# Patient Record
Sex: MALE | Race: WHITE | Employment: UNEMPLOYED | URBAN - METROPOLITAN AREA
[De-identification: names, ages, dates, MRNs, and addresses within clinical notes are randomized per-mention and may not be internally consistent; named-entity substitution may affect disease eponyms.]

---

## 2019-11-29 ENCOUNTER — TRANSFERRED RECORDS (OUTPATIENT)
Dept: HEALTH INFORMATION MANAGEMENT | Facility: CLINIC | Age: 64
End: 2019-11-29

## 2019-11-30 ENCOUNTER — TRANSFERRED RECORDS (OUTPATIENT)
Dept: HEALTH INFORMATION MANAGEMENT | Facility: CLINIC | Age: 64
End: 2019-11-30

## 2019-12-01 ENCOUNTER — APPOINTMENT (OUTPATIENT)
Dept: MRI IMAGING | Facility: CLINIC | Age: 64
DRG: 003 | End: 2019-12-01
Attending: NEUROLOGICAL SURGERY
Payer: COMMERCIAL

## 2019-12-01 ENCOUNTER — ANESTHESIA (OUTPATIENT)
Dept: SURGERY | Facility: CLINIC | Age: 64
DRG: 003 | End: 2019-12-01
Payer: COMMERCIAL

## 2019-12-01 ENCOUNTER — APPOINTMENT (OUTPATIENT)
Dept: GENERAL RADIOLOGY | Facility: CLINIC | Age: 64
DRG: 003 | End: 2019-12-01
Attending: PSYCHIATRY & NEUROLOGY
Payer: COMMERCIAL

## 2019-12-01 ENCOUNTER — APPOINTMENT (OUTPATIENT)
Dept: GENERAL RADIOLOGY | Facility: CLINIC | Age: 64
DRG: 003 | End: 2019-12-01
Attending: NEUROLOGICAL SURGERY
Payer: COMMERCIAL

## 2019-12-01 ENCOUNTER — APPOINTMENT (OUTPATIENT)
Dept: GENERAL RADIOLOGY | Facility: CLINIC | Age: 64
DRG: 003 | End: 2019-12-01
Attending: ANESTHESIOLOGY
Payer: COMMERCIAL

## 2019-12-01 ENCOUNTER — APPOINTMENT (OUTPATIENT)
Dept: GENERAL RADIOLOGY | Facility: CLINIC | Age: 64
DRG: 003 | End: 2019-12-01
Attending: STUDENT IN AN ORGANIZED HEALTH CARE EDUCATION/TRAINING PROGRAM
Payer: COMMERCIAL

## 2019-12-01 ENCOUNTER — HOSPITAL ENCOUNTER (INPATIENT)
Facility: CLINIC | Age: 64
LOS: 30 days | Discharge: ANOTHER HOSPITAL | DRG: 003 | End: 2019-12-31
Attending: NEUROLOGICAL SURGERY | Admitting: NEUROLOGICAL SURGERY
Payer: COMMERCIAL

## 2019-12-01 ENCOUNTER — ANESTHESIA EVENT (OUTPATIENT)
Dept: SURGERY | Facility: CLINIC | Age: 64
DRG: 003 | End: 2019-12-01
Payer: COMMERCIAL

## 2019-12-01 DIAGNOSIS — F10.10 ALCOHOL ABUSE: ICD-10-CM

## 2019-12-01 DIAGNOSIS — I10 BENIGN ESSENTIAL HYPERTENSION: ICD-10-CM

## 2019-12-01 DIAGNOSIS — K29.70 GASTRITIS WITHOUT BLEEDING, UNSPECIFIED CHRONICITY, UNSPECIFIED GASTRITIS TYPE: ICD-10-CM

## 2019-12-01 DIAGNOSIS — R52 PAIN: ICD-10-CM

## 2019-12-01 DIAGNOSIS — J96.01 ACUTE RESPIRATORY FAILURE WITH HYPOXIA (H): ICD-10-CM

## 2019-12-01 DIAGNOSIS — G82.50 QUADRIPLEGIA (H): Primary | ICD-10-CM

## 2019-12-01 DIAGNOSIS — S14.109A: ICD-10-CM

## 2019-12-01 DIAGNOSIS — K59.00 CONSTIPATION, UNSPECIFIED CONSTIPATION TYPE: ICD-10-CM

## 2019-12-01 DIAGNOSIS — F32.A DEPRESSION, UNSPECIFIED DEPRESSION TYPE: ICD-10-CM

## 2019-12-01 DIAGNOSIS — Z43.0 TRACHEOSTOMY CARE (H): ICD-10-CM

## 2019-12-01 DIAGNOSIS — H04.123 DRY EYES: ICD-10-CM

## 2019-12-01 DIAGNOSIS — G82.20 PARAPLEGIA (H): ICD-10-CM

## 2019-12-01 LAB
ABO + RH BLD: NORMAL
ABO + RH BLD: NORMAL
ALBUMIN SERPL-MCNC: 2.1 G/DL (ref 3.4–5)
ALBUMIN UR-MCNC: 100 MG/DL
ALP SERPL-CCNC: 103 U/L (ref 40–150)
ALT SERPL W P-5'-P-CCNC: 32 U/L (ref 0–70)
ANION GAP SERPL CALCULATED.3IONS-SCNC: 7 MMOL/L (ref 3–14)
APPEARANCE UR: CLEAR
APTT PPP: 35 SEC (ref 22–37)
AST SERPL W P-5'-P-CCNC: 17 U/L (ref 0–45)
BASE EXCESS BLDA CALC-SCNC: 3 MMOL/L
BASE EXCESS BLDA CALC-SCNC: 4.8 MMOL/L
BASE EXCESS BLDA CALC-SCNC: 5.9 MMOL/L
BILIRUB SERPL-MCNC: 0.6 MG/DL (ref 0.2–1.3)
BILIRUB UR QL STRIP: NEGATIVE
BLD GP AB SCN SERPL QL: NORMAL
BLD PROD TYP BPU: NORMAL
BLOOD BANK CMNT PATIENT-IMP: NORMAL
BUN SERPL-MCNC: 27 MG/DL (ref 7–30)
CA-I BLD-MCNC: 4.4 MG/DL (ref 4.4–5.2)
CA-I BLD-MCNC: 4.4 MG/DL (ref 4.4–5.2)
CALCIUM SERPL-MCNC: 8.7 MG/DL (ref 8.5–10.1)
CHLORIDE SERPL-SCNC: 94 MMOL/L (ref 94–109)
CO2 SERPL-SCNC: 31 MMOL/L (ref 20–32)
COLOR UR AUTO: YELLOW
CREAT SERPL-MCNC: 0.75 MG/DL (ref 0.66–1.25)
CRP SERPL-MCNC: 170 MG/L (ref 0–8)
ERYTHROCYTE [DISTWIDTH] IN BLOOD BY AUTOMATED COUNT: 12.1 % (ref 10–15)
GFR SERPL CREATININE-BSD FRML MDRD: >90 ML/MIN/{1.73_M2}
GLUCOSE BLD-MCNC: 188 MG/DL (ref 70–99)
GLUCOSE BLD-MCNC: 195 MG/DL (ref 70–99)
GLUCOSE BLDC GLUCOMTR-MCNC: 140 MG/DL (ref 70–99)
GLUCOSE BLDC GLUCOMTR-MCNC: 168 MG/DL (ref 70–99)
GLUCOSE BLDC GLUCOMTR-MCNC: 172 MG/DL (ref 70–99)
GLUCOSE BLDC GLUCOMTR-MCNC: 175 MG/DL (ref 70–99)
GLUCOSE BLDC GLUCOMTR-MCNC: 209 MG/DL (ref 70–99)
GLUCOSE BLDC GLUCOMTR-MCNC: 218 MG/DL (ref 70–99)
GLUCOSE SERPL-MCNC: 230 MG/DL (ref 70–99)
GLUCOSE UR STRIP-MCNC: NEGATIVE MG/DL
GRAM STN SPEC: ABNORMAL
HBA1C MFR BLD: 6.5 % (ref 0–5.6)
HCO3 BLD-SCNC: 27 MMOL/L (ref 21–28)
HCO3 BLD-SCNC: 30 MMOL/L (ref 21–28)
HCO3 BLD-SCNC: 31 MMOL/L (ref 21–28)
HCT VFR BLD AUTO: 36.5 % (ref 40–53)
HGB BLD-MCNC: 11.5 G/DL (ref 13.3–17.7)
HGB BLD-MCNC: 11.7 G/DL (ref 13.3–17.7)
HGB BLD-MCNC: 11.9 G/DL (ref 13.3–17.7)
HGB UR QL STRIP: NEGATIVE
INR PPP: 1.17 (ref 0.86–1.14)
KETONES UR STRIP-MCNC: NEGATIVE MG/DL
LACTATE BLD-SCNC: 1.2 MMOL/L (ref 0.7–2)
LEUKOCYTE ESTERASE UR QL STRIP: NEGATIVE
MAGNESIUM SERPL-MCNC: 2.2 MG/DL (ref 1.6–2.3)
MCH RBC QN AUTO: 29 PG (ref 26.5–33)
MCHC RBC AUTO-ENTMCNC: 32.1 G/DL (ref 31.5–36.5)
MCV RBC AUTO: 90 FL (ref 78–100)
MRSA DNA SPEC QL NAA+PROBE: NEGATIVE
NITRATE UR QL: NEGATIVE
NUM BPU REQUESTED: 2
O2/TOTAL GAS SETTING VFR VENT: 100 %
O2/TOTAL GAS SETTING VFR VENT: 100 %
O2/TOTAL GAS SETTING VFR VENT: ABNORMAL %
PCO2 BLD: 40 MM HG (ref 35–45)
PCO2 BLD: 43 MM HG (ref 35–45)
PCO2 BLD: 44 MM HG (ref 35–45)
PH BLD: 7.44 PH (ref 7.35–7.45)
PH BLD: 7.44 PH (ref 7.35–7.45)
PH BLD: 7.45 PH (ref 7.35–7.45)
PH UR STRIP: 6 PH (ref 5–7)
PHOSPHATE SERPL-MCNC: 4.1 MG/DL (ref 2.5–4.5)
PLATELET # BLD AUTO: 565 10E9/L (ref 150–450)
PO2 BLD: 114 MM HG (ref 80–105)
PO2 BLD: 192 MM HG (ref 80–105)
PO2 BLD: 61 MM HG (ref 80–105)
POTASSIUM BLD-SCNC: 4.6 MMOL/L (ref 3.4–5.3)
POTASSIUM BLD-SCNC: 4.7 MMOL/L (ref 3.4–5.3)
POTASSIUM SERPL-SCNC: 4.4 MMOL/L (ref 3.4–5.3)
PROT SERPL-MCNC: 7.4 G/DL (ref 6.8–8.8)
RADIOLOGIST FLAGS: ABNORMAL
RBC # BLD AUTO: 4.04 10E12/L (ref 4.4–5.9)
RBC #/AREA URNS AUTO: 2 /HPF (ref 0–2)
SODIUM BLD-SCNC: 135 MMOL/L (ref 133–144)
SODIUM BLD-SCNC: 135 MMOL/L (ref 133–144)
SODIUM SERPL-SCNC: 133 MMOL/L (ref 133–144)
SOURCE: ABNORMAL
SP GR UR STRIP: 1.03 (ref 1–1.03)
SPECIMEN EXP DATE BLD: NORMAL
SPECIMEN SOURCE: ABNORMAL
SPECIMEN SOURCE: NORMAL
TRANS CELLS #/AREA URNS HPF: <1 /HPF (ref 0–1)
UROBILINOGEN UR STRIP-MCNC: NORMAL MG/DL (ref 0–2)
WBC # BLD AUTO: 13.1 10E9/L (ref 4–11)
WBC #/AREA URNS AUTO: 2 /HPF (ref 0–5)

## 2019-12-01 PROCEDURE — 36000064 ZZH SURGERY LEVEL 4 EA 15 ADDTL MIN - UMMC: Performed by: NEUROLOGICAL SURGERY

## 2019-12-01 PROCEDURE — 87205 SMEAR GRAM STAIN: CPT | Performed by: NEUROLOGICAL SURGERY

## 2019-12-01 PROCEDURE — 87040 BLOOD CULTURE FOR BACTERIA: CPT | Performed by: STUDENT IN AN ORGANIZED HEALTH CARE EDUCATION/TRAINING PROGRAM

## 2019-12-01 PROCEDURE — 0RP30JZ REMOVAL OF SYNTHETIC SUBSTITUTE FROM CERVICAL VERTEBRAL DISC, OPEN APPROACH: ICD-10-PCS | Performed by: NEUROLOGICAL SURGERY

## 2019-12-01 PROCEDURE — 85610 PROTHROMBIN TIME: CPT | Performed by: STUDENT IN AN ORGANIZED HEALTH CARE EDUCATION/TRAINING PROGRAM

## 2019-12-01 PROCEDURE — 86901 BLOOD TYPING SEROLOGIC RH(D): CPT | Performed by: STUDENT IN AN ORGANIZED HEALTH CARE EDUCATION/TRAINING PROGRAM

## 2019-12-01 PROCEDURE — 37000008 ZZH ANESTHESIA TECHNICAL FEE, 1ST 30 MIN: Performed by: NEUROLOGICAL SURGERY

## 2019-12-01 PROCEDURE — 25800030 ZZH RX IP 258 OP 636: Performed by: NEUROLOGICAL SURGERY

## 2019-12-01 PROCEDURE — 25000131 ZZH RX MED GY IP 250 OP 636 PS 637: Performed by: STUDENT IN AN ORGANIZED HEALTH CARE EDUCATION/TRAINING PROGRAM

## 2019-12-01 PROCEDURE — 40000275 ZZH STATISTIC RCP TIME EA 10 MIN

## 2019-12-01 PROCEDURE — 25800030 ZZH RX IP 258 OP 636: Performed by: STUDENT IN AN ORGANIZED HEALTH CARE EDUCATION/TRAINING PROGRAM

## 2019-12-01 PROCEDURE — 27210794 ZZH OR GENERAL SUPPLY STERILE: Performed by: NEUROLOGICAL SURGERY

## 2019-12-01 PROCEDURE — 25000125 ZZHC RX 250: Performed by: NEUROLOGICAL SURGERY

## 2019-12-01 PROCEDURE — 25500064 ZZH RX 255 OP 636: Performed by: NEUROLOGICAL SURGERY

## 2019-12-01 PROCEDURE — 40000277 XR SURGERY CARM FLUORO LESS THAN 5 MIN W STILLS: Mod: TC

## 2019-12-01 PROCEDURE — 40000986 XR ABDOMEN PORT 1 VW

## 2019-12-01 PROCEDURE — 25000565 ZZH ISOFLURANE, EA 15 MIN: Performed by: NEUROLOGICAL SURGERY

## 2019-12-01 PROCEDURE — 37000009 ZZH ANESTHESIA TECHNICAL FEE, EACH ADDTL 15 MIN: Performed by: NEUROLOGICAL SURGERY

## 2019-12-01 PROCEDURE — 83735 ASSAY OF MAGNESIUM: CPT | Performed by: STUDENT IN AN ORGANIZED HEALTH CARE EDUCATION/TRAINING PROGRAM

## 2019-12-01 PROCEDURE — 87102 FUNGUS ISOLATION CULTURE: CPT | Performed by: NEUROLOGICAL SURGERY

## 2019-12-01 PROCEDURE — 25800030 ZZH RX IP 258 OP 636

## 2019-12-01 PROCEDURE — 25000125 ZZHC RX 250: Performed by: STUDENT IN AN ORGANIZED HEALTH CARE EDUCATION/TRAINING PROGRAM

## 2019-12-01 PROCEDURE — 87075 CULTR BACTERIA EXCEPT BLOOD: CPT | Performed by: NEUROLOGICAL SURGERY

## 2019-12-01 PROCEDURE — 36000066 ZZH SURGERY LEVEL 4 W FLUORO 1ST 30 MIN - UMMC: Performed by: NEUROLOGICAL SURGERY

## 2019-12-01 PROCEDURE — L1499 SPINAL ORTHOSIS NOS: HCPCS

## 2019-12-01 PROCEDURE — 87185 SC STD ENZYME DETCJ PER NZM: CPT | Performed by: NEUROLOGICAL SURGERY

## 2019-12-01 PROCEDURE — 25000132 ZZH RX MED GY IP 250 OP 250 PS 637: Performed by: STUDENT IN AN ORGANIZED HEALTH CARE EDUCATION/TRAINING PROGRAM

## 2019-12-01 PROCEDURE — 25800030 ZZH RX IP 258 OP 636: Performed by: NURSE ANESTHETIST, CERTIFIED REGISTERED

## 2019-12-01 PROCEDURE — 87070 CULTURE OTHR SPECIMN AEROBIC: CPT | Performed by: NEUROLOGICAL SURGERY

## 2019-12-01 PROCEDURE — 25000128 H RX IP 250 OP 636: Performed by: NEUROLOGICAL SURGERY

## 2019-12-01 PROCEDURE — L0172 CERV COL SR FOAM 2PC PRE OTS: HCPCS

## 2019-12-01 PROCEDURE — 0CQM0ZZ REPAIR PHARYNX, OPEN APPROACH: ICD-10-PCS | Performed by: OTOLARYNGOLOGY

## 2019-12-01 PROCEDURE — 27210437 ZZH NUTRITION PRODUCT SEMIELEM INTERMED LITER

## 2019-12-01 PROCEDURE — 71045 X-RAY EXAM CHEST 1 VIEW: CPT | Mod: 77

## 2019-12-01 PROCEDURE — 80053 COMPREHEN METABOLIC PANEL: CPT | Performed by: STUDENT IN AN ORGANIZED HEALTH CARE EDUCATION/TRAINING PROGRAM

## 2019-12-01 PROCEDURE — 93010 ELECTROCARDIOGRAM REPORT: CPT | Mod: 76 | Performed by: INTERNAL MEDICINE

## 2019-12-01 PROCEDURE — 87181 SC STD AGAR DILUTION PER AGT: CPT | Performed by: NEUROLOGICAL SURGERY

## 2019-12-01 PROCEDURE — 81001 URINALYSIS AUTO W/SCOPE: CPT | Performed by: PSYCHIATRY & NEUROLOGY

## 2019-12-01 PROCEDURE — 84132 ASSAY OF SERUM POTASSIUM: CPT | Performed by: NEUROLOGICAL SURGERY

## 2019-12-01 PROCEDURE — 36415 COLL VENOUS BLD VENIPUNCTURE: CPT | Performed by: STUDENT IN AN ORGANIZED HEALTH CARE EDUCATION/TRAINING PROGRAM

## 2019-12-01 PROCEDURE — 84295 ASSAY OF SERUM SODIUM: CPT | Performed by: NEUROLOGICAL SURGERY

## 2019-12-01 PROCEDURE — 86140 C-REACTIVE PROTEIN: CPT | Performed by: STUDENT IN AN ORGANIZED HEALTH CARE EDUCATION/TRAINING PROGRAM

## 2019-12-01 PROCEDURE — 25000125 ZZHC RX 250: Performed by: NURSE ANESTHETIST, CERTIFIED REGISTERED

## 2019-12-01 PROCEDURE — 25000128 H RX IP 250 OP 636: Performed by: NURSE ANESTHETIST, CERTIFIED REGISTERED

## 2019-12-01 PROCEDURE — 93005 ELECTROCARDIOGRAM TRACING: CPT

## 2019-12-01 PROCEDURE — 83036 HEMOGLOBIN GLYCOSYLATED A1C: CPT | Performed by: STUDENT IN AN ORGANIZED HEALTH CARE EDUCATION/TRAINING PROGRAM

## 2019-12-01 PROCEDURE — 27210995 ZZH RX 272: Performed by: NEUROLOGICAL SURGERY

## 2019-12-01 PROCEDURE — 82803 BLOOD GASES ANY COMBINATION: CPT | Performed by: NEUROLOGICAL SURGERY

## 2019-12-01 PROCEDURE — 72040 X-RAY EXAM NECK SPINE 2-3 VW: CPT

## 2019-12-01 PROCEDURE — 40000065 ZZH STATISTIC EKG NON-CHARGEABLE

## 2019-12-01 PROCEDURE — 86850 RBC ANTIBODY SCREEN: CPT | Performed by: STUDENT IN AN ORGANIZED HEALTH CARE EDUCATION/TRAINING PROGRAM

## 2019-12-01 PROCEDURE — 82803 BLOOD GASES ANY COMBINATION: CPT | Performed by: PSYCHIATRY & NEUROLOGY

## 2019-12-01 PROCEDURE — 82947 ASSAY GLUCOSE BLOOD QUANT: CPT | Performed by: NEUROLOGICAL SURGERY

## 2019-12-01 PROCEDURE — A9585 GADOBUTROL INJECTION: HCPCS | Performed by: NEUROLOGICAL SURGERY

## 2019-12-01 PROCEDURE — 87640 STAPH A DNA AMP PROBE: CPT | Performed by: STUDENT IN AN ORGANIZED HEALTH CARE EDUCATION/TRAINING PROGRAM

## 2019-12-01 PROCEDURE — 25000132 ZZH RX MED GY IP 250 OP 250 PS 637: Performed by: NEUROLOGICAL SURGERY

## 2019-12-01 PROCEDURE — 87077 CULTURE AEROBIC IDENTIFY: CPT | Performed by: NEUROLOGICAL SURGERY

## 2019-12-01 PROCEDURE — 40000556 ZZH STATISTIC PERIPHERAL IV START W US GUIDANCE

## 2019-12-01 PROCEDURE — 87076 CULTURE ANAEROBE IDENT EACH: CPT | Performed by: NEUROLOGICAL SURGERY

## 2019-12-01 PROCEDURE — 85730 THROMBOPLASTIN TIME PARTIAL: CPT | Performed by: STUDENT IN AN ORGANIZED HEALTH CARE EDUCATION/TRAINING PROGRAM

## 2019-12-01 PROCEDURE — 87641 MR-STAPH DNA AMP PROBE: CPT | Performed by: STUDENT IN AN ORGANIZED HEALTH CARE EDUCATION/TRAINING PROGRAM

## 2019-12-01 PROCEDURE — 00000146 ZZHCL STATISTIC GLUCOSE BY METER IP

## 2019-12-01 PROCEDURE — 72156 MRI NECK SPINE W/O & W/DYE: CPT

## 2019-12-01 PROCEDURE — 0CJS8ZZ INSPECTION OF LARYNX, VIA NATURAL OR ARTIFICIAL OPENING ENDOSCOPIC: ICD-10-PCS | Performed by: OTOLARYNGOLOGY

## 2019-12-01 PROCEDURE — 71045 X-RAY EXAM CHEST 1 VIEW: CPT

## 2019-12-01 PROCEDURE — 00NW0ZZ RELEASE CERVICAL SPINAL CORD, OPEN APPROACH: ICD-10-PCS | Performed by: NEUROLOGICAL SURGERY

## 2019-12-01 PROCEDURE — 86900 BLOOD TYPING SEROLOGIC ABO: CPT | Performed by: STUDENT IN AN ORGANIZED HEALTH CARE EDUCATION/TRAINING PROGRAM

## 2019-12-01 PROCEDURE — 87176 TISSUE HOMOGENIZATION CULTR: CPT | Performed by: NEUROLOGICAL SURGERY

## 2019-12-01 PROCEDURE — 86923 COMPATIBILITY TEST ELECTRIC: CPT | Performed by: STUDENT IN AN ORGANIZED HEALTH CARE EDUCATION/TRAINING PROGRAM

## 2019-12-01 PROCEDURE — 25000128 H RX IP 250 OP 636: Performed by: STUDENT IN AN ORGANIZED HEALTH CARE EDUCATION/TRAINING PROGRAM

## 2019-12-01 PROCEDURE — 84100 ASSAY OF PHOSPHORUS: CPT | Performed by: STUDENT IN AN ORGANIZED HEALTH CARE EDUCATION/TRAINING PROGRAM

## 2019-12-01 PROCEDURE — 20000004 ZZH R&B ICU UMMC

## 2019-12-01 PROCEDURE — 40000014 ZZH STATISTIC ARTERIAL MONITORING DAILY

## 2019-12-01 PROCEDURE — 40000170 ZZH STATISTIC PRE-PROCEDURE ASSESSMENT II: Performed by: NEUROLOGICAL SURGERY

## 2019-12-01 PROCEDURE — 94002 VENT MGMT INPAT INIT DAY: CPT

## 2019-12-01 PROCEDURE — 83605 ASSAY OF LACTIC ACID: CPT | Performed by: NEUROLOGICAL SURGERY

## 2019-12-01 PROCEDURE — 40000196 ZZH STATISTIC RAPCV CVP MONITORING

## 2019-12-01 PROCEDURE — 82330 ASSAY OF CALCIUM: CPT | Performed by: NEUROLOGICAL SURGERY

## 2019-12-01 PROCEDURE — 85027 COMPLETE CBC AUTOMATED: CPT | Performed by: STUDENT IN AN ORGANIZED HEALTH CARE EDUCATION/TRAINING PROGRAM

## 2019-12-01 PROCEDURE — 87186 SC STD MICRODIL/AGAR DIL: CPT | Performed by: NEUROLOGICAL SURGERY

## 2019-12-01 PROCEDURE — 40000281 ZZH STATISTIC TRANSPORT TIME EA 15 MIN

## 2019-12-01 PROCEDURE — 25000128 H RX IP 250 OP 636

## 2019-12-01 RX ORDER — AMOXICILLIN 250 MG
1 CAPSULE ORAL 2 TIMES DAILY PRN
Status: DISCONTINUED | OUTPATIENT
Start: 2019-12-01 | End: 2019-12-01

## 2019-12-01 RX ORDER — FOLIC ACID 1 MG/1
1 TABLET ORAL DAILY
Status: DISCONTINUED | OUTPATIENT
Start: 2019-12-04 | End: 2019-12-01

## 2019-12-01 RX ORDER — FOLIC ACID 5 MG/ML
1 INJECTION, SOLUTION INTRAMUSCULAR; INTRAVENOUS; SUBCUTANEOUS DAILY
Status: DISCONTINUED | OUTPATIENT
Start: 2019-12-02 | End: 2019-12-03

## 2019-12-01 RX ORDER — OXYCODONE HCL 5 MG/5 ML
5-10 SOLUTION, ORAL ORAL EVERY 4 HOURS PRN
Status: DISCONTINUED | OUTPATIENT
Start: 2019-12-01 | End: 2019-12-31 | Stop reason: HOSPADM

## 2019-12-01 RX ORDER — NICOTINE POLACRILEX 4 MG
15-30 LOZENGE BUCCAL
Status: DISCONTINUED | OUTPATIENT
Start: 2019-12-01 | End: 2019-12-31 | Stop reason: HOSPADM

## 2019-12-01 RX ORDER — FENTANYL CITRATE 50 UG/ML
INJECTION, SOLUTION INTRAMUSCULAR; INTRAVENOUS PRN
Status: DISCONTINUED | OUTPATIENT
Start: 2019-12-01 | End: 2019-12-01

## 2019-12-01 RX ORDER — FOLIC ACID 1 MG/1
1 TABLET ORAL DAILY
Status: DISCONTINUED | OUTPATIENT
Start: 2019-12-04 | End: 2019-12-31 | Stop reason: HOSPADM

## 2019-12-01 RX ORDER — DEXMEDETOMIDINE HYDROCHLORIDE 4 UG/ML
0.2-0.7 INJECTION, SOLUTION INTRAVENOUS CONTINUOUS
Status: DISCONTINUED | OUTPATIENT
Start: 2019-12-01 | End: 2019-12-06

## 2019-12-01 RX ORDER — CEFAZOLIN SODIUM 2 G/100ML
2 INJECTION, SOLUTION INTRAVENOUS
Status: DISCONTINUED | OUTPATIENT
Start: 2019-12-01 | End: 2019-12-01 | Stop reason: HOSPADM

## 2019-12-01 RX ORDER — DEXMEDETOMIDINE HYDROCHLORIDE 4 UG/ML
0.2-0.7 INJECTION, SOLUTION INTRAVENOUS CONTINUOUS
Status: DISCONTINUED | OUTPATIENT
Start: 2019-12-01 | End: 2019-12-01

## 2019-12-01 RX ORDER — SODIUM CHLORIDE 9 MG/ML
INJECTION, SOLUTION INTRAVENOUS CONTINUOUS
Status: DISCONTINUED | OUTPATIENT
Start: 2019-12-01 | End: 2019-12-02

## 2019-12-01 RX ORDER — LANOLIN ALCOHOL/MO/W.PET/CERES
100 CREAM (GRAM) TOPICAL 3 TIMES DAILY
Status: DISCONTINUED | OUTPATIENT
Start: 2019-12-03 | End: 2019-12-03

## 2019-12-01 RX ORDER — CEFAZOLIN SODIUM 2 G/100ML
INJECTION, SOLUTION INTRAVENOUS PRN
Status: DISCONTINUED | OUTPATIENT
Start: 2019-12-01 | End: 2019-12-01

## 2019-12-01 RX ORDER — DEXTROSE MONOHYDRATE 25 G/50ML
25-50 INJECTION, SOLUTION INTRAVENOUS
Status: DISCONTINUED | OUTPATIENT
Start: 2019-12-01 | End: 2019-12-31 | Stop reason: HOSPADM

## 2019-12-01 RX ORDER — PROPOFOL 10 MG/ML
5-75 INJECTION, EMULSION INTRAVENOUS CONTINUOUS
Status: DISCONTINUED | OUTPATIENT
Start: 2019-12-01 | End: 2019-12-01

## 2019-12-01 RX ORDER — POTASSIUM CHLORIDE 7.45 MG/ML
10 INJECTION INTRAVENOUS
Status: DISCONTINUED | OUTPATIENT
Start: 2019-12-01 | End: 2019-12-31 | Stop reason: HOSPADM

## 2019-12-01 RX ORDER — NALOXONE HYDROCHLORIDE 0.4 MG/ML
.1-.4 INJECTION, SOLUTION INTRAMUSCULAR; INTRAVENOUS; SUBCUTANEOUS
Status: DISCONTINUED | OUTPATIENT
Start: 2019-12-01 | End: 2019-12-31 | Stop reason: HOSPADM

## 2019-12-01 RX ORDER — AMOXICILLIN 250 MG
2 CAPSULE ORAL 2 TIMES DAILY PRN
Status: DISCONTINUED | OUTPATIENT
Start: 2019-12-01 | End: 2019-12-01

## 2019-12-01 RX ORDER — LANOLIN ALCOHOL/MO/W.PET/CERES
100 CREAM (GRAM) TOPICAL DAILY
Status: DISCONTINUED | OUTPATIENT
Start: 2019-12-08 | End: 2019-12-04

## 2019-12-01 RX ORDER — NOREPINEPHRINE BITARTRATE 0.06 MG/ML
0.03-0.4 INJECTION, SOLUTION INTRAVENOUS CONTINUOUS
Status: DISCONTINUED | OUTPATIENT
Start: 2019-12-01 | End: 2019-12-01

## 2019-12-01 RX ORDER — ONDANSETRON 4 MG/1
4-8 TABLET, ORALLY DISINTEGRATING ORAL EVERY 6 HOURS PRN
Status: DISCONTINUED | OUTPATIENT
Start: 2019-12-01 | End: 2019-12-31 | Stop reason: HOSPADM

## 2019-12-01 RX ORDER — PROCHLORPERAZINE MALEATE 5 MG
10 TABLET ORAL EVERY 6 HOURS PRN
Status: DISCONTINUED | OUTPATIENT
Start: 2019-12-01 | End: 2019-12-31 | Stop reason: HOSPADM

## 2019-12-01 RX ORDER — LANOLIN ALCOHOL/MO/W.PET/CERES
100 CREAM (GRAM) TOPICAL 3 TIMES DAILY
Status: DISCONTINUED | OUTPATIENT
Start: 2019-12-03 | End: 2019-12-01

## 2019-12-01 RX ORDER — PROPOFOL 10 MG/ML
5-75 INJECTION, EMULSION INTRAVENOUS CONTINUOUS
Status: DISCONTINUED | OUTPATIENT
Start: 2019-12-01 | End: 2019-12-03

## 2019-12-01 RX ORDER — AMOXICILLIN 250 MG
2 CAPSULE ORAL 2 TIMES DAILY PRN
Status: DISCONTINUED | OUTPATIENT
Start: 2019-12-01 | End: 2019-12-02

## 2019-12-01 RX ORDER — PROCHLORPERAZINE 25 MG
25 SUPPOSITORY, RECTAL RECTAL EVERY 12 HOURS PRN
Status: DISCONTINUED | OUTPATIENT
Start: 2019-12-01 | End: 2019-12-31 | Stop reason: HOSPADM

## 2019-12-01 RX ORDER — ONDANSETRON 2 MG/ML
4-8 INJECTION INTRAMUSCULAR; INTRAVENOUS EVERY 6 HOURS PRN
Status: DISCONTINUED | OUTPATIENT
Start: 2019-12-01 | End: 2019-12-31 | Stop reason: HOSPADM

## 2019-12-01 RX ORDER — LANOLIN ALCOHOL/MO/W.PET/CERES
100 CREAM (GRAM) TOPICAL DAILY
Status: DISCONTINUED | OUTPATIENT
Start: 2019-12-08 | End: 2019-12-01

## 2019-12-01 RX ORDER — FOLIC ACID 5 MG/ML
1 INJECTION, SOLUTION INTRAMUSCULAR; INTRAVENOUS; SUBCUTANEOUS ONCE
Status: COMPLETED | OUTPATIENT
Start: 2019-12-01 | End: 2019-12-01

## 2019-12-01 RX ORDER — SODIUM CHLORIDE, SODIUM LACTATE, POTASSIUM CHLORIDE, CALCIUM CHLORIDE 600; 310; 30; 20 MG/100ML; MG/100ML; MG/100ML; MG/100ML
INJECTION, SOLUTION INTRAVENOUS CONTINUOUS
Status: DISCONTINUED | OUTPATIENT
Start: 2019-12-01 | End: 2019-12-01 | Stop reason: HOSPADM

## 2019-12-01 RX ORDER — AMOXICILLIN 250 MG
1 CAPSULE ORAL 2 TIMES DAILY PRN
Status: DISCONTINUED | OUTPATIENT
Start: 2019-12-01 | End: 2019-12-02

## 2019-12-01 RX ORDER — POTASSIUM CL/LIDO/0.9 % NACL 10MEQ/0.1L
10 INTRAVENOUS SOLUTION, PIGGYBACK (ML) INTRAVENOUS
Status: DISCONTINUED | OUTPATIENT
Start: 2019-12-01 | End: 2019-12-31 | Stop reason: HOSPADM

## 2019-12-01 RX ORDER — OXYCODONE HCL 5 MG/5 ML
5-10 SOLUTION, ORAL ORAL EVERY 4 HOURS PRN
Status: DISCONTINUED | OUTPATIENT
Start: 2019-12-01 | End: 2019-12-01

## 2019-12-01 RX ORDER — NOREPINEPHRINE BITARTRATE 0.06 MG/ML
0.03-0.4 INJECTION, SOLUTION INTRAVENOUS CONTINUOUS
Status: DISCONTINUED | OUTPATIENT
Start: 2019-12-01 | End: 2019-12-03

## 2019-12-01 RX ORDER — PROPOFOL 10 MG/ML
INJECTION, EMULSION INTRAVENOUS PRN
Status: DISCONTINUED | OUTPATIENT
Start: 2019-12-01 | End: 2019-12-01

## 2019-12-01 RX ORDER — SODIUM CHLORIDE, SODIUM LACTATE, POTASSIUM CHLORIDE, CALCIUM CHLORIDE 600; 310; 30; 20 MG/100ML; MG/100ML; MG/100ML; MG/100ML
INJECTION, SOLUTION INTRAVENOUS CONTINUOUS PRN
Status: DISCONTINUED | OUTPATIENT
Start: 2019-12-01 | End: 2019-12-01

## 2019-12-01 RX ORDER — MULTIPLE VITAMINS W/ MINERALS TAB 9MG-400MCG
1 TAB ORAL DAILY
Status: DISCONTINUED | OUTPATIENT
Start: 2019-12-01 | End: 2019-12-01

## 2019-12-01 RX ORDER — POTASSIUM CHLORIDE 29.8 MG/ML
20 INJECTION INTRAVENOUS
Status: DISCONTINUED | OUTPATIENT
Start: 2019-12-01 | End: 2019-12-31 | Stop reason: HOSPADM

## 2019-12-01 RX ORDER — BISACODYL 10 MG
10 SUPPOSITORY, RECTAL RECTAL DAILY PRN
Status: DISCONTINUED | OUTPATIENT
Start: 2019-12-01 | End: 2019-12-14

## 2019-12-01 RX ORDER — CEFAZOLIN SODIUM 1 G/3ML
1 INJECTION, POWDER, FOR SOLUTION INTRAMUSCULAR; INTRAVENOUS SEE ADMIN INSTRUCTIONS
Status: DISCONTINUED | OUTPATIENT
Start: 2019-12-01 | End: 2019-12-01 | Stop reason: HOSPADM

## 2019-12-01 RX ORDER — POTASSIUM CHLORIDE 1.5 G/1.58G
20-40 POWDER, FOR SOLUTION ORAL
Status: DISCONTINUED | OUTPATIENT
Start: 2019-12-01 | End: 2019-12-31 | Stop reason: HOSPADM

## 2019-12-01 RX ORDER — PROPOFOL 10 MG/ML
INJECTION, EMULSION INTRAVENOUS CONTINUOUS PRN
Status: DISCONTINUED | OUTPATIENT
Start: 2019-12-01 | End: 2019-12-01

## 2019-12-01 RX ORDER — ALBUTEROL SULFATE 0.83 MG/ML
2.5 SOLUTION RESPIRATORY (INHALATION) EVERY 6 HOURS PRN
Status: DISCONTINUED | OUTPATIENT
Start: 2019-12-01 | End: 2019-12-09

## 2019-12-01 RX ORDER — POTASSIUM CHLORIDE 750 MG/1
20-40 TABLET, EXTENDED RELEASE ORAL
Status: DISCONTINUED | OUTPATIENT
Start: 2019-12-01 | End: 2019-12-31 | Stop reason: HOSPADM

## 2019-12-01 RX ORDER — MAGNESIUM SULFATE HEPTAHYDRATE 40 MG/ML
4 INJECTION, SOLUTION INTRAVENOUS EVERY 4 HOURS PRN
Status: DISCONTINUED | OUTPATIENT
Start: 2019-12-01 | End: 2019-12-31 | Stop reason: HOSPADM

## 2019-12-01 RX ORDER — GADOBUTROL 604.72 MG/ML
10 INJECTION INTRAVENOUS ONCE
Status: COMPLETED | OUTPATIENT
Start: 2019-12-01 | End: 2019-12-01

## 2019-12-01 RX ADMIN — FENTANYL CITRATE 50 MCG: 50 INJECTION, SOLUTION INTRAMUSCULAR; INTRAVENOUS at 08:10

## 2019-12-01 RX ADMIN — GADOBUTROL 10 ML: 604.72 INJECTION INTRAVENOUS at 03:25

## 2019-12-01 RX ADMIN — PHENYLEPHRINE HYDROCHLORIDE 100 MCG: 10 INJECTION INTRAVENOUS at 10:14

## 2019-12-01 RX ADMIN — SUGAMMADEX 150 MG: 100 INJECTION, SOLUTION INTRAVENOUS at 13:49

## 2019-12-01 RX ADMIN — THIAMINE HYDROCHLORIDE 200 MG: 100 INJECTION, SOLUTION INTRAMUSCULAR; INTRAVENOUS at 20:01

## 2019-12-01 RX ADMIN — PROPOFOL 50 MG: 10 INJECTION, EMULSION INTRAVENOUS at 09:50

## 2019-12-01 RX ADMIN — DEXMEDETOMIDINE 0.7 MCG/KG/HR: 100 INJECTION, SOLUTION, CONCENTRATE INTRAVENOUS at 22:30

## 2019-12-01 RX ADMIN — METRONIDAZOLE 500 MG: 500 INJECTION, SOLUTION INTRAVENOUS at 15:22

## 2019-12-01 RX ADMIN — DEXMEDETOMIDINE 0.4 MCG/KG/HR: 100 INJECTION, SOLUTION, CONCENTRATE INTRAVENOUS at 15:26

## 2019-12-01 RX ADMIN — FOLIC ACID 1 MG: 5 INJECTION, SOLUTION INTRAMUSCULAR; INTRAVENOUS; SUBCUTANEOUS at 20:10

## 2019-12-01 RX ADMIN — VANCOMYCIN HYDROCHLORIDE 2250 MG: 10 INJECTION, POWDER, LYOPHILIZED, FOR SOLUTION INTRAVENOUS at 18:27

## 2019-12-01 RX ADMIN — PHENYLEPHRINE HYDROCHLORIDE 100 MCG: 10 INJECTION INTRAVENOUS at 08:18

## 2019-12-01 RX ADMIN — MIDAZOLAM 1 MG: 1 INJECTION INTRAMUSCULAR; INTRAVENOUS at 08:21

## 2019-12-01 RX ADMIN — CEFAZOLIN SODIUM 2 G: 2 INJECTION, SOLUTION INTRAVENOUS at 09:22

## 2019-12-01 RX ADMIN — SODIUM CHLORIDE: 9 INJECTION, SOLUTION INTRAVENOUS at 01:06

## 2019-12-01 RX ADMIN — ROCURONIUM BROMIDE 20 MG: 10 INJECTION INTRAVENOUS at 13:24

## 2019-12-01 RX ADMIN — ROCURONIUM BROMIDE 80 MG: 10 INJECTION INTRAVENOUS at 08:10

## 2019-12-01 RX ADMIN — PHENYLEPHRINE HYDROCHLORIDE 0.04 MCG/KG/MIN: 10 INJECTION INTRAVENOUS at 14:56

## 2019-12-01 RX ADMIN — INSULIN ASPART 2 UNITS: 100 INJECTION, SOLUTION INTRAVENOUS; SUBCUTANEOUS at 23:32

## 2019-12-01 RX ADMIN — MIDAZOLAM 1 MG: 1 INJECTION INTRAMUSCULAR; INTRAVENOUS at 08:10

## 2019-12-01 RX ADMIN — INSULIN ASPART 1 UNITS: 100 INJECTION, SOLUTION INTRAVENOUS; SUBCUTANEOUS at 19:59

## 2019-12-01 RX ADMIN — PROPOFOL 60 MCG/KG/MIN: 10 INJECTION, EMULSION INTRAVENOUS at 14:57

## 2019-12-01 RX ADMIN — PHENYLEPHRINE HYDROCHLORIDE 100 MCG: 10 INJECTION INTRAVENOUS at 08:23

## 2019-12-01 RX ADMIN — ACETAMINOPHEN 650 MG: 325 SOLUTION ORAL at 23:49

## 2019-12-01 RX ADMIN — DEXMEDETOMIDINE 0.6 MCG/KG/HR: 100 INJECTION, SOLUTION, CONCENTRATE INTRAVENOUS at 15:46

## 2019-12-01 RX ADMIN — FENTANYL CITRATE 100 MCG: 50 INJECTION, SOLUTION INTRAMUSCULAR; INTRAVENOUS at 12:05

## 2019-12-01 RX ADMIN — SODIUM CHLORIDE, POTASSIUM CHLORIDE, SODIUM LACTATE AND CALCIUM CHLORIDE: 600; 310; 30; 20 INJECTION, SOLUTION INTRAVENOUS at 07:59

## 2019-12-01 RX ADMIN — FENTANYL CITRATE 50 MCG: 50 INJECTION, SOLUTION INTRAMUSCULAR; INTRAVENOUS at 10:31

## 2019-12-01 RX ADMIN — PHENYLEPHRINE HYDROCHLORIDE 0.5 MCG/KG/MIN: 10 INJECTION INTRAVENOUS at 08:41

## 2019-12-01 RX ADMIN — PROPOFOL 25 MCG/KG/MIN: 10 INJECTION, EMULSION INTRAVENOUS at 16:29

## 2019-12-01 RX ADMIN — CEFEPIME HYDROCHLORIDE 2 G: 2 INJECTION, POWDER, FOR SOLUTION INTRAVENOUS at 16:31

## 2019-12-01 RX ADMIN — PROPOFOL 50 MCG/KG/MIN: 10 INJECTION, EMULSION INTRAVENOUS at 08:26

## 2019-12-01 RX ADMIN — CEFAZOLIN SODIUM 1 G: 2 INJECTION, SOLUTION INTRAVENOUS at 13:33

## 2019-12-01 RX ADMIN — ROCURONIUM BROMIDE 20 MG: 10 INJECTION INTRAVENOUS at 09:48

## 2019-12-01 RX ADMIN — ALBUTEROL SULFATE 2.5 MG: 2.5 SOLUTION RESPIRATORY (INHALATION) at 05:43

## 2019-12-01 RX ADMIN — CEFAZOLIN SODIUM 1 G: 2 INJECTION, SOLUTION INTRAVENOUS at 11:28

## 2019-12-01 RX ADMIN — PHENYLEPHRINE HYDROCHLORIDE 100 MCG: 10 INJECTION INTRAVENOUS at 08:20

## 2019-12-01 RX ADMIN — METRONIDAZOLE 500 MG: 500 INJECTION, SOLUTION INTRAVENOUS at 21:05

## 2019-12-01 RX ADMIN — Medication 25 MCG/HR: at 15:00

## 2019-12-01 RX ADMIN — MULTIVITAMIN 15 ML: LIQUID ORAL at 20:00

## 2019-12-01 RX ADMIN — SODIUM CHLORIDE, POTASSIUM CHLORIDE, SODIUM LACTATE AND CALCIUM CHLORIDE: 600; 310; 30; 20 INJECTION, SOLUTION INTRAVENOUS at 08:23

## 2019-12-01 RX ADMIN — Medication 0.03 MCG/KG/MIN: at 17:04

## 2019-12-01 RX ADMIN — INSULIN ASPART 1 UNITS: 100 INJECTION, SOLUTION INTRAVENOUS; SUBCUTANEOUS at 04:23

## 2019-12-01 RX ADMIN — CEFEPIME HYDROCHLORIDE 2 G: 2 INJECTION, POWDER, FOR SOLUTION INTRAVENOUS at 23:37

## 2019-12-01 ASSESSMENT — ACTIVITIES OF DAILY LIVING (ADL)
WHICH_OF_THE_ABOVE_FUNCTIONAL_RISKS_HAD_A_RECENT_ONSET_OR_CHANGE?: AMBULATION;TRANSFERRING;TOILETING;BATHING;DRESSING;EATING;SWALLOWING;COGNITION;COMMUNICATION/SPEECH;FALL HISTORY
RETIRED_COMMUNICATION: 0-->UNDERSTANDS/COMMUNICATES WITHOUT DIFFICULTY
TOILETING: 3-->ASSISTIVE EQUIPMENT AND PERSON
TRANSFERRING: 4-->COMPLETELY DEPENDENT
AMBULATION: 4-->COMPLETELY DEPENDENT
SWALLOWING: 2-->DIFFICULTY SWALLOWING FOODS
COGNITION: 0 - NO COGNITION ISSUES REPORTED
ADLS_ACUITY_SCORE: 41
FALL_HISTORY_WITHIN_LAST_SIX_MONTHS: YES
BATHING: 4-->COMPLETELY DEPENDENT
RETIRED_EATING: 4-->COMPLETELY DEPENDENT
NUMBER_OF_TIMES_PATIENT_HAS_FALLEN_WITHIN_LAST_SIX_MONTHS: 20
DRESS: 4-->COMPLETELY DEPENDENT
PRIOR_FUNCTIONAL_LEVEL_COMMENT: INDEPENDENT
ADLS_ACUITY_SCORE: 41
ADLS_ACUITY_SCORE: 41

## 2019-12-01 ASSESSMENT — MIFFLIN-ST. JEOR: SCORE: 1636.78

## 2019-12-01 NOTE — BRIEF OP NOTE
[facility], [place]    Brief Operative Note    Pre-operative diagnosis: Spinal cord injury at C1-C4 level, initial encounter (H) [S14.101A]  Post-operative diagnosis Spinal cord injury at C1-C4 level, initial encounter (H) [S14.101A]    Procedure: Procedure(s):  IRRIGATION AND DEBRIDEMENT OF NECK; PHARYNGOTOMY REPAIR.  REMOVAL, HARDWARE, SPINE, CERVICAL, ANTERIOR  FUSION, SPINE, CERVICAL, 3 OR MORE LEVELS, ANTERIOR APPROACH  Surgeon: Surgeon(s) and Role:     * Fernando Ashton MD - Primary     * Kathy Mercado MD - Assisting     * Merlin Quintanilla MD - Resident - Assisting     * Reagan Faustin MD - Resident - Assisting     * Kemar Akins MD - Resident - Assisting     * Nate Urbina MD - Resident - Assisting     * Ronak Cummins MD - Resident - Observing  Anesthesia: General   Estimated blood loss: 30cc  Drains: [employer]  Specimens:   ID Type Source Tests Collected by Time Destination   1 : VERTEBRAL PHLEGMON Tissue Spine, Cervical ANAEROBIC BACTERIAL CULTURE, FUNGUS CULTURE, GRAM STAIN, TISSUE CULTURE AEROBIC BACTERIAL Fernando Ashton MD 12/1/2019 12:25 PM      Findings:  Pharyngotomy closed with assistance from ENT. Arthroplasty implant removed. No replacement implant placed.  Complications: None.  Implants: * No implants in log *

## 2019-12-01 NOTE — PROGRESS NOTES
"CLINICAL NUTRITION SERVICES - ASSESSMENT NOTE     Nutrition Prescription    RECOMMENDATIONS FOR MDs/PROVIDERS TO ORDER:   Adjust free water flushes via feeding tube as needed, pending fluid status. Currently, free water flushes are minimal, or for patency. Adjust IVFs as needed.      Malnutrition Status:    Unable to determine due to lack of weight data points in EMR and pt is intubated    Recommendations already ordered by Registered Dietitian (RD):  TFs, free water, and multivitamin with minerals    Future/Additional Recommendations:  1. When appropriate, consult SLP and adv diet as per SLP. Order oral supplements between meals, when appropriate.   2. Monitor BG control. H/o DM. Hgb A1c was 6.5 on 12/1/19.   3. Monitor lytes (Phos, Mg++, and K+) for refeeding syndrome. If lytes trend low, aggressively replace.         REASON FOR ASSESSMENT  Sherwin Angel is a/an 64 year old male assessed by the dietitian for Admission Nutrition Risk Screen for tube feeding or parenteral nutrition and Provider Order - Registered Dietitian to Assess and Order TF per Medical Nutrition Therapy Protocol    NUTRITION HISTORY  Pt not known to this service PTA.   Pt transferred from the [facility]. Per H & P, \"Status post anterior C3-C4 arthroplasty about 2 weeks ago complicated by dysphasia was transferred from an outside hospital after a fall today in rehab, and now presents paraplegic and near loss of motor function upper extremities bilateral. No N/V or change in taste/smell.\" Chart indicates pt has a h/o DM and HTN.   Unable to obtain nutrition history. Pt is intubated and no family is in his room.     CURRENT NUTRITION ORDERS  Diet: No order. Admitted just today (12/1).  Intake/Tolerance: N/A    Nutrition Support: Per chart, LIOR KING from OSH, clamped, no drainage.      LABS  Labs reviewed  Note,  on 12/1/19.    MEDICATIONS  Medications reviewed  Gtt: Noting, precedex, fent, phenylephrine, and propofol " "(providing 549 kcals/day at current rate). Note, on NS at 150 mL/hr.     ANTHROPOMETRICS  Height: 172.7 cm (5' 8\")  Most Recent Weight: 86.7 kg (191 lb 3.2 oz)    IBW: 64.8 kg - Adjusted for new paraplegia     BMI: Overweight BMI 25-29.9. However, with new paraplegia, pt's BMI would fit into the obese wt status criteria when accounting for SCI.  Weight History: Not available in EMR.   Dosing Weight: 70 kg (adjusted, based on lowest wt so far this admission of 86.7 kg on 12/1)    ASSESSED NUTRITION NEEDS  Estimated Energy Needs: 5639-9962 kcals/day (25 - 30 kcals/kg)  Justification: Decreased needs with paraplegia but potentially increased needs with stress factors  Estimated Protein Needs: 105-140 grams protein/day (1.5 - 2 grams of pro/kg)  Justification: Increased needs with new paraplegia  Estimated Fluid Needs: 7167-1107 mL/day (30 - 35 mL/kg)   Justification: Maintenance needs or per team, pending fluid status    PHYSICAL FINDINGS/OTHER FINDINGS  See malnutrition section below.  Resp: Intubated.  GI: Last stool yesterday, 11/30?  Skin (per RN): Bruise on L forearm, healing incision site on R side of jawline, healing wound on bilateral knees; other skin intact.    MALNUTRITION   % Intake: Unable to assess as pt is intubated.  % Weight Loss: Unable to assess. Data not available.  Subcutaneous Fat Loss: None observed  Muscle Loss: None observed  Fluid Accumulation/Edema: None noted  Malnutrition Diagnosis: Unable to determine due to lack of weight data points in EMR and pt is intubated         NUTRITION DIAGNOSIS  Predicted inadequate nutrient intake (protein-energy) related to no diet order and intubated.    INTERVENTIONS  Implementation  Nutrition Education: Per provider order if indicated   Collaboration with other providers: Discussed pt with RN. Per RN, page ENT to see if ok to start TFs. Paged ENT, await response.   Enteral Nutrition: Placed TF orders to be used if able to start TFs per ENT and pending " feeding tube verification. Impact Peptide 1.5 (immune modulating TF, high in protein), initiated at 10 mL/hr (pt lying flat). Once able to advance TFs, adv by 10 mL Q 8 hrs to goal 55 ml/hr (1320 ml/day) to provide 1980 kcals (28 kcal/kg/day), 124 g PRO (1.8 g/kg/day), 1016 ml free H2O, 84 g Fat (50% from MCTs), 185 g CHO and no Fiber daily.   Feeding tube flush: Ordered free water flushes of 30 mL Q 4 hrs.    Multivitamin/mineral supplement therapy: Ordered certavite for micronutrient supplementation.     Goals  Total avg nutritional intake to meet a minimum of 25 kcal/kg and 1.5 g PRO/kg daily (per dosing wt 70 kg).     Monitoring/Evaluation  Progress toward goals will be monitored and evaluated per protocol.    Nutrition will continue to follow.      Rhianna Lora, MS, RD, LD, CNSC   Saturday/Sunday Pgr: [phone]      4A (neuro ICU) RD pager: [phone]

## 2019-12-01 NOTE — PROGRESS NOTES
Fit patient with MiamJ collar with extra pad set.  Difficult fit with Steve drain and short neck.  [place] collar was left in the room and I advised nursing to keep it with patient despite having [place] J collar.  Please call orthotics if questions.  [phone].  Sky DELGADO.

## 2019-12-01 NOTE — ANESTHESIA CARE TRANSFER NOTE
Patient: Sherwin Covarrubiasolya    Procedure(s):  IRRIGATION AND DEBRIDEMENT OF NECK; PHARYNGOTOMY REPAIR.  REMOVAL, HARDWARE, SPINE, CERVICAL, ANTERIOR    Diagnosis: Spinal cord injury at C1-C4 level, initial encounter (H) [S14.101A]  Diagnosis Additional Information: No value filed.    Anesthesia Type:   No value filed.     Note:  Anesthesia Care Transfer Notewriter    Vitals: (Last set prior to Anesthesia Care Transfer)    CRNA VITALS  12/1/2019 0912 - 12/1/2019 1012      12/1/2019             Pulse:  82    ART BP:  111/67    ART Mean:  85    Ht Rate:  82    Temp:  36.9  C (98.4  F)    SpO2:  93 %    Resp Rate (observed):  12                Electronically Signed By: JORGE L Amin [facility]  December 1, 2019  2:18 PM

## 2019-12-01 NOTE — CONSULTS
Otolaryngology Consult Note  December 1, 2019      CC: intraoperative consult after spinal cord injury    HPI: Sherwin Angel is a 64 year old male with a past medical history of hypertension and diabetes who had a C3-C4 arthroplasty about 2 weeks ago that was complicated by dysphasia at the [facility].  The patient fell in rehab and presented with paraplegia to the [facility].  Before the fall the patient was ambulating with a cane.  Neurosurgery obtained an MRI scan that showed prevertebral enhancement concerning for an infectious process and elected to bring him to the operating room and during the exposure they encountered scar tissue and inflammatory tissue that made exposure of the operative site difficult and consulted ENT intraoperatively.    PMH, PSH, Family History, Social History obtained from chart review as we saw the patient intraoperatively only.     PMH: DM, HTN    PSH: Arthroplasty     No current outpatient medications on file.        No Known Allergies    Social History     Socioeconomic History     Marital status: Not on file     Spouse name: Not on file     Number of children: Not on file     Years of education: Not on file     Highest education level: Not on file   Occupational History     Not on file   Social Needs     Financial resource strain: Not on file     Food insecurity:     Worry: Not on file     Inability: Not on file     Transportation needs:     Medical: Not on file     Non-medical: Not on file   Tobacco Use     Smoking status: Not on file   Substance and Sexual Activity     Alcohol use: Not on file     Drug use: Not on file     Sexual activity: Not on file   Lifestyle     Physical activity:     Days per week: Not on file     Minutes per session: Not on file     Stress: Not on file   Relationships     Social connections:     Talks on phone: Not on file     Gets together: Not on file     Attends Gnosticism service: Not on file     Active member of club or  organization: Not on file     Attends meetings of clubs or organizations: Not on file     Relationship status: Not on file     Intimate partner violence:     Fear of current or ex partner: Not on file     Emotionally abused: Not on file     Physically abused: Not on file     Forced sexual activity: Not on file   Other Topics Concern     Not on file   Social History Narrative     Not on file       History reviewed. No pertinent family history.    ROS: not able to be performed due to intraoperative status    PHYSICAL EXAM: Not able to be performed as the patient was in the operating room.  C operating room vitals.    ROUTINE IP LABS (Last four results)  BMP  Recent Labs   Lab 12/01/19  1342 12/01/19  1057 12/01/19  0112    135 133   POTASSIUM 4.7 4.6 4.4   CHLORIDE  --   --  94   FLORINDA  --   --  8.7   CO2  --   --  31   BUN  --   --  27   CR  --   --  0.75   * 195* 230*     CBC  Recent Labs   Lab 12/01/19  1342 12/01/19  1057 12/01/19  0112   WBC  --   --  13.1*   RBC  --   --  4.04*   HGB 11.9* 11.5* 11.7*   HCT  --   --  36.5*   MCV  --   --  90   MCH  --   --  29.0   MCHC  --   --  32.1   RDW  --   --  12.1   PLT  --   --  565*     INR  Recent Labs   Lab 12/01/19  0112   INR 1.17*       Imaging:  Results for orders placed or performed during the hospital encounter of 12/01/19   MR Cervical Spine w/o & w Contrast    Addendum: 12/1/2019    This is an addendum to the prior report to correct the technique,  report should read:    MR CERVICAL SPINE W/O & W CONTRAST 12/1/2019 3:26 AM    Provided History: s/p C3-4 artificial disc placement 2 wks ago; fall  yesterday at rehab, new paraplegia now    Comparison: None available    Technique: Sagittal T1-weighted, sagittal T2-weighted, sagittal  diffusion weighted, axial T2-weighted, and axial T2* gradient echo  images of the cervical spine were obtained without intravenous  contrast. Following intravenous administration of gadolinium, axial  and sagittal  T1-weighted images with fat saturation were also  obtained.    Contrast: 10 mL Gadavist    Findings: Images are mildly degraded secondary to metallic artifact  from disc prosthesis.  Postsurgical changes of C3-C4 artificial disc placement. There is 4 mm  of retrolisthesis of C3 on C4. Probable acute compression fractures of  the inferior endplate of C3 and of the opposing endplates of C4-5.  There is fluid signal within the ventral epidural space. These  findings result in severe spinal canal stenosis from C3 through C4-5.  There is associated abnormal T2 signal within the cord at these  levels. Additionally, there is posterior disc bulge at C4-5  centrally  and on the left.    There is extensive adjacent inflammatory change, with enhancing   prevertebral soft tissue thickening up to 2 cm. This is hyperintense  on STIR imaging.    There is mild anterolisthesis of C7 on T1. Small central disc  protrusion at C6-7 without spinal canal stenosis. Left central disc  protrusion at C7-T1 indents the left ventral spinal cord. Mild to  moderate neural foraminal stenosis at C6-7 and C7-T1.    Impression: Images are mildly degraded secondary to metallic artifact  from disc prosthesis. Follow-up imaging with CT could be considered  for further evaluation.  1. Traumatic cord injury at C3 and C4-5 as a result of C3-5  compression fractures.   2. There is 4 mm of retrolisthesis of C3 on C4.  3. Traumatic herniation of C4-5 intervertebral disc with retropulsion  of disc fragment into the spinal canal.  4. Extensive prevertebral thickening up to 2 cm anteriorly and up to 9  mm within the epidural space, favored to represent prevertebral edema  in the setting of recent trauma.    LETTY BEAL MD      Narrative    MR CERVICAL SPINE W/O & W CONTRAST 12/1/2019 3:26 AM    Provided History: s/p C3-4 artificial disc placement 2 wks ago; fall  yesterday at rehab, new paraplegia now    Comparison: None available    Technique: Sagittal  T1-weighted, sagittal T2-weighted, sagittal STIR,  sagittal diffusion weighted, axial T2-weighted, and axial T2* gradient  echo images of the cervical spine were obtained without intravenous  contrast.    Findings: Images are mildly degraded secondary to metallic artifact  from disc prosthesis.  Postsurgical changes of C3-C4 artificial disc placement. There is 4 mm  of retrolisthesis of C3 on C4. Probable acute compression fractures of  the inferior endplate of C3 and of the opposing endplates of C4-5.  There is fluid signal within the ventral epidural space. These  findings result in severe spinal canal stenosis from C3 through C4-5.  There is associated abnormal T2 signal within the cord at these  levels. Additionally, there is posterior disc bulge at C4-5  centrally  and on the left.    There is extensive adjacent inflammatory change, with enhancing T2  prevertebral soft tissue thickening up to 2 cm. This is hyperintense  on STIR imaging.    There is mild anterolisthesis of C7 on T1. Small central disc  protrusion at C6-7 without spinal canal stenosis. Left central disc  protrusion at C7-T1 indents the left ventral spinal cord. Mild to  moderate neural foraminal stenosis at C6-7 and C7-T1.      Impression    Impression: Images are mildly degraded secondary to metallic artifact  from disc prosthesis. Follow-up imaging with CT could be considered  for further evaluation.  1. Traumatic cord injury at C3 and C4-5 as a result of C3-5 fractures.    2. There is 4 mm of retrolisthesis of C3 on C4.  3. Traumatic herniation of C4-5 intervertebral disc with retropulsion  of disc fragment into the spinal canal.  4. Extensive prevertebral thickening up to 2 cm anteriorly and up to 9  mm within the epidural space, favored to represent prevertebral edema  in the setting of recent trauma.    Findings were discussed with Dr. Sharma by Dr. Knutson via telephone  at 4:00 AM.     I have personally reviewed the examination and  initial interpretation  and I agree with the findings.    LETTY BEAL MD   XR Chest Port 1 View    Narrative    Exam: XR CHEST PORT 1 VW, 12/1/2019 7:48 AM    Indication: INCREASE WORK OF BREATHING    Comparison: None available    Findings: Single portable chest radiograph. Enteric tube coursing over  the esophagus, tip outside field of view. Trachea is midline.  Cardiomediastinal silhouette is within normal limits when accounting  for patient rotation. Bibasilar opacities, left greater than right.  Small bilateral pleural effusions. No pneumothorax. Upper abdomen is  unremarkable.      Impression    Impression: Small bilateral pleural effusions with overlying  opacities, likely atelectasis.    I have personally reviewed the examination and initial interpretation  and I agree with the findings.    RYAN PÉREZ MD   XR Surgery ALEX Fluoro L/T 5 Min w Stills    Narrative    This exam was marked as non-reportable because it will not be read by a   radiologist or a [place] non-radiologist provider.               Assessment and Plan  Sherwin Angel is a 64 year old male with a past medical history of hypertension and diabetes status post arthroplasty about 2 weeks ago who fell at rehab and now presents to the [place] with paraplegia.  He was brought to the operating room by neurosurgery by Dr. Ashton who encountered inflammatory  tissue over the spine and so ENT was consulted for assistance with the exposure.  During the exposure we noticed that there was a pharyngotomy that was seen from the cervical exposure. We performed a laryngoscopy and there was actually 2 pharyngotomy's, one superior that was able to be closed and then there was another inferior pharyngotomy that was not able to be sutured due to the location. Through the cervical exposure we were able to oversew some of the tissue and we placed a drain.  The patient will be admitted to the ICU.     - Antibiotics per neurosurgery and  ICU  -N.p.o.  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 Mercado who agrees with the above assessment and plan.  After Rogelio was present in the operating room as well.  See operative note for full details.    Kemar Akins, PGY-2  Otolaryngology-Head & Neck Surgery  Please page ENT with questions by dialing [phone] and entering job code [phone] when prompted.

## 2019-12-01 NOTE — H&P
"[facility]       H&P NOTE    HPI:  64-year-old gentleman status post anterior C3-C4 arthroplasty about 2 weeks ago complicated by dysphasia was transferred from an outside hospital after a fall today in rehab, and now presents paraplegic and near loss of motor function upper extremities bilateral.  The patient does not quite remember the fall or if he lost consciousness.  However, before the fall he was able to ambulate just using a cane. He is afraid because he has noticed that his legs move without him trying to move them. He denies sensation from his nipples down. He denies pain and urinary or stool incontinence.    No recent fevers, chills, nausea, vomiting, chest pain, shortness of breath, and denies headaches, LOC, changes in sensation, taste, smell, nor trouble speaking or other neurologic symptoms.    PAST MEDICAL HISTORY: HTN, DM    PAST SURGICAL HISTORY: History reviewed. No pertinent surgical history.    FAMILY HISTORY: History reviewed. No pertinent family history.    SOCIAL HISTORY:   Social History     Tobacco Use     Smoking status: Not on file   Substance Use Topics     Alcohol use: Not on file       MEDICATIONS:  No current outpatient medications on file.       Allergies:  No Known Allergies    ROS: 10 point ROS of systems including Constitutional, Eyes, Respiratory, Cardiovascular, Gastroenterology, Genitourinary, Integumentary, Muscularskeletal, Psychiatric were all negative except for pertinent positives noted in my HPI.    Physical exam:   Blood pressure 124/62, pulse 76, temperature 99.3  F (37.4  C), temperature source Oral, resp. rate 24, height 1.727 m (5' 8\"), weight 86.7 kg (191 lb 3.2 oz), SpO2 93 %.  General: awake and alert  HEENT: normocephalic, NG tube in place  PULM: breathing comfortably on room air  NEUROLOGIC:  -- Awake; Alert; oriented x 3  -- Follows commands  -- Speech fluent, spontaneous. No aphasia or dysarthria.  -- no gaze " preference. No apparent hemineglect.  Cranial Nerves:  -- PERRL 3-2mm bilat and brisk, extraocular movements intact  -- face symmetrical, tongue midline  -- sensory V1-V3 intact bilaterally  -- palate elevates symmetrically, uvula midline  -- hearing grossly intact bilat  -- Trapezii 5/5 strength bilat symmetric    Motor:     Delt Bi Tri Hand Flexion/  Extension Iliopsoas Quadriceps Hamstrings Tibialis Anterior Gastroc    C5 C6 C7 C8/T1 L2 L3 L4-S1 L4 S1   R 1 4- 1 3 1 1 1 1 1   L 3 4- 1 3 1 1 1 1 1   Sensory:  diminished sensation below the nipples; some sensation left nipple    Reflexes:  no clonus     Gait: deferred      IMAGING:  MRI  Impression:   1. Traumatic cord injury at C3 and C4-5 as a result of C3-5 fractures.  2. Traumatic herniation of C4-5 intervertebral disc with retropulsion  of disc fragment into the spinal canal.  2. Extensive prevertebral thickening up to 2 cm, favored to represent  prevertebral edema in the setting of recent trauma.      LABS:   Lab Results   Component Value Date    WBC 13.1 12/01/2019     Lab Results   Component Value Date    RBC 4.04 12/01/2019     Lab Results   Component Value Date    HGB 11.7 12/01/2019     Lab Results   Component Value Date    HCT 36.5 12/01/2019     Lab Results   Component Value Date    MCV 90 12/01/2019     Lab Results   Component Value Date    MCH 29.0 12/01/2019     Lab Results   Component Value Date    MCHC 32.1 12/01/2019     Lab Results   Component Value Date    RDW 12.1 12/01/2019     Lab Results   Component Value Date     12/01/2019     Last Comprehensive Metabolic Panel:  Sodium   Date Value Ref Range Status   12/01/2019 133 133 - 144 mmol/L Final     Potassium   Date Value Ref Range Status   12/01/2019 4.4 3.4 - 5.3 mmol/L Final     Chloride   Date Value Ref Range Status   12/01/2019 94 94 - 109 mmol/L Final     Carbon Dioxide   Date Value Ref Range Status   12/01/2019 31 20 - 32 mmol/L Final     Anion Gap   Date Value Ref Range Status    12/01/2019 7 3 - 14 mmol/L Final     Glucose   Date Value Ref Range Status   12/01/2019 230 (H) 70 - 99 mg/dL Final     Urea Nitrogen   Date Value Ref Range Status   12/01/2019 27 7 - 30 mg/dL Final     Creatinine   Date Value Ref Range Status   12/01/2019 0.75 0.66 - 1.25 mg/dL Final     GFR Estimate   Date Value Ref Range Status   12/01/2019 >90 >60 mL/min/[1.73_m2] Final     Comment:     Non  GFR Calc  Starting 12/18/2018, serum creatinine based estimated GFR (eGFR) will be   calculated using the Chronic Kidney Disease Epidemiology Collaboration   (CKD-EPI) equation.       Calcium   Date Value Ref Range Status   12/01/2019 8.7 8.5 - 10.1 mg/dL Final       ASSESSMENT:  64 years old gentleman status post C3-C4 artificial disc placement about 2 weeks ago presents after a fall with almost complete loss of strength in his upper extremities and paraplegia.    PLAN:  OR after MRI  MAP > 85  IV fluids  Levophed prn  Activity: bedrest  Q1hr  neuro exams   Continuous cardiac monitoring while in ICU  Continuous pulse oximetry  Supplemental oxygen PRN  Incentive spirometry Q1H while awake  NPO   Bowel regimen. PRN anti-emetics.  [place]  Electrolyte replacement protocol  Continue to monitor intake/output  Glucose < 180  DVT: SCDs while in bed  Disposition: 4A    Vinay Sharma MD  Neurosurgery Resident, PGY-1    The patient was discussed with Dr. Cummins, neurosurgery chief resident, and Dr. Ashton, neurosurgery staff.

## 2019-12-01 NOTE — PLAN OF CARE
D/I:  Neuro: Alert and oriented x4, PERRL. Slight slurred speech but understandable. Able to make needs known. Strengths: BUE +2/3; BLE 0/1, withdrew to pain, observed spontaneous movements in BLE; does not move BLE on command. Numbness in all extremities and abdominal/epigastric area. Denied hallucinations, headache, and pain.   Cardiac: Sinus rhythm and meeting MAP goal of >85. Levophed not yet started but available to titrate. NS fluids decreased from 125 mL/hr to 100 mL/hr this am. Tmax 99.4.   Resp: On 4L to keep O2 >92%, RR 9-20s. Weak cough, nonproductive; sputum via oral suction was white, thick. LS coarse. Nebs ordered Q6H PRN. White, patch noted on tongue.  GI/: NPO, NJ from OSH, clamped, no drainage. BG Q4H, on sliding scale insulin. Last BM 11/29 and díaz [facility] for [facility].  Integumentary: Incision site from surgical procedure on anterior R jawline; no erythema or edema noted. Skin blanchable. Healing scars on bilateral kneeds, large bruise on L forearm. Pre-op bath completed for pending surgery.  Social: Sister Carlota Leon was updated per patient's request on his POC.     Plan: Surgery planned for this morning (see neurosurg note). Continue to monitor and follow POC.

## 2019-12-01 NOTE — ANESTHESIA PROCEDURE NOTES
Central Line Procedure Note  Staff:     Anesthesiologist:  Wachter, Sarah, MD    Resident/CRNA:  Hubert Andino MD    Central line placed by Resident/CRNA in the presence of a teaching physician    Location: In OR after induction  Procedure Start/Stop Times:     patient identified, IV checked, site marked, risks and benefits discussed, informed consent, monitors and equipment checked, pre-op evaluation and at physician/surgeon's request      Correct Patient: Yes      Correct Position: Yes      Correct Site: Yes      Correct Procedure: Yes      Correct Laterality:  Yes    Site Marked:  Yes  Line Placement:     Procedure:  Central Line    Insertion laterality:  Left    Insertion site:  Subclavian    Position:  Supine      Maximal Sterile Barriers: All elements of maximal sterile barrier technique followed      (Maximal sterile barriers include:   Sterile gown, Sterile Gloves, Mask, Cap, Whole body draped, hand hygiene and acceptable skin prep).Skin Prep: Chloraprep         Injection Technique:  Ultrasound guided    Sterile Ultrasound Technique:  Sterile probe cover and Sterile gel    Vein evaluated via U/S for patency/adequacy of catheter insertion and is adequate.  Using realtime U/S imaging the vein was punctured, and needle was observed entering vein on U/S      A permanent image is NOT entered into the patient's record.      Local skin infiltration:  None    Catheter size:  12 Fr, 3 lumen, 20 cm    Catheter length at skin (cm):  20    Cath secured with: suture      Dressing:  Tegaderm and Biopatch    Complications:  None obvious    Blood aspirated all lumens: Yes      Tip termination: other      Verification method:  Placement to be verified post-op

## 2019-12-01 NOTE — ANESTHESIA PREPROCEDURE EVALUATION
Anesthesia Pre-Procedure Evaluation    Patient: Sherwin Angel   MRN:     [id number] Gender:   male   Age:    64 year old :      1955        Preoperative Diagnosis: Spinal cord injury at C1-C4 level, initial encounter (H) [S14.101A]   Procedure(s):  IRRIGATION AND DEBRIDEMENT, NECK  REMOVAL, HARDWARE, SPINE, CERVICAL, ANTERIOR  FUSION, SPINE, CERVICAL, 3 OR MORE LEVELS, ANTERIOR APPROACH  DECOMPRESSION CERVICAL POSTERIOR THREE+ LEVELS, Cervical 3-5     History reviewed. No pertinent past medical history.   History reviewed. No pertinent surgical history.     Pt is a poor historian but answers all questions appropriately. He was transferred from [facility] after a suspected fall resulting in nicolle-instrumental C-spine injury. Deficits include bilateral lower extremity paraplegia and incomplete upper extremity plegia. He has very weak cough, coarse breath sounds and low O2 sats on NC. CXR pending.      Anesthesia Evaluation     . Pt has had prior anesthetic. Type: General and MAC    No history of anesthetic complications          ROS/MED HX    ENT/Pulmonary:  - neg pulmonary ROS     Neurologic: Comment: Pt had recent fall resulting in BLE paraplegia and partial upper extremity plegia      (+)Spinal cord injury year sustained: 2019 level of injury: C5     Cardiovascular:     (+) hypertension----. : . . . :. .       METS/Exercise Tolerance:  3 - Able to walk 1-2 blocks without stopping   Hematologic:  - neg hematologic  ROS       Musculoskeletal:  - neg musculoskeletal ROS       GI/Hepatic:  - neg GI/hepatic ROS       Renal/Genitourinary:  - ROS Renal section negative       Endo:  - neg endo ROS       Psychiatric:  - neg psychiatric ROS       Infectious Disease:         Malignancy:      - no malignancy   Other:    (+) C-spine cleared: No, no H/O Chronic Pain,                       PHYSICAL EXAM:   Mental Status/Neuro: A/A/O; Age Appropriate   Airway: Facies: Feasible  Mallampati: II  Mouth/Opening: Full  TM  "distance: > 6 cm  Neck ROM: Full   Respiratory: Auscultation: Decreased BS     Resp. Rate: Normal     Respiratory Effort: Decreased respirator effort.      CV: Rhythm: Regular  Heart: Normal Sounds   Comments:      Dental: Details                  LABS:  CBC:   Lab Results   Component Value Date    WBC 13.1 (H) 12/01/2019    HGB 11.7 (L) 12/01/2019    HCT 36.5 (L) 12/01/2019     (H) 12/01/2019     BMP:   Lab Results   Component Value Date     12/01/2019    POTASSIUM 4.4 12/01/2019    CHLORIDE 94 12/01/2019    CO2 31 12/01/2019    BUN 27 12/01/2019    CR 0.75 12/01/2019     (H) 12/01/2019     COAGS:   Lab Results   Component Value Date    PTT 35 12/01/2019    INR 1.17 (H) 12/01/2019     POC:   Lab Results   Component Value Date     (H) 12/01/2019     OTHER:   Lab Results   Component Value Date    A1C 6.5 (H) 12/01/2019    FLORINDA 8.7 12/01/2019    ALBUMIN 2.1 (L) 12/01/2019    PROTTOTAL 7.4 12/01/2019    ALT 32 12/01/2019    AST 17 12/01/2019    ALKPHOS 103 12/01/2019    BILITOTAL 0.6 12/01/2019    .0 (H) 12/01/2019        Preop Vitals    BP Readings from Last 3 Encounters:   12/01/19 124/62    Pulse Readings from Last 3 Encounters:   12/01/19 76      Resp Readings from Last 3 Encounters:   12/01/19 24    SpO2 Readings from Last 3 Encounters:   12/01/19 94%      Temp Readings from Last 1 Encounters:   12/01/19 36.9  C (98.4  F) (Axillary)    Ht Readings from Last 1 Encounters:   12/01/19 1.727 m (5' 8\")      Wt Readings from Last 1 Encounters:   12/01/19 86.7 kg (191 lb 3.2 oz)    Estimated body mass index is 29.07 kg/m  as calculated from the following:    Height as of this encounter: 1.727 m (5' 8\").    Weight as of this encounter: 86.7 kg (191 lb 3.2 oz).     LDA:  Peripheral IV 12/01/19 Right Upper arm (Active)   Site Assessment WDL 12/1/2019  4:00 AM   Line Status Saline locked 12/1/2019  4:00 AM   Phlebitis Scale 0-->no symptoms 12/1/2019  4:00 AM   Infiltration Scale 0 12/1/2019  " 4:00 AM   Extravasation? No 12/1/2019  4:00 AM   Number of days: 0       Peripheral IV 12/01/19 Left Lower forearm (Active)   Site Assessment WDL 12/1/2019  4:00 AM   Line Status Infusing 12/1/2019  4:00 AM   Phlebitis Scale 0-->no symptoms 12/1/2019  4:00 AM   Infiltration Scale 0 12/1/2019  4:00 AM   Infiltration Site Treatment Method  None 12/1/2019  1:09 AM   Extravasation? No 12/1/2019  4:00 AM   Dressing Intervention New dressing  12/1/2019  1:09 AM   Number of days: 0       Arterial Line 12/01/19 Wrist (Active)   Site Assessment WDL 12/1/2019  6:00 AM   Line Status Pulsatile blood flow;Cap changed 12/1/2019  6:00 AM   Arterine Line Cap Change Due 12/03/19 12/1/2019  6:00 AM   Art Line Waveform Appropriate;Square wave test performed 12/1/2019  6:00 AM   Art Line Interventions Zeroed and calibrated;Leveled;Connections checked and tightened;Flushed per protocol 12/1/2019  6:00 AM   Color/Movement/Sensation Capillary refill less than 3 sec 12/1/2019  6:00 AM   Line Necessity Yes, meets criteria 12/1/2019  6:00 AM   Dressing Type Transparent 12/1/2019  6:00 AM   Dressing Status Clean, dry, intact 12/1/2019  6:00 AM   Dressing Change Due 12/08/19 12/1/2019  6:00 AM   Number of days: 0       Urethral Catheter (Active)   Tube Description Positional 12/1/2019  4:00 AM   Catheter Care Done 12/1/2019  4:00 AM   Collection Container Standard 12/1/2019  4:00 AM   Securement Method Securing device (Describe) 12/1/2019  4:00 AM   Rationale for Continued Use Retention 12/1/2019  4:00 AM   Urine Output 200 mL 12/1/2019  6:00 AM   Number of days: 0        Assessment:   ASA SCORE: 4    H&P: History and physical reviewed and following examination; no interval change.    NPO Status: NPO Appropriate     Plan:   Anes. Type:  General   Pre-Medication: None   Induction:  IV (Standard)   Airway: ETT; Oral   Access/Monitoring: PIV; CVL   Maintenance: Balanced     Postop Plan:   Postop Pain: Opioids  Postop Sedation/Airway: Not  planned  Disposition: Inpatient/Admit     PONV Management:   Adult Risk Factors:, Postop Opioids   Prevention: Ondansetron     CONSENT: Direct conversation   Plan and risks discussed with: Patient   Blood Products: Consented (ALL Blood Products)                   Sarah Wachter, MD

## 2019-12-01 NOTE — ANESTHESIA POSTPROCEDURE EVALUATION
Anesthesia POST Procedure Evaluation    Patient: Sherwin Angel   MRN:     [id number] Gender:   male   Age:    64 year old :      1955        Preoperative Diagnosis: Spinal cord injury at C1-C4 level, initial encounter (H) [S14.101A]   Procedure(s):  IRRIGATION AND DEBRIDEMENT OF NECK; PHARYNGOTOMY REPAIR.  REMOVAL, HARDWARE, SPINE, CERVICAL, ANTERIOR   Postop Comments: No value filed.       Anesthesia Type:  Not documented  No value filed.    Reportable Event: NO     PAIN: Uncomplicated   Sign Out status: Comfortable, Well controlled pain     PONV: No PONV   Sign Out status:  No Nausea or Vomiting     Neuro/Psych: Uneventful perioperative course   Sign Out Status: Planned Postop Sedation     Airway/Resp.: Uneventful perioperative course   Sign Out Status: Airway Device present     Airway Device: ETT     CV: Uneventful perioperative course   Sign Out status: Appropriate BP and perfusion indices; Appropriate HR/Rhythm     Disposition:   Sign Out in:  ICU  Disposition:  ICU  Recovery Course: Recovery in ICU  Follow-Up: Not required     Comments/Narrative:  Pt remained intubated due to multiple factors:   1. Defect in posterior pharynx found by ENT - likely pre-existing  2. High c-spine injury - pre-existing  3. Decreased respiratory effort pre-op  4. Low intraoperative saturations despite recruitment and increasing O2 requirements.               Last Anesthesia Record Vitals:  CRNA VITALS  2019 0912 - 2019 1012      2019             Pulse:  82    ART BP:  111/67    ART Mean:  85    Ht Rate:  82    Temp:  36.9  C (98.4  F)    SpO2:  93 %    Resp Rate (observed):  12          Last PACU Vitals:  Vitals Value Taken Time   BP     Temp     Pulse     Resp     SpO2 97 % 2019  2:35 PM   Temp src     NIBP     Pulse     SpO2     Resp     Temp     Ht Rate     Temp 2     Vitals shown include unvalidated device data.      Electronically Signed By: Sarah Wachter, MD, 2019, 2:35 PM

## 2019-12-01 NOTE — PHARMACY-VANCOMYCIN DOSING SERVICE
Pharmacy Vancomycin Initial Note  Date of Service 2019  Patient's  1955  64 year old, male    Indication: Meningitis    Current estimated CrCl = Estimated Creatinine Clearance: 107.9 mL/min (based on SCr of 0.75 mg/dL).    Creatinine for last 3 days  2019:  1:12 AM Creatinine 0.75 mg/dL    Recent Vancomycin Level(s) for last 3 days  No results found for requested labs within last 72 hours.      Vancomycin IV Administrations (past 72 hours)      No vancomycin orders with administrations in past 72 hours.                Nephrotoxins and other renal medications (From now, onward)    Start     Dose/Rate Route Frequency Ordered Stop    19 0300  vancomycin (VANCOCIN) 1,750 mg in sodium chloride 0.9 % 500 mL intermittent infusion      1,750 mg  over 2 Hours Intravenous EVERY 12 HOURS 19 1507      19 1515  vancomycin (VANCOCIN) 2,250 mg in sodium chloride 0.9 % 500 mL intermittent infusion      2,250 mg  over 2 Hours Intravenous ONCE 19 1507      19 1445  phenylephrine (ANDERSON-SYNEPHRINE) 50 mg in sodium chloride 0.9 % 250 mL infusion      0.5-6 mcg/kg/min × 86.7 kg  .1 mL/hr  Intravenous CONTINUOUS 19 1431      19 0100  norepinephrine (LEVOPHED) 16 mg in  mL infusion      0.03-0.4 mcg/kg/min × 86.7 kg  2.4-32.5 mL/hr  Intravenous CONTINUOUS 19 0046            Contrast Orders - past 72 hours (72h ago, onward)    Start     Dose/Rate Route Frequency Ordered Stop    19 0330  gadobutrol (GADAVIST) injection 10 mL      10 mL Intravenous ONCE 19 0325 19 0325                Plan:  1.  Start vancomycin  2250 mg IV (25 mg/kg) once followed by vancomycin 1750 mg IV (20 mg/kg) q12h.   2.  Goal Trough Level: 15-20 mg/L (aim for higher end of range with meningitis)  3.  Pharmacy will check trough levels as appropriate in 1-3 Days.    4. Serum creatinine levels will be ordered daily for the first week of therapy and at least twice weekly  for subsequent weeks.    5. [place] method utilized to dose vancomycin therapy: Method 2    Ced Calle RPH

## 2019-12-01 NOTE — LETTER
2019      Medical release office:    Hello,    My name Sandra Robertroberto and I am the provider currently taking care of Sherwinsavage Angel ( 1955) at [facility]. Upon his admission to our facility we were only given records pertaining to his inpatient rehab visit. It is imperative that we get a current medical reconciliation of his outpatient medications so we can better treat him. We are requesting this information ASAP. If you have any questions please feel free to contact myself or our staff at [phone].      Sincerely,      Sandra COATS, CNP  NeuroCritical Care Nurse Practitioner  Pager [phone]

## 2019-12-01 NOTE — PROGRESS NOTES
Neuroscience Intensive Care Progress Note    2019    Sherwin Keny Ramosfabricioolya is a 64 year old year old male with hx HTN and DM admitted on 2019 with bilateral lower extremity plegia and near loss of upper extremity movement, after a fall at rehab. He is s/p anterior C3-4 arthroplasty approximately 2 weeks ago, complicated by dysphagia, who was at rehab when he fell. Prior to the fall, he was able to ambulate with a cane.     Problem List  1. Cervical stenosis   2. Traumatic spinal cord injury at C3-5   3. Traumatic cervical spine vertebral fractures C3-5    24 hour events:  - Transferred to [facility] from OSH overnight. Arterial line placed, MAP goal > 85 without need for pressor to be started.     24 Hour Vital Signs Summary:  Temperatures:  Current - Temp: 99.3  F (37.4  C); Max - Temp  Av  F (37.2  C)  Min: 98.4  F (36.9  C)  Max: 99.4  F (37.4  C)  Respiration range: Resp  Av.8  Min: 11  Max: 26  Pulse range: Pulse  Av.3  Min: 73  Max: 76  Blood pressure range: Systolic (24hrs), Av , Min:124 , Max:143   ; Diastolic (24hrs), Av, Min:62, Max:92    Pulse oximetry range: SpO2  Av.5 %  Min: 90 %  Max: 96 %    Ventilator Settings  Resp: 24    Intake/Output Summary (Last 24 hours) at 2019 0806  Last data filed at 2019 0600  Gross per 24 hour   Intake 617.5 ml   Output 535 ml   Net 82.5 ml       Arterial Line BP: (107-151)/(60-70) 148/62  MAP:  [89 mmHg-95 mmHg] 92 mmHg  BP - Mean:  [] 95    Current Medications:    ceFAZolin  1 g Intravenous See Admin Instructions     ceFAZolin  2 g Intravenous Pre-Op/Pre-procedure x 1 dose     [Auto Hold] insulin aspart  1-6 Units Subcutaneous Q4H       PRN Medications:  [Auto Hold] albuterol, [Auto Hold] bisacodyl, [Auto Hold] glucose **OR** [Auto Hold] dextrose **OR** [Auto Hold] glucagon, HOLD MEDICATION, [Auto Hold] naloxone, [Auto Hold] senna-docusate **OR** [Auto Hold] senna-docusate    Infusions:    lactated ringers        "norepinephrine       sodium chloride 100 mL/hr at 12/01/19 0627       No Known Allergies    Physical Examination:  /62   Pulse 76   Temp 99.3  F (37.4  C) (Oral)   Resp 24   Ht 1.727 m (5' 8\")   Wt 86.7 kg (191 lb 3.2 oz)   SpO2 93%   BMI 29.07 kg/m    Taken emergently to OR, not able to assess physical exam.     Was examined while he is was in propofol 40 after surgery  Mental status: Can open eyes to noxious stimuli and sternal rub  Cranial nerves : Pupils 3 mm and reactive to light, positive vestibular ocular reflex, intact corneal and intact cough.  motor: Not moving any of his extremities spontaneously.  Moves his left shoulder and flexes his left elbow to noxious stimuli.  No movement to noxious stimuli in his right upper extremity.  There was some reflex dorsiflexion movement of his right foot to noxious stimuli nothing on the left.  Labs/Studies:  Recent Labs   Lab Test 12/01/19 0112      POTASSIUM 4.4   CHLORIDE 94   CO2 31   ANIONGAP 7   *   BUN 27   CR 0.75   FLORINDA 8.7   WBC 13.1*   RBC 4.04*   HGB 11.7*   *     Most Recent 3 CBC's:  Recent Labs   Lab Test 12/01/19  0112   WBC 13.1*   HGB 11.7*   MCV 90   *     Recent Labs   Lab Test 12/01/19 0112   INR 1.17*   PTT 35     No lab results found in last 7 days.    Imaging:    Xr Chest Port 1 View    Result Date: 12/1/2019  Preliminary Report - The following report is a preliminary interpretation.     Impression: Small bilateral pleural effusions with overlying opacities, likely atelectasis.    Mr Cervical Spine W/o & W Contrast    Result Date: 12/1/2019  MR CERVICAL SPINE W/O & W CONTRAST 12/1/2019 3:26 AM Provided History: s/p C3-4 artificial disc placement 2 wks ago; fall yesterday at rehab, new paraplegia now Comparison: None available Technique: Sagittal T1-weighted, sagittal T2-weighted, sagittal STIR, sagittal diffusion weighted, axial T2-weighted, and axial T2* gradient echo images of the cervical spine were " obtained without intravenous contrast. Findings: Postsurgical changes of C3-C4 artificial disc placement. Acute compression fractures of C3, C4 and C5 vertebral bodies with posterior wall retropulsion. This results in severe spinal canal narrowing at C3 and C4-5, with associated abnormal T2 signal within the cord at these levels. Additionally, there is rupture of the intervertebral disc at C4-5 with retropulsion of disc fragment into the spinal canal centrally and on the left. There is extensive adjacent inflammatory change, with enhancing T2 prevertebral soft tissue thickening up to 2 cm. This is hyperintense on STIR imaging.     Impression: 1. Traumatic cord injury at C3 and C4-5 as a result of C3-5 fractures. 2. Traumatic herniation of C4-5 intervertebral disc with retropulsion of disc fragment into the spinal canal. 2. Extensive prevertebral thickening up to 2 cm, favored to represent prevertebral edema in the setting of recent trauma. Findings were discussed with Dr. Sharma by Dr. Knutson via telephone at 4:00 AM.     Assessment/Plan  Sherwin Keny Angel is a 64 year old h/o HTN and DM admitted 12/1/2019 with cervical spine cord injury after a fall resulted in lower extremity plegia and near complete upper extremity weakness as well. Recently [facility], and was at rehab when he had the fall.     Plan  Neuro:  #Traumatic cervical spinal cord injury with C3-5 vertebral fractures   - MAP goal > 85 to improve spinal cord perfusion, achieved this in ICU prior to OR without pressor but will initiated levophed if needed.   - Strict bedrest   - Collar at all times   - Q1H neuro exams   -Wean sedation as possible    Resp:  Acute respiratory failure, Possible aspiration pna.  Patient intubated and sedated on propofol 40.  During surgery, communication between oropharynx and spinal cord was found, neurosurgery cleaned as much as they can, infection is  possible, ENT consulted to patch that communication.  Neurosurgery they removed the  hard wires.  - Supplemental O2 prn to keep Spo2 > 92%   - IS Q1H   -ABG ordered  -CXR after the possible aspiration  -Broad-spectrum antibiotics    CV:  #Hx Hypertension   - SBP < 140  - Resume PTA medications     Renal:  No acute issues   - Electrolyte replacement protocol   - Daily BMP     Endo:  #Hx Diabetes type II  - Initiate sliding scale insulin     Heme:  No acute issues   - Goal Hb > 8, INR < 1.5, Plt > 100    ID:  Low-grade fever 100.3, leukocytosis 13.1.  There is concern of aspiration pneumonia.  We will send blood culture, repeat chest x-ray after surgery.  Started on wide spectrum antibiotics including Vanco, metronidazole, cefepime.  - Continue monitoring WBC and fever curve   -Blood cultures by 2  -Repeat chest x-ray  -Urine analysis    GI:  No acute issues   - NPO currently   -NG tube in place, abdominal x-ray was placed to confirm correct placement  -Nutrition consult to start tube feeding if NG tube in the right place  - Bowel regimen prn     FEN: NS @ 100 ml/hr    PPX:    DVT prophylaxis: SCDs, no chemo ppx at this time     GI: not indicated     Lines: arterial line, díaz, right PIV, left PIV    Code Status: full     Dispo: pending OR, ICU cares     Arabella Howard MD  Neurocritical care fellow

## 2019-12-01 NOTE — LETTER
2019      Medical release office: STAT REQUEST    Hello,    My name is Sandra Alvarenganegritoroberto and I am the provider currently taking care of Sherwinsavage Angel ( 1955) at the [facility] in [place]. Upon his admission to our facility we were only given records pertaining to his inpatient rehab visit. It is imperative that we get a current medical reconciliation of his outpatient medications so we can better treat him. We are requesting this information STAT. If you have any questions please feel free to contact myself or our staff at [phone]. Please Fax his medication list to [phone].          Sincerely,      Sandra COATS CNP  NeuroCritical Care Nurse Practitioner  Pager [phone]  Fax [phone]

## 2019-12-01 NOTE — PROCEDURES
[facility]  Neurosurgery Procedure Note    Name of Procedure: Left-Sided Arterial Line Placement    Date of Procedure: 12/1/2019    Indication for Procedure: invasive BP monitoring/ frequent ABGs    Anesthesia: local (1% lidocaine)    Description of Procedure  Pre-procedure consent was obtained from the patient after the inherent risks & benefits were explained. The patient was placed in the supine position and their wrist was hyperextended. The puncture site was then prepped & draped in the usual sterile fashion and 1 mL of 1% Lidocaine was infiltrated into skin and subcutaneous tissue overlying the radial artery for local anesthesia. A 20-gauge needle was then advanced through the patient's skin and subcutaneous tissue and entered the patient's radial artery. Strong pulsatile blood flow was immediately observed coming through the needle. Seldinger technique was used to introduce the catheter which was then attached to the transducer. The catheter was subsequently sutured into place. After cleaning the entry site a sterile dressing was applied. The patient tolerated the procedure well without any immediate complications.    Findings: A good waveform was observed on the monitor and the arterial line blood pressure readings matched those obtained via the blood pressure cuff.    EBL: 1ml    Complications: none    Surgeon: MD Vinay Campos MD  Neurosurgery PGY1

## 2019-12-01 NOTE — PLAN OF CARE
Admitted/transferred from: [facility]  Reason for admission/transfer: Fall, MRI cervical spine with contrast, possible surgery  Patient status upon admission/transfer: Alert and oriented x4, VSS with MAP goals >85. Impaired neuro assessment (see flowsheet).   Interventions: Per POC.   Plan: Stat MRI, possible surgery.  2 RN skin assessment: completed by Alberto  Result of skin assessment and interventions/actions: Bruise on L forearm, healing incision site on R side of jawline, healing wound on bilateral knees; other skin intact.  Height, weight, drug calc weight: Done  Patient belongings (see Flowsheet - Adult Profile for details):   MDRO education (if applicable): N/A

## 2019-12-02 ENCOUNTER — APPOINTMENT (OUTPATIENT)
Dept: GENERAL RADIOLOGY | Facility: CLINIC | Age: 64
DRG: 003 | End: 2019-12-02
Attending: STUDENT IN AN ORGANIZED HEALTH CARE EDUCATION/TRAINING PROGRAM
Payer: COMMERCIAL

## 2019-12-02 LAB
ANION GAP SERPL CALCULATED.3IONS-SCNC: 5 MMOL/L (ref 3–14)
BASE EXCESS BLDA CALC-SCNC: 2.9 MMOL/L
BASE EXCESS BLDA CALC-SCNC: 3.3 MMOL/L
BUN SERPL-MCNC: 22 MG/DL (ref 7–30)
CALCIUM SERPL-MCNC: 7.5 MG/DL (ref 8.5–10.1)
CHLORIDE SERPL-SCNC: 108 MMOL/L (ref 94–109)
CO2 SERPL-SCNC: 25 MMOL/L (ref 20–32)
CREAT SERPL-MCNC: 0.67 MG/DL (ref 0.66–1.25)
CRP SERPL-MCNC: 220 MG/L (ref 0–8)
ERYTHROCYTE [DISTWIDTH] IN BLOOD BY AUTOMATED COUNT: 12.2 % (ref 10–15)
GFR SERPL CREATININE-BSD FRML MDRD: >90 ML/MIN/{1.73_M2}
GLUCOSE BLDC GLUCOMTR-MCNC: 146 MG/DL (ref 70–99)
GLUCOSE BLDC GLUCOMTR-MCNC: 158 MG/DL (ref 70–99)
GLUCOSE BLDC GLUCOMTR-MCNC: 159 MG/DL (ref 70–99)
GLUCOSE BLDC GLUCOMTR-MCNC: 159 MG/DL (ref 70–99)
GLUCOSE BLDC GLUCOMTR-MCNC: 175 MG/DL (ref 70–99)
GLUCOSE SERPL-MCNC: 184 MG/DL (ref 70–99)
HCO3 BLD-SCNC: 28 MMOL/L (ref 21–28)
HCO3 BLD-SCNC: 28 MMOL/L (ref 21–28)
HCT VFR BLD AUTO: 32 % (ref 40–53)
HGB BLD-MCNC: 10.1 G/DL (ref 13.3–17.7)
INTERPRETATION ECG - MUSE: NORMAL
INTERPRETATION ECG - MUSE: NORMAL
MCH RBC QN AUTO: 29.1 PG (ref 26.5–33)
MCHC RBC AUTO-ENTMCNC: 31.6 G/DL (ref 31.5–36.5)
MCV RBC AUTO: 92 FL (ref 78–100)
O2/TOTAL GAS SETTING VFR VENT: 50 %
O2/TOTAL GAS SETTING VFR VENT: 60 %
PCO2 BLD: 42 MM HG (ref 35–45)
PCO2 BLD: 43 MM HG (ref 35–45)
PH BLD: 7.42 PH (ref 7.35–7.45)
PH BLD: 7.43 PH (ref 7.35–7.45)
PLATELET # BLD AUTO: 528 10E9/L (ref 150–450)
PO2 BLD: 122 MM HG (ref 80–105)
PO2 BLD: 130 MM HG (ref 80–105)
POTASSIUM SERPL-SCNC: 4.3 MMOL/L (ref 3.4–5.3)
RADIOLOGIST FLAGS: ABNORMAL
RADIOLOGIST FLAGS: ABNORMAL
RBC # BLD AUTO: 3.47 10E12/L (ref 4.4–5.9)
SODIUM SERPL-SCNC: 139 MMOL/L (ref 133–144)
WBC # BLD AUTO: 15 10E9/L (ref 4–11)

## 2019-12-02 PROCEDURE — 25000128 H RX IP 250 OP 636: Performed by: STUDENT IN AN ORGANIZED HEALTH CARE EDUCATION/TRAINING PROGRAM

## 2019-12-02 PROCEDURE — 25000125 ZZHC RX 250: Performed by: STUDENT IN AN ORGANIZED HEALTH CARE EDUCATION/TRAINING PROGRAM

## 2019-12-02 PROCEDURE — 27211414 ZZ H ADHESIVE SKIN CLOSURE, DERMABOND

## 2019-12-02 PROCEDURE — 25000125 ZZHC RX 250: Performed by: NEUROLOGICAL SURGERY

## 2019-12-02 PROCEDURE — 71045 X-RAY EXAM CHEST 1 VIEW: CPT

## 2019-12-02 PROCEDURE — 40000275 ZZH STATISTIC RCP TIME EA 10 MIN

## 2019-12-02 PROCEDURE — 27211417 ZZ H KIT, VPS RHYTHM STYLET

## 2019-12-02 PROCEDURE — 27211383 ZZ H DEVICE 5FR SECURACATH

## 2019-12-02 PROCEDURE — 80048 BASIC METABOLIC PNL TOTAL CA: CPT | Performed by: STUDENT IN AN ORGANIZED HEALTH CARE EDUCATION/TRAINING PROGRAM

## 2019-12-02 PROCEDURE — 25000128 H RX IP 250 OP 636

## 2019-12-02 PROCEDURE — 25000132 ZZH RX MED GY IP 250 OP 250 PS 637: Performed by: STUDENT IN AN ORGANIZED HEALTH CARE EDUCATION/TRAINING PROGRAM

## 2019-12-02 PROCEDURE — 82803 BLOOD GASES ANY COMBINATION: CPT | Performed by: STUDENT IN AN ORGANIZED HEALTH CARE EDUCATION/TRAINING PROGRAM

## 2019-12-02 PROCEDURE — 25800030 ZZH RX IP 258 OP 636

## 2019-12-02 PROCEDURE — 27210437 ZZH NUTRITION PRODUCT SEMIELEM INTERMED LITER

## 2019-12-02 PROCEDURE — 25800030 ZZH RX IP 258 OP 636: Performed by: STUDENT IN AN ORGANIZED HEALTH CARE EDUCATION/TRAINING PROGRAM

## 2019-12-02 PROCEDURE — 40000986 XR CHEST PORT 1 VW

## 2019-12-02 PROCEDURE — 36569 INSJ PICC 5 YR+ W/O IMAGING: CPT

## 2019-12-02 PROCEDURE — 27211391 ZZ H KIT, 6 FR TL BIOFLO OPEN ENDED PICC

## 2019-12-02 PROCEDURE — 00000146 ZZHCL STATISTIC GLUCOSE BY METER IP

## 2019-12-02 PROCEDURE — 25000132 ZZH RX MED GY IP 250 OP 250 PS 637: Performed by: NURSE PRACTITIONER

## 2019-12-02 PROCEDURE — 94003 VENT MGMT INPAT SUBQ DAY: CPT

## 2019-12-02 PROCEDURE — 20000004 ZZH R&B ICU UMMC

## 2019-12-02 PROCEDURE — 86140 C-REACTIVE PROTEIN: CPT | Performed by: STUDENT IN AN ORGANIZED HEALTH CARE EDUCATION/TRAINING PROGRAM

## 2019-12-02 PROCEDURE — 85027 COMPLETE CBC AUTOMATED: CPT | Performed by: STUDENT IN AN ORGANIZED HEALTH CARE EDUCATION/TRAINING PROGRAM

## 2019-12-02 PROCEDURE — 25800030 ZZH RX IP 258 OP 636: Performed by: NEUROLOGICAL SURGERY

## 2019-12-02 RX ORDER — HEPARIN SODIUM,PORCINE 10 UNIT/ML
5-10 VIAL (ML) INTRAVENOUS EVERY 24 HOURS
Status: DISCONTINUED | OUTPATIENT
Start: 2019-12-02 | End: 2019-12-31 | Stop reason: HOSPADM

## 2019-12-02 RX ORDER — SODIUM CHLORIDE 9 MG/ML
INJECTION, SOLUTION INTRAVENOUS
Status: COMPLETED
Start: 2019-12-02 | End: 2019-12-02

## 2019-12-02 RX ORDER — LIDOCAINE 40 MG/G
CREAM TOPICAL
Status: DISCONTINUED | OUTPATIENT
Start: 2019-12-02 | End: 2019-12-06

## 2019-12-02 RX ORDER — LIDOCAINE 40 MG/G
CREAM TOPICAL
Status: DISCONTINUED | OUTPATIENT
Start: 2019-12-02 | End: 2019-12-31 | Stop reason: HOSPADM

## 2019-12-02 RX ORDER — AMOXICILLIN 250 MG
1 CAPSULE ORAL 2 TIMES DAILY
Status: DISCONTINUED | OUTPATIENT
Start: 2019-12-02 | End: 2019-12-14

## 2019-12-02 RX ORDER — HEPARIN SODIUM,PORCINE 10 UNIT/ML
2-5 VIAL (ML) INTRAVENOUS
Status: DISCONTINUED | OUTPATIENT
Start: 2019-12-02 | End: 2019-12-06

## 2019-12-02 RX ORDER — POLYETHYLENE GLYCOL 3350 17 G/17G
17 POWDER, FOR SOLUTION ORAL DAILY
Status: DISCONTINUED | OUTPATIENT
Start: 2019-12-02 | End: 2019-12-08

## 2019-12-02 RX ORDER — HEPARIN SODIUM,PORCINE 10 UNIT/ML
5-10 VIAL (ML) INTRAVENOUS
Status: DISCONTINUED | OUTPATIENT
Start: 2019-12-02 | End: 2019-12-31 | Stop reason: HOSPADM

## 2019-12-02 RX ADMIN — INSULIN ASPART 1 UNITS: 100 INJECTION, SOLUTION INTRAVENOUS; SUBCUTANEOUS at 11:39

## 2019-12-02 RX ADMIN — METRONIDAZOLE 500 MG: 500 INJECTION, SOLUTION INTRAVENOUS at 22:22

## 2019-12-02 RX ADMIN — SENNOSIDES AND DOCUSATE SODIUM 1 TABLET: 8.6; 5 TABLET ORAL at 09:36

## 2019-12-02 RX ADMIN — MULTIVITAMIN 15 ML: LIQUID ORAL at 20:52

## 2019-12-02 RX ADMIN — FOLIC ACID 1 MG: 5 INJECTION, SOLUTION INTRAMUSCULAR; INTRAVENOUS; SUBCUTANEOUS at 11:11

## 2019-12-02 RX ADMIN — POLYETHYLENE GLYCOL 3350 17 G: 17 POWDER, FOR SOLUTION ORAL at 09:36

## 2019-12-02 RX ADMIN — Medication 40 MG: at 11:10

## 2019-12-02 RX ADMIN — VANCOMYCIN HYDROCHLORIDE 1750 MG: 10 INJECTION, POWDER, LYOPHILIZED, FOR SOLUTION INTRAVENOUS at 16:05

## 2019-12-02 RX ADMIN — Medication 50 MCG/HR: at 18:39

## 2019-12-02 RX ADMIN — CEFEPIME HYDROCHLORIDE 2 G: 2 INJECTION, POWDER, FOR SOLUTION INTRAVENOUS at 08:25

## 2019-12-02 RX ADMIN — THIAMINE HYDROCHLORIDE 200 MG: 100 INJECTION, SOLUTION INTRAMUSCULAR; INTRAVENOUS at 08:27

## 2019-12-02 RX ADMIN — CEFEPIME HYDROCHLORIDE 2 G: 2 INJECTION, POWDER, FOR SOLUTION INTRAVENOUS at 15:09

## 2019-12-02 RX ADMIN — INSULIN ASPART 1 UNITS: 100 INJECTION, SOLUTION INTRAVENOUS; SUBCUTANEOUS at 15:59

## 2019-12-02 RX ADMIN — DEXMEDETOMIDINE 0.6 MCG/KG/HR: 100 INJECTION, SOLUTION, CONCENTRATE INTRAVENOUS at 08:20

## 2019-12-02 RX ADMIN — THIAMINE HYDROCHLORIDE 200 MG: 100 INJECTION, SOLUTION INTRAMUSCULAR; INTRAVENOUS at 22:22

## 2019-12-02 RX ADMIN — THIAMINE HYDROCHLORIDE 200 MG: 100 INJECTION, SOLUTION INTRAMUSCULAR; INTRAVENOUS at 13:48

## 2019-12-02 RX ADMIN — SODIUM CHLORIDE 500 ML: 9 INJECTION, SOLUTION INTRAVENOUS at 13:58

## 2019-12-02 RX ADMIN — METRONIDAZOLE 500 MG: 500 INJECTION, SOLUTION INTRAVENOUS at 09:34

## 2019-12-02 RX ADMIN — DEXMEDETOMIDINE 0.4 MCG/KG/HR: 100 INJECTION, SOLUTION, CONCENTRATE INTRAVENOUS at 13:34

## 2019-12-02 RX ADMIN — INSULIN ASPART 1 UNITS: 100 INJECTION, SOLUTION INTRAVENOUS; SUBCUTANEOUS at 21:03

## 2019-12-02 RX ADMIN — INSULIN ASPART 1 UNITS: 100 INJECTION, SOLUTION INTRAVENOUS; SUBCUTANEOUS at 08:31

## 2019-12-02 RX ADMIN — VANCOMYCIN HYDROCHLORIDE 1750 MG: 10 INJECTION, POWDER, LYOPHILIZED, FOR SOLUTION INTRAVENOUS at 03:31

## 2019-12-02 RX ADMIN — DEXMEDETOMIDINE 0.4 MCG/KG/HR: 100 INJECTION, SOLUTION, CONCENTRATE INTRAVENOUS at 23:51

## 2019-12-02 RX ADMIN — METRONIDAZOLE 500 MG: 500 INJECTION, SOLUTION INTRAVENOUS at 16:05

## 2019-12-02 RX ADMIN — INSULIN ASPART 1 UNITS: 100 INJECTION, SOLUTION INTRAVENOUS; SUBCUTANEOUS at 03:40

## 2019-12-02 RX ADMIN — SENNOSIDES AND DOCUSATE SODIUM 1 TABLET: 8.6; 5 TABLET ORAL at 20:52

## 2019-12-02 RX ADMIN — METRONIDAZOLE 500 MG: 500 INJECTION, SOLUTION INTRAVENOUS at 03:33

## 2019-12-02 ASSESSMENT — ACTIVITIES OF DAILY LIVING (ADL)
ADLS_ACUITY_SCORE: 41

## 2019-12-02 ASSESSMENT — MIFFLIN-ST. JEOR: SCORE: 1657.64

## 2019-12-02 NOTE — PROGRESS NOTES
"Otolaryngology Progress Note  December 2, 2019    S: No acute events overnight. Neuro status improving.     O: /62   Pulse 76   Temp 98.3  F (36.8  C) (Axillary)   Resp 16   Ht 1.727 m (5' 8\")   Wt 88.8 kg (195 lb 12.8 oz)   SpO2 99%   BMI 29.77 kg/m     General: Responds to verbal cues, no acute distress    HEENT: Neck collar in place, limited range of motion of neck. Difficult to asseses if any swelling of neck due to collar. EOMI.  Drain with serosang output   Pulmonary:    Neuro/MSK: able to weakly squeeze hand bilaterally. Unable to wiggle toes.    ERVIN drain output(s): (last 24 hours)/(last shift)  Right Neck 7, 29, 19    LABS:  ROUTINE IP LABS (Last four results)  BMP  Recent Labs   Lab 12/02/19 0342 12/01/19  1342 12/01/19  1057 12/01/19  0112    135 135 133   POTASSIUM 4.3 4.7 4.6 4.4   CHLORIDE 108  --   --  94   FLORINDA 7.5*  --   --  8.7   CO2 25  --   --  31   BUN 22  --   --  27   CR 0.67  --   --  0.75   * 188* 195* 230*     CBC  Recent Labs   Lab 12/02/19 0342 12/01/19  1342 12/01/19  1057 12/01/19  0112   WBC 15.0*  --   --  13.1*   RBC 3.47*  --   --  4.04*   HGB 10.1* 11.9* 11.5* 11.7*   HCT 32.0*  --   --  36.5*   MCV 92  --   --  90   MCH 29.1  --   --  29.0   MCHC 31.6  --   --  32.1   RDW 12.2  --   --  12.1   *  --   --  565*     INR  Recent Labs   Lab 12/01/19  0112   INR 1.17*       Assessment and Plan  Sherwin Keny Angel is a 64 year old male with a past medical history of hypertension and diabetes status post arthroplasty about 2 weeks ago who fell at rehab and now presents to the [place] with paraplegia.  He was brought to the operating room by neurosurgery by Dr. Ashton who encountered inflammatory  tissue over the spine and so ENT was consulted for assistance with the exposure.  During the exposure we noticed that there was a pharyngotomy that was seen from the cervical exposure. We performed a laryngoscopy and there was actually 2 pharyngotomy's, " one superior that was able to be closed and then there was another inferior pharyngotomy that was not able to be sutured due to the location. Through the cervical exposure we were able to oversew some of the tissue and we placed a drain.      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 Berenice Kearney MD PGY1   Otolaryngology-Head & Neck Surgery  Please contact ENT with questions by dialing [phone] and entering job code [phone] when prompted.

## 2019-12-02 NOTE — PLAN OF CARE
Major Shift events: Decreased precedex to 0.4 mcg/kg/min and fentanyl to 75 mcg/hr. Levophed weaned off at 1300. Tolerated PS trial for approximately 2 hours. Concerned re: ETT placement, ENT at bedside to assess. tube advanced per Respiratory, cxray completed and awaiting read. Suctioning small amount of secretions from ETT. No BM, TF advanced to 30 ml/hr. C collar in place, blanchable redness noted on L side, mepilix applied, please continue to monitor. PICC line placed   Neuro: Pupils equal, reactive. Able to track RN in room. Moves UEs. Can feel touch RLE, but not LLE. On Precedex/Fentanyl for pain/sedation. C-collar in place. No HOB restriction per Dr. Cummins.     CV: HR NSR, peaked T waves. BPs stable, goal MAP >85. TMax 100.3     Resp: LS clear to coarse. Suctioning small amount of secretions. Bloody tinged secretions from oral cavity.     Gu: díaz output adequate.     GI: Bowel regimen started. Advanced TF per order. No BM during shift.     Skin: See flowsheets for assessments and interventions. Monitor R & L toes, blanchable redness, continue to monitor.     Continue to monitor patient status, update MD as appropriate.

## 2019-12-02 NOTE — CONSULTS
[facility] ID SERVICE CONSULTATION     Patient:  Sherwin Angel   Date of birth 1955, Medical record number [id number]  Date of Visit:  12/02/2019  Date of Admission: 12/1/2019  Consult Requester:Fernando Ashton MD            Assessment and Recommendations:   ASSESSMENT:  1. Traumatic cervical spinal cord injury s/p C3-4 arthroplasty on 11/15, now removed  2. Cervical stenosis C3-5  3. Vertebral phlegmon vs abscess s/p drain placement on 12/1 -cultures now growing Staph aureus and strep anginosus  4. Concern for osteomyelitis  5. Leukocytosis        DISCUSSION:  64-year-old man with history of htn, DM2, and recent C3-4 arthroplasty on 11/15 now admitted with new paraplegia after a fall and found to have pharyngotomy and vertebral phlegmon now s/p OR with removal of arthroplasty and placement of drain.    Typically, the organisms we would worry about in an epidural abscess or infected vertebral phlegmon would be primarily gram positives, with Staph aureus being most common. In this patient, higher suspicion exists for gram negative organisms and oral anaerobes because of the pharyngotomy which creates a connection between the oropharynx and the site of infection. Additionally, the initial identification of Strep anginosus supports the presence of anaerobes as this organism tends to travel with anaerobes. As such, it is appropriate to expand coverage from the more typical vancomycin/ceftriaxone to include coverage for gram negatives including pseudomonas and anaerobes. This has been done appropriately with cefepime and metronidazole.    [facility] suggested that there was some evidence of osteomyelitis in C3, C4, and C5, and raised concern that the extention of the infection into the epidural space may be due to presnce of an abscess (image read in paper chart). As such, this patient will require a long course of antibiotics. This will need to cover  gram positive and anaerobes likely regardless of what ultimately grows out. The current choices will need to be narrowed, but this can be done in the coming days as more culture data becomes available.    RECOMMENDATION:  - Continue cefepime, metronidazole, and vancomycin  - Follow-up cultures; antibiotics to be narrowed based on these results  - Trend CRP every 2-3 days  - Please obtain blood cultures if spikes a fever      Thank you for this consult. ID will continue to follow.  This patient was discussed with Dr. Carrington.    Elaine Cowan MD  Internal Medicine, PGY-1  [phone]      Attestation:  I have reviewed today's vital signs, medications, labs and imaging.  Floor time: 25 minutes, Face-to-face time: 10 minutes, Total time: 35 minutes    Sherwin Angel was seen in the hospital by Joya Carrington MD on 12/02/2019, with the intern Elaine Cowan MD. [facility] Internal Medicine Resident. I reviewed the history & exam. Assessment and plan were jointly made.  I agree with and have edited the note and plan of care.      Joya Carrington MD.  ID Staff  [phone]      ________________________________________________________________    Consult Question: 64 M s/p C3-4 arthroplasty last month, presented with quadriplegia, found to have epidural abscess and plegmon at C3-4, taken to OR for hardware removal. 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 consult for Abx.  Admission Diagnosis: Spinal Cord Edema  Paraplegia (H)  Quadriplegia (H)         History of Present Illness:     Sherwin Angel is a 64-year-old man with history of hypertension and diabetes who had a recent [facility] and is now readmitted on 12/1 after a fall at his rehab facility that resulted in near paraplegia.    He was taken to the OR by neurosurgery on 12/1, and had the arthroplasty implant removed. He was found to have significant inflammation in the area and 2  pharyngotomy sites, so ENT was consulted intra-operatively and closed one pharyngotomy (the other was unable to be reached) and placed a drain for the phlegmon. He was put on broad-spectrum antibiotics with cefepime, metronidazole, and vancomycin, and ID is consulted for assistance with antibiotic management.    The patient had a temperature to 100.6 overnight after the surgery, but has been afebrile throughout the day today. He is awake and able to nod yes/no to questions, and follow basic commands.    Currently he denies pain or discomfort      Recent culture results include:  Culture Micro   Date Value Ref Range Status   12/01/2019 No growth after 6 hours  Preliminary   12/01/2019 Culture negative monitoring continues  Preliminary   12/01/2019 Culture in progress  Preliminary            Review of Systems:   Unable to complete as patient is intubated.         Past Medical History:   Diabetes  Hypertension  C3-C4 arthroplasty on 11/15/19         Past Surgical History:     Past Surgical History:   Procedure Laterality Date     IRRIGATION AND DEBRIDEMENT NECK, COMBINED N/A 12/1/2019    Procedure: IRRIGATION AND DEBRIDEMENT OF NECK; PHARYNGOTOMY REPAIR.;  Surgeon: Fernando Ashton MD;  Location: [facility] OR     REMOVE HARDWARE CERVICAL ANTERIOR SPINE N/A 12/1/2019    Procedure: REMOVAL, HARDWARE, SPINE, CERVICAL, ANTERIOR;  Surgeon: Fernando Ashton MD;  Location: [facility] OR            Family History:   History reviewed. No pertinent family history.         Social History:     Social History     Tobacco Use     Smoking status: Not on file   Substance Use Topics     Alcohol use: Not on file     History   Sexual Activity     Sexual activity: Not on file            Current Medications (antimicrobials listed in bold):       ceFEPIme (MAXIPIME) IV  2 g Intravenous Q8H     [START ON 12/4/2019] folic acid  1 mg Oral or Feeding Tube Daily     folic acid  1 mg Intravenous Daily     insulin aspart  1-6 Units Subcutaneous Q4H      metroNIDAZOLE  500 mg Intravenous Q6H     multivitamins w/minerals  15 mL Per Feeding Tube Daily     pantoprazole  40 mg Oral or Feeding Tube QAM AC     polyethylene glycol  17 g Oral or Feeding Tube Daily     senna-docusate  1 tablet Oral or Feeding Tube BID     thiamine  200 mg Intravenous TID     vancomycin (VANCOCIN) IV  1,750 mg Intravenous Q12H     [START ON 12/3/2019] thiamine  100 mg Oral or Feeding Tube TID     [START ON 12/8/2019] thiamine  100 mg Oral or Feeding Tube Daily            Allergies:   No Known Allergies         Physical Exam:     Ranges for his vital signs:   Temp:  [98.1  F (36.7  C)-100.6  F (38.1  C)] 98.7  F (37.1  C)  Heart Rate:  [53-74] 56  Resp:  [12-24] 14  BP: (156)/(57) 156/57  MAP:  [76 mmHg-97 mmHg] 86 mmHg  Arterial Line BP: (112-155)/(37-69) 145/58  FiO2 (%):  [40 %-100 %] 40 %  SpO2:  [90 %-100 %] 100 %    Physical Examination:  GENERAL: Intubated, awake and alert, follows commands.   HEENT:  Head is normocephalic, atraumatic.   EYES:  Eyes have anicteric sclerae without conjunctival injection or stigmata of endocarditis.    ENT:  ETT in place. Suction tube with bloody-appearing secretions.  NECK:  Collar in place. Drain with serosanguinous output.  LUNGS:  Clear to auscultation bilaterally.   CARDIOVASCULAR:  Regular rate and rhythm with no murmurs, gallops or rubs.  ABDOMEN:  Normal bowel sounds, soft, nontender. No appreciable hepatosplenomegaly  SKIN:  No acute rashes.  Lines are in place without any surrounding erythema or exudate. No stigmata of endocarditis.  NEUROLOGIC: Weakly able to  bilaterally; unable to wiggle toes.         Laboratory Data:     Inflammatory Markers    Recent Labs   Lab Test 12/02/19  0342 12/01/19  0112   .0* 170.0*       Hematology Studies    Recent Labs   Lab Test 12/02/19  0342 12/01/19  1342 12/01/19  1057 12/01/19  0112   WBC 15.0*  --   --  13.1*   HGB 10.1* 11.9* 11.5* 11.7*   MCV 92  --   --  90   *  --   --  565*  "    Metabolic Studies     Recent Labs   Lab Test 12/02/19  0342 12/01/19  1342 12/01/19  1057 12/01/19  0112    135 135 133   POTASSIUM 4.3 4.7 4.6 4.4   CHLORIDE 108  --   --  94   CO2 25  --   --  31   BUN 22  --   --  27   CR 0.67  --   --  0.75   GFRESTIMATED >90  --   --  >90       Hepatic Studies    Recent Labs   Lab Test 12/01/19  0112   BILITOTAL 0.6   ALKPHOS 103   ALBUMIN 2.1*   AST 17   ALT 32     Microbiology:  12/1 Blood culture NGTD  UA negative  12/1 Tissue culture: Cervical spine vertebral phlegmon: Staph aureus, Strep anginosus    Culture Micro   Date Value Ref Range Status   12/01/2019 No growth after 6 hours  Preliminary   12/01/2019 Culture negative monitoring continues  Preliminary   12/01/2019 Culture in progress  Preliminary       Imaging:  Of [facility]:  MRI C-spine w/o contrast done on 11/30/2019 --  - \"Apparent osteomyelitis involvement of the C3, C4 and C5 vertebrae.\"  - \"Suspected extension of the infectious process into the ventral aspect of the central canal suspicious for a ventral epidural phlegmon versus abscess.\"  "

## 2019-12-02 NOTE — PROGRESS NOTES
Neuroscience Intensive Care Progress Note  2019    Sherwin Keny Angel is a 64 year old year old male with hx HTN, DM and cervical stenosis s/p recent anterior C3-4 arthroplasty (2 weeks prior) who was admitted on 2019 with bilateral lower extremity paraplegia and near loss of upper extremity movement, after a fall at rehab. Prior to the fall, he was able to ambulate with a cane. Taken to the OR for arthroplasty implant removal on . Found to have significant inflammation and 2 pharyngotomy sites. ENT closed 1 (unable to reach other) and drain placed for the phlegmon. Now on broad spectrum abx for possible infection.     Problem List  1. Cervical stenosis s/p C3-C4 arthroplasty   2. Cervical spinal injury due to fall, with C3-C5 fracture   3. Acute respiratory failure s/p intubation  4. Leukocytosis  5. Concern for pneumonia  6. Hx of hypertension  7. Hx of DM  8. Alcohol dependence     24 hour events:  - Febrile with Tmax 100.6, HR 53 - 88  - Remains on Levophed 0.03 with BP range 113/63 - 155/57  - For sedation, on fentanyl gtt 50 mcg/hr and precedex gtt 0.6 mcg/hr      24 Hour Vital Signs Summary:  Temperatures:  Current - Temp: 98.3  F (36.8  C); Max - Temp  Av.2  F (37.3  C)  Min: 98.1  F (36.7  C)  Max: 100.6  F (38.1  C)  Respiration range: Resp  Av.7  Min: 12  Max: 24  Pulse range: No data recorded  Arterial Line BP: (112-155)/(37-69) 145/58  MAP:  [76 mmHg-97 mmHg] 86 mmHg    Pulse oximetry range: SpO2  Av.7 %  Min: 90 %  Max: 100 %    Ventilator Settings  Ventilation Mode: CMV/AC  (Continuous Mandatory Ventilation/ Assist Control)  FiO2 (%): 50 %  Rate Set (breaths/minute): 12 breaths/min  Tidal Volume Set (mL): 450 mL  PEEP (cm H2O): 5 cmH2O  Oxygen Concentration (%): 50 %  Peak Inspiratory Pressure (cm H2O) (Drager Eliana): 22  Resp: 14    Intake/Output   19   00:00 - 23:59 19   0000 - present   Input 4.2 L 1.8 L   Output 1.2 L 0.4 L   Net +2.9 L +1.3 L   Since  "Admit +2.9 L +4.3 L       Current Medications:    ceFEPIme (MAXIPIME) IV  2 g Intravenous Q8H     [START ON 12/4/2019] folic acid  1 mg Oral or Feeding Tube Daily     folic acid  1 mg Intravenous Daily     insulin aspart  1-6 Units Subcutaneous Q4H     metroNIDAZOLE  500 mg Intravenous Q6H     multivitamins w/minerals  15 mL Per Feeding Tube Daily     thiamine  200 mg Intravenous TID     vancomycin (VANCOCIN) IV  1,750 mg Intravenous Q12H     [START ON 12/3/2019] thiamine  100 mg Oral or Feeding Tube TID     [START ON 12/8/2019] thiamine  100 mg Oral or Feeding Tube Daily       PRN Medications:  acetaminophen, albuterol, bisacodyl, IV fluid REPLACEMENT ONLY, glucose **OR** dextrose **OR** glucagon, heparin lock flush, lidocaine 4%, lidocaine (buffered or not buffered), magnesium sulfate, magnesium sulfate, naloxone, ondansetron **OR** ondansetron, oxyCODONE, potassium chloride, potassium chloride with lidocaine, potassium chloride, potassium chloride, potassium chloride, potassium phosphate (KPHOS) in D5W IV, potassium phosphate (KPHOS) in D5W IV, potassium phosphate (KPHOS) in D5W IV, potassium phosphate (KPHOS) in D5W IV, potassium phosphate (KPHOS) in D5W IV, prochlorperazine **OR** prochlorperazine **OR** prochlorperazine, senna-docusate **OR** senna-docusate, sodium chloride (PF)    Infusions:    dexmedetomidine 0.6 mcg/kg/hr (12/02/19 0700)     IV fluid REPLACEMENT ONLY       fentaNYL 50 mcg/hr (12/02/19 0700)     norepinephrine 0.03 mcg/kg/min (12/02/19 0700)     propofol (DIPRIVAN) infusion Stopped (12/01/19 1716)     sodium chloride 100 mL/hr at 12/02/19 0700       No Known Allergies    Physical Examination:  BP (!) 156/57   Pulse 76   Temp 98.7  F (37.1  C) (Axillary)   Resp 14   Ht 1.727 m (5' 8\")   Wt 88.8 kg (195 lb 12.8 oz)   SpO2 100%   BMI 29.77 kg/m    General: NAD, lying in bed   HEENT: Normocephalic. NG tube in place.  CV: RRR  Resp: Intuated  Abd: Soft, nontender, nondistended   Skin: " Warm, dry   Extremities:  Neuro: intubated, examined on fentanyl 100 and precedex 0.4. Opens eyes when stimulated. Follows commands (sticking out his tongue). PERRL, eyes are conjugate. Moves both arms anti-gravity at elbows, more overall movement in LUE noted. R foot with triple flexion, L foot with no movement. No sensation in bilateral upper extremities below elbows, intact above.      Labs:  Recent Labs   Lab Test 12/02/19  0342 12/01/19  1342 12/01/19  1057 12/01/19  0112    135 135 133   POTASSIUM 4.3 4.7 4.6 4.4   CHLORIDE 108  --   --  94   CO2 25  --   --  31   ANIONGAP 5  --   --  7   * 188* 195* 230*   BUN 22  --   --  27   CR 0.67  --   --  0.75   FLORINDA 7.5*  --   --  8.7   WBC 15.0*  --   --  13.1*   RBC 3.47*  --   --  4.04*   HGB 10.1* 11.9* 11.5* 11.7*   *  --   --  565*       Recent Labs   Lab 12/02/19  0545 12/02/19  0200 12/01/19  1811 12/01/19  1342   PH 7.42 7.43 7.44 7.44   PCO2 43 42 40 43   PO2 130* 122* 192* 114*   HCO3 28 28 27 30*   O2PER 60.0 50.0 100 100       Cultures:    Blood culture (12/1): NGTD, pending   Fungal Cervical spine tissue cx: NGTD  Anaerobic bacteria cervical spine tissue cx: NGTD  Gram stain from cervical spine tissue: Few gram + cocci, rare gram + rods    Imaging:   CXR: 1. Left upper extremity PICC tip is deflected upwards and projects over the left internal jugular vein, similar to the prior radiograph on 12/1/2019. Recommend repositioning.  2. Bilateral perihilar and retrocardiac opacities, which may represent atelectasis versus infection.  3. Small right pleural effusion.  [Urgent Result: Malpositioned left upper extremity PICC]    Assessment/Plan:  Sherwin Covarrubiasolya is a 64 year old with hx of recent of anterior C3-4 [facility]) who was admitted 12/1/2019 after a fall at rehab, resulting in lower extremity paraplegia. Found to have cervical stenosis. Now s/p pharyngotomy closure on 12/1. Arthroplasty implant removed with  no replacement implant placed.    Neuro:  #Cervical spinal cord injury  - MAP > 85 with levophed as needed  - Strict bedrest   - Collar at all times   - Q1H neuro checks     #Sedation/pain  - Wean off Fentanyl gtt if able   - Precedex gtt  - Oxycodone PRN    #Hx of Alcohol dependence  - CIWA  - Thiamine & folate    Resp:  #Acute respiratory failure s/p intubation  #Possible aspiration pneumonia  - Pressure support trials while being weaned off sedation  - Would like to obtain a goal FIO2 of 30  - Supplemental O2 prn to keep Spo2 > 92%   - CXR with subclavian line going into internal jugular, replaced with PICC  - Treat possible pneumonia as below under ID    Cardio:  #Hypertension   - levophed gtt  - MAP > 85  - Hold PTA medications     Renal:  No active issues.  - Electrolyte replacement protocol   - Monitor I/O  - Continuous NS x 100 ml/hr, can be stopped once at goal TF    Endo:  #DM2  -Sliding scale insulin    Heme:  No active issues.    - Hg > 7.0   - Plt > 100k   - INR <1.5     GI:  No active issues.    #Constipation  - Last BM 11/29  - Miralax scheduled, PRN's available    - Diet: NPO, tube feeds being titrated up to goal 55 ml/hr    ID:  #Leukocytosis  #Low grade fever  #Possible pneumonia  - ID consult  - Continue Vancomycin, metronidazole, cefepime  - Follow up blood cultures (12/1)  - Follow up spinal tissue cultures (12/1)  - Monitor WBC/fevers  - Monitor for signs of infection; pan culture if >100.4    Antibiotics:  - Vancomycin (12/1 - present)  - Metronidazole (12/1 - present)  - Cefepime (12/1 - present)    Lines:  - ETT  - PICC  - PIV x 2  - Arterial line  - Degroot   - ERVIN drain in neck  - NG    FEN: NPO, tube feeds being titrated  PPX:    DVT prophylaxis: SCDs    GI: Pantoprazole  Code Status: Full, presumed    Dispo: Remains medically unstable    The patient was seen and discussed with the attending, Dr. Clark.    Randi Edmonds MD  Neurology PGY2  [phone]

## 2019-12-02 NOTE — OP NOTE
Procedure Date: 12/01/2019      PREOPERATIVE DIAGNOSIS:  Traumatic cervical spinal cord injury.      POSTOPERATIVE DIAGNOSIS:  Traumatic cervical spinal cord injury.      PROCEDURE PERFORMED:   1.  Incision and debridement of neck incision.   2.  Use of intraoperative C-arm.   3.  Removal of arthroplasty hardware.   4.  Otolaryngology repair of pharyngotomy.   5.  Use of intraoperative microscope.        ATTENDING SURGEONS:   1.  Fernando Ashton MD.    2.  Kathy Mercado MD.      RESIDENT SURGEONS:   1.  Merlin Quintanilla MD.    2.  Reagan Faustin MD.    3.  Kemar Akins MD.      4.  Nate Urbina MD.    5.  Ronak Cummins MD.      ANESTHESIA:  General.      ESTIMATED BLOOD LOSS:  30 mL.       INTRAOPERATIVE FINDINGS:  Chronic pharyngotomy was found and closed primarily with assistance from ENT.  Arthroplasty implant was removed in entirety.  No replacement implant was placed.  Phlegmon throughout the epidural space was noted and sent for cultures.      INDICATIONS FOR OPERATION:  Mr. Angel is a 64-year-old male who underwent anterior C3-C4 diskectomy and artificial disk placement approximately 2 weeks ago at the [facility].  The procedure went well, however, the patient was troubled with dysphagia postoperatively.  A feeding tube was placed.  The patient was recovering well without fever or white count.  He was discharged to a rehab facility.  While at his rehabilitation facility, the patient had a fall which he is amnestic to.  He was found down by staff at the rehab facility.  Clinically he was paraplegic.  He was transferred back to the [facility] where an MRI was obtained that demonstrated severe spinal canal narrowing between C2 and C5.  Given the patient's exam, he was transferred to [facility] for further cares.  An MRI with contrast was obtained on arrival to [facility], which demonstrated a contrast enhancing epidural collection.  Given his clinical exam as well as imaging  findings, it was recommended that he undergo the above-mentioned procedure.      DESCRIPTION OF PROCEDURE:  The patient was marked and consented in his hospital room.  He was brought to the operating room.  The patient was intubated, maintaining a neutral neck position.  An arterial line and a Degroot had already been placed in the ICU.  A left subclavian central venous catheter was placed by our anesthesia colleagues.  The patient was then transferred to the operative table and his head was placed on a donut.  His prior incision was immediately apparent.  A running Monocryl suture was apparent, which was cut and removed.  Using electrical clippers, the area of his prior incision was then shaved.  The midline, sternal notch, and medial border of the sternocleidomastoid were then marked.  The patient's incision was first cleaned using alcohol soaked 4 x 4s.  We then prepped and draped the patient in normal sterile fashion.  No local anesthetic was used.  A timeout was called, confirming the patient's identity, laterality of procedure, and the procedure intended.  A C-arm was brought to the sterile field and an image was obtained demonstrating that the vertebral bodies at C3-C4 were adequately aligned.      Metzenbaum scissors were used to gently open the incision and cut the subgaleal sutures.  Using a mosquito, the subgaleal sutures were removed.  With the incision open, blunt dissection with a finger was used to dissect along the prior dissection pathway.  Dense granulation tissue was encountered without any obvious indications of infection.  We continued to use blunt dissection until we had reached the anterior cervical spine.  The hardware was palpable.  An instrument was placed on the palpable hardware and an additional C-arm image was obtained demonstrating that we had indeed identified the artificial disk level.     We attempted to widen the corridor, however, there was substantial fibrous tissue buildup  surrounding the corridor.  A new corridor was attempted more laterally, however, we were not able to gain adequate exposure.  At this point, we consulted our ENT colleagues for assistance.  With their consultation, we determined that we were safe to move through the exposed tissues and widened our incision, per their direction.  Once we had adequate exposure, we then explored the wound with our ENT colleagues.  It was at this time that we detected a pharyngotomy.  This was confirmed with visualization of the posterior oropharynx up at the head of the bed.      We then removed the artificial disk.  We then brought the microscope to the field.  Using a curet, we then mobilized dense phlegmon appearing tissue under the implant.  Many samples were removed using a pituitary and sent to the lab for pathology.  Once we had reached the spinal canal, we then used a curet to try to identify dura.  A portion of the dura was identified, however, the phlegmon material was densely adhered to the dura and additional tension was not applied.  We then palpated within the spinal canal using a nerve hook.  We felt that the spinal canal was adequately decompressed.  The wound was then copiously irrigated with bacitracin infused normal saline.  Meticulous hemostasis was obtained.      At this point in time, our ENT colleagues assumed control of the case.  This was for primary closure of the pharyngotomy as well as drain placement and closure of the incision.  Please refer to their dictation for details regarding their procedure.      With the wound closed, the wound was cleaned with a wet followed by a dry sponge.  ChloraPrep was applied to the incision.  Once dried, a Primapore dressing was then used to dress the incision.  The drapes were then completely removed.  A decision was made to keep the patient intubated given some difficulty respirating him throughout the case and his copious secretions.  For this reason, an Aspen collar was  placed and the patient was then transferred back to his ICU bed.  The patient was taken to the surgical ICU for further postoperative cares intubated and sedated.      All counts were correct at the end of the procedure x2.  Dr. Batool Almaraz was present and scrubbed throughout all critical portions of the case and otherwise immediately available.         BATOOL ALMARAZ MD       As dictated by KIKO CHAPARRO MD            D: 2019   T: 2019   MT: WT      Name:     IVONE HUITRON   MRN:      [id number]        Account:        [id number]   :      1955           Procedure Date: 2019      Document: [id number]

## 2019-12-02 NOTE — PLAN OF CARE
"/62   Pulse 76   Temp 100.3  F (37.9  C) (Axillary)   Resp 22   Ht 1.727 m (5' 8\")   Wt 86.7 kg (191 lb 3.2 oz)   SpO2 92%   BMI 29.07 kg/m       Neuro: Sedated. Propofol off. Able to open eyes to voice. Withdraws lower extremities to pain intermittently. (NCC aware) Localizes UE  indePrecedex started, currently @0.07.     Cardiac: On arrival from OR, Tristen was @ 0.05. Tristen stopped. Currently on levo between 0.01-0.03 to keep MAP >85.     Respiratory: Fio2: 80% on arrival to unit, unable to keep sats >95%, team updated, chest Xray done, ABG sent, weaned to 50% SPO2>91. Minimal secretions.     GI/: Feeding tube in place. Degroot with good UOP.     Skin: Anterior neck incision w/ primapore. C-Collar in place.      LDA:  R. Neck ERVIN (small serosanguinous output)    Drips: Fentanyl @100   Precedex @0.7   NS @100   Levo @0.01-0.03     Plan: Aspen collar changed to [place] collar. Neck Xrays done.  Per NSG, resident to come and draw back on CVC line.  Pending Chest Xray read.  Blood cx pending. Urine cx sent.  C-spine precautions. Will continue to monitor and update team with changes. Sister Carlota updated about surgery.   "

## 2019-12-02 NOTE — PROGRESS NOTES
Neurosurgery Progress Note    Subjective:  OR - Pharyngotomy closed with assistance from ENT. Arthroplasty implant removed. No replacement implant placed.    Objective:  Intubated, sedated  Awake  Follows commands  At least antigravity in bilateral upper extremities, withdraws in bilateral lower extremities    Assessment:  64 years old gentleman status post C3-C4 artificial disc placement about 2 weeks ago presents after a fall with almost complete loss of strength in his upper extremities and paraplegia, found to have cervical stenosis. S/p OR 12/1 - Pharyngotomy closed with assistance from ENT. Arthroplasty implant removed. No replacement implant placed.    Plan:  MAP > 85  Levophed prn  Activity: bedrest  DVT: SCDs while in bed  CIWA protocol  ERVIN drain per ENT  [place] J collar  Vancomycin, cefepime, flagyl for vertebral phlegmon; consult infectious disease  Disposition: 4A    Lion Vargas MD  Neurosurgery Resident PGY2    Please contact neurosurgery resident on call with questions.    Dial * * [phone], enter [phone] when prompted.

## 2019-12-02 NOTE — PHARMACY-CONSULT NOTE
Pharmacy Tube Feeding Consult    Medication reviewed for administration by feeding tube and for potential food/drug interactions.    Recommendation: No changes are needed at this time.     Pharmacy will continue to follow as new medications are ordered.    Ced Calle, PharmD

## 2019-12-02 NOTE — PLAN OF CARE
D/I:  Neuro: Patient appeared to be oriented as he can answer yes or no questions, aroused to voice and opened eyes spontaneously. PERRL, able to track with eyes intermittently. BUE has purposeful movements and can lift on command, bilateral shoulder shrug present since 0000; weak to no grasp upon command. BLE 0/1 in strength, intermittently withdrew to pain. Precedex titrated between 0.4-0.7 mcg for RASS goal of 0 to -1. Currently infusing at 0.5. Fentanyl at 50- mcg. Neuros Q1H.   Cardiac: Sinus estela to NSR, HR 50s-60s. Tmax 100.6 F, PRN tylenol given, temp at 0400 was 98.3 F. Scheduled abx given; blood cultures collected per lab. Levophed titrated between 0.03-0.06 mcg for MAP goal >85, SBP 100s-160s via ART line. Levophed currently infusing at 0.03 mcg. IV fluids infusing at 100 mL/hr. Other vital signs stable. ERVIN in neck draining 9-10 mL serosanguinous drainage, UTV dressing due to cervical collar.  Chest x-ray obtained this am.  Resp: RT assisted in monitoring vent changes; RR 16-22, O2 stat >95%. CMV settings currently at FiO2 60%, RR 12, , PEEP 5 per RT. ABG collected and sent.   GI/: At 2000, TF started at 10 mL/hr, increased at 0400 to 20 mL with standard flushes. No residual noted. Patient appeared to be tolerating. Degroot intact, urine output  mL Q2H. No BMs overnight.    Plan: Per neurosurg, central line replacement may be needed. Continue to wean off sedation, keep MAPs >85 and follow POC.

## 2019-12-03 ENCOUNTER — APPOINTMENT (OUTPATIENT)
Dept: GENERAL RADIOLOGY | Facility: CLINIC | Age: 64
DRG: 003 | End: 2019-12-03
Attending: STUDENT IN AN ORGANIZED HEALTH CARE EDUCATION/TRAINING PROGRAM
Payer: COMMERCIAL

## 2019-12-03 LAB
ANION GAP SERPL CALCULATED.3IONS-SCNC: 3 MMOL/L (ref 3–14)
BASOPHILS # BLD AUTO: 0.1 10E9/L (ref 0–0.2)
BASOPHILS NFR BLD AUTO: 0.3 %
BUN SERPL-MCNC: 18 MG/DL (ref 7–30)
CALCIUM SERPL-MCNC: 8.2 MG/DL (ref 8.5–10.1)
CHLORIDE SERPL-SCNC: 106 MMOL/L (ref 94–109)
CO2 SERPL-SCNC: 29 MMOL/L (ref 20–32)
CREAT SERPL-MCNC: 0.58 MG/DL (ref 0.66–1.25)
DIFFERENTIAL METHOD BLD: ABNORMAL
EOSINOPHIL # BLD AUTO: 0.1 10E9/L (ref 0–0.7)
EOSINOPHIL NFR BLD AUTO: 0.6 %
ERYTHROCYTE [DISTWIDTH] IN BLOOD BY AUTOMATED COUNT: 12.3 % (ref 10–15)
GFR SERPL CREATININE-BSD FRML MDRD: >90 ML/MIN/{1.73_M2}
GLUCOSE BLDC GLUCOMTR-MCNC: 177 MG/DL (ref 70–99)
GLUCOSE BLDC GLUCOMTR-MCNC: 180 MG/DL (ref 70–99)
GLUCOSE BLDC GLUCOMTR-MCNC: 187 MG/DL (ref 70–99)
GLUCOSE BLDC GLUCOMTR-MCNC: 192 MG/DL (ref 70–99)
GLUCOSE BLDC GLUCOMTR-MCNC: 195 MG/DL (ref 70–99)
GLUCOSE BLDC GLUCOMTR-MCNC: 210 MG/DL (ref 70–99)
GLUCOSE SERPL-MCNC: 212 MG/DL (ref 70–99)
HCT VFR BLD AUTO: 31.5 % (ref 40–53)
HGB BLD-MCNC: 9.7 G/DL (ref 13.3–17.7)
IMM GRANULOCYTES # BLD: 0.2 10E9/L (ref 0–0.4)
IMM GRANULOCYTES NFR BLD: 0.9 %
LYMPHOCYTES # BLD AUTO: 1.4 10E9/L (ref 0.8–5.3)
LYMPHOCYTES NFR BLD AUTO: 7.3 %
MAGNESIUM SERPL-MCNC: 2.3 MG/DL (ref 1.6–2.3)
MCH RBC QN AUTO: 29 PG (ref 26.5–33)
MCHC RBC AUTO-ENTMCNC: 30.8 G/DL (ref 31.5–36.5)
MCV RBC AUTO: 94 FL (ref 78–100)
MONOCYTES # BLD AUTO: 0.8 10E9/L (ref 0–1.3)
MONOCYTES NFR BLD AUTO: 4.2 %
NEUTROPHILS # BLD AUTO: 16.6 10E9/L (ref 1.6–8.3)
NEUTROPHILS NFR BLD AUTO: 86.7 %
NRBC # BLD AUTO: 0 10*3/UL
NRBC BLD AUTO-RTO: 0 /100
PHOSPHATE SERPL-MCNC: 2.7 MG/DL (ref 2.5–4.5)
PLATELET # BLD AUTO: 481 10E9/L (ref 150–450)
POTASSIUM SERPL-SCNC: 3.9 MMOL/L (ref 3.4–5.3)
RBC # BLD AUTO: 3.35 10E12/L (ref 4.4–5.9)
SODIUM SERPL-SCNC: 138 MMOL/L (ref 133–144)
VANCOMYCIN SERPL-MCNC: 14.3 MG/L
WBC # BLD AUTO: 19.1 10E9/L (ref 4–11)

## 2019-12-03 PROCEDURE — 25000132 ZZH RX MED GY IP 250 OP 250 PS 637: Performed by: STUDENT IN AN ORGANIZED HEALTH CARE EDUCATION/TRAINING PROGRAM

## 2019-12-03 PROCEDURE — 25000132 ZZH RX MED GY IP 250 OP 250 PS 637: Performed by: NEUROLOGICAL SURGERY

## 2019-12-03 PROCEDURE — 25800030 ZZH RX IP 258 OP 636: Performed by: STUDENT IN AN ORGANIZED HEALTH CARE EDUCATION/TRAINING PROGRAM

## 2019-12-03 PROCEDURE — 25800030 ZZH RX IP 258 OP 636

## 2019-12-03 PROCEDURE — 40000275 ZZH STATISTIC RCP TIME EA 10 MIN

## 2019-12-03 PROCEDURE — 84100 ASSAY OF PHOSPHORUS: CPT | Performed by: STUDENT IN AN ORGANIZED HEALTH CARE EDUCATION/TRAINING PROGRAM

## 2019-12-03 PROCEDURE — 94003 VENT MGMT INPAT SUBQ DAY: CPT

## 2019-12-03 PROCEDURE — 20000004 ZZH R&B ICU UMMC

## 2019-12-03 PROCEDURE — 83735 ASSAY OF MAGNESIUM: CPT | Performed by: STUDENT IN AN ORGANIZED HEALTH CARE EDUCATION/TRAINING PROGRAM

## 2019-12-03 PROCEDURE — 25000125 ZZHC RX 250: Performed by: STUDENT IN AN ORGANIZED HEALTH CARE EDUCATION/TRAINING PROGRAM

## 2019-12-03 PROCEDURE — 00000146 ZZHCL STATISTIC GLUCOSE BY METER IP

## 2019-12-03 PROCEDURE — 85025 COMPLETE CBC W/AUTO DIFF WBC: CPT | Performed by: STUDENT IN AN ORGANIZED HEALTH CARE EDUCATION/TRAINING PROGRAM

## 2019-12-03 PROCEDURE — 25000132 ZZH RX MED GY IP 250 OP 250 PS 637: Performed by: NURSE PRACTITIONER

## 2019-12-03 PROCEDURE — 40000986 XR CHEST PORT 1 VW

## 2019-12-03 PROCEDURE — 80202 ASSAY OF VANCOMYCIN: CPT | Performed by: NEUROLOGICAL SURGERY

## 2019-12-03 PROCEDURE — 25800030 ZZH RX IP 258 OP 636: Performed by: NEUROLOGICAL SURGERY

## 2019-12-03 PROCEDURE — 25000128 H RX IP 250 OP 636

## 2019-12-03 PROCEDURE — 87040 BLOOD CULTURE FOR BACTERIA: CPT | Performed by: NURSE PRACTITIONER

## 2019-12-03 PROCEDURE — 5A1945Z RESPIRATORY VENTILATION, 24-96 CONSECUTIVE HOURS: ICD-10-PCS | Performed by: STUDENT IN AN ORGANIZED HEALTH CARE EDUCATION/TRAINING PROGRAM

## 2019-12-03 PROCEDURE — 36415 COLL VENOUS BLD VENIPUNCTURE: CPT | Performed by: NURSE PRACTITIONER

## 2019-12-03 PROCEDURE — 27210437 ZZH NUTRITION PRODUCT SEMIELEM INTERMED LITER

## 2019-12-03 PROCEDURE — 80048 BASIC METABOLIC PNL TOTAL CA: CPT | Performed by: STUDENT IN AN ORGANIZED HEALTH CARE EDUCATION/TRAINING PROGRAM

## 2019-12-03 PROCEDURE — 25000125 ZZHC RX 250: Performed by: NEUROLOGICAL SURGERY

## 2019-12-03 PROCEDURE — 25000128 H RX IP 250 OP 636: Performed by: STUDENT IN AN ORGANIZED HEALTH CARE EDUCATION/TRAINING PROGRAM

## 2019-12-03 RX ADMIN — CEFEPIME HYDROCHLORIDE 2 G: 2 INJECTION, POWDER, FOR SOLUTION INTRAVENOUS at 08:02

## 2019-12-03 RX ADMIN — THIAMINE HYDROCHLORIDE 200 MG: 100 INJECTION, SOLUTION INTRAMUSCULAR; INTRAVENOUS at 08:41

## 2019-12-03 RX ADMIN — INSULIN ASPART 2 UNITS: 100 INJECTION, SOLUTION INTRAVENOUS; SUBCUTANEOUS at 08:06

## 2019-12-03 RX ADMIN — SENNOSIDES AND DOCUSATE SODIUM 1 TABLET: 8.6; 5 TABLET ORAL at 08:10

## 2019-12-03 RX ADMIN — SENNOSIDES AND DOCUSATE SODIUM 1 TABLET: 8.6; 5 TABLET ORAL at 20:38

## 2019-12-03 RX ADMIN — VANCOMYCIN HYDROCHLORIDE 1750 MG: 10 INJECTION, POWDER, LYOPHILIZED, FOR SOLUTION INTRAVENOUS at 17:34

## 2019-12-03 RX ADMIN — METRONIDAZOLE 500 MG: 500 INJECTION, SOLUTION INTRAVENOUS at 22:16

## 2019-12-03 RX ADMIN — ACETAMINOPHEN 650 MG: 325 SOLUTION ORAL at 18:11

## 2019-12-03 RX ADMIN — POTASSIUM PHOSPHATE, MONOBASIC AND POTASSIUM PHOSPHATE, DIBASIC 10 MMOL: 224; 236 INJECTION, SOLUTION INTRAVENOUS at 06:31

## 2019-12-03 RX ADMIN — POLYETHYLENE GLYCOL 3350 17 G: 17 POWDER, FOR SOLUTION ORAL at 08:10

## 2019-12-03 RX ADMIN — POTASSIUM CHLORIDE 20 MEQ: 1.5 POWDER, FOR SOLUTION ORAL at 04:40

## 2019-12-03 RX ADMIN — VANCOMYCIN HYDROCHLORIDE 1750 MG: 10 INJECTION, POWDER, LYOPHILIZED, FOR SOLUTION INTRAVENOUS at 04:40

## 2019-12-03 RX ADMIN — ACETAMINOPHEN 650 MG: 325 SOLUTION ORAL at 23:06

## 2019-12-03 RX ADMIN — DEXMEDETOMIDINE 0.4 MCG/KG/HR: 100 INJECTION, SOLUTION, CONCENTRATE INTRAVENOUS at 13:10

## 2019-12-03 RX ADMIN — METRONIDAZOLE 500 MG: 500 INJECTION, SOLUTION INTRAVENOUS at 16:16

## 2019-12-03 RX ADMIN — CEFEPIME HYDROCHLORIDE 2 G: 2 INJECTION, POWDER, FOR SOLUTION INTRAVENOUS at 23:15

## 2019-12-03 RX ADMIN — CEFEPIME HYDROCHLORIDE 2 G: 2 INJECTION, POWDER, FOR SOLUTION INTRAVENOUS at 00:28

## 2019-12-03 RX ADMIN — BISACODYL 10 MG: 10 SUPPOSITORY RECTAL at 12:20

## 2019-12-03 RX ADMIN — Medication 40 MG: at 08:09

## 2019-12-03 RX ADMIN — INSULIN ASPART 1 UNITS: 100 INJECTION, SOLUTION INTRAVENOUS; SUBCUTANEOUS at 04:40

## 2019-12-03 RX ADMIN — METRONIDAZOLE 500 MG: 500 INJECTION, SOLUTION INTRAVENOUS at 10:44

## 2019-12-03 RX ADMIN — METRONIDAZOLE 500 MG: 500 INJECTION, SOLUTION INTRAVENOUS at 04:41

## 2019-12-03 RX ADMIN — THIAMINE HYDROCHLORIDE 200 MG: 100 INJECTION, SOLUTION INTRAMUSCULAR; INTRAVENOUS at 13:54

## 2019-12-03 RX ADMIN — INSULIN ASPART 1 UNITS: 100 INJECTION, SOLUTION INTRAVENOUS; SUBCUTANEOUS at 00:43

## 2019-12-03 RX ADMIN — ACETAMINOPHEN 650 MG: 325 SOLUTION ORAL at 11:46

## 2019-12-03 RX ADMIN — CEFEPIME HYDROCHLORIDE 2 G: 2 INJECTION, POWDER, FOR SOLUTION INTRAVENOUS at 15:04

## 2019-12-03 RX ADMIN — MULTIVITAMIN 15 ML: LIQUID ORAL at 20:38

## 2019-12-03 ASSESSMENT — ACTIVITIES OF DAILY LIVING (ADL)
ADLS_ACUITY_SCORE: 41

## 2019-12-03 ASSESSMENT — MIFFLIN-ST. JEOR: SCORE: 1671.5

## 2019-12-03 NOTE — PROGRESS NOTES
Neuroscience Intensive Care Progress Note  2019    Sherwin Keny Angel is a 64 year old year old male with hx HTN, DM and cervical stenosis s/p recent anterior C3-4 arthroplasty (2 weeks prior) who was admitted on 2019 with bilateral lower extremity paraplegia and near loss of upper extremity movement, after a fall at rehab. Prior to the fall, he was able to ambulate with a cane. Taken to the OR for arthroplasty implant removal on . Found to have significant inflammation and 2 pharyngotomy sites. ENT closed 1 (unable to reach other) and drain placed for the phlegmon. Now on broad spectrum abx for possible infection.     Problem List  1. Cervical stenosis s/p C3-C4 arthroplasty   2. Cervical spinal injury due to fall, with C3-C5 fracture   3. Acute respiratory failure s/p intubation  4. Leukocytosis  5. Concern for pneumonia  6. Hx of hypertension  7. Hx of DM  8. Alcohol dependence     24 hour events:  - No major events overnight  - Noted to have Tmax 100.3.  - No longer on Levophed with BP range 118//57.  - For sedation, being weaned, now on fentanyl gtt 20 mcg/hr and precedex gtt 0.4 mcg/hr   - ET tube advanced appropriately this morning  - No BM since   - This morning, on pressure support, tolerating well  - Discussed future plans with NSG - they do not plan to go back to OR, ok to extubate from their standpoint, will touch base with ENT     24 Hour Vital Signs Summary:  Temperatures:  Current - Temp: 98.3  F (36.8  C); Max - Temp  Av.2  F (37.3  C)  Min: 98.1  F (36.7  C)  Max: 100.6  F (38.1  C)  Respiration range: Resp  Av.7  Min: 12  Max: 24  Pulse range: No data recorded  Arterial Line BP: (112-155)/(37-69) 145/58  MAP:  [76 mmHg-97 mmHg] 86 mmHg    Pulse oximetry range: SpO2  Av.7 %  Min: 90 %  Max: 100 %    Ventilator Settings  Ventilation Mode: CMV/AC  (Continuous Mandatory Ventilation/ Assist Control)  FiO2 (%): 40 %  Rate Set (breaths/minute): 12  breaths/min  Tidal Volume Set (mL): 450 mL  PEEP (cm H2O): 5 cmH2O  Pressure Support (cm H2O): 7 cmH2O  Oxygen Concentration (%): 40 %  Peak Inspiratory Pressure (cm H2O) (Drager Eliana): 20  Resp: 17    Intake/Output   12/2/19   00:00 - 23:59 12/03/19   0000 - present   Input 3.7 L 1.3 L   Output 2.1 L 0.911 L   Net +1.6 L +0.4 L   Since Admit +4.5 L +4.9 L       Current Medications:    ceFEPIme (MAXIPIME) IV  2 g Intravenous Q8H     [START ON 12/4/2019] folic acid  1 mg Oral or Feeding Tube Daily     folic acid  1 mg Intravenous Daily     heparin lock flush  5-10 mL Intracatheter Q24H     insulin aspart  1-6 Units Subcutaneous Q4H     metroNIDAZOLE  500 mg Intravenous Q6H     multivitamins w/minerals  15 mL Per Feeding Tube Daily     pantoprazole  40 mg Oral or Feeding Tube QAM AC     polyethylene glycol  17 g Oral or Feeding Tube Daily     senna-docusate  1 tablet Oral or Feeding Tube BID     thiamine  200 mg Intravenous TID     vancomycin (VANCOCIN) IV  1,750 mg Intravenous Q12H     thiamine  100 mg Oral or Feeding Tube TID     [START ON 12/8/2019] thiamine  100 mg Oral or Feeding Tube Daily       PRN Medications:  acetaminophen, albuterol, bisacodyl, IV fluid REPLACEMENT ONLY, glucose **OR** dextrose **OR** glucagon, heparin lock flush, heparin lock flush, lidocaine 4%, lidocaine 4%, lidocaine (buffered or not buffered), lidocaine (buffered or not buffered), magnesium sulfate, magnesium sulfate, naloxone, ondansetron **OR** ondansetron, oxyCODONE, potassium chloride, potassium chloride with lidocaine, potassium chloride, potassium chloride, potassium chloride, potassium phosphate (KPHOS) in D5W IV, potassium phosphate (KPHOS) in D5W IV, potassium phosphate (KPHOS) in D5W IV, potassium phosphate (KPHOS) in D5W IV, potassium phosphate (KPHOS) in D5W IV, prochlorperazine **OR** prochlorperazine **OR** prochlorperazine, sodium chloride (PF), sodium chloride (PF)    Infusions:    dexmedetomidine 0.4 mcg/kg/hr  "(12/03/19 0700)     IV fluid REPLACEMENT ONLY       fentaNYL 20 mcg/hr (12/03/19 0700)     norepinephrine Stopped (12/03/19 0525)       No Known Allergies    Physical Examination:  BP (!) 144/74   Pulse 68   Temp 98.9  F (37.2  C) (Axillary)   Resp 17   Ht 1.727 m (5' 8\")   Wt 90.2 kg (198 lb 13.7 oz)   SpO2 94%   BMI 30.24 kg/m    General: NAD, lying in bed   HEENT: Normocephalic. NG tube in place.  CV: RRR  Resp: Intuated  Abd: Soft, nontender, nondistended   Skin: Warm, dry   Extremities:  Neuro: intubated, examined on fentanyl and precedex. Eyes open today. EOMI. Follows commands. Eyes are conjugate. Moves both arms anti-gravity at elbows, more overall movement in LUE noted.  stronger in L > R. No movement in BLE with noxious.Sensation intact half way past bilateral elbows today, improved from prior.     Labs:  Most Recent 3 CBC's:  Recent Labs   Lab Test 12/03/19  0332 12/02/19  0342 12/01/19  1342  12/01/19  0112   WBC 19.1* 15.0*  --   --  13.1*   HGB 9.7* 10.1* 11.9*   < > 11.7*   MCV 94 92  --   --  90   * 528*  --   --  565*    < > = values in this interval not displayed.     Most Recent 3 BMP's:  Recent Labs   Lab Test 12/03/19  0332 12/02/19  0342 12/01/19  1342  12/01/19  0112    139 135   < > 133   POTASSIUM 3.9 4.3 4.7   < > 4.4   CHLORIDE 106 108  --   --  94   CO2 29 25  --   --  31   BUN 18 22  --   --  27   CR 0.58* 0.67  --   --  0.75   ANIONGAP 3 5  --   --  7   FLORINDA 8.2* 7.5*  --   --  8.7   * 184* 188*   < > 230*    < > = values in this interval not displayed.       Cultures:  Blood culture (12/1): NGTD, pending   Fungal Cervical spine tissue cx (12/1): NGTD  Gram stain from cervical spine tissue(12/1): Few gram + cocci, rare gram + rods    Anaerobic cervical spine tissue cx (12/1): Moderate fusobacterium nucleatum, moderate parvimonas micra  Aerobic cervical spine tissue cx (12/1): Single colony staph aureus, light growth strep anginosus, light eikinella " corrodens    Imaging:   Reviewed.    Assessment/Plan:  Sherwin Keny Angel is a 64 year old with hx of recent of anterior C3-4 arthroplasty ([facility] weeks ago at [facility]) who was admitted 12/1/2019 after a fall at rehab, resulting in lower extremity paraplegia. Found to have cervical stenosis. Now s/p pharyngotomy closure on 12/1. Arthroplasty implant removed with no replacement implant placed. On broad spectrum abx with ID following.     Neuro:  #Cervical spinal cord injury  - Normalize goal MAP > 65 today, stop levo  - Strict bedrest   - Collar at all times   - Q1H neuro checks     #Sedation/pain  - Wean off Fentanyl gtt if able   - Precedex gtt  - Oxycodone PRN    #Hx of Alcohol dependence  - CIWA  - Thiamine & folate    Resp:  #Acute respiratory failure s/p intubation  - Discussed future plans with NSG - they do not plan to go back to OR, ok to extubate from their standpoint, will touch base with ENT   - Pressure support trials while being weaned off sedation  - Would like to obtain a goal FIO2 of 30  - Supplemental O2 prn to keep Spo2 > 92%     Cardio:  #Hx of hypertension   - discontinue levophed  - Normalize goal to MAP > 65  - Hold PTA medications for now    Renal:  No active issues.  - Electrolyte replacement protocol   - Monitor I/O  - No longer on IVF    Endo:  #DM2  -Sliding scale insulin    Heme:  No active issues.    - Hg > 7.0   - Plt > 100k   - INR <1.5     GI:  No active issues.    #Constipation  - Last BM 11/29  - will give a suppository today   - Miralax scheduled  - PRN's available    - Diet: NPO, tube feeds at goal    ID:  #Leukocytosis  #Low grade fever  - today worsening leukocytosis with low grade T 100.3, however, CXR without signs of pnuemonia and already on broad abx, will continue to monitor; leukocytosis may be stress response   - if T >100.4, will obtain blood/sputum cultures and UA   - ID following  - Continue Vancomycin, metronidazole, cefepime  - Follow up blood cultures (12/1): NGTD,  pending  - Follow up spinal tissue cultures (12/1): multiple organisms growing, pending   - Daily CBC    Antibiotics:  - Vancomycin (12/1 - present)  - Metronidazole (12/1 - present)  - Cefepime (12/1 - present)    Lines:  - ETT  - PICC  - PIV x 2  - Arterial line  - Degroot   - ERVIN drain in neck  - NG    FEN: NPO, tube feeds at goal  PPX:    DVT prophylaxis: SCDs, will discuss starting heparin ppx with NSG    GI: Pantoprazole  Code Status: Full, presumed    Dispo: Remains medically unstable    The patient was seen and discussed with the attending, Dr. Clark.    Randi Edmonds MD  Neurology PGY2  [phone]

## 2019-12-03 NOTE — PROGRESS NOTES
Neurosurgery Progress Note    Subjective:  PICC placed.     Objective:  Intubated, sedated  Awake  Follows commands  At least antigravity in bilateral upper extremities, 4/5 biceps, 1/5 , triple flexion in RLE, 0/5 in LLE    Assessment:  64 years old gentleman status post C3-C4 artificial disc placement about 2 weeks ago presents after a fall with almost complete loss of strength in his upper extremities and paraplegia, found to have cervical stenosis. S/p OR 12/1 - Pharyngotomy closed with assistance from ENT. Arthroplasty implant removed. No replacement implant placed.    Plan:  MAP > 85  Levophed prn  Activity: bedrest  DVT: SCDs while in bed  ERVIN drain per ENT  [place] J collar  Per ID: continue vancomycin, cefepime, flagyl for vertebral phlegmon, CRP q72 hrs  Disposition: 4A    Lion Vargas MD  Neurosurgery Resident PGY2    Please contact neurosurgery resident on call with questions.    Dial * * [phone], enter [phone] when prompted.

## 2019-12-03 NOTE — PLAN OF CARE
N-  Neuros q1h. Pupils equal and reactive. Patient moves upper arms to command. Slight grasp at times. Sensation to upper extremeties and RLE. No sensation to LLE. Triple flex at times to RLE. No movement to LLE. Bloody nasal drainage, NSG notified at 0100. Cervical kartik in place with spinal precautions.     C- Afebrile. SR. HR 60s-70s. Norepinephrine to maintain MAP goal >85, see MAR.     R- ETT advanced. Now at 29 to lip. CMV 40%/12/450/5. Suctioning bloody oral secretions. Suctioning more pink tinged/creamy secretions from ETT after advancement. Lung sounds course.     Gi- NG. TF advanced to 55 which is goal. FWF. No bowel movement since 11/29. +bowel sounds. Bowel regimen in place.     - Degroot in place for neurogenic bladder. Good UO.     Skin- ERVIN x1. Preventative mepilex to R shoulder and coccyx.     Access- R PICC, PIV    Gtts- Precedex, Fentanyl, TKO     Electrolytes replaced via MAR    Plan to continue to monitor and notify MD for changes

## 2019-12-03 NOTE — OP NOTE
Procedure Date: 12/01/2019    SURGEONS:   1. Kathy Mercado MD  2. Fernando Ashton MD    RESIDENT SURGEONS:   1. Nate Urbina MD  2. Rashawn Akins MD  3. Walker Faustin MD  4. Ronak Cummins MD    PREOPERATIVE DIAGNOSIS:  1.  Traumatic cervical spinal cord injury.   2.  Epidural abscess.    POSTOPERATIVE DIAGNOSIS:  1.  Traumatic cervical spinal cord injury.   2.  Epidural abscess.  3.  Pharyngotomy     PROCEDURE PERFORMED:  1.  Direct laryngoscopy.   2.  Transcervical and transoral repair of pharyngotomy.     INDICATIONS FOR PROCEDURE: Sherwin Gonzalez is a 64 year old male who underwent an anterior C3-C4 diskectomy and artificial disk placement about 2 weeks ago. He was discharged to TCU following the procedure. He apparently had a fall that resulted in paraplegia. He was transferred to [facility] for further management. An MRI with contrast was notable for a contrast-enhancing epidural fluid collection. He was therefore taken to the operating room by the neurosurgery service for management. We were asked to assist intraoperatively.     FINDINGS:  1.  Significant inflammatory soft tissue in the prevertebral soft tissues.  2.  Anterior arthroplasty hardware in place.   3.  A pharyngotomy was identified in the oropharynx extending towards the hypopharynx. Given the degree of granulation tissue, this was suspected to be an old injury.     ANESTHESIA: General.     ESTIMATED BLOOD LOSS: 30 mL    SPECIMENS: none from our portion.     DRAINS: [place] x 1.     CONDITION: Stable to the surgical intensive care unit.     DESCRIPTION OF PROCEDURE: Patient was met in the operating room as an intraoperative consult. Dr. Ashton and his team had performed the initial approach through the patient's prior anterior cervical approach on the right. The prevertebral musculature was significantly inflamed and friable. The carotid sheath was appreciated laterally. This complicated identification of vital structures. In order to identify the  esophagus, a 26 Fr Lawton esophageal bougie or was passed. During this maneuver, it became apparent that their was likely a pharyngeal defect that was palpable from the neck. A Dedo laryngoscope was then introduced into the oral cavity and the pharyngeal defect was identified on the posterior wall extending from the oropharynx toward the hypopharynx. At this point, the neurosurgery service completed removal of the hardware which will be dictated under a separate cover.     We then returned to complete the repair of the pharyngotomy. The pharyngeal defect was oversewn with a 4-0 vicryl from the neck side to reapproximate the wound edges. The pharyngeal constrictors and prevertebral muscles were then approximated and oversewn to bolster the repair. A 10 flat ERVIN drain was placed in the wound, brought out through a stab incision, and secured with a 3-0 nylon. The platysma was reapproximated with 3-0 vicryl. The deep dermis was closed with 3-0 vicryl. Skin was then closed with 5-0 nylon in a simple running fashion. A McIvor mouth gag was placed into the oral cavity. The pharyngotomy defect was identified. This was closed in 2 layers with 3-0 vicryl with simple interrupted sutures. The patient's feeding tube was then secured in the nose with a 3-0 nylon. An island dressing was placed over the neck incision. A cervical collar was placed by the neurosurgery service. The patient remained intubated and was transported to the surgical intensive care unit in stable condition.     Dr. Kathy Mercado was present for all portions of the procedure.     Nate Urbina MD PGY5  Otolaryngology - Head & Neck Surgery

## 2019-12-03 NOTE — PHARMACY-VANCOMYCIN DOSING SERVICE
Pharmacy Vancomycin Note  Date of Service December 3, 2019  Patient's  1955   64 year old, male    Indication: Osteomyelitis (spinal)  Goal Trough Level: 15-20 mg/L  Day of Therapy: 3  Current Vancomycin regimen:  1750 mg IV q12h    Current estimated CrCl = Estimated Creatinine Clearance: 142.2 mL/min (A) (based on SCr of 0.58 mg/dL (L)).    Creatinine for last 3 days  2019:  1:12 AM Creatinine 0.75 mg/dL  2019:  3:42 AM Creatinine 0.67 mg/dL  12/3/2019:  3:32 AM Creatinine 0.58 mg/dL    Recent Vancomycin Levels (past 3 days)  12/3/2019:  3:48 PM Vancomycin Level 14.3 mg/L (11hr trough)    Vancomycin IV Administrations (past 72 hours)                   vancomycin (VANCOCIN) 1,750 mg in sodium chloride 0.9 % 500 mL intermittent infusion (mg) 1,750 mg New Bag 19 1734     1,750 mg New Bag  0440     1,750 mg New Bag 19 1605     1,750 mg New Bag  0331    vancomycin (VANCOCIN) 2,250 mg in sodium chloride 0.9 % 500 mL intermittent infusion (mg) 2,250 mg New Bag 19 1827                Nephrotoxins and other renal medications (From now, onward)    Start     Dose/Rate Route Frequency Ordered Stop    19 0400  vancomycin (VANCOCIN) 1,750 mg in sodium chloride 0.9 % 500 mL intermittent infusion      1,750 mg  over 2 Hours Intravenous EVERY 12 HOURS 19 1507               Contrast Orders - past 72 hours (72h ago, onward)    Start     Dose/Rate Route Frequency Ordered Stop    19 0330  gadobutrol (GADAVIST) injection 10 mL      10 mL Intravenous ONCE 19 0325 19 0325          Interpretation of levels and current regimen:  Trough level is  Subtherapeutic    Has serum creatinine changed > 50% in last 72 hours: No    Urine output:  good urine output    Renal Function: Stable    Plan:  1.  Increase Dose to 2000 mg IV (22 mg/kg) every 12 hours. Patient should be near steady state after four doses including a loading dose. Clinically patient continues to have a rising  leukocytosis and Staph aureus culture sensitivities are pending. Renal function has remained good. Therefore, will increase dose by ~15%.   2.  Pharmacy will check trough levels as appropriate in 1-3 Days.    3. Serum creatinine levels will be ordered daily for the first week of therapy and at least twice weekly for subsequent weeks.      Ced Calle [facility]        .

## 2019-12-03 NOTE — PROGRESS NOTES
"Otolaryngology Progress Note  12/3/2019     S: No acute events overnight. Neuro status improving. ETT advanced yesterday afternoon.     O: BP (!) 153/68   Pulse 73   Temp 98.9  F (37.2  C) (Axillary)   Resp 17   Ht 1.727 m (5' 8\")   Wt 88.8 kg (195 lb 12.8 oz)   SpO2 97%   BMI 29.77 kg/m                 General: Responds to verbal cues, no acute distress               HEENT: Neck collar in place, limited range of motion of neck. Neck without hematoma/seroma. EOMI.  Drain with serosang output              Pulmonary:               Neuro/MSK: able to weakly squeeze hand bilaterally. Unable to wiggle toes.     ERVIN drain output(s): (last 24 hours)/(last shift)  Right Neck 15 / 11 / 3      Assessment and Plan  Sherwin Keny Angel is a 64 year old male with a past medical history of hypertension and diabetes status post arthroplasty about 2 weeks ago who fell at rehab and now presents to the [facility] with paraplegia.  He was brought to the operating room by neurosurgery by Dr. Ashton who encountered inflammatory  tissue over the spine and so ENT was consulted for assistance with the exposure.  During the exposure we noticed that there was a pharyngotomy that was seen from the cervical exposure. This was repaired transoral and transcervical. Patient remained intubated post-op and remains in the SICU.   - Antibiotics per ID (flagyl, vanc, cefepime), S aureus, S anginosus  - NPO  - Continue to monitor drain output. Thus far, it has been serosanguinous, no evidence of saliva.   - Rest of cares per neurosurgery and ICU  - [facility] on call for questions     -- Patient and above plan discussed with Dr. Kathy Urbina MD PGY5  Otolaryngology-Head & Neck Surgery  Please contact ENT with questions by dialing [phone] and entering job code [phone] when prompted.    "

## 2019-12-03 NOTE — PROGRESS NOTES
[facility] ID SERVICE Progress Note     Patient:  Sherwin Angel   Date of birth 1955, Medical record number [id number]  Date of Visit:  12/03/2019  Date of Admission: 12/1/2019  Consult Requester:Fernando Ashton MD            Assessment and Recommendations:   ASSESSMENT:  1. Traumatic cervical spinal cord injury s/p C3-4 arthroplasty on 11/15, now removed  2. Cervical stenosis C3-5  3. Vertebral phlegmon vs abscess s/p drain placement on 12/1 -cultures now growing Staph aureus and strep anginosus  4. Concern for osteomyelitis  5. Leukocytosis        DISCUSSION:  64-year-old man with history of htn, DM2, and recent C3-4 arthroplasty on 11/15 now admitted with new paraplegia after a fall and found to have pharyngotomy and vertebral phlegmon now s/p OR with removal of arthroplasty and placement of drain.    Cultures growing staph aureus and strep anginosus. Okay to continue Vanc/Cefepime/Flagyl for now. If no pseudomonas, we can narrow to Ceftriaxone. Awaiting staph sensitivities.  Anticipate he will need a 6 week course of IV antibiotics for abscess vs. Phlegmon in epidural space.       RECOMMENDATION:  - Continue cefepime, metronidazole, and vancomycin  - Follow-up cultures; antibiotics to be narrowed based on these results  - Trend CRP every 2-3 days  - Please obtain blood cultures if spikes a fever      Thank you for this consult. ID will continue to follow.    Attestation:  I have reviewed today's vital signs, medications, labs and imaging.  Floor time: 25 minutes, Face-to-face time: 10 minutes, Total time: 35 minutes    Joya Carrington MD.  ID Staff  [id number]      ________________________________________________________________           Overnight events:     Weaned off of pressors. Currently he denies pain or discomfort             Review of Systems:   Unable to complete as patient is intubated.         Past Medical History:   Diabetes  Hypertension  C3-C4 arthroplasty on 11/15/19          Past Surgical History:     Past Surgical History:   Procedure Laterality Date     IRRIGATION AND DEBRIDEMENT NECK, COMBINED N/A 12/1/2019    Procedure: IRRIGATION AND DEBRIDEMENT OF NECK; PHARYNGOTOMY REPAIR.;  Surgeon: Fernando Ashton MD;  Location: [facility] OR     REMOVE HARDWARE CERVICAL ANTERIOR SPINE N/A 12/1/2019    Procedure: REMOVAL, HARDWARE, SPINE, CERVICAL, ANTERIOR;  Surgeon: Fernando Ashton MD;  Location: [facility] OR            Family History:   History reviewed. No pertinent family history.         Social History:     Social History     Tobacco Use     Smoking status: Not on file   Substance Use Topics     Alcohol use: Not on file     History   Sexual Activity     Sexual activity: Not on file            Current Medications (antimicrobials listed in bold):       ceFEPIme (MAXIPIME) IV  2 g Intravenous Q8H     [START ON 12/4/2019] folic acid  1 mg Oral or Feeding Tube Daily     heparin lock flush  5-10 mL Intracatheter Q24H     insulin aspart  1-12 Units Subcutaneous Q4H     metroNIDAZOLE  500 mg Intravenous Q6H     multivitamins w/minerals  15 mL Per Feeding Tube Daily     pantoprazole  40 mg Oral or Feeding Tube QAM AC     polyethylene glycol  17 g Oral or Feeding Tube Daily     senna-docusate  1 tablet Oral or Feeding Tube BID     thiamine  200 mg Intravenous TID     vancomycin (VANCOCIN) IV  1,750 mg Intravenous Q12H     [START ON 12/8/2019] thiamine  100 mg Oral or Feeding Tube Daily            Allergies:   No Known Allergies         Physical Exam:     Ranges for his vital signs:   Temp:  [98.5  F (36.9  C)-100.3  F (37.9  C)] 99.2  F (37.3  C)  Pulse:  [65-92] 82  Heart Rate:  [60-92] 80  Resp:  [10-26] 19  BP: (118-153)/(58-74) 138/73  MAP:  [73 mmHg-114 mmHg] 98 mmHg  Arterial Line BP: (100-186)/(53-94) 159/62  FiO2 (%):  [40 %] 40 %  SpO2:  [92 %-100 %] 97 %    Physical Examination:  GENERAL: Intubated, awake and alert, follows commands.   HEENT:  Head is normocephalic, atraumatic.   EYES:   Eyes have anicteric sclerae without conjunctival injection or stigmata of endocarditis.    ENT:  ETT in place. Suction tube with bloody-appearing secretions.  NECK:  Collar in place. Drain with serosanguinous output.  LUNGS:  Clear to auscultation bilaterally on anterior exam.   CARDIOVASCULAR:  Regular rate and rhythm with no murmurs, gallops or rubs.  ABDOMEN:  Normal bowel sounds, soft, nontender. No appreciable hepatosplenomegaly  SKIN:  No acute rashes.  Lines are in place without any surrounding erythema or exudate. No stigmata of endocarditis.           Laboratory Data:     Inflammatory Markers    Recent Labs   Lab Test 12/02/19  0342 12/01/19  0112   .0* 170.0*       Hematology Studies    Recent Labs   Lab Test 12/03/19  0332 12/02/19  0342 12/01/19  1342 12/01/19  1057 12/01/19  0112   WBC 19.1* 15.0*  --   --  13.1*   ANEU 16.6*  --   --   --   --    AEOS 0.1  --   --   --   --    HGB 9.7* 10.1* 11.9* 11.5* 11.7*   MCV 94 92  --   --  90   * 528*  --   --  565*     Metabolic Studies     Recent Labs   Lab Test 12/03/19  0332 12/02/19  0342 12/01/19  1342 12/01/19  1057 12/01/19  0112    139 135 135 133   POTASSIUM 3.9 4.3 4.7 4.6 4.4   CHLORIDE 106 108  --   --  94   CO2 29 25  --   --  31   BUN 18 22  --   --  27   CR 0.58* 0.67  --   --  0.75   GFRESTIMATED >90 >90  --   --  >90       Hepatic Studies    Recent Labs   Lab Test 12/01/19  0112   BILITOTAL 0.6   ALKPHOS 103   ALBUMIN 2.1*   AST 17   ALT 32     Microbiology:  12/1 Blood culture NGTD  UA negative  12/1 Tissue culture: Cervical spine vertebral phlegmon: Staph aureus, Strep anginosus    Culture Micro   Date Value Ref Range Status   12/01/2019 No growth after 2 days  Preliminary   12/01/2019 Culture negative monitoring continues  Preliminary   12/01/2019 Culture negative after 1 day  Preliminary   12/01/2019 (A)  Preliminary    Single colony  Staphylococcus aureus  Susceptibility testing in progress     12/01/2019 (A)   "Preliminary    Light growth  Streptococcus anginosus  Susceptibility testing in progress     2019 Culture in progress  Preliminary       Imagin/1 MRI  Impression: Images are mildly degraded secondary to metallic artifact  from disc prosthesis. Follow-up imaging with CT could be considered  for further evaluation.  1. Traumatic cord injury at C3 and C4-5 as a result of C3-5  compression fractures.   2. There is 4 mm of retrolisthesis of C3 on C4.  3. Traumatic herniation of C4-5 intervertebral disc with retropulsion  of disc fragment into the spinal canal.  4. Extensive prevertebral thickening up to 2 cm anteriorly and up to 9  mm within the epidural space, favored to represent prevertebral edema  in the setting of recent trauma.       [facility]  [facility] C-spine w/o contrast done on 2019 --  - \"Apparent osteomyelitis involvement of the C3, C4 and C5 vertebrae.\"  - \"Suspected extension of the infectious process into the ventral aspect of the central canal suspicious for a ventral epidural phlegmon versus abscess.\"  "

## 2019-12-03 NOTE — PROVIDER NOTIFICATION
[facility] resident notified on chest xray results after being advanced 2cm. Orders to advance and recheck xray.

## 2019-12-04 ENCOUNTER — APPOINTMENT (OUTPATIENT)
Dept: GENERAL RADIOLOGY | Facility: CLINIC | Age: 64
DRG: 003 | End: 2019-12-04
Attending: NEUROLOGICAL SURGERY
Payer: COMMERCIAL

## 2019-12-04 LAB
ABO + RH BLD: NORMAL
ABO + RH BLD: NORMAL
ANION GAP SERPL CALCULATED.3IONS-SCNC: 4 MMOL/L (ref 3–14)
BASOPHILS # BLD AUTO: 0 10E9/L (ref 0–0.2)
BASOPHILS NFR BLD AUTO: 0.2 %
BLD GP AB SCN SERPL QL: NORMAL
BLOOD BANK CMNT PATIENT-IMP: NORMAL
BUN SERPL-MCNC: 24 MG/DL (ref 7–30)
CALCIUM SERPL-MCNC: 8 MG/DL (ref 8.5–10.1)
CHLORIDE SERPL-SCNC: 109 MMOL/L (ref 94–109)
CO2 SERPL-SCNC: 29 MMOL/L (ref 20–32)
CREAT SERPL-MCNC: 0.59 MG/DL (ref 0.66–1.25)
DIFFERENTIAL METHOD BLD: ABNORMAL
EOSINOPHIL # BLD AUTO: 0.2 10E9/L (ref 0–0.7)
EOSINOPHIL NFR BLD AUTO: 1.3 %
ERYTHROCYTE [DISTWIDTH] IN BLOOD BY AUTOMATED COUNT: 12.3 % (ref 10–15)
GFR SERPL CREATININE-BSD FRML MDRD: >90 ML/MIN/{1.73_M2}
GLUCOSE BLDC GLUCOMTR-MCNC: 185 MG/DL (ref 70–99)
GLUCOSE BLDC GLUCOMTR-MCNC: 185 MG/DL (ref 70–99)
GLUCOSE BLDC GLUCOMTR-MCNC: 187 MG/DL (ref 70–99)
GLUCOSE BLDC GLUCOMTR-MCNC: 190 MG/DL (ref 70–99)
GLUCOSE BLDC GLUCOMTR-MCNC: 192 MG/DL (ref 70–99)
GLUCOSE BLDC GLUCOMTR-MCNC: 199 MG/DL (ref 70–99)
GLUCOSE SERPL-MCNC: 222 MG/DL (ref 70–99)
HCT VFR BLD AUTO: 26.3 % (ref 40–53)
HGB BLD-MCNC: 8.2 G/DL (ref 13.3–17.7)
IMM GRANULOCYTES # BLD: 0.1 10E9/L (ref 0–0.4)
IMM GRANULOCYTES NFR BLD: 0.6 %
INR PPP: 1.3 (ref 0.86–1.14)
LYMPHOCYTES # BLD AUTO: 1.2 10E9/L (ref 0.8–5.3)
LYMPHOCYTES NFR BLD AUTO: 10.2 %
MAGNESIUM SERPL-MCNC: 2.3 MG/DL (ref 1.6–2.3)
MCH RBC QN AUTO: 29.1 PG (ref 26.5–33)
MCHC RBC AUTO-ENTMCNC: 31.2 G/DL (ref 31.5–36.5)
MCV RBC AUTO: 93 FL (ref 78–100)
MONOCYTES # BLD AUTO: 0.6 10E9/L (ref 0–1.3)
MONOCYTES NFR BLD AUTO: 4.9 %
NEUTROPHILS # BLD AUTO: 9.9 10E9/L (ref 1.6–8.3)
NEUTROPHILS NFR BLD AUTO: 82.8 %
NRBC # BLD AUTO: 0 10*3/UL
NRBC BLD AUTO-RTO: 0 /100
PHOSPHATE SERPL-MCNC: 1.9 MG/DL (ref 2.5–4.5)
PHOSPHATE SERPL-MCNC: 2.5 MG/DL (ref 2.5–4.5)
PLATELET # BLD AUTO: 397 10E9/L (ref 150–450)
POTASSIUM SERPL-SCNC: 3.9 MMOL/L (ref 3.4–5.3)
RBC # BLD AUTO: 2.82 10E12/L (ref 4.4–5.9)
SODIUM SERPL-SCNC: 142 MMOL/L (ref 133–144)
SPECIMEN EXP DATE BLD: NORMAL
WBC # BLD AUTO: 12 10E9/L (ref 4–11)

## 2019-12-04 PROCEDURE — 25800030 ZZH RX IP 258 OP 636: Performed by: STUDENT IN AN ORGANIZED HEALTH CARE EDUCATION/TRAINING PROGRAM

## 2019-12-04 PROCEDURE — 86901 BLOOD TYPING SEROLOGIC RH(D): CPT | Performed by: STUDENT IN AN ORGANIZED HEALTH CARE EDUCATION/TRAINING PROGRAM

## 2019-12-04 PROCEDURE — 71045 X-RAY EXAM CHEST 1 VIEW: CPT

## 2019-12-04 PROCEDURE — 94640 AIRWAY INHALATION TREATMENT: CPT | Mod: 76

## 2019-12-04 PROCEDURE — 00000146 ZZHCL STATISTIC GLUCOSE BY METER IP

## 2019-12-04 PROCEDURE — 86900 BLOOD TYPING SEROLOGIC ABO: CPT | Performed by: STUDENT IN AN ORGANIZED HEALTH CARE EDUCATION/TRAINING PROGRAM

## 2019-12-04 PROCEDURE — 83735 ASSAY OF MAGNESIUM: CPT | Performed by: STUDENT IN AN ORGANIZED HEALTH CARE EDUCATION/TRAINING PROGRAM

## 2019-12-04 PROCEDURE — 40000275 ZZH STATISTIC RCP TIME EA 10 MIN

## 2019-12-04 PROCEDURE — 25000128 H RX IP 250 OP 636: Performed by: NEUROLOGICAL SURGERY

## 2019-12-04 PROCEDURE — 40000014 ZZH STATISTIC ARTERIAL MONITORING DAILY

## 2019-12-04 PROCEDURE — 94003 VENT MGMT INPAT SUBQ DAY: CPT

## 2019-12-04 PROCEDURE — 80048 BASIC METABOLIC PNL TOTAL CA: CPT | Performed by: STUDENT IN AN ORGANIZED HEALTH CARE EDUCATION/TRAINING PROGRAM

## 2019-12-04 PROCEDURE — 85610 PROTHROMBIN TIME: CPT | Performed by: NEUROLOGICAL SURGERY

## 2019-12-04 PROCEDURE — 27211040 ZZH CONTINUOUS NEBULIZER MICRO PUMP

## 2019-12-04 PROCEDURE — 25000132 ZZH RX MED GY IP 250 OP 250 PS 637: Performed by: STUDENT IN AN ORGANIZED HEALTH CARE EDUCATION/TRAINING PROGRAM

## 2019-12-04 PROCEDURE — 25000132 ZZH RX MED GY IP 250 OP 250 PS 637: Performed by: NEUROLOGICAL SURGERY

## 2019-12-04 PROCEDURE — 25000128 H RX IP 250 OP 636: Performed by: STUDENT IN AN ORGANIZED HEALTH CARE EDUCATION/TRAINING PROGRAM

## 2019-12-04 PROCEDURE — 25000132 ZZH RX MED GY IP 250 OP 250 PS 637: Performed by: NURSE PRACTITIONER

## 2019-12-04 PROCEDURE — 25000125 ZZHC RX 250: Performed by: STUDENT IN AN ORGANIZED HEALTH CARE EDUCATION/TRAINING PROGRAM

## 2019-12-04 PROCEDURE — 84100 ASSAY OF PHOSPHORUS: CPT | Performed by: NEUROLOGICAL SURGERY

## 2019-12-04 PROCEDURE — 25800030 ZZH RX IP 258 OP 636: Performed by: NEUROLOGICAL SURGERY

## 2019-12-04 PROCEDURE — 20000004 ZZH R&B ICU UMMC

## 2019-12-04 PROCEDURE — 86850 RBC ANTIBODY SCREEN: CPT | Performed by: STUDENT IN AN ORGANIZED HEALTH CARE EDUCATION/TRAINING PROGRAM

## 2019-12-04 PROCEDURE — 94640 AIRWAY INHALATION TREATMENT: CPT

## 2019-12-04 PROCEDURE — 84100 ASSAY OF PHOSPHORUS: CPT | Performed by: STUDENT IN AN ORGANIZED HEALTH CARE EDUCATION/TRAINING PROGRAM

## 2019-12-04 PROCEDURE — 25000125 ZZHC RX 250: Performed by: NEUROLOGICAL SURGERY

## 2019-12-04 PROCEDURE — 27210437 ZZH NUTRITION PRODUCT SEMIELEM INTERMED LITER

## 2019-12-04 PROCEDURE — 85025 COMPLETE CBC W/AUTO DIFF WBC: CPT | Performed by: STUDENT IN AN ORGANIZED HEALTH CARE EDUCATION/TRAINING PROGRAM

## 2019-12-04 RX ORDER — IPRATROPIUM BROMIDE AND ALBUTEROL SULFATE 2.5; .5 MG/3ML; MG/3ML
3 SOLUTION RESPIRATORY (INHALATION)
Status: DISCONTINUED | OUTPATIENT
Start: 2019-12-04 | End: 2019-12-09

## 2019-12-04 RX ORDER — MULTIVITAMIN,THERAPEUTIC
1 TABLET ORAL DAILY
Status: ON HOLD | COMMUNITY
End: 2019-12-23

## 2019-12-04 RX ORDER — LANOLIN ALCOHOL/MO/W.PET/CERES
100 CREAM (GRAM) TOPICAL DAILY
Status: DISCONTINUED | OUTPATIENT
Start: 2019-12-05 | End: 2019-12-31 | Stop reason: HOSPADM

## 2019-12-04 RX ORDER — ATORVASTATIN CALCIUM 80 MG/1
40 TABLET, FILM COATED ORAL EVERY EVENING
Status: ON HOLD | COMMUNITY
End: 2019-12-23

## 2019-12-04 RX ORDER — TRAZODONE HYDROCHLORIDE 50 MG/1
150 TABLET, FILM COATED ORAL AT BEDTIME
Status: ON HOLD | COMMUNITY
End: 2019-12-23

## 2019-12-04 RX ORDER — AMPICILLIN AND SULBACTAM 2; 1 G/1; G/1
3 INJECTION, POWDER, FOR SOLUTION INTRAMUSCULAR; INTRAVENOUS EVERY 6 HOURS
Status: DISCONTINUED | OUTPATIENT
Start: 2019-12-04 | End: 2019-12-09

## 2019-12-04 RX ORDER — ACETYLCYSTEINE 100 MG/ML
4 SOLUTION ORAL; RESPIRATORY (INHALATION) EVERY 4 HOURS
Status: DISCONTINUED | OUTPATIENT
Start: 2019-12-04 | End: 2019-12-07

## 2019-12-04 RX ORDER — FENTANYL CITRATE 50 UG/ML
25-50 INJECTION, SOLUTION INTRAMUSCULAR; INTRAVENOUS
Status: DISCONTINUED | OUTPATIENT
Start: 2019-12-04 | End: 2019-12-06

## 2019-12-04 RX ORDER — LIDOCAINE 50 MG/G
1 PATCH TOPICAL EVERY 24 HOURS
Status: ON HOLD | COMMUNITY
End: 2019-12-23

## 2019-12-04 RX ORDER — MIRTAZAPINE 30 MG/1
15 TABLET, FILM COATED ORAL AT BEDTIME
Status: ON HOLD | COMMUNITY
End: 2019-12-23

## 2019-12-04 RX ORDER — AMLODIPINE BESYLATE 10 MG/1
10 TABLET ORAL DAILY
Status: ON HOLD | COMMUNITY
End: 2019-12-23

## 2019-12-04 RX ORDER — BUMETANIDE 2 MG/1
1 TABLET ORAL 2 TIMES DAILY
Status: ON HOLD | COMMUNITY
End: 2019-12-23

## 2019-12-04 RX ORDER — ACETAMINOPHEN 500 MG
1000 TABLET ORAL 3 TIMES DAILY PRN
Status: ON HOLD | COMMUNITY
End: 2019-12-23

## 2019-12-04 RX ORDER — METOPROLOL TARTRATE 50 MG
75 TABLET ORAL 2 TIMES DAILY
Status: ON HOLD | COMMUNITY
End: 2019-12-23

## 2019-12-04 RX ADMIN — ACETYLCYSTEINE 4 ML: 100 INHALANT RESPIRATORY (INHALATION) at 11:57

## 2019-12-04 RX ADMIN — POTASSIUM PHOSPHATE, MONOBASIC AND POTASSIUM PHOSPHATE, DIBASIC 20 MMOL: 224; 236 INJECTION, SOLUTION INTRAVENOUS at 05:50

## 2019-12-04 RX ADMIN — FOLIC ACID 1 MG: 1 TABLET ORAL at 08:54

## 2019-12-04 RX ADMIN — SENNOSIDES AND DOCUSATE SODIUM 1 TABLET: 8.6; 5 TABLET ORAL at 20:38

## 2019-12-04 RX ADMIN — IPRATROPIUM BROMIDE AND ALBUTEROL SULFATE 3 ML: .5; 3 SOLUTION RESPIRATORY (INHALATION) at 16:31

## 2019-12-04 RX ADMIN — OXYCODONE HYDROCHLORIDE 10 MG: 5 SOLUTION ORAL at 02:19

## 2019-12-04 RX ADMIN — METRONIDAZOLE 500 MG: 500 INJECTION, SOLUTION INTRAVENOUS at 04:22

## 2019-12-04 RX ADMIN — IPRATROPIUM BROMIDE AND ALBUTEROL SULFATE 3 ML: .5; 3 SOLUTION RESPIRATORY (INHALATION) at 11:57

## 2019-12-04 RX ADMIN — MULTIVITAMIN 15 ML: LIQUID ORAL at 20:38

## 2019-12-04 RX ADMIN — METRONIDAZOLE 500 MG: 500 INJECTION, SOLUTION INTRAVENOUS at 10:20

## 2019-12-04 RX ADMIN — ACETYLCYSTEINE 4 ML: 100 INHALANT RESPIRATORY (INHALATION) at 20:30

## 2019-12-04 RX ADMIN — POTASSIUM PHOSPHATE, MONOBASIC AND POTASSIUM PHOSPHATE, DIBASIC 10 MMOL: 224; 236 INJECTION, SOLUTION INTRAVENOUS at 15:13

## 2019-12-04 RX ADMIN — Medication 40 MG: at 08:53

## 2019-12-04 RX ADMIN — CEFEPIME HYDROCHLORIDE 2 G: 2 INJECTION, POWDER, FOR SOLUTION INTRAVENOUS at 15:16

## 2019-12-04 RX ADMIN — ACETAMINOPHEN 650 MG: 325 SOLUTION ORAL at 22:45

## 2019-12-04 RX ADMIN — ACETYLCYSTEINE 4 ML: 100 INHALANT RESPIRATORY (INHALATION) at 16:31

## 2019-12-04 RX ADMIN — BISACODYL 10 MG: 10 SUPPOSITORY RECTAL at 16:16

## 2019-12-04 RX ADMIN — POTASSIUM CHLORIDE 20 MEQ: 1.5 POWDER, FOR SOLUTION ORAL at 05:06

## 2019-12-04 RX ADMIN — IPRATROPIUM BROMIDE AND ALBUTEROL SULFATE 3 ML: .5; 3 SOLUTION RESPIRATORY (INHALATION) at 20:30

## 2019-12-04 RX ADMIN — AMPICILLIN AND SULBACTAM 3 G: 1; 2 INJECTION, POWDER, FOR SOLUTION INTRAMUSCULAR; INTRAVENOUS at 20:57

## 2019-12-04 RX ADMIN — OXYCODONE HYDROCHLORIDE 10 MG: 5 SOLUTION ORAL at 20:38

## 2019-12-04 RX ADMIN — CEFEPIME HYDROCHLORIDE 2 G: 2 INJECTION, POWDER, FOR SOLUTION INTRAVENOUS at 08:52

## 2019-12-04 RX ADMIN — VANCOMYCIN HYDROCHLORIDE 2000 MG: 10 INJECTION, POWDER, LYOPHILIZED, FOR SOLUTION INTRAVENOUS at 17:25

## 2019-12-04 RX ADMIN — VANCOMYCIN HYDROCHLORIDE 2000 MG: 10 INJECTION, POWDER, LYOPHILIZED, FOR SOLUTION INTRAVENOUS at 05:50

## 2019-12-04 RX ADMIN — DEXMEDETOMIDINE 0.2 MCG/KG/HR: 100 INJECTION, SOLUTION, CONCENTRATE INTRAVENOUS at 04:21

## 2019-12-04 RX ADMIN — SENNOSIDES AND DOCUSATE SODIUM 1 TABLET: 8.6; 5 TABLET ORAL at 10:20

## 2019-12-04 RX ADMIN — OXYCODONE HYDROCHLORIDE 5 MG: 5 SOLUTION ORAL at 12:20

## 2019-12-04 RX ADMIN — POLYETHYLENE GLYCOL 3350 17 G: 17 POWDER, FOR SOLUTION ORAL at 08:54

## 2019-12-04 RX ADMIN — ACETAMINOPHEN 650 MG: 325 SOLUTION ORAL at 08:54

## 2019-12-04 RX ADMIN — METRONIDAZOLE 500 MG: 500 INJECTION, SOLUTION INTRAVENOUS at 16:11

## 2019-12-04 RX ADMIN — ACETAMINOPHEN 650 MG: 325 SOLUTION ORAL at 05:06

## 2019-12-04 ASSESSMENT — ACTIVITIES OF DAILY LIVING (ADL)
ADLS_ACUITY_SCORE: 41

## 2019-12-04 NOTE — CONSULTS
Bariatric Surgery Consult  12/04/2019    Sherwin Keny Angel  64 year old  [id number]    Consulted for:  g-tube placement    History of Present Illness  Mr. Angel is a 64 year old male with a history of HTN and DMII and recent cervical arthoplasty who who fell at rehab and presented to [facility] with paraplegia. Took to the OR on 12/1 and found to have some inflammation with difficult cervical exposure which required assistance from ENT. During the case, the patient was found to have a pharyngotomy which was repaired. ENT requests NPO for about 2 weeks and also requests tube feed access. Patient has a history of a laparoscopic Ca en Y gastric bypass in 2008.    The patient is able to answer yes/no questions. He has no abdominal pain. He confirms that he has had a RNYGB in the past. He confirms that he is interested in a feeding tube if that is what is needed.    Past Medical History  HTN  DMII  Alcoholism  Paraplegia  SDH  DPGN  HLD  Asthma    Past Surgical History  Cervical spine repair  Cervical spine debridement with repair pharnygotomy  Lap RNYGB  Removal urachal tumor  ?Open appendectomy    Social History  Unable to obtain    Family History  Unable to obtain    Allergies   No Known Allergies    Medications    acetylcysteine  4 mL Nebulization Q4H     ceFEPIme (MAXIPIME) IV  2 g Intravenous Q8H     folic acid  1 mg Oral or Feeding Tube Daily     heparin lock flush  5-10 mL Intracatheter Q24H     insulin aspart  1-12 Units Subcutaneous Q4H     insulin glargine  5 Units Subcutaneous At Bedtime     ipratropium - albuterol 0.5 mg/2.5 mg/3 mL  3 mL Nebulization 4x daily     metroNIDAZOLE  500 mg Intravenous Q6H     multivitamins w/minerals  15 mL Per Feeding Tube Daily     pantoprazole  40 mg Oral or Feeding Tube QAM AC     polyethylene glycol  17 g Oral or Feeding Tube Daily     senna-docusate  1 tablet Oral or Feeding Tube BID     vancomycin (VANCOCIN) IV  2,000 mg (central catheter) Intravenous Q12H      [START ON 12/5/2019] thiamine  100 mg Oral or Feeding Tube Daily     Review of Systems  Unable to obtain    Physical Exam  Vitals:    12/04/19 1000 12/04/19 1100 12/04/19 1157 12/04/19 1200   BP: 138/67 138/69 (!) 169/79    BP Location:       Pulse: 61 59     Resp: 18 20     Temp:    98.4  F (36.9  C)   TempSrc:    Axillary   SpO2: 98% 98% 98%    Weight:       Height:         I/O this shift:  In: 643.03 [I.V.:333.03; NG/GT:90]  Out: 801 [Urine:800; Drains:1]  General: Patient in no acute distress, awake  Respiratory: tracheostomy in place; vent  CV: Regular rate and rhythm  Abdomen: soft, non-distended, nontender to palpation; no laparotomy incision; incision at mcburneys point  Extremities: warm, well perfused, no edema    Lab  Lab Results   Component Value Date    WBC 12.0 (H) 12/04/2019    HGB 8.2 (L) 12/04/2019    HCT 26.3 (L) 12/04/2019     12/04/2019     12/04/2019    POTASSIUM 3.9 12/04/2019    CHLORIDE 109 12/04/2019    CO2 29 12/04/2019    BUN 24 12/04/2019    CR 0.59 (L) 12/04/2019     (H) 12/04/2019    AST 17 12/01/2019    ALT 32 12/01/2019    ALKPHOS 103 12/01/2019    BILITOTAL 0.6 12/01/2019    INR 1.17 (H) 12/01/2019     Imaging  No relevant imaging.    Assessment  Sherwin Angel is a 64 year old year old male with recent debridement of cervical spine with pharyngotomy. Tube feed access is requested from bariatric surgery given his history of lawson en Y gastric bypass.    Recommendations:  Tentative plan for laparoscopic assisted g-tube placement into gastric remnant at 10:15 tomorrow  Discussed with ENT who recommends g-tube placement despite possibly only needing tube feed supplementation for < 2 weeks, there is a possibility that this could be much longer  NPO at midnight including tube feeds  Pre-operative orders in Epic  Has a current type and screen  Will consent patient for procedure    Patient was discussed with Dr. Santiago.    Chava Solis, PGY7  Pager [phone]  [phone]

## 2019-12-04 NOTE — PROGRESS NOTES
Neuroscience Intensive Care Progress Note  2019    Sherwin Keny Ramosfabricioolya is a 64 year old year old male with hx HTN, DM and cervical stenosis s/p recent anterior C3-4 arthroplasty (2 weeks prior) who was admitted on 2019 with bilateral lower extremity paraplegia and near loss of upper extremity movement, after a fall at rehab. Prior to the fall, he was able to ambulate with a cane. Taken to the OR for arthroplasty implant removal on . Found to have significant inflammation and 2 pharyngotomy sites. ENT closed 1 (unable to reach other) and drain placed for the phlegmon. Now on broad spectrum abx for possible infection.     Problem List  1. Cervical stenosis s/p C3-C4 arthroplasty   2. Cervical spinal injury due to fall, with C3-C5 fracture   3. Acute respiratory failure s/p intubation  4. Leukocytosis  5. Concern for pneumonia  6. Hx of hypertension  7. Hx of DM  8. Alcohol dependence  9. Secretions  10. Normocytic anemia     24 hour events:  - No major events overnight  - Noted to have cuff leak yesterday evening  - Remained afebrile with T max 99.3  - Vitally stable overall. BP 98//64, HR .  - Drips this morning: Fentanyl 20, Precedex 0.2  - No longer on pressors  - Discussed plans with NSG and ENT, no plans for OR  - Per ENT, anticipating pt will be NPO for prolonged time given pharyngotomy, thus, pt may need peg tube  - He had 2 small BMs yesterday night    24 Hour Vital Signs Summary:  Temperatures:  Current - Temp: 98.9  F (37.2  C); Max - Temp  Av.5  F (36.9  C)  Min: 96.8  F (36  C)  Max: 99.3  F (37.4  C)  Respiration range: Resp  Av.5  Min: 14  Max: 22  Pulse range: Pulse  Av.3  Min: 52  Max: 95  Blood pressure range: Systolic (24hrs), Av , Min:98 , Max:171   ; Diastolic (24hrs), Av, Min:51, Max:81    Pulse oximetry range: SpO2  Av.6 %  Min: 93 %  Max: 100 %    Ventilator Settings  Ventilation Mode: CMV/AC  (Continuous Mandatory Ventilation/ Assist  Control)  FiO2 (%): 40 %  Rate Set (breaths/minute): 12 breaths/min  Tidal Volume Set (mL): 450 mL  PEEP (cm H2O): 5 cmH2O  Pressure Support (cm H2O): 7 cmH2O  Oxygen Concentration (%): 40 %  Peak Inspiratory Pressure (cm H2O) (Drager Eliana): 20  Resp: 19      Intake/Output   12/3/19   00:00 - 23:59 12/04/19   0000 - present   Input 4 L 1 L   Output 2.3 L 0.7 L   Net 1.7 L 0.37 L   Since Admit  +6.66 L     Arterial Line BP: ()/(48-88) 145/81  MAP:  [67 mmHg-104 mmHg] 100 mmHg  BP - Mean:  [] 97    Current Medications:    ceFEPIme (MAXIPIME) IV  2 g Intravenous Q8H     folic acid  1 mg Oral or Feeding Tube Daily     heparin lock flush  5-10 mL Intracatheter Q24H     insulin aspart  1-12 Units Subcutaneous Q4H     metroNIDAZOLE  500 mg Intravenous Q6H     multivitamins w/minerals  15 mL Per Feeding Tube Daily     pantoprazole  40 mg Oral or Feeding Tube QAM AC     polyethylene glycol  17 g Oral or Feeding Tube Daily     senna-docusate  1 tablet Oral or Feeding Tube BID     vancomycin (VANCOCIN) IV  2,000 mg (central catheter) Intravenous Q12H     [START ON 12/8/2019] thiamine  100 mg Oral or Feeding Tube Daily       PRN Medications:  acetaminophen, albuterol, bisacodyl, IV fluid REPLACEMENT ONLY, glucose **OR** dextrose **OR** glucagon, heparin lock flush, heparin lock flush, lidocaine 4%, lidocaine 4%, lidocaine (buffered or not buffered), lidocaine (buffered or not buffered), magnesium sulfate, magnesium sulfate, naloxone, ondansetron **OR** ondansetron, oxyCODONE, potassium chloride, potassium chloride with lidocaine, potassium chloride, potassium chloride, potassium chloride, potassium phosphate (KPHOS) in D5W IV, potassium phosphate (KPHOS) in D5W IV, potassium phosphate (KPHOS) in D5W IV, potassium phosphate (KPHOS) in D5W IV, potassium phosphate (KPHOS) in D5W IV, prochlorperazine **OR** prochlorperazine **OR** prochlorperazine, sodium chloride (PF), sodium chloride (PF)    Infusions:     "dexmedetomidine 0.2 mcg/kg/hr (12/04/19 0700)     IV fluid REPLACEMENT ONLY       fentaNYL 20 mcg/hr (12/04/19 0700)       No Known Allergies    Physical Examination:  Vitals: /63   Pulse 61   Temp 98.9  F (37.2  C) (Axillary)   Resp 19   Ht 1.727 m (5' 8\")   Wt 90.2 kg (198 lb 13.7 oz)   SpO2 94%   BMI 30.24 kg/m    General: NAD, lying in bed  HEENT: Normocephalic. NG tube in place.  CV: RRR  Resp: Intubated  Abd: Soft, nontender,distended  Skin: Warm, dry   Extremities:  Neuro: intubated, examined on fentanyl and precedex. Awake with eyes open. Responds to questions by nodding. Follows commands. EOMI. Eyes are conjugate. Moves both arms anti-gravity at elbows brisky - appears more strong, more overall movement in LUE noted and weaker in RUE.  stronger in L > R. No movement in BLE with noxious - though earlier today, seemed to have R ankle movement (unable to repeat later on exam).Sensation intact proximately and distally including forearm and dorsal surface of hand (improved from prior)    Labs:  Recent Labs   Lab Test 12/04/19  0336 12/03/19  0332 12/02/19  0342    138 139   POTASSIUM 3.9 3.9 4.3   CHLORIDE 109 106 108   CO2 29 29 25   ANIONGAP 4 3 5   * 212* 184*   BUN 24 18 22   CR 0.59* 0.58* 0.67   FLORINDA 8.0* 8.2* 7.5*   WBC 12.0* 19.1* 15.0*   RBC 2.82* 3.35* 3.47*   HGB 8.2* 9.7* 10.1*    481* 528*     Cultures:  Blood culture (12/1 & 12/3): NGTD, pending     Fungal Cervical spine tissue cx (12/1): NGTD    Gram stain from cervical spine tissue(12/1): Few gram + cocci, rare gram + rods     Anaerobic cervical spine tissue cx (12/1): Moderate fusobacterium nucleatum, moderate parvimonas micra    Aerobic cervical spine tissue cx (12/1): Single colony staph aureus, light growth strep anginosus, light eikinella corrodens     Imaging:   None    Assessment/Plan:  Sherwin Angel is a 64 year old with hx of recent of anterior C3-4 [facility]) who was " admitted 12/1/2019 after a fall at rehab, resulting in lower extremity paraplegia. Found to have cervical stenosis. Now s/p pharyngotomy closure on 12/1. Arthroplasty implant removed with no replacement implant placed. On broad spectrum abx with ID following.     Neuro:  #Cervical spinal cord injury  - Goal MAP > 65 today  - Strict bedrest   - Collar at all times   - Q1H neuro checks --> will change to q2h   - Discussed plans with NSG and ENT, no plans for OR  - Planned to start DVT ppx once his drain is removed, however, likely will need drain for a long time per ENT, they will discuss further and confirm when ok to start     #Sedation/pain  - Stop Fentanyl gtt, start fentanyl pushes  - Precedex gtt  - Oxycodone PRN     #Hx of Alcohol dependence  - CIWA  - Thiamine & folate     Resp:  #Acute respiratory failure s/p intubation  - Discussed plans with NSG/ENT - no plan to go back to OR, ok to extubate as able  - Continue Pressure support trials while being weaned off sedation, maybe extubate tomorrow  - obtain a goal FIO2 of 30 today  - Supplemental O2 prn to keep Spo2 > 92%   - Maintain normal phos level, can contribute to resp status    #Secretions  - difficulty clearing secretions  - Mucomyst & duoneb q4h scheduled     Cardio:  #Hx of hypertension   - goal MAP > 65  - Hold PTA medications for now     Renal:  No active issues.  - Electrolyte replacement protocol   - Monitor I/O  - No longer on IVF     Endo:  #DM2  -Sliding scale insulin, high dose  - Start lantus 5 units  - Monitor BG     Heme:  #Normocytic anemia  - Down-trending from Hgb 9.7 to 8.2   - Monitor CBC     - Hg > 7.0   - Plt > 100k   - INR <1.5      GI:  No active issues.     #Constipation  - 2 small-mod BM on 12/3 night after suppository  - will give another suppository today as appears to have some abdominal distension today  - Miralax & Senna scheduled  - PRN suppository available     #Diet  - NPO, tube feeds at goal  - Per ENT, anticipate  prolonged duration of NPO status (at least 2 weeks)  - Recommend considering PEG placement     ID:  #Leukocytosis  #Low grade fever  - ID following  - 12/3 low grade fever, blood cultures sent, CXR without pna, planned to monitor   - Remains afebrile and leukocytosis improving today  - Continue Vancomycin, metronidazole, cefepime  - per ID, likely will need 6 weeks IV abx, will narrow as able  - Follow up blood cultures (12/1 & 12/3): NGTD, pending  - Follow up spinal tissue cultures (12/1): multiple organisms growing, pending   - Daily CBC  - if T >100.4, will obtain blood/sputum cultures and UA      Antibiotics:  - Vancomycin (12/1 - present)  - Metronidazole (12/1 - present)  - Cefepime (12/1 - present)     Lines:  - ETT  - PICC  - PIV x 2  - Arterial line, NSG would like to keep, will remove when able  - Degroot   - ERVIN drain in neck  - NG     FEN: NPO, tube feeds at goal  PPX:    DVT prophylaxis: SCDs, pending ENT decision on when to start ppx    GI: Pantoprazole  Code Status: Full, presumed     Dispo: Remains medically unstable     The patient was seen and discussed with the attending, Dr. Clark.     Randi Edmonds MD  Neurology PGY2  [phone]

## 2019-12-04 NOTE — PROGRESS NOTES
[facility] ID SERVICE Progress Note     Patient:  Sherwin Angel   Date of birth 1955, Medical record number [id number]  Date of Visit:  12/4/19  Date of Admission: 12/1/2019  Consult Requester:Fernando Ashton MD            Assessment and Recommendations:   ASSESSMENT:  1. Traumatic cervical spinal cord injury s/p C3-4 arthroplasty on 11/15, now removed  2. Cervical stenosis C3-5  3. Vertebral phlegmon vs abscess s/p drain placement on 12/1 - cultures growing MSSA, Strep anginosus, Eikenella, and oral anaerobes  4. Concern for osteomyelitis  5. Leukocytosis      DISCUSSION:  64-year-old man with history of htn, DM2, and recent C3-4 arthroplasty on 11/15 now admitted with new paraplegia after a fall and found to have pharyngotomy and vertebral phlegmon now s/p OR with removal of arthroplasty and placement of drain.    Staph aureus and Strep anginosus are both pan-sensitive. Eikenella sensitivities pending. Anaeobic culture now positive with oral nic, which was anticipated. Given the staph and strep sensitivities and the fact that no pseudomonas has grown, it is appropriate to narrow antibiotics at this time. Okay to treat with unasyn now; anticipating a long course. Can discuss changes to antibiotics or ways to make the dosing easier at the time of discharge as disposition becomes more clear.      RECOMMENDATION:  - Discontinue cefepime, metronidazole, and vancomycin  - Start unasyn 3 g q6h  - Anticipate 4-6 week course of IV antibiotics, will monitor response for final recommendation  - Check CRP; repeat 1-2 times per week to assess for response to antibiotics  - We will continue to follow cultures  - Please obtain blood cultures if spikes a fever      Thank you for this consult. ID will continue to follow.  This patient was discussed with ID staff, Dr. Carrington.    Elaine Cowan MD  Internal Medicine, PGY-1      Attestation:  I have reviewed today's vital signs, medications, labs  and imaging.  Floor time: 25 minutes, Face-to-face time: 10 minutes, Total time: 35 minutes    Sherwin Angel was seen in the hospital by Joya Carrington MD on 12/05/2019, with the intern, Dr. Camryn MD. [facility] Internal Medicine Resident. I reviewed the history & exam. Assessment and plan were jointly made.  I agree with and have edited the note and plan of care.      Joya Carrington MD.  ID Staff  [phone]        ________________________________________________________________           Overnight events:   Pressure supported yesterday, and stable overnight. Fentanyl gtt turned off today, patient awake and alert.         Review of Systems:   Unable to complete as patient is intubated.         Past Medical History:   Diabetes  Hypertension  C3-C4 arthroplasty on 11/15/19         Past Surgical History:     Past Surgical History:   Procedure Laterality Date     IRRIGATION AND DEBRIDEMENT NECK, COMBINED N/A 12/1/2019    Procedure: IRRIGATION AND DEBRIDEMENT OF NECK; PHARYNGOTOMY REPAIR.;  Surgeon: Fernando Ashton MD;  Location: [facility] OR     REMOVE HARDWARE CERVICAL ANTERIOR SPINE N/A 12/1/2019    Procedure: REMOVAL, HARDWARE, SPINE, CERVICAL, ANTERIOR;  Surgeon: Fernando Ashton MD;  Location: [facility] OR            Family History:   History reviewed. No pertinent family history.         Social History:     Social History     Tobacco Use     Smoking status: Not on file   Substance Use Topics     Alcohol use: Not on file     History   Sexual Activity     Sexual activity: Not on file            Current Medications (antimicrobials listed in bold):       acetylcysteine  4 mL Nebulization Q4H     ceFEPIme (MAXIPIME) IV  2 g Intravenous Q8H     folic acid  1 mg Oral or Feeding Tube Daily     heparin lock flush  5-10 mL Intracatheter Q24H     insulin aspart  1-12 Units Subcutaneous Q4H     insulin glargine  5 Units Subcutaneous At Bedtime     ipratropium - albuterol 0.5 mg/2.5 mg/3 mL  3 mL  Nebulization 4x daily     metroNIDAZOLE  500 mg Intravenous Q6H     multivitamins w/minerals  15 mL Per Feeding Tube Daily     pantoprazole  40 mg Oral or Feeding Tube QAM AC     polyethylene glycol  17 g Oral or Feeding Tube Daily     senna-docusate  1 tablet Oral or Feeding Tube BID     vancomycin (VANCOCIN) IV  2,000 mg (central catheter) Intravenous Q12H     [START ON 12/5/2019] thiamine  100 mg Oral or Feeding Tube Daily            Allergies:   No Known Allergies         Physical Exam:     Ranges for his vital signs:   Temp:  [96.8  F (36  C)-99  F (37.2  C)] 98.4  F (36.9  C)  Pulse:  [52-63] 59  Heart Rate:  [53-67] 64  Resp:  [14-21] 20  BP: ()/(51-81) 169/79  MAP:  [67 mmHg-101 mmHg] 87 mmHg  Arterial Line BP: ()/(48-88) 118/65  FiO2 (%):  [40 %] 40 %  SpO2:  [93 %-100 %] 98 %    Physical Examination:  GENERAL: Intubated, awake and alert, follows commands.   HEENT:  Head is normocephalic, atraumatic.   EYES:  Eyes have anicteric sclerae without conjunctival injection or stigmata of endocarditis.    ENT:  ETT in place. Suction tube with bloody-appearing secretions.  NECK:  Collar in place. Drain with serosanguinous output.  LUNGS:  Clear to auscultation bilaterally on anterior exam.   CARDIOVASCULAR:  Regular rate and rhythm with no murmurs, gallops or rubs.  ABDOMEN:  Normal bowel sounds, soft, nontender. No appreciable hepatosplenomegaly  SKIN:  No acute rashes.  Lines are in place without any surrounding erythema or exudate. No stigmata of endocarditis.           Laboratory Data:     Inflammatory Markers    Recent Labs   Lab Test 12/02/19  0342 12/01/19  0112   .0* 170.0*       Hematology Studies    Recent Labs   Lab Test 12/04/19  0336 12/03/19  0332 12/02/19  0342 12/01/19  1342 12/01/19  1057 12/01/19  0112   WBC 12.0* 19.1* 15.0*  --   --  13.1*   ANEU 9.9* 16.6*  --   --   --   --    AEOS 0.2 0.1  --   --   --   --    HGB 8.2* 9.7* 10.1* 11.9* 11.5* 11.7*   MCV 93 94 92  --   --   90    481* 528*  --   --  565*     Metabolic Studies     Recent Labs   Lab Test 19  0336 19  0332 19  0342 19  1342 19  1057 19  0112    138 139 135 135 133   POTASSIUM 3.9 3.9 4.3 4.7 4.6 4.4   CHLORIDE 109 106 108  --   --  94   CO2 29 29 25  --   --  31   BUN 24 18 22  --   --  27   CR 0.59* 0.58* 0.67  --   --  0.75   GFRESTIMATED >90 >90 >90  --   --  >90       Hepatic Studies    Recent Labs   Lab Test 19  0112   BILITOTAL 0.6   ALKPHOS 103   ALBUMIN 2.1*   AST 17   ALT 32     Microbiology:   Blood culture NGTD  UA negative   Tissue culture: Cervical spine vertebral phlegmon:  -- Aerobic: Staph aureus, Strep anginosus, Eikenella corrodens  -- Anaerobic: Fusobacterium nucleatum, Parvimonas micra    Culture Micro   Date Value Ref Range Status   2019 No growth after 16 hours  Preliminary   2019 No growth after 16 hours  Preliminary   2019 No growth after 3 days  Preliminary   2019 (A)  Preliminary    Moderate growth  Fusobacterium nucleatum  Susceptibility testing not routinely done     2019 (A)  Preliminary    Moderate growth  Parvimonas micra  Susceptibility testing not routinely done     2019 Culture in progress  Preliminary   2019 Culture negative after 3 days  Preliminary   2019 Single colony  Staphylococcus aureus   (A)  Preliminary   2019 Light growth  Streptococcus anginosus   (A)  Preliminary   2019 (A)  Preliminary    Light growth  Eikenella corrodens  Susceptibility testing in progress         Imagin/1 MRI  Impression: Images are mildly degraded secondary to metallic artifact  from disc prosthesis. Follow-up imaging with CT could be considered  for further evaluation.  1. Traumatic cord injury at C3 and C4-5 as a result of C3-5  compression fractures.   2. There is 4 mm of retrolisthesis of C3 on C4.  3. Traumatic herniation of C4-5 intervertebral disc with retropulsion  of  "disc fragment into the spinal canal.  4. Extensive prevertebral thickening up to 2 cm anteriorly and up to 9  mm within the epidural space, favored to represent prevertebral edema  in the setting of recent trauma.       [facility]  [facility] C-spine w/o contrast done on 11/30/2019 --  - \"Apparent osteomyelitis involvement of the C3, C4 and C5 vertebrae.\"  - \"Suspected extension of the infectious process into the ventral aspect of the central canal suspicious for a ventral epidural phlegmon versus abscess.\"  "

## 2019-12-04 NOTE — PHARMACY-ADMISSION MEDICATION HISTORY
Admission medication history interview status for the 12/1/2019 admission is complete. See Epic admission navigator for allergy information, pharmacy, prior to admission medications and immunization status.     Medication history interview sources:  [facility]    Changes made to PTA medication list (reason)  Added: all below medications    Additional medication history information (including reliability of information, actions taken by pharmacist):  [facility] included:  -Folic acid 1mg daily  -thiamine 100mg daily  -potassium 20mEq daily  - sliding scale insulin   -tiotropium-olodaterol 2.5-2.5 MCG/ACT AERS 2 puffs daily      Prior to Admission medications    Medication Sig Last Dose Taking? Auth Provider   acetaminophen (TYLENOL) 500 MG tablet Take 1,000 mg by mouth 3 times daily as needed for mild pain  Yes Unknown, Entered By History   amLODIPine (NORVASC) 10 MG tablet Take 10 mg by mouth daily  Yes Unknown, Entered By History   atorvastatin (LIPITOR) 80 MG tablet Take 40 mg by mouth every evening  Yes Unknown, Entered By History   bumetanide (BUMEX) 2 MG tablet Take 2 mg by mouth 2 times daily Was decreased to 1mg twice daily at rehab Yes Unknown, Entered By History   calcium-vitamin D-vitamin K (VIACTIV) 500-500-40 MG-UNT-MCG CHEW Take 1 tablet by mouth 3 times daily  Yes Unknown, Entered By History   lidocaine (LIDODERM) 5 % patch Place 1 patch onto the skin every 24 hours To prevent lidocaine toxicity, patient should be patch free for 12 hrs daily.  Yes Unknown, Entered By History   metoprolol tartrate (LOPRESSOR) 50 MG tablet Take 50 mg by mouth 2 times daily Was increased to 75mg twice daily at rehab Yes Unknown, Entered By History   mirtazapine (REMERON) 30 MG tablet Take 15 mg by mouth At Bedtime  Yes Unknown, Entered By History   multivitamin, therapeutic (THERA-VIT) TABS tablet Take 1 tablet by mouth  daily  Yes Unknown, Entered By History   traZODone (DESYREL) 50 MG tablet Take 150 mg by mouth At Bedtime  Yes Unknown, Entered By History     Medication history completed by: Pari Costa, TerenceD

## 2019-12-04 NOTE — PLAN OF CARE
Major Shift Events: Pressure supported x2, fentanyl discontinued, oxy given x1, activity orders changed, okay to get up/out of bed but must leave C-Collar on at all times, Suppository given x1, Phos rechecked and replaced again this afternoon.  Plan: Possible extubation tomorrow, PEG tube placement tomorrow, consent signed with sister and in chart.  For vital signs and complete assessments, please see documentation flowsheets.

## 2019-12-04 NOTE — PROGRESS NOTES
"Otolaryngology Progress Note  December 4, 2019     S: No acute events overnight. Pressure support trial trials yesterday.      O: /59 (BP Location: Left arm)   Pulse 59   Temp 98.9  F (37.2  C) (Axillary)   Resp 17   Ht 1.727 m (5' 8\")   Wt 90.2 kg (198 lb 13.7 oz)   SpO2 95%   BMI 30.24 kg/m                 General: Responds to verbal cues, no acute distress               HEENT: Neck collar in place. Neck without hematoma/seroma. EOMI.  Drain with serosang output              Pulmonary: Mechanically ventilated, FiO2 40%, PEEP +5              Neuro/MSK: able to weakly squeeze hand bilaterally.     ERVIN drain output(s): (last 24 hours)/(last shift)  Right Neck 3 / 10 / 5      Assessment and Plan  Sherwin Keny Angel is a 64 year old male with a past medical history of hypertension and diabetes status post arthroplasty about 2 weeks ago who fell at rehab and now presents to the [facility] with paraplegia.  He was brought to the operating room by neurosurgery by Dr. Ashton who encountered inflammatory  tissue over the spine and so ENT was consulted for assistance with the exposure.  During the exposure we noticed that there was a pharyngotomy that was seen from the cervical exposure. This was repaired transoral and transcervical. Patient remained intubated post-op and remains in the SICU.   - OK to extubate from ENT perspective when meets standard extubation parameters  - Antibiotics per ID (flagyl, vanc, cefepime). Cultures growing S aureus, S anginosus  - Anticipate prolonged duration of NPO status (at least 2 weeks)  - Consider PEG placement  - Continue to monitor drain output. Thus far, it has been serosanguinous, no evidence of saliva.   - Rest of cares per neurosurgery and ICU  - [facility] on call for questions     -- Patient and above plan discussed with Dr. Kathy Urbina MD PGY5  Otolaryngology-Head & Neck Surgery  Please contact ENT with questions by dialing * [phone] and " entering job [phone] when prompted.

## 2019-12-04 NOTE — PLAN OF CARE
No acute changes this shift       N-  Neuros q1h. Pupils equal and reactive. Patient moves upper arms to command. Slight grasp. Sensation to face, upper extremities and RLE. Sensation at times to foot on LLE. Cervical collar in place with spinal precautions. PRN tylenol and oxycodone for pain     C- Afebrile. SR. HR 50s-70s. Maintaining MAP >65 without intervention.      R- CMV 40%/12/450/5. Suctioning bloody oral secretions. Suctioning creamy thick secretions from ETT. Lung sounds course.      Gi- NG with TF @ goal, 55ml/hr. Standard. FWF. Bowel movement last night.      - Degroot in place for neurogenic bladder. Good UO.      Skin- ERVIN x1. Neck incision CDI. Preventative mepilex to R & L shoulder and coccyx. Collar pads changed last evening.      Access- R PICC, PIV     Gtts- Precedex, Fentanyl, TKO      Electrolytes replaced via MAR     Plan to continue to monitor, notify MD for changes and possibly extubate today

## 2019-12-04 NOTE — PROGRESS NOTES
Neurosurgery Progress Note    Subjective:  per ENT, ok to try to extubate, normalized MAP goals    Objective:  Intubated, sedated  Awake  Follows commands  At least antigravity in bilateral upper extremities, 4/5 biceps, 1/5 , triple flexion in RLE, 0/5 in LLE    Assessment:  64 years old gentleman status post C3-C4 artificial disc placement about 2 weeks ago presents after a fall with almost complete loss of strength in his upper extremities and paraplegia, found to have cervical stenosis. S/p OR 12/1 - Pharyngotomy closed with assistance from ENT. Arthroplasty implant removed. No replacement implant placed.    Plan:  MAP > 65  Activity: bedrest  DVT: SCDs while in bed  ERVIN drain per ENT  [place] J collar  Per ID: continue vancomycin, cefepime, flagyl for vertebral phlegmon, CRP q72 hrs  Per ENT: ok to work towards extubation  Disposition: 4A    Lion Vargas MD  Neurosurgery Resident PGY2    Please contact neurosurgery resident on call with questions.    Dial * [phone], enter [phone] when prompted.

## 2019-12-04 NOTE — PLAN OF CARE
Major Shift Events: levo discontinued, BP goal normalized. Suppository given, no results. Pressure supported x2.   Plan: Possible extubation tomorrow, depending on plans to return to OR or not. Cuff leak present. Awaiting ENT and neurosurg to clarify.   For vital signs and complete assessments, please see documentation flowsheets.

## 2019-12-05 ENCOUNTER — APPOINTMENT (OUTPATIENT)
Dept: GENERAL RADIOLOGY | Facility: CLINIC | Age: 64
DRG: 003 | End: 2019-12-05
Attending: STUDENT IN AN ORGANIZED HEALTH CARE EDUCATION/TRAINING PROGRAM
Payer: COMMERCIAL

## 2019-12-05 ENCOUNTER — ANESTHESIA EVENT (OUTPATIENT)
Dept: SURGERY | Facility: CLINIC | Age: 64
DRG: 003 | End: 2019-12-05
Payer: COMMERCIAL

## 2019-12-05 ENCOUNTER — ANESTHESIA (OUTPATIENT)
Dept: SURGERY | Facility: CLINIC | Age: 64
DRG: 003 | End: 2019-12-05
Payer: COMMERCIAL

## 2019-12-05 LAB
ANION GAP SERPL CALCULATED.3IONS-SCNC: 3 MMOL/L (ref 3–14)
BACTERIA SPEC CULT: ABNORMAL
BASE EXCESS BLDA CALC-SCNC: 4.9 MMOL/L
BASOPHILS # BLD AUTO: 0 10E9/L (ref 0–0.2)
BASOPHILS NFR BLD AUTO: 0.2 %
BLD PROD TYP BPU: NORMAL
BLD PROD TYP BPU: NORMAL
BLD UNIT ID BPU: 0
BLD UNIT ID BPU: 0
BLOOD PRODUCT CODE: NORMAL
BLOOD PRODUCT CODE: NORMAL
BPU ID: NORMAL
BPU ID: NORMAL
BUN SERPL-MCNC: 19 MG/DL (ref 7–30)
CALCIUM SERPL-MCNC: 8.1 MG/DL (ref 8.5–10.1)
CHLORIDE SERPL-SCNC: 106 MMOL/L (ref 94–109)
CO2 SERPL-SCNC: 30 MMOL/L (ref 20–32)
CREAT SERPL-MCNC: 0.5 MG/DL (ref 0.66–1.25)
CRP SERPL-MCNC: 130 MG/L (ref 0–8)
DIFFERENTIAL METHOD BLD: ABNORMAL
EOSINOPHIL # BLD AUTO: 0.2 10E9/L (ref 0–0.7)
EOSINOPHIL NFR BLD AUTO: 1.7 %
ERYTHROCYTE [DISTWIDTH] IN BLOOD BY AUTOMATED COUNT: 12.2 % (ref 10–15)
GFR SERPL CREATININE-BSD FRML MDRD: >90 ML/MIN/{1.73_M2}
GLUCOSE BLDC GLUCOMTR-MCNC: 128 MG/DL (ref 70–99)
GLUCOSE BLDC GLUCOMTR-MCNC: 139 MG/DL (ref 70–99)
GLUCOSE BLDC GLUCOMTR-MCNC: 142 MG/DL (ref 70–99)
GLUCOSE BLDC GLUCOMTR-MCNC: 146 MG/DL (ref 70–99)
GLUCOSE BLDC GLUCOMTR-MCNC: 151 MG/DL (ref 70–99)
GLUCOSE BLDC GLUCOMTR-MCNC: 208 MG/DL (ref 70–99)
GLUCOSE SERPL-MCNC: 138 MG/DL (ref 70–99)
HCO3 BLD-SCNC: 30 MMOL/L (ref 21–28)
HCT VFR BLD AUTO: 26 % (ref 40–53)
HGB BLD-MCNC: 8.3 G/DL (ref 13.3–17.7)
IMM GRANULOCYTES # BLD: 0.1 10E9/L (ref 0–0.4)
IMM GRANULOCYTES NFR BLD: 0.8 %
LYMPHOCYTES # BLD AUTO: 1.5 10E9/L (ref 0.8–5.3)
LYMPHOCYTES NFR BLD AUTO: 17.1 %
MAGNESIUM SERPL-MCNC: 2.1 MG/DL (ref 1.6–2.3)
MCH RBC QN AUTO: 29.5 PG (ref 26.5–33)
MCHC RBC AUTO-ENTMCNC: 31.9 G/DL (ref 31.5–36.5)
MCV RBC AUTO: 93 FL (ref 78–100)
MONOCYTES # BLD AUTO: 0.6 10E9/L (ref 0–1.3)
MONOCYTES NFR BLD AUTO: 6.5 %
NEUTROPHILS # BLD AUTO: 6.5 10E9/L (ref 1.6–8.3)
NEUTROPHILS NFR BLD AUTO: 73.7 %
NRBC # BLD AUTO: 0 10*3/UL
NRBC BLD AUTO-RTO: 0 /100
O2/TOTAL GAS SETTING VFR VENT: ABNORMAL %
PCO2 BLD: 46 MM HG (ref 35–45)
PH BLD: 7.43 PH (ref 7.35–7.45)
PHOSPHATE SERPL-MCNC: 3.4 MG/DL (ref 2.5–4.5)
PLATELET # BLD AUTO: 390 10E9/L (ref 150–450)
PO2 BLD: 87 MM HG (ref 80–105)
POTASSIUM SERPL-SCNC: 4.1 MMOL/L (ref 3.4–5.3)
RBC # BLD AUTO: 2.81 10E12/L (ref 4.4–5.9)
SODIUM SERPL-SCNC: 140 MMOL/L (ref 133–144)
SPECIMEN SOURCE: ABNORMAL
TRANSFUSION STATUS PATIENT QL: NORMAL
WBC # BLD AUTO: 8.8 10E9/L (ref 4–11)

## 2019-12-05 PROCEDURE — 25000128 H RX IP 250 OP 636: Performed by: NURSE ANESTHETIST, CERTIFIED REGISTERED

## 2019-12-05 PROCEDURE — 27210794 ZZH OR GENERAL SUPPLY STERILE: Performed by: SURGERY

## 2019-12-05 PROCEDURE — 83735 ASSAY OF MAGNESIUM: CPT | Performed by: STUDENT IN AN ORGANIZED HEALTH CARE EDUCATION/TRAINING PROGRAM

## 2019-12-05 PROCEDURE — 40000275 ZZH STATISTIC RCP TIME EA 10 MIN

## 2019-12-05 PROCEDURE — 25000125 ZZHC RX 250: Performed by: NURSE ANESTHETIST, CERTIFIED REGISTERED

## 2019-12-05 PROCEDURE — 0D164Z4 BYPASS STOMACH TO CUTANEOUS, PERCUTANEOUS ENDOSCOPIC APPROACH: ICD-10-PCS | Performed by: SURGERY

## 2019-12-05 PROCEDURE — 25000132 ZZH RX MED GY IP 250 OP 250 PS 637: Performed by: NEUROLOGICAL SURGERY

## 2019-12-05 PROCEDURE — 82803 BLOOD GASES ANY COMBINATION: CPT | Performed by: STUDENT IN AN ORGANIZED HEALTH CARE EDUCATION/TRAINING PROGRAM

## 2019-12-05 PROCEDURE — 25000128 H RX IP 250 OP 636: Performed by: SURGERY

## 2019-12-05 PROCEDURE — 25000128 H RX IP 250 OP 636: Performed by: STUDENT IN AN ORGANIZED HEALTH CARE EDUCATION/TRAINING PROGRAM

## 2019-12-05 PROCEDURE — 94640 AIRWAY INHALATION TREATMENT: CPT | Mod: 76

## 2019-12-05 PROCEDURE — 37000009 ZZH ANESTHESIA TECHNICAL FEE, EACH ADDTL 15 MIN: Performed by: SURGERY

## 2019-12-05 PROCEDURE — 20000004 ZZH R&B ICU UMMC

## 2019-12-05 PROCEDURE — 36000059 ZZH SURGERY LEVEL 3 EA 15 ADDTL MIN UMMC: Performed by: SURGERY

## 2019-12-05 PROCEDURE — 37000008 ZZH ANESTHESIA TECHNICAL FEE, 1ST 30 MIN: Performed by: SURGERY

## 2019-12-05 PROCEDURE — 25000132 ZZH RX MED GY IP 250 OP 250 PS 637: Performed by: STUDENT IN AN ORGANIZED HEALTH CARE EDUCATION/TRAINING PROGRAM

## 2019-12-05 PROCEDURE — 25800030 ZZH RX IP 258 OP 636: Performed by: NEUROLOGICAL SURGERY

## 2019-12-05 PROCEDURE — 71045 X-RAY EXAM CHEST 1 VIEW: CPT

## 2019-12-05 PROCEDURE — 25000125 ZZHC RX 250: Performed by: STUDENT IN AN ORGANIZED HEALTH CARE EDUCATION/TRAINING PROGRAM

## 2019-12-05 PROCEDURE — 25000128 H RX IP 250 OP 636: Performed by: NURSE PRACTITIONER

## 2019-12-05 PROCEDURE — 80048 BASIC METABOLIC PNL TOTAL CA: CPT | Performed by: STUDENT IN AN ORGANIZED HEALTH CARE EDUCATION/TRAINING PROGRAM

## 2019-12-05 PROCEDURE — 27210437 ZZH NUTRITION PRODUCT SEMIELEM INTERMED LITER

## 2019-12-05 PROCEDURE — 25000566 ZZH SEVOFLURANE, EA 15 MIN: Performed by: SURGERY

## 2019-12-05 PROCEDURE — 25000125 ZZHC RX 250: Performed by: NEUROLOGICAL SURGERY

## 2019-12-05 PROCEDURE — 94640 AIRWAY INHALATION TREATMENT: CPT

## 2019-12-05 PROCEDURE — 40000014 ZZH STATISTIC ARTERIAL MONITORING DAILY

## 2019-12-05 PROCEDURE — 25800030 ZZH RX IP 258 OP 636: Performed by: NURSE ANESTHETIST, CERTIFIED REGISTERED

## 2019-12-05 PROCEDURE — 94003 VENT MGMT INPAT SUBQ DAY: CPT

## 2019-12-05 PROCEDURE — C1894 INTRO/SHEATH, NON-LASER: HCPCS | Performed by: SURGERY

## 2019-12-05 PROCEDURE — 00000146 ZZHCL STATISTIC GLUCOSE BY METER IP

## 2019-12-05 PROCEDURE — 86140 C-REACTIVE PROTEIN: CPT | Performed by: STUDENT IN AN ORGANIZED HEALTH CARE EDUCATION/TRAINING PROGRAM

## 2019-12-05 PROCEDURE — 84100 ASSAY OF PHOSPHORUS: CPT | Performed by: STUDENT IN AN ORGANIZED HEALTH CARE EDUCATION/TRAINING PROGRAM

## 2019-12-05 PROCEDURE — 25000125 ZZHC RX 250: Performed by: SURGERY

## 2019-12-05 PROCEDURE — 85025 COMPLETE CBC W/AUTO DIFF WBC: CPT | Performed by: STUDENT IN AN ORGANIZED HEALTH CARE EDUCATION/TRAINING PROGRAM

## 2019-12-05 PROCEDURE — 25000131 ZZH RX MED GY IP 250 OP 636 PS 637: Performed by: STUDENT IN AN ORGANIZED HEALTH CARE EDUCATION/TRAINING PROGRAM

## 2019-12-05 PROCEDURE — 36000057 ZZH SURGERY LEVEL 3 1ST 30 MIN - UMMC: Performed by: SURGERY

## 2019-12-05 RX ORDER — LABETALOL 20 MG/4 ML (5 MG/ML) INTRAVENOUS SYRINGE
10 EVERY 10 MIN PRN
Status: DISCONTINUED | OUTPATIENT
Start: 2019-12-05 | End: 2019-12-07

## 2019-12-05 RX ORDER — CEFAZOLIN SODIUM 1 G/3ML
1 INJECTION, POWDER, FOR SOLUTION INTRAMUSCULAR; INTRAVENOUS SEE ADMIN INSTRUCTIONS
Status: DISCONTINUED | OUTPATIENT
Start: 2019-12-05 | End: 2019-12-05 | Stop reason: CLARIF

## 2019-12-05 RX ORDER — SODIUM CHLORIDE, SODIUM LACTATE, POTASSIUM CHLORIDE, CALCIUM CHLORIDE 600; 310; 30; 20 MG/100ML; MG/100ML; MG/100ML; MG/100ML
INJECTION, SOLUTION INTRAVENOUS CONTINUOUS PRN
Status: DISCONTINUED | OUTPATIENT
Start: 2019-12-05 | End: 2019-12-05

## 2019-12-05 RX ORDER — CARBOXYMETHYLCELLULOSE SODIUM 5 MG/ML
1 SOLUTION/ DROPS OPHTHALMIC 3 TIMES DAILY PRN
Status: DISCONTINUED | OUTPATIENT
Start: 2019-12-05 | End: 2019-12-30

## 2019-12-05 RX ORDER — CEFAZOLIN SODIUM 2 G/100ML
2 INJECTION, SOLUTION INTRAVENOUS
Status: DISCONTINUED | OUTPATIENT
Start: 2019-12-05 | End: 2019-12-05 | Stop reason: CLARIF

## 2019-12-05 RX ORDER — PROPOFOL 10 MG/ML
INJECTION, EMULSION INTRAVENOUS PRN
Status: DISCONTINUED | OUTPATIENT
Start: 2019-12-05 | End: 2019-12-05

## 2019-12-05 RX ORDER — EPHEDRINE SULFATE 50 MG/ML
INJECTION, SOLUTION INTRAMUSCULAR; INTRAVENOUS; SUBCUTANEOUS PRN
Status: DISCONTINUED | OUTPATIENT
Start: 2019-12-05 | End: 2019-12-05

## 2019-12-05 RX ORDER — FENTANYL CITRATE 50 UG/ML
INJECTION, SOLUTION INTRAMUSCULAR; INTRAVENOUS PRN
Status: DISCONTINUED | OUTPATIENT
Start: 2019-12-05 | End: 2019-12-05

## 2019-12-05 RX ADMIN — ROCURONIUM BROMIDE 50 MG: 10 INJECTION INTRAVENOUS at 11:35

## 2019-12-05 RX ADMIN — PROPOFOL 50 MG: 10 INJECTION, EMULSION INTRAVENOUS at 11:58

## 2019-12-05 RX ADMIN — INSULIN GLARGINE 5 UNITS: 100 INJECTION, SOLUTION SUBCUTANEOUS at 22:54

## 2019-12-05 RX ADMIN — FENTANYL CITRATE 50 MCG: 50 INJECTION, SOLUTION INTRAMUSCULAR; INTRAVENOUS at 11:51

## 2019-12-05 RX ADMIN — LABETALOL 20 MG/4 ML (5 MG/ML) INTRAVENOUS SYRINGE 10 MG: at 16:16

## 2019-12-05 RX ADMIN — FENTANYL CITRATE 50 MCG: 50 INJECTION INTRAMUSCULAR; INTRAVENOUS at 14:07

## 2019-12-05 RX ADMIN — ACETYLCYSTEINE 4 ML: 100 INHALANT RESPIRATORY (INHALATION) at 15:51

## 2019-12-05 RX ADMIN — AMPICILLIN AND SULBACTAM 3 G: 1; 2 INJECTION, POWDER, FOR SOLUTION INTRAMUSCULAR; INTRAVENOUS at 14:15

## 2019-12-05 RX ADMIN — AMPICILLIN AND SULBACTAM 3 G: 1; 2 INJECTION, POWDER, FOR SOLUTION INTRAMUSCULAR; INTRAVENOUS at 02:10

## 2019-12-05 RX ADMIN — IPRATROPIUM BROMIDE AND ALBUTEROL SULFATE 3 ML: .5; 3 SOLUTION RESPIRATORY (INHALATION) at 07:48

## 2019-12-05 RX ADMIN — SENNOSIDES AND DOCUSATE SODIUM 1 TABLET: 8.6; 5 TABLET ORAL at 20:20

## 2019-12-05 RX ADMIN — PHENYLEPHRINE HYDROCHLORIDE 200 MCG: 10 INJECTION INTRAVENOUS at 11:41

## 2019-12-05 RX ADMIN — Medication 1 MG: at 22:44

## 2019-12-05 RX ADMIN — FENTANYL CITRATE 100 MCG: 50 INJECTION, SOLUTION INTRAMUSCULAR; INTRAVENOUS at 11:28

## 2019-12-05 RX ADMIN — PROPOFOL 50 MG: 10 INJECTION, EMULSION INTRAVENOUS at 11:43

## 2019-12-05 RX ADMIN — ACETYLCYSTEINE 4 ML: 100 INHALANT RESPIRATORY (INHALATION) at 19:27

## 2019-12-05 RX ADMIN — PHENYLEPHRINE HYDROCHLORIDE 50 MCG: 10 INJECTION INTRAVENOUS at 12:07

## 2019-12-05 RX ADMIN — AMPICILLIN AND SULBACTAM 3 G: 1; 2 INJECTION, POWDER, FOR SOLUTION INTRAMUSCULAR; INTRAVENOUS at 20:21

## 2019-12-05 RX ADMIN — SODIUM CHLORIDE, POTASSIUM CHLORIDE, SODIUM LACTATE AND CALCIUM CHLORIDE: 600; 310; 30; 20 INJECTION, SOLUTION INTRAVENOUS at 11:18

## 2019-12-05 RX ADMIN — OXYCODONE HYDROCHLORIDE 10 MG: 5 SOLUTION ORAL at 00:31

## 2019-12-05 RX ADMIN — Medication 7.5 MG: at 12:27

## 2019-12-05 RX ADMIN — SUGAMMADEX 200 MG: 100 INJECTION, SOLUTION INTRAVENOUS at 13:13

## 2019-12-05 RX ADMIN — ACETAMINOPHEN 650 MG: 325 SOLUTION ORAL at 06:19

## 2019-12-05 RX ADMIN — PROPOFOL 40 MG: 10 INJECTION, EMULSION INTRAVENOUS at 12:40

## 2019-12-05 RX ADMIN — FENTANYL CITRATE 50 MCG: 50 INJECTION, SOLUTION INTRAMUSCULAR; INTRAVENOUS at 11:54

## 2019-12-05 RX ADMIN — MULTIVITAMIN 15 ML: LIQUID ORAL at 20:20

## 2019-12-05 RX ADMIN — LABETALOL 20 MG/4 ML (5 MG/ML) INTRAVENOUS SYRINGE 10 MG: at 20:20

## 2019-12-05 RX ADMIN — IPRATROPIUM BROMIDE AND ALBUTEROL SULFATE 3 ML: .5; 3 SOLUTION RESPIRATORY (INHALATION) at 15:51

## 2019-12-05 RX ADMIN — AMPICILLIN AND SULBACTAM 3 G: 1; 2 INJECTION, POWDER, FOR SOLUTION INTRAMUSCULAR; INTRAVENOUS at 07:55

## 2019-12-05 RX ADMIN — DEXMEDETOMIDINE 0.2 MCG/KG/HR: 100 INJECTION, SOLUTION, CONCENTRATE INTRAVENOUS at 20:20

## 2019-12-05 RX ADMIN — PHENYLEPHRINE HYDROCHLORIDE 100 MCG: 10 INJECTION INTRAVENOUS at 12:29

## 2019-12-05 RX ADMIN — MIDAZOLAM 2 MG: 1 INJECTION INTRAMUSCULAR; INTRAVENOUS at 11:10

## 2019-12-05 RX ADMIN — FENTANYL CITRATE 50 MCG: 50 INJECTION, SOLUTION INTRAMUSCULAR; INTRAVENOUS at 11:18

## 2019-12-05 RX ADMIN — ROCURONIUM BROMIDE 20 MG: 10 INJECTION INTRAVENOUS at 12:25

## 2019-12-05 RX ADMIN — ACETYLCYSTEINE 4 ML: 100 INHALANT RESPIRATORY (INHALATION) at 07:48

## 2019-12-05 RX ADMIN — IPRATROPIUM BROMIDE AND ALBUTEROL SULFATE 3 ML: .5; 3 SOLUTION RESPIRATORY (INHALATION) at 19:27

## 2019-12-05 RX ADMIN — DEXMEDETOMIDINE 0.2 MCG/KG/HR: 100 INJECTION, SOLUTION, CONCENTRATE INTRAVENOUS at 04:31

## 2019-12-05 ASSESSMENT — ACTIVITIES OF DAILY LIVING (ADL)
ADLS_ACUITY_SCORE: 41

## 2019-12-05 ASSESSMENT — VISUAL ACUITY
OU: NORMAL ACUITY
OU: NORMAL ACUITY

## 2019-12-05 ASSESSMENT — MIFFLIN-ST. JEOR: SCORE: 1699.5

## 2019-12-05 NOTE — PROVIDER NOTIFICATION
NSG provider notified regarding sliding insulin scale and lantus orders. Patient NPO at 0000 for PEG tube placement.  at 0030. Orders from NSG resident to not give lantus but to give sliding scale insulin scheduled for 0000.

## 2019-12-05 NOTE — ANESTHESIA PREPROCEDURE EVALUATION
Anesthesia Pre-Procedure Evaluation    Patient: Sherwin Angel   MRN:     [id number] Gender:   male   Age:    64 year old :      1955        Preoperative Diagnosis: Spinal cord injury at C1-C4 level, initial encounter (H) [S14.101A]   Procedure(s):  peg     No past medical history on file.   Past Surgical History:   Procedure Laterality Date     IRRIGATION AND DEBRIDEMENT NECK, COMBINED N/A 2019    Procedure: IRRIGATION AND DEBRIDEMENT OF NECK; PHARYNGOTOMY REPAIR.;  Surgeon: Fernando Ashton MD;  Location: [facility] OR     REMOVE HARDWARE CERVICAL ANTERIOR SPINE N/A 2019    Procedure: REMOVAL, HARDWARE, SPINE, CERVICAL, ANTERIOR;  Surgeon: Fernando Ashton MD;  Location: [facility] OR        Pt is a poor historian but answers all questions appropriately. He was transferred from [facility] after a suspected fall resulting in nicolle-instrumental C-spine injury. Deficits include bilateral lower extremity paraplegia and incomplete upper extremity plegia. He has very weak cough, coarse breath sounds and low O2 sats on NC. CXR pending.      Anesthesia Evaluation     . Pt has had prior anesthetic. Type: General and MAC    No history of anesthetic complications          ROS/MED HX    ENT/Pulmonary:  - neg pulmonary ROS     Neurologic: Comment: Pt had recent fall resulting in BLE paraplegia and partial upper extremity plegia      (+)Spinal cord injury year sustained: 2019 level of injury: C5     Cardiovascular:     (+) hypertension----. : . . . :. .       METS/Exercise Tolerance:  3 - Able to walk 1-2 blocks without stopping   Hematologic:  - neg hematologic  ROS       Musculoskeletal:  - neg musculoskeletal ROS       GI/Hepatic:  - neg GI/hepatic ROS       Renal/Genitourinary:  - ROS Renal section negative       Endo:  - neg endo ROS       Psychiatric:  - neg psychiatric ROS       Infectious Disease:         Malignancy:      - no malignancy   Other:    (+) C-spine cleared: No, no H/O Chronic Pain,    "                    PHYSICAL EXAM:   Mental Status/Neuro: Sedation (Iatrogenic)   Airway:    Respiratory: Auscultation: Decreased BS     Resp. Rate: Normal     Respiratory Effort: Decreased respirator effort.      CV: Rhythm: Regular  Heart: Normal Sounds   Comments:      Dental: Details                  LABS:  CBC:   Lab Results   Component Value Date    WBC 8.8 12/05/2019    WBC 12.0 (H) 12/04/2019    HGB 8.3 (L) 12/05/2019    HGB 8.2 (L) 12/04/2019    HCT 26.0 (L) 12/05/2019    HCT 26.3 (L) 12/04/2019     12/05/2019     12/04/2019     BMP:   Lab Results   Component Value Date     12/05/2019     12/04/2019    POTASSIUM 4.1 12/05/2019    POTASSIUM 3.9 12/04/2019    CHLORIDE 106 12/05/2019    CHLORIDE 109 12/04/2019    CO2 30 12/05/2019    CO2 29 12/04/2019    BUN 19 12/05/2019    BUN 24 12/04/2019    CR 0.50 (L) 12/05/2019    CR 0.59 (L) 12/04/2019     (H) 12/05/2019     (H) 12/04/2019     COAGS:   Lab Results   Component Value Date    PTT 35 12/01/2019    INR 1.30 (H) 12/04/2019     POC:   Lab Results   Component Value Date     (H) 12/05/2019     OTHER:   Lab Results   Component Value Date    PH 7.42 12/02/2019    LACT 1.2 12/01/2019    A1C 6.5 (H) 12/01/2019    FLORINDA 8.1 (L) 12/05/2019    PHOS 3.4 12/05/2019    MAG 2.1 12/05/2019    ALBUMIN 2.1 (L) 12/01/2019    PROTTOTAL 7.4 12/01/2019    ALT 32 12/01/2019    AST 17 12/01/2019    ALKPHOS 103 12/01/2019    BILITOTAL 0.6 12/01/2019    .0 (H) 12/05/2019        Preop Vitals    BP Readings from Last 3 Encounters:   12/05/19 (!) 152/72    Pulse Readings from Last 3 Encounters:   12/05/19 74      Resp Readings from Last 3 Encounters:   12/05/19 20    SpO2 Readings from Last 3 Encounters:   12/05/19 95%      Temp Readings from Last 1 Encounters:   12/05/19 37.1  C (98.7  F) (Axillary)    Ht Readings from Last 1 Encounters:   12/01/19 1.727 m (5' 8\")      Wt Readings from Last 1 Encounters:   12/05/19 93 kg (205 lb 0.4 " "oz)    Estimated body mass index is 31.17 kg/m  as calculated from the following:    Height as of 12/1/19: 1.727 m (5' 8\").    Weight as of an earlier encounter on 12/5/19: 93 kg (205 lb 0.4 oz).     LDA:  Peripheral IV 12/01/19 Right Upper arm (Active)   Site Assessment WDL 12/5/2019  8:00 AM   Line Status Saline locked 12/5/2019  8:00 AM   Phlebitis Scale 0-->no symptoms 12/5/2019  4:00 AM   Infiltration Scale 0 12/5/2019  4:00 AM   Infiltration Site Treatment Method  None 12/5/2019  4:00 AM   Extravasation? No 12/5/2019  8:00 AM   Number of days: 4       Peripheral IV 12/01/19 Left Lower forearm (Active)   Site Assessment [facility] 12/5/2019  8:00 AM   Line Status Saline locked 12/5/2019  8:00 AM   Phlebitis Scale 0-->no symptoms 12/5/2019  4:00 AM   Infiltration Scale 0 12/5/2019  4:00 AM   Infiltration Site Treatment Method  None 12/5/2019  4:00 AM   Extravasation? No 12/5/2019  8:00 AM   Dressing Intervention New dressing  12/2/2019  4:00 PM   Number of days: 4       PICC Triple Lumen 12/02/19 Right Basilic Access. RN notified PICC was ready to use. (Active)   Site Assessment [facility] 12/5/2019  8:00 AM   External Cath Length (cm) 3 cm 12/2/2019 11:00 AM   Extremity Circumference (cm) 33 cm 12/2/2019 11:00 AM   Dressing Intervention Chlorhexidine patch;Transparent 12/5/2019  8:00 AM   Dressing Change Due 12/08/19 12/5/2019  8:00 AM   PICC Comment CDI 12/5/2019  8:00 AM   Lumen A - Color PURPLE 12/5/2019  8:00 AM   Lumen A - Status saline locked 12/5/2019  8:00 AM   Lumen A - Cap Change Due 12/05/19 12/5/2019  8:00 AM   Lumen B - Color GRAY 12/5/2019  8:00 AM   Lumen B - Status infusing 12/5/2019  8:00 AM   Lumen B - Cap Change Due 12/05/19 12/5/2019  8:00 AM   Lumen C - Color RED 12/5/2019  8:00 AM   Lumen C - Status infusing 12/5/2019  8:00 AM   Lumen C - Cap Change Due 12/05/19 12/5/2019  8:00 AM   Extravasation? No 12/5/2019  8:00 AM   Line Necessity Yes, meets criteria 12/5/2019  8:00 AM   Number of days: 3     "   Arterial Line 12/01/19 Wrist (Active)   Site Assessment WDL 12/5/2019  8:00 AM   Line Status Pulsatile blood flow 12/5/2019  8:00 AM   Arterine Line Cap Change Due 12/03/19 12/3/2019  8:00 PM   Art Line Waveform Dampened 12/5/2019  8:00 AM   Art Line Interventions Connections checked and tightened;Flushed per protocol 12/5/2019  8:00 AM   Color/Movement/Sensation Capillary refill less than 3 sec 12/5/2019  8:00 AM   Line Necessity Yes, meets criteria 12/5/2019  8:00 AM   Dressing Type Transparent 12/5/2019  8:00 AM   Dressing Status Clean, dry, intact 12/5/2019  8:00 AM   Dressing Change Due 12/08/19 12/5/2019  8:00 AM   Number of days: 4       ETT (adult) 8 (Active)   Secured By Commercial tube godinez 12/5/2019  8:59 AM   Site Appearance Clean and dry 12/5/2019  8:59 AM   Secured at (cm) to lip 27 cm 12/5/2019  8:59 AM   Site of ET Tube Securement At lip 12/5/2019  8:59 AM   Cuff Pressure - Type minimal occluding volume 12/4/2019  4:31 PM   Tube Care/Reposition repositioned tube left side of mouth 12/5/2019  8:00 AM   Bite Block Secure and Patent 12/5/2019  8:59 AM   Safety Measures manual resuscitator at bedside;manual resuscitator/mask/valve in room;manual resuscitator/PEEP valve in room 12/5/2019  8:59 AM   ETT Cuff Deflation Leak Test No Leak 12/5/2019  8:00 AM   Number of days: 4       Closed/Suction Drain 1 Right;Lateral Neck Bulb 10 [place] (Active)   Site Description UTV 12/5/2019  8:00 AM   Dressing Status Normal: Clean, Dry & Intact 12/5/2019  8:00 AM   Drainage Appearance Serosanguenous 12/5/2019  8:00 AM   Status To bulb suction 12/5/2019  8:00 AM   Output (ml) 0 ml 12/5/2019  8:00 AM   Number of days: 4       NG/OG Tube Nasogastric Left nostril Sutured by ENT  (Active)   Site Description WDL 12/5/2019  8:00 AM   Status Clamped 12/5/2019  8:00 AM   Placement Confirmation [place] unchanged 12/5/2019  8:00 AM   [place] (cm marking) at nare/mouth 92 cm 12/5/2019  8:00 AM   Intake (ml) 60 ml 12/5/2019  12:00 AM   Flush/Free Water (mL) 30 mL 12/5/2019 12:00 AM   Residual (mL) 0 mL 12/2/2019  4:00 AM   Number of days: 4       Urethral Catheter (Active)   Tube Description Positional 12/5/2019  4:00 AM   Catheter Care Done;Catheter wipes 12/5/2019  8:00 AM   Collection Container Standard;Patent 12/5/2019  8:00 AM   Securement Method Securing device (Describe) 12/5/2019  8:00 AM   Rationale for Continued Use Strict 1-2 Hour I&O 12/5/2019  8:00 AM   Urine Output 100 mL 12/5/2019  8:00 AM   Number of days: 4        Assessment:   ASA SCORE: 3    H&P: History and physical reviewed and following examination; no interval change.    NPO Status: NPO Appropriate     Plan:   Anes. Type:  General   Pre-Medication: None   Induction: inhaled via ett.   Airway: ETT; Oral   Access/Monitoring: PIV; CVL   Maintenance: Balanced     Postop Plan:   Postop Pain: Opioids  Postop Sedation/Airway: Not planned  Disposition: Inpatient/Admit     PONV Management:   Adult Risk Factors:, Postop Opioids   Prevention: Ondansetron     CONSENT: Direct conversation   Plan and risks discussed with: Patient                          Arabella Gaines MD

## 2019-12-05 NOTE — PROGRESS NOTES
Neurosurgery Progress Note    Subjective:  ID: switched vanc, cef, flagyl -> Unasyn 4-6 wks; no acute events, ready for PEG 12/5    Objective:  Intubated, sedated  Awake  Follows commands  At least antigravity in bilateral upper extremities, 4/5 biceps, 1/5 , triple flexion in BLE    Assessment:  64 years old gentleman status post C3-C4 artificial disc placement about 2 weeks ago presents after a fall with almost complete loss of strength in his upper extremities and paraplegia, found to have cervical stenosis. S/p OR 12/1 - Pharyngotomy closed with assistance from ENT. Arthroplasty implant removed. No replacement implant placed. Pending PEG 12/5.    Plan:  MAP > 65  Activity: bedrest  DVT: SCDs while in bed  ERVIN drain per ENT  [place] J collar  Per ID: switched vanc, cef, flagyl -> Unasyn 4-6 wks for vertebral phlegmon, CRP q72 hrs  Per ENT: ok to work towards extubation  Disposition: 4A  NPO, tube feeds held 12/5 midnight for PEG 12/5 with bariatric surgery    Lion Vargas MD  Neurosurgery Resident PGY2    Please contact neurosurgery resident on call with questions.    Dial * [phone], enter [phone] when prompted.

## 2019-12-05 NOTE — PROGRESS NOTES
Neuroscience Intensive Care Progress Note  2019    Sherwinsavage Angel is a 64 year old year old male with hx HTN, DM and cervical stenosis s/p recent anterior C3-4 arthroplasty (2 weeks prior) who was admitted on 2019 with bilateral lower extremity paraplegia and near loss of upper extremity movement, after a fall at rehab. Prior to the fall, he was able to ambulate with a cane. Taken to the OR for arthroplasty implant removal on . Found to have significant inflammation and 2 pharyngotomy sites. ENT closed 1 (unable to reach other) and drain placed for the phlegmon. Now on broad spectrum abx for possible infection.     Problem List  1. Cervical stenosis s/p C3-C4 arthroplasty   2. Cervical spinal injury due to fall, with C3-C5 fracture   3. Acute respiratory failure s/p intubation  4. Leukocytosis  5. Concern for pneumonia  6. Hx of hypertension  7. Hx of DM  8. Alcohol dependence  9. Secretions  10. Normocytic anemia     24 hour events:  - No major events overnight  - Remained afebrile. /54 - 175/89, HR 53-87  - Lantus 5 units not given last night due to NPO, BG's 192 -> 208 -> 138 -> 128, rec'd total of 15 units sliding scale insulin  - Had BM yesterday    24 Hour Vital Signs Summary:  Temperatures:  Current - Temp: 98.4  F (36.9  C); Max - Temp  Av.9  F (37.2  C)  Min: 98.4  F (36.9  C)  Max: 99.5  F (37.5  C)  Respiration range: Resp  Av.8  Min: 10  Max: 30  Pulse range: Pulse  Av.7  Min: 53  Max: 85  Blood pressure range: Systolic (24hrs), Av , Min:123 , Max:169   ; Diastolic (24hrs), Av, Min:56, Max:79    Pulse oximetry range: SpO2  Av.2 %  Min: 96 %  Max: 100 %    Ventilator Settings  Ventilation Mode: CMV/AC  (Continuous Mandatory Ventilation/ Assist Control)  FiO2 (%): 40 %  Rate Set (breaths/minute): 12 breaths/min  Tidal Volume Set (mL): 450 mL  PEEP (cm H2O): 5 cmH2O  Pressure Support (cm H2O): 7 cmH2O  Oxygen Concentration (%): 40 %  Resp:  18      Intake/Output Summary (Last 24 hours) at 12/5/2019 0908  Last data filed at 12/5/2019 0800  Gross per 24 hour   Intake 2115.3 ml   Output 2264 ml   Net -148.7 ml       Arterial Line BP: (118-175)/(54-89) 147/58  MAP:  [81 mmHg-114 mmHg] 87 mmHg  BP - Mean:  [] 106    Current Medications:    acetylcysteine  4 mL Nebulization Q4H     ampicillin-sulbactam (UNASYN) IV  3 g Intravenous Q6H     folic acid  1 mg Oral or Feeding Tube Daily     heparin lock flush  5-10 mL Intracatheter Q24H     insulin aspart  1-12 Units Subcutaneous Q4H     insulin glargine  5 Units Subcutaneous At Bedtime     ipratropium - albuterol 0.5 mg/2.5 mg/3 mL  3 mL Nebulization 4x daily     multivitamins w/minerals  15 mL Per Feeding Tube Daily     pantoprazole  40 mg Oral or Feeding Tube QAM AC     polyethylene glycol  17 g Oral or Feeding Tube Daily     senna-docusate  1 tablet Oral or Feeding Tube BID     thiamine  100 mg Oral or Feeding Tube Daily       PRN Medications:  acetaminophen, albuterol, bisacodyl, IV fluid REPLACEMENT ONLY, glucose **OR** dextrose **OR** glucagon, fentaNYL, heparin lock flush, heparin lock flush, lidocaine 4%, lidocaine 4%, lidocaine (buffered or not buffered), lidocaine (buffered or not buffered), magnesium sulfate, magnesium sulfate, naloxone, ondansetron **OR** ondansetron, oxyCODONE, potassium chloride, potassium chloride with lidocaine, potassium chloride, potassium chloride, potassium chloride, potassium phosphate (KPHOS) in D5W IV, potassium phosphate (KPHOS) in D5W IV, potassium phosphate (KPHOS) in D5W IV, potassium phosphate (KPHOS) in D5W IV, potassium phosphate (KPHOS) in D5W IV, prochlorperazine **OR** prochlorperazine **OR** prochlorperazine, sodium chloride (PF), sodium chloride (PF)    Infusions:    dexmedetomidine 0.2 mcg/kg/hr (12/05/19 0700)     IV fluid REPLACEMENT ONLY         No Known Allergies    Physical Examination:  Vitals: BP (!) 143/72   Pulse 56   Temp 98.4  F (36.9  C)  "(Axillary)   Resp 18   Ht 1.727 m (5' 8\")   Wt 93 kg (205 lb 0.4 oz)   SpO2 96%   BMI 31.17 kg/m    General: NAD, lying in bed  HEENT: Normocephalic. NG tube in place.  CV: RRR  Resp: Intubated  Abd: Soft, nontender,distended  Skin: Warm, dry   Neuro: intubated, examined on precedex. Awake and alert. Responds to questions by nodding. Follows commands. EOMI. Eyes are conjugate. Moves both arms anti-gravity at elbows brisky, more overall movement in LUE noted and weaker in RUE.  stronger in L > R. Inconsistent response in BLE - R ankle moves to noxious, sometimes L toes moves slightly to noxious though unclear if it is more triple flexion.Sensation intact proximately and distally including forearm and dorsal surface of hand (improved from prior).    Labs:  Recent Labs   Lab Test 12/05/19  0340 12/04/19  0336 12/03/19  0332    142 138   POTASSIUM 4.1 3.9 3.9   CHLORIDE 106 109 106   CO2 30 29 29   ANIONGAP 3 4 3   * 222* 212*   BUN 19 24 18   CR 0.50* 0.59* 0.58*   FLORINDA 8.1* 8.0* 8.2*   WBC 8.8 12.0* 19.1*   RBC 2.81* 2.82* 3.35*   HGB 8.3* 8.2* 9.7*    397 481*     Cultures:  Blood culture (12/1 & 12/3): NGTD, pending      Fungal Cervical spine tissue cx (12/1): NGTD     Gram stain from cervical spine tissue(12/1): Few gram + cocci, rare gram + rods     Anaerobic cervical spine tissue cx (12/1): Moderate fusobacterium nucleatum, moderate parvimonas micra, light prevotella species     Aerobic cervical spine tissue cx (12/1): Single colony staph aureus, light growth strep anginosus, light eikinella corrodens     Imaging:   None    Assessment/Plan:  Sherwin Covarrubiasolya is a 64 year old with hx of recent of anterior C3-4 arthroplasty ([facility]) who was admitted 12/1/2019 after a fall at rehab, resulting in lower extremity paraplegia. Found to have cervical stenosis. Now s/p pharyngotomy closure on 12/1. Arthroplasty implant removed with no replacement implant placed. He was on broad " spectrum abx for + cervical tissue cultures with ID following, narrowed to Unasyn on 12/4.      Neuro:  #Cervical spinal cord injury  - Goal MAP > 65 today  - Up with assist  - Collar at all times   - Q2H neuro checks   - Discussed plans with NSG and ENT, no plans for OR  - ENT/NSG ok with starting DVT ppx this evening but will speak surgery after gtube placed     #Sedation/pain  - No longer on Fentanyl gtt, start fentanyl pushes  - Precedex gtt  - Oxycodone PRN     #Hx of Alcohol dependence  - CIWA  - Thiamine & folate     Resp:  #Acute respiratory failure s/p intubation  - Discussed plans with NSG/ENT - no plan to go back to OR, ok to extubate as able  - Continue Pressure support trials, maybe extubate today after gtube placement  - place pt on FIO2 of 30 today  - Maintain normal phos level, can contribute to resp status     #Secretions  - difficulty clearing, thick white secretions  - Mucomyst & duoneb q4h scheduled     Cardio:  #Hx of hypertension   - goal MAP > 65  - Hold PTA medications for now     Renal:  No active issues.  - Electrolyte replacement protocol   - Monitor I/O  - No longer on IVF     Endo:  #DM2  - Sliding scale insulin, high dose  - Start lantus 5 units possibly tonight  - Monitor BG     Heme:  #Normocytic anemia  - a little better today with Hgb 8.3   - Monitor CBC     - Hg > 7.0   - Plt > 100k   - INR <1.5      GI:  No active issues.     #Constipation, resolved  - Miralax & Senna scheduled  - PRN suppository available     #Diet  - NPO  - Per ENT, anticipate prolonged duration of NPO status (at least 2 weeks, may be longer)  - G tube placement today, will talk with surgery after placement for recs on starting TFs  - Nutrition following, follow up tube feed recs     ID:  #Leukocytosis  #Low grade fever  - ID following  - Remains afebrile and leukocytosis improving   - Stopped Vancomycin, metronidazole, cefepime on 12/4 --> started on unasyn 3 g q6h per ID  - will need 6 weeks IV abx, will  narrow as able  - Follow up blood cultures (12/1 & 12/3): NGTD, pending  - Follow up spinal tissue cultures (12/1): multiple organisms growing, pending   - Daily CBC  - if T >100.4, will obtain blood/sputum cultures and UA      Lines:  - ETT  - PICC  - PIV x 2  - Arterial line, will remove after gtube placement  - [place]   - ERVIN drain in neck  - NG     FEN: NPO, tube feeds at goal  PPX:    DVT prophylaxis: SCDs, ENT/NSG ok with starting DVT ppx this evening but will speak surgery after gtube placed    GI: Pantoprazole  Code Status: Full, presumed     Dispo: Remains medically unstable     The patient was seen and discussed with the attending, Dr. Clark.     Randi Edmonds MD  Neurology PGY2  [phone]

## 2019-12-05 NOTE — PROGRESS NOTES
Surgery Progress Note  12/05/2019       Subjective:  SHIRLENE overnight. Sleeping comfortably in bed in NAD.     Objective:  Temp:  [98.4  F (36.9  C)-99.5  F (37.5  C)] 98.4  F (36.9  C)  Pulse:  [53-85] 56  Heart Rate:  [53-87] 58  Resp:  [10-30] 18  BP: (123-169)/(56-79) 143/72  MAP:  [81 mmHg-114 mmHg] 87 mmHg  Arterial Line BP: (118-175)/(54-89) 147/58  FiO2 (%):  [40 %] 40 %  SpO2:  [96 %-100 %] 96 %    I/O last 3 completed shifts:  In: 2670.6 [I.V.:1375.6; NG/GT:360]  Out: 2640 [Urine:2625; Drains:15]      Gen: Asleep, alert, NAD  Resp: tracheostomy in place; vent  Abd: soft, nondistended, nontender  Incision: No laparotomy incision; incision at [place] point  Ext: WWP, no edema     Labs:  Recent Labs   Lab 12/05/19  0340 12/04/19  0336 12/03/19  0332   WBC 8.8 12.0* 19.1*   HGB 8.3* 8.2* 9.7*    397 481*       Recent Labs   Lab 12/05/19  0340 12/04/19  1216 12/04/19  0336 12/03/19  0332     --  142 138   POTASSIUM 4.1  --  3.9 3.9   CHLORIDE 106  --  109 106   CO2 30  --  29 29   BUN 19  --  24 18   CR 0.50*  --  0.59* 0.58*   *  --  222* 212*   FLORINDA 8.1*  --  8.0* 8.2*   MAG 2.1  --  2.3 2.3   PHOS 3.4 2.5 1.9* 2.7       Imaging:  No relevant imaging     Assessment/Plan:   64 year old male with history of HTN, DMII, laparoscopic Ca en Y gastric bypass in 2008, and recent cervical arthoplasty who underwent recent debridement of cervical spine with pharyngotomy. Tube feed access requested from bariatric surgery given h/o RNYGB.    Scheduled for laparoscopic assisted g-tube placement into gastric remnant today (12/5/19) at 1015  - Has been NPO since 1215 including tube feeds, w/pre-op orders completed, and consent obtained  - Has current type and screen     Seen, examined, and discussed with chief resident, Dr. Yuriy Solis who will discuss with staff.  - - - - - - - - - - - - - - - - - -  Hardik Cai, MS3  [employer]    Agree. No acute events. Plan for surgery  at 10:15. Discussed with NSG and ENT.    Appears comfortable  Sleeping    To OR today for lap assisted g-tube into gastric remnant    Yuriy Solis, PGY7

## 2019-12-05 NOTE — PROGRESS NOTES
Post-op g-tube   ---------------------  Gtube to gravity  Ok for medications per g-tube  Keep NPO - no tube feeds overnight  Will re-evaluate in AM  Ok to remove NJ tube per primary    Discussed with NSG    Yuriy Solis, PGY7

## 2019-12-05 NOTE — PROGRESS NOTES
"Otolaryngology Progress Note  December 5, 2019     S: No acute events overnight. 2x Pressure support trial now on CMV mode. Has feeding tube.      O: /73   Pulse 60   Temp 98.4  F (36.9  C) (Axillary)   Resp 18   Ht 1.727 m (5' 8\")   Wt 93 kg (205 lb 0.4 oz)   SpO2 98%   BMI 31.17 kg/m                 General: Responds to verbal cues, no acute distress               HEENT: Neck collar in place. Neck without hematoma/seroma. EOMI.  Drain with serosang output.               Pulmonary: Mechanically ventilated, FiO2 40%, PEEP +5              Neuro/MSK: able to weakly squeeze hand bilaterally.     ERVIN drain output(s): (last 24 hours)/(last shift)  Right Neck 1 / 4 / 10      Assessment and Plan  Sherwin Keny Ramosfabricioolya is a 64 year old male with a past medical history of hypertension and diabetes status post arthroplasty about 2 weeks ago who fell at rehab and now presents to the [place] with paraplegia.  He was brought to the operating room by neurosurgery by Dr. Ashton who encountered inflammatory  tissue over the spine and so ENT was consulted for assistance with the exposure.  During the exposure we noticed that there was a pharyngotomy that was seen from the cervical exposure. This was repaired transoral and transcervical. Patient remained intubated post-op and remains in the SICU.     - OK to extubate from ENT perspective when meets standard extubation parameters  - Antibiotics per ID (flagyl, vanc, cefepime). Cultures growing S aureus, S anginosus   - Anticipate prolonged duration of NPO status (at least 2 weeks)  - Consider PEG placement as the patient will likely need several weeks of supplemental nutrition. Anticipate swallow will be compromised.   - Continue to monitor drain output. Thus far, it has been serosanguinous, no evidence of saliva. Keep in place to monitor for saliva.   - Rest of cares per neurosurgery and ICU  - [facility] on call for questions     -- Patient and above plan will be " discussed with Dr. Kathy Kearney MD   Otolaryngology-Head & Neck Surgery  Please contact ENT with questions by dialing [phone] and entering job [phone] when prompted.

## 2019-12-05 NOTE — PLAN OF CARE
No acute changes this shift       N-  Neuros q2h. Pupils equal and reactive. Patient moves upper arms to command. Slight grasp. Sensation to face, upper extremities and RLE. Sensation at times to foot on LLE. Cervical collar in place. PRN tylenol and oxycodone for pain     C- Afebrile. SR. HR 50s-70s. Maintaining MAP >65 without intervention.      R- CMV 40%/12/450/5. Suctioning creamy thick secretions from ETT. Lung sounds course.      Gi- NG with TF @ goal, 55ml/hr. Standard. FWF. NPO at midnight for PEG tube placement today. Bowel movement last night.      - Degroot in place for neurogenic bladder. Good UO.      Skin- ERVIN x1. Neck incision CDI. Preventative mepilex to R & L shoulder and coccyx. Collar pads changed last evening.      Access- R PICC, PIV     Gtts- Precedex, TKO      Plan for PEG tube placement today

## 2019-12-05 NOTE — ANESTHESIA POSTPROCEDURE EVALUATION
Anesthesia POST Procedure Evaluation    Patient: Sherwin Angel   MRN:     [id number] Gender:   male   Age:    64 year old :      1955        Preoperative Diagnosis: Quadriplegia (H) [G82.50]   Procedure(s):  laproscopic assisted gastrostomy tube placement   Postop Comments: No value filed.       Anesthesia Type:  Not documented  General    Reportable Event: NO     PAIN: Uncomplicated   Sign Out status: Comfortable, Well controlled pain     PONV: No PONV   Sign Out status:  No Nausea or Vomiting     Neuro/Psych: Uneventful perioperative course   Sign Out Status: Preoperative baseline; Age appropriate mentation     Airway/Resp.: Uneventful perioperative course   Sign Out Status: Airway Device present                 Reason: Planned (pre-op)     CV: Uneventful perioperative course   Sign Out status: Appropriate BP and perfusion indices; Appropriate HR/Rhythm     Disposition:   Sign Out in:  ICU  Disposition:  ICU  Recovery Course: Recovery in ICU  Follow-Up: Not required           Last Anesthesia Record Vitals:  CRNA VITALS  2019 1235 - 2019 1335      2019             SpO2:  97 %    EKG:  Sinus rhythm          Last PACU Vitals:  Vitals Value Taken Time   BP     Temp     Pulse     Resp     SpO2 95 % 2019  2:00 PM   Temp src     NIBP     Pulse     SpO2     Resp     Temp     Ht Rate     Temp 2     Vitals shown include unvalidated device data.      Electronically Signed By: Arabella Gaines MD, 2019, 2:02 PM

## 2019-12-05 NOTE — OP NOTE
[facility], [place]    Brief Operative Note    Pre-operative diagnosis: Quadriplegia (H) [G82.50]   Post-operative diagnosis * No post-op diagnosis entered *   Procedure:                                   Laparoscopic gastrostomy tube placement   Surgeon: Surgeon(s) and Role:     * Luis Santiago MD - Primary     * Chava Solis - Resident - Assisting   Anesthesia: General    Estimated blood loss: 25cc   Drains: None   Specimens: * No specimens in log *   Findings: remnant stomach, non-dilated.       Complications: None.   Implants: None.       COMORBIDITIES: HTN, DM. LRYGB (Louis, 2008)    INDICATIONS FOR PROCEDURE  Pt is a 64M who underwent LRYGB in 2008, and requires pharyngostomy after recent C-spine debridement. He will require tube feeds for an uncertain length of time, at minimum, two weeks but likely longer.     Bariatric Surgery has been requested for enteral access tube placement.    General risks related to abdominal surgery were reviewed with the patient and sister who both agreed to laparoscopic remnant gastrostomy.    These include, but are not limited to, death, myocardial infarction, pneumonia, urinary tract infection, deep venous thrombosis with or without pulmonary embolus, abdominal infection from bowel injury or abscess, bowel obstruction, wound infection, and bleeding.    Risks related to g-tube were previously reviewed with the patient and sister.    I emphasized the pain associated with g-tube placement to the patient.    OPERATIVE PROCEDURE:  Under the benefits of general endotracheal anesthesia, a left upper quadrant Veress needle was inserted and pneumoperitoneum was established to a maximum pressure of 15 mm Hg.  5 ports were placed in total- 2 L sided 5 mm ports, and a 5 and 12mm R sided port.  The camera port was a 5-mm port.     The gastric remnant was evaluated and was normal.    Four sutures of 2-0 Surgidac on the Endostitch were used to  create a 4-sided gastropexy (left/right/superior/inferior) between the gastric remnant close to the greater curvature and the anterior abdominal wall, a few centimeters proximal from the pylorus.  The sutures were parachuted as transfascial sutures.  A gastrotomy was made in the middle of the sutures using hook cautery  .  A left upper quadrant incision was made and a needle and wire were placed through the incision and gastrotomy, into the remnant stomach.  A 10-Fr peelaway sheath was passed into the abdomen and subsequently, the gastrotomy through this incision. The gastrotomy was then updilated with a 22 Fr sheath with introducer, and the intraabdominal pressure was dropped to 8.     The sheath was removed and the 18 Fr DORIS g-tube inserted, and was inflated with water to 8-cc.  The transfascial sutures were then brought snugly to the anterior abdominal wall along with the g tube, under direct visualization.  The tube flushed easily and gastric contents were aspirated back. The insufflaton was turned down to 5cc and the transfascial sutures were tied.     The 12mm incision was closed with an interrupted 0-vicryl on a suture passer.     Skin was closed with 4-0 monocryl and skin glue. The g tube was hooked to gravity bag.    I was scrubbed for all critical components of the operation.    All sponge and needle counts were correct x 2 at the end of the procedure.    Luis Santiago MD

## 2019-12-05 NOTE — PROGRESS NOTES
CLINICAL NUTRITION SERVICES - REASSESSMENT NOTE     Nutrition Prescription    RECOMMENDATIONS FOR MDs/PROVIDERS TO ORDER:  - Total daily fluids/adjustments per MD     Malnutrition Status:    - Patient does not meet two of the established criteria necessary for diagnosing malnutrition    Recommendations already ordered by Registered Dietitian (RD):  - Once PEG placed and verified for use per MD, continue prior regimen: Impact Peptide 1.5 at goal 55 ml/hr (1320 ml/day) to provide 1980 kcals (28 kcal/kg/day), 124 g PRO (1.8 g/kg/day), 1016 ml free H2O, 84 g Fat (50% from MCTs), 185 g CHO and no Fiber daily.   - Initiate @ 35 ml/hr and adv by 10 ml q4hr as tolerated   - Elevated HOB/aspiration precautions with gastric feeds  - RD changed orders to reflect pending new access/PEG     Future/Additional Recommendations:  - Ongoing EN tolerance via new access/PEG  - GI status  - Met cart as appro      EVALUATION OF THE PROGRESS TOWARD GOALS   Diet: NPO  Nutrition Support:  Impact Peptite 1.5 @ goal 55 ml/hr (1320 ml/day) to provide 1980 kcals (28 kcal/kg/day), 124 g PRO (1.8 g/kg/day), 1016 ml free H2O, 84 g Fat (50% from MCTs), 185 g CHO and no Fiber daily.   Intake: 4-day averages = 743 ml, 1114 kcals (16 kcals/kg or 64%), 70 gm PRO (1 gm/kg or 67%).        NEW FINDINGS   Nutrition/GI: Pt was tolerating EN via ENT placed NGT (per chart and abd imaging from 12/1 pt FT jejunal; per RN pt had a different FT placed by ENT: NGT. Unable to to confirm new imaging). Pt NPO for PEG placement today by bariatrics (pt with pharyngotomy with anticipated prolonged NPO status). + BM after more aggressive bowel regimen.     Labs/meds: CRP: 130.0 (H); glucose trends: 208, 138, 128, 142. Meds: folic acid, thiamine, insulin, certavite, senokot, miralax.     Neuro: cervical cord injury due to fall and fracture, cervical stenosis, paraplegia.     Weights: Overall up from admit: 191 lbs --> 205 lbs. Cannot r/o role of fluid in weight change.      Skin: FT appears pressed upward against left nare due to postioning of ETT. Pt having long-term tube placed today. There is no securement device present.      MALNUTRITION  % Intake: Decreased intake does not meet criteria most recently; unable to assess PTA  % Weight Loss: None noted  Subcutaneous Fat Loss: None observed  Muscle Loss: None observed  Fluid Accumulation/Edema: edema in hands  Malnutrition Diagnosis: Patient does not meet two of the established criteria necessary for diagnosing malnutrition    Previous Goals   Total avg nutritional intake to meet a minimum of 25 kcal/kg and 1.5 g PRO/kg daily (per dosing wt 70 kg).  Evaluation: Not met    Previous Nutrition Diagnosis  Predicted inadequate nutrient intake (protein-energy) related to no diet order and intubated.  Evaluation: Improving    CURRENT NUTRITION DIAGNOSIS  Inadequate protein-energy intake related to advancing TFs, holds, inadequate infusions as evidenced by pt not meeting goal provisions with 4-day averages meeting 16 kcals/kg and 1.0 gm PRO/kg dosing weight.       INTERVENTIONS  Implementation  Enteral Nutrition - Initiate regimen once able after PEG placement    Goals  Total avg nutritional intake to meet a minimum of 25 kcal/kg and 1.5 g PRO/kg daily (per dosing wt 70 kg).    Monitoring/Evaluation  Progress toward goals will be monitored and evaluated per protocol.     Angélica Marina RD, DIANA, [facility]  Neuro ICU  Pager: [phone]

## 2019-12-05 NOTE — ANESTHESIA CARE TRANSFER NOTE
Patient: Sherwin Angel    Procedure(s):  laproscopic assisted gastrostomy tube placement    Diagnosis: Quadriplegia (H) [G82.50]  Diagnosis Additional Information: No value filed.    Anesthesia Type:   General     Note:  Airway :ETT  Patient transferred to:ICU  Comments: To 4A intubated and sedated, monitored, placed on vent per previous settings, gtts reviewed, report given to JUAN Hoffman, care accepted.ICU Handoff: Call for PAUSE to initiate/utilize ICU HANDOFF, Identified Patient, Identified Responsible Provider, Reviewed the Pertinent Medical History, Discussed Surgical Course, Reviewed Intra-OP Anesthesia Management and Issues during Anesthesia, Set Expectations for Post Procedure Period and Allowed Opportunity for Questions and Acknowledgement of Understanding      Vitals: (Last set prior to Anesthesia Care Transfer)    CRNA VITALS  12/5/2019 1235 - 12/5/2019 1316      12/5/2019             Resp Rate (observed):  16    Resp Rate (set):  16                Electronically Signed By: JORGE L Miranda CRNA  December 5, 2019  1:16 PM

## 2019-12-06 ENCOUNTER — APPOINTMENT (OUTPATIENT)
Dept: GENERAL RADIOLOGY | Facility: CLINIC | Age: 64
DRG: 003 | End: 2019-12-06
Attending: NURSE PRACTITIONER
Payer: COMMERCIAL

## 2019-12-06 ENCOUNTER — APPOINTMENT (OUTPATIENT)
Dept: GENERAL RADIOLOGY | Facility: CLINIC | Age: 64
DRG: 003 | End: 2019-12-06
Attending: PSYCHIATRY & NEUROLOGY
Payer: COMMERCIAL

## 2019-12-06 LAB
ANION GAP SERPL CALCULATED.3IONS-SCNC: 4 MMOL/L (ref 3–14)
ANION GAP SERPL CALCULATED.3IONS-SCNC: 5 MMOL/L (ref 3–14)
BASOPHILS # BLD AUTO: 0 10E9/L (ref 0–0.2)
BASOPHILS NFR BLD AUTO: 0.2 %
BUN SERPL-MCNC: 14 MG/DL (ref 7–30)
BUN SERPL-MCNC: 20 MG/DL (ref 7–30)
CALCIUM SERPL-MCNC: 8.6 MG/DL (ref 8.5–10.1)
CALCIUM SERPL-MCNC: 8.7 MG/DL (ref 8.5–10.1)
CHLORIDE SERPL-SCNC: 100 MMOL/L (ref 94–109)
CHLORIDE SERPL-SCNC: 99 MMOL/L (ref 94–109)
CO2 SERPL-SCNC: 31 MMOL/L (ref 20–32)
CO2 SERPL-SCNC: 34 MMOL/L (ref 20–32)
CREAT SERPL-MCNC: 0.44 MG/DL (ref 0.66–1.25)
CREAT SERPL-MCNC: 0.57 MG/DL (ref 0.66–1.25)
DIFFERENTIAL METHOD BLD: ABNORMAL
EOSINOPHIL # BLD AUTO: 0.1 10E9/L (ref 0–0.7)
EOSINOPHIL NFR BLD AUTO: 0.6 %
ERYTHROCYTE [DISTWIDTH] IN BLOOD BY AUTOMATED COUNT: 12.2 % (ref 10–15)
GFR SERPL CREATININE-BSD FRML MDRD: >90 ML/MIN/{1.73_M2}
GFR SERPL CREATININE-BSD FRML MDRD: >90 ML/MIN/{1.73_M2}
GLUCOSE BLDC GLUCOMTR-MCNC: 123 MG/DL (ref 70–99)
GLUCOSE BLDC GLUCOMTR-MCNC: 139 MG/DL (ref 70–99)
GLUCOSE BLDC GLUCOMTR-MCNC: 148 MG/DL (ref 70–99)
GLUCOSE BLDC GLUCOMTR-MCNC: 154 MG/DL (ref 70–99)
GLUCOSE BLDC GLUCOMTR-MCNC: 179 MG/DL (ref 70–99)
GLUCOSE SERPL-MCNC: 145 MG/DL (ref 70–99)
GLUCOSE SERPL-MCNC: 154 MG/DL (ref 70–99)
HCT VFR BLD AUTO: 32.6 % (ref 40–53)
HGB BLD-MCNC: 10.3 G/DL (ref 13.3–17.7)
IMM GRANULOCYTES # BLD: 0.1 10E9/L (ref 0–0.4)
IMM GRANULOCYTES NFR BLD: 0.7 %
LYMPHOCYTES # BLD AUTO: 1.1 10E9/L (ref 0.8–5.3)
LYMPHOCYTES NFR BLD AUTO: 10.9 %
MAGNESIUM SERPL-MCNC: 2.1 MG/DL (ref 1.6–2.3)
MCH RBC QN AUTO: 29.1 PG (ref 26.5–33)
MCHC RBC AUTO-ENTMCNC: 31.6 G/DL (ref 31.5–36.5)
MCV RBC AUTO: 92 FL (ref 78–100)
MONOCYTES # BLD AUTO: 0.6 10E9/L (ref 0–1.3)
MONOCYTES NFR BLD AUTO: 5.6 %
NEUTROPHILS # BLD AUTO: 8 10E9/L (ref 1.6–8.3)
NEUTROPHILS NFR BLD AUTO: 82 %
NRBC # BLD AUTO: 0 10*3/UL
NRBC BLD AUTO-RTO: 0 /100
PHOSPHATE SERPL-MCNC: 3.5 MG/DL (ref 2.5–4.5)
PLATELET # BLD AUTO: 419 10E9/L (ref 150–450)
POTASSIUM SERPL-SCNC: 3.8 MMOL/L (ref 3.4–5.3)
POTASSIUM SERPL-SCNC: 4.1 MMOL/L (ref 3.4–5.3)
RADIOLOGIST FLAGS: ABNORMAL
RBC # BLD AUTO: 3.54 10E12/L (ref 4.4–5.9)
SODIUM SERPL-SCNC: 136 MMOL/L (ref 133–144)
SODIUM SERPL-SCNC: 138 MMOL/L (ref 133–144)
WBC # BLD AUTO: 9.8 10E9/L (ref 4–11)

## 2019-12-06 PROCEDURE — 25000132 ZZH RX MED GY IP 250 OP 250 PS 637: Performed by: PSYCHIATRY & NEUROLOGY

## 2019-12-06 PROCEDURE — 25000132 ZZH RX MED GY IP 250 OP 250 PS 637: Performed by: STUDENT IN AN ORGANIZED HEALTH CARE EDUCATION/TRAINING PROGRAM

## 2019-12-06 PROCEDURE — 71045 X-RAY EXAM CHEST 1 VIEW: CPT

## 2019-12-06 PROCEDURE — 25000125 ZZHC RX 250: Performed by: STUDENT IN AN ORGANIZED HEALTH CARE EDUCATION/TRAINING PROGRAM

## 2019-12-06 PROCEDURE — 94003 VENT MGMT INPAT SUBQ DAY: CPT

## 2019-12-06 PROCEDURE — 00000146 ZZHCL STATISTIC GLUCOSE BY METER IP

## 2019-12-06 PROCEDURE — 40000986 XR CHEST PORT 1 VW

## 2019-12-06 PROCEDURE — 94640 AIRWAY INHALATION TREATMENT: CPT

## 2019-12-06 PROCEDURE — 80048 BASIC METABOLIC PNL TOTAL CA: CPT | Performed by: NEUROLOGICAL SURGERY

## 2019-12-06 PROCEDURE — 25000128 H RX IP 250 OP 636: Performed by: STUDENT IN AN ORGANIZED HEALTH CARE EDUCATION/TRAINING PROGRAM

## 2019-12-06 PROCEDURE — 25000132 ZZH RX MED GY IP 250 OP 250 PS 637: Performed by: NURSE PRACTITIONER

## 2019-12-06 PROCEDURE — 40000014 ZZH STATISTIC ARTERIAL MONITORING DAILY

## 2019-12-06 PROCEDURE — 85025 COMPLETE CBC W/AUTO DIFF WBC: CPT | Performed by: STUDENT IN AN ORGANIZED HEALTH CARE EDUCATION/TRAINING PROGRAM

## 2019-12-06 PROCEDURE — 25000132 ZZH RX MED GY IP 250 OP 250 PS 637: Performed by: NEUROLOGICAL SURGERY

## 2019-12-06 PROCEDURE — 40000961 ZZH STATISTIC INTRAPULMONARY PERCUSSIVE VENT

## 2019-12-06 PROCEDURE — 80048 BASIC METABOLIC PNL TOTAL CA: CPT | Performed by: STUDENT IN AN ORGANIZED HEALTH CARE EDUCATION/TRAINING PROGRAM

## 2019-12-06 PROCEDURE — 83735 ASSAY OF MAGNESIUM: CPT | Performed by: STUDENT IN AN ORGANIZED HEALTH CARE EDUCATION/TRAINING PROGRAM

## 2019-12-06 PROCEDURE — 25800030 ZZH RX IP 258 OP 636: Performed by: NURSE PRACTITIONER

## 2019-12-06 PROCEDURE — 94640 AIRWAY INHALATION TREATMENT: CPT | Mod: 76

## 2019-12-06 PROCEDURE — 5A1955Z RESPIRATORY VENTILATION, GREATER THAN 96 CONSECUTIVE HOURS: ICD-10-PCS | Performed by: STUDENT IN AN ORGANIZED HEALTH CARE EDUCATION/TRAINING PROGRAM

## 2019-12-06 PROCEDURE — 20000004 ZZH R&B ICU UMMC

## 2019-12-06 PROCEDURE — 25000128 H RX IP 250 OP 636: Performed by: NURSE PRACTITIONER

## 2019-12-06 PROCEDURE — 84100 ASSAY OF PHOSPHORUS: CPT | Performed by: STUDENT IN AN ORGANIZED HEALTH CARE EDUCATION/TRAINING PROGRAM

## 2019-12-06 PROCEDURE — 40000275 ZZH STATISTIC RCP TIME EA 10 MIN

## 2019-12-06 PROCEDURE — 25000125 ZZHC RX 250: Performed by: NURSE PRACTITIONER

## 2019-12-06 RX ORDER — CYANOCOBALAMIN 1000 UG/ML
1000 INJECTION, SOLUTION INTRAMUSCULAR; SUBCUTANEOUS
Status: DISCONTINUED | OUTPATIENT
Start: 2019-12-06 | End: 2019-12-31 | Stop reason: HOSPADM

## 2019-12-06 RX ORDER — DEXMEDETOMIDINE HYDROCHLORIDE 4 UG/ML
0.2-0.7 INJECTION, SOLUTION INTRAVENOUS CONTINUOUS
Status: DISCONTINUED | OUTPATIENT
Start: 2019-12-06 | End: 2019-12-12

## 2019-12-06 RX ORDER — FAMOTIDINE 40 MG/5ML
20 POWDER, FOR SUSPENSION ORAL 2 TIMES DAILY
Status: DISCONTINUED | OUTPATIENT
Start: 2019-12-06 | End: 2019-12-31 | Stop reason: HOSPADM

## 2019-12-06 RX ORDER — BUMETANIDE 2 MG/1
2 TABLET ORAL 2 TIMES DAILY
Status: DISCONTINUED | OUTPATIENT
Start: 2019-12-06 | End: 2019-12-10

## 2019-12-06 RX ORDER — FENTANYL CITRATE 50 UG/ML
25 INJECTION, SOLUTION INTRAMUSCULAR; INTRAVENOUS
Status: DISCONTINUED | OUTPATIENT
Start: 2019-12-06 | End: 2019-12-11

## 2019-12-06 RX ORDER — AMLODIPINE BESYLATE 5 MG/1
5 TABLET ORAL DAILY
Status: DISCONTINUED | OUTPATIENT
Start: 2019-12-06 | End: 2019-12-12

## 2019-12-06 RX ORDER — PROPOFOL 10 MG/ML
5-75 INJECTION, EMULSION INTRAVENOUS CONTINUOUS
Status: DISCONTINUED | OUTPATIENT
Start: 2019-12-06 | End: 2019-12-07

## 2019-12-06 RX ORDER — HEPARIN SODIUM 5000 [USP'U]/.5ML
5000 INJECTION, SOLUTION INTRAVENOUS; SUBCUTANEOUS EVERY 12 HOURS
Status: COMPLETED | OUTPATIENT
Start: 2019-12-06 | End: 2019-12-09

## 2019-12-06 RX ORDER — FUROSEMIDE 10 MG/ML
20 INJECTION INTRAMUSCULAR; INTRAVENOUS ONCE
Status: DISCONTINUED | OUTPATIENT
Start: 2019-12-06 | End: 2019-12-06

## 2019-12-06 RX ORDER — PROPOFOL 10 MG/ML
INJECTION, EMULSION INTRAVENOUS
Status: DISCONTINUED
Start: 2019-12-06 | End: 2019-12-07 | Stop reason: HOSPADM

## 2019-12-06 RX ORDER — FUROSEMIDE 10 MG/ML
20 INJECTION INTRAMUSCULAR; INTRAVENOUS ONCE
Status: COMPLETED | OUTPATIENT
Start: 2019-12-06 | End: 2019-12-06

## 2019-12-06 RX ADMIN — ALBUTEROL SULFATE 2.5 MG: 2.5 SOLUTION RESPIRATORY (INHALATION) at 23:57

## 2019-12-06 RX ADMIN — ONDANSETRON 8 MG: 2 INJECTION INTRAMUSCULAR; INTRAVENOUS at 11:36

## 2019-12-06 RX ADMIN — SENNOSIDES AND DOCUSATE SODIUM 1 TABLET: 8.6; 5 TABLET ORAL at 20:50

## 2019-12-06 RX ADMIN — CYANOCOBALAMIN 1000 MCG: 1000 INJECTION, SOLUTION INTRAMUSCULAR; SUBCUTANEOUS at 10:56

## 2019-12-06 RX ADMIN — MULTIVITAMIN 15 ML: LIQUID ORAL at 20:50

## 2019-12-06 RX ADMIN — FAMOTIDINE 20 MG: 40 POWDER, FOR SUSPENSION ORAL at 20:51

## 2019-12-06 RX ADMIN — IPRATROPIUM BROMIDE AND ALBUTEROL SULFATE 3 ML: .5; 3 SOLUTION RESPIRATORY (INHALATION) at 11:53

## 2019-12-06 RX ADMIN — AMPICILLIN AND SULBACTAM 3 G: 1; 2 INJECTION, POWDER, FOR SOLUTION INTRAMUSCULAR; INTRAVENOUS at 08:11

## 2019-12-06 RX ADMIN — ACETYLCYSTEINE 4 ML: 100 INHALANT RESPIRATORY (INHALATION) at 19:26

## 2019-12-06 RX ADMIN — DEXMEDETOMIDINE 0.4 MCG/KG/HR: 100 INJECTION, SOLUTION, CONCENTRATE INTRAVENOUS at 17:44

## 2019-12-06 RX ADMIN — ALBUTEROL SULFATE 2.5 MG: 2.5 SOLUTION RESPIRATORY (INHALATION) at 00:11

## 2019-12-06 RX ADMIN — Medication 1 MG: at 20:50

## 2019-12-06 RX ADMIN — ACETYLCYSTEINE 4 ML: 100 INHALANT RESPIRATORY (INHALATION) at 11:53

## 2019-12-06 RX ADMIN — ACETYLCYSTEINE 4 ML: 100 INHALANT RESPIRATORY (INHALATION) at 15:44

## 2019-12-06 RX ADMIN — AMPICILLIN AND SULBACTAM 3 G: 1; 2 INJECTION, POWDER, FOR SOLUTION INTRAMUSCULAR; INTRAVENOUS at 02:25

## 2019-12-06 RX ADMIN — BUMETANIDE 2 MG: 2 TABLET ORAL at 10:55

## 2019-12-06 RX ADMIN — Medication 100 MG: at 08:08

## 2019-12-06 RX ADMIN — FUROSEMIDE 20 MG: 10 INJECTION, SOLUTION INTRAVENOUS at 10:52

## 2019-12-06 RX ADMIN — LABETALOL 20 MG/4 ML (5 MG/ML) INTRAVENOUS SYRINGE 10 MG: at 01:07

## 2019-12-06 RX ADMIN — BUMETANIDE 2 MG: 2 TABLET ORAL at 20:51

## 2019-12-06 RX ADMIN — LABETALOL 20 MG/4 ML (5 MG/ML) INTRAVENOUS SYRINGE 10 MG: at 12:34

## 2019-12-06 RX ADMIN — ACETYLCYSTEINE 4 ML: 100 INHALANT RESPIRATORY (INHALATION) at 23:57

## 2019-12-06 RX ADMIN — ACETAMINOPHEN 650 MG: 325 SOLUTION ORAL at 17:32

## 2019-12-06 RX ADMIN — AMLODIPINE BESYLATE 5 MG: 5 TABLET ORAL at 10:55

## 2019-12-06 RX ADMIN — OXYCODONE HYDROCHLORIDE 10 MG: 5 SOLUTION ORAL at 08:50

## 2019-12-06 RX ADMIN — POLYETHYLENE GLYCOL 3350 17 G: 17 POWDER, FOR SOLUTION ORAL at 08:08

## 2019-12-06 RX ADMIN — AMPICILLIN AND SULBACTAM 3 G: 1; 2 INJECTION, POWDER, FOR SOLUTION INTRAMUSCULAR; INTRAVENOUS at 14:44

## 2019-12-06 RX ADMIN — AMPICILLIN AND SULBACTAM 3 G: 1; 2 INJECTION, POWDER, FOR SOLUTION INTRAMUSCULAR; INTRAVENOUS at 20:50

## 2019-12-06 RX ADMIN — IPRATROPIUM BROMIDE AND ALBUTEROL SULFATE 3 ML: .5; 3 SOLUTION RESPIRATORY (INHALATION) at 15:44

## 2019-12-06 RX ADMIN — IPRATROPIUM BROMIDE AND ALBUTEROL SULFATE 3 ML: .5; 3 SOLUTION RESPIRATORY (INHALATION) at 07:41

## 2019-12-06 RX ADMIN — Medication 5000 UNITS: at 10:53

## 2019-12-06 RX ADMIN — INSULIN GLARGINE 5 UNITS: 100 INJECTION, SOLUTION SUBCUTANEOUS at 21:02

## 2019-12-06 RX ADMIN — OXYCODONE HYDROCHLORIDE 5 MG: 5 SOLUTION ORAL at 20:50

## 2019-12-06 RX ADMIN — IPRATROPIUM BROMIDE AND ALBUTEROL SULFATE 3 ML: .5; 3 SOLUTION RESPIRATORY (INHALATION) at 19:26

## 2019-12-06 RX ADMIN — ALBUTEROL SULFATE 2.5 MG: 2.5 SOLUTION RESPIRATORY (INHALATION) at 04:37

## 2019-12-06 RX ADMIN — LABETALOL 20 MG/4 ML (5 MG/ML) INTRAVENOUS SYRINGE 10 MG: at 00:13

## 2019-12-06 RX ADMIN — LABETALOL 20 MG/4 ML (5 MG/ML) INTRAVENOUS SYRINGE 10 MG: at 08:07

## 2019-12-06 RX ADMIN — Medication 5000 UNITS: at 20:50

## 2019-12-06 RX ADMIN — SENNOSIDES AND DOCUSATE SODIUM 1 TABLET: 8.6; 5 TABLET ORAL at 08:08

## 2019-12-06 RX ADMIN — ONDANSETRON 4 MG: 2 INJECTION INTRAMUSCULAR; INTRAVENOUS at 01:12

## 2019-12-06 RX ADMIN — ACETYLCYSTEINE 4 ML: 100 INHALANT RESPIRATORY (INHALATION) at 04:37

## 2019-12-06 RX ADMIN — ACETYLCYSTEINE 4 ML: 100 INHALANT RESPIRATORY (INHALATION) at 07:41

## 2019-12-06 RX ADMIN — FOLIC ACID 1 MG: 1 TABLET ORAL at 08:08

## 2019-12-06 RX ADMIN — Medication 40 MG: at 08:11

## 2019-12-06 RX ADMIN — FENTANYL CITRATE 50 MCG: 50 INJECTION INTRAMUSCULAR; INTRAVENOUS at 08:50

## 2019-12-06 ASSESSMENT — ACTIVITIES OF DAILY LIVING (ADL)
ADLS_ACUITY_SCORE: 41
ADLS_ACUITY_SCORE: 39
ADLS_ACUITY_SCORE: 41

## 2019-12-06 ASSESSMENT — VISUAL ACUITY
OU: NORMAL ACUITY

## 2019-12-06 ASSESSMENT — MIFFLIN-ST. JEOR: SCORE: 1673.5

## 2019-12-06 NOTE — PROGRESS NOTES
Received call from bedside nurse, pt now requiring BIPAP at 100%. Saturations 91%. Anesthesia paged to bedside, and Pt was re-intubated. NSG updated.

## 2019-12-06 NOTE — PROGRESS NOTES
Neurosurgery Progress Note    Subjective:  G-Tube Placed by Bariatric Surgery; Extubated; A-Line Removed     Objective:  Awake, alert, oriented x3  Follows commands  At least antigravity in bilateral upper extremities, 4/5 biceps, 1/5 , triple flexion in BLE    Assessment:  64 years old gentleman status post C3-C4 artificial disc placement about 2 weeks ago presents after a fall with almost complete loss of strength in his upper extremities and paraplegia, found to have cervical stenosis. S/p OR 12/1 - Pharyngotomy closed with assistance from ENT. Arthroplasty implant removed. No replacement implant placed. S/p G-tube 12/5. Extubated 12/5.    Plan:  MAP > 65  Incentive spirometry  DVT: SCDs while in bed  ERVIN drain per ENT  [place] J collar  Per ID: Unasyn 4-6 wks for vertebral phlegmon, CRP q72 hrs  Per bariatric surgery: per Bariatric surgery- ok for DVT ppx 12/6, ok to start using tube for medication administration 12/5, ok to start tube feedings 12/6  MRI before stopping abx in 6 wks  Disposition: 4A      Lion Vargas MD  Neurosurgery Resident PGY2    Please contact neurosurgery resident on call with questions.    Dial * [phone], enter [phone] when prompted.

## 2019-12-06 NOTE — PLAN OF CARE
"D/I: Patient admitted to unit 4A Surgical/Neuro ICU   BP (!) 160/86   Pulse 75   Temp 97.7  F (36.5  C) (Oral)   Resp 20   Ht 1.727 m (5' 8\")   Wt 90.4 kg (199 lb 4.7 oz)   SpO2 96%   BMI 30.30 kg/m    Neuro- A&O. PERRLA. Minimal sensation/movement in all four extremities.   CV- Sinus rhythm. Afebrile. SBP goal <180, given Labetalol x 3 to achieve BP goal.   Respiratory- 4L NC. Complaining of shortness of breath. Right side lung sounds absent at beginning of shift-MD paged. ABG sent and chest x-ray done. Able to auscultate lung sounds after deep suctioning via RT.   GI- GT to gravity, TF to started at 0630 @ 10 (goal of 55). NJ in place, clamped. No BM. Passing flatus.   - Degroot. Adequate output.  Skin- ERVIN from neck. GT on left side.   Pain- Denies pain.  See flowsheets for further interventions and assessments.   A: Stable  P: Continue to monitor pt closely. Notify MD of significant changes.    "

## 2019-12-06 NOTE — PROGRESS NOTES
"Otolaryngology Progress Note  December 6, 2019     S: No acute events overnight. Extubated yesterday evening. 2L NC with oxygen saturations > 92%. Patient complains of SOB overnight and again this morning. CXR obtained showing bibasilar opacities.      O: BP (!) 160/86   Pulse 75   Temp 97.7  F (36.5  C) (Oral)   Resp 20   Ht 1.727 m (5' 8\")   Wt 90.4 kg (199 lb 4.7 oz)   SpO2 96%   BMI 30.30 kg/m                 General: Responds to verbal cues, no acute distress               HEENT: Neck collar in place. EOMI.  Drain with serosang output.               Pulmonary: Mechanically ventilated, FiO2 40%, PEEP +5              Neuro/MSK: able to weakly squeeze hand bilaterally.     ERVIN drain output(s): (last 24 hours)/(last shift)  Right Neck 0 / 4 / 3      Assessment and Plan  Sherwin Keny Angel is a 64 year old male with a past medical history of hypertension and diabetes status post arthroplasty about 2 weeks ago who fell at rehab and now presents to the [facility] with paraplegia.  He was brought to the operating room by neurosurgery by Dr. Ashton who encountered inflammatory  tissue over the spine and so ENT was consulted for assistance with the exposure.  During the exposure we noticed that there was a pharyngotomy that was seen from the cervical exposure. This was repaired transoral and transcervical. Patient remained intubated post-op and remains in the SICU. G-tube placed 12/5.     - Antibiotics per ID Unasyn. Cultures growing MSSA, Strep anginosus, Eikenella, and oral anaerobes   - Anticipate prolonged duration of NPO status (at least 2 weeks)  - Continue to monitor drain output. Thus far, it has been serosanguinous, no evidence of saliva. Keep in place to monitor for saliva.   - G-tube placed. NJ likely okay to remove pending NSG input  - ENT laryngoscopy today   - Rest of cares per neurosurgery and ICU  - [facility] on call for questions     -- Patient and above plan will be discussed with Dr. Chavez " Rogelio Kearney MD   Otolaryngology-Head & Neck Surgery  Please contact ENT with questions by dialing [phone] and entering job [phone] when prompted.

## 2019-12-06 NOTE — PROGRESS NOTES
CLINICAL NUTRITION SERVICES - BRIEF NOTE    Nutrition Prescription    RECOMMENDATIONS FOR MDs/PROVIDERS TO ORDER:  - Total daily fluids/adjustments per MD   - Initiate B12 supplementation in the setting of gastric bypass/EtOH PTA and potential for inadequate supplementation PTA. Discussed at rounds.     Recommendations already ordered by Registered Dietitian (RD):  - RD changed EN orders per surgery recommendations: initiate @ 10 ml/hr and adv by 10 ml q8hr as tolerated via new access/G-tube; Advance to prior regimen as tolerated: Peptide 1.5 at goal 55 ml/hr (1320 ml/day) to provide 1980 kcals (28 kcal/kg/day), 124 g PRO (1.8 g/kg/day), 1016 ml free H2O, 84 g Fat (50% from MCTs), 185 g CHO and no Fiber daily.     Future/Additional Recommendations:  - EN tolerance/adv via new access     Nutrition Progress Note - f/u for progress towards previous nutrition POC (see previous reassessment for details)     Angélica Marina RD, LD, [facility]  Neuro ICU  Pager: [phone]

## 2019-12-06 NOTE — PROGRESS NOTES
Care Coordinator Progress Note    Admission Date/Time:  12/1/2019  Attending MD:  Fernando Ashton MD    Data  Chart reviewed, discussed with interdisciplinary team.   Pt is status post anterior C3-C4 arthroplasty about 2 weeks ago complicated by dysphasia was transferred from an outside hospital after a fall in rehab, and now presents paraplegic and near loss of motor function upper extremities bilateral.    Assessment  Pt is s/p OR 12/1 - Pharyngotomy closed with assistance from ENT.  Pt had PEG placement done yesterday, 12/5 and extubated.  Pt has restriction with movement.  Request for PT/OT eval.  RNCC will cont to follow plan of care.     Plan  Anticipated Discharge Date:  TBD.  Anticipated Discharge Plan:   TBD.  RNCC will cont to follow plan of care.      Edilson Xavier RN, PHN, BSN  4A and 4E/ ICU  Care Coordinator  Phone: [phone]  Pager: [phone]

## 2019-12-06 NOTE — PLAN OF CARE
PT 4A:  PT orders received.  Unfortunately pt was re-intubated this afternoon.  Will reassess 12/7

## 2019-12-06 NOTE — PROGRESS NOTES
Neuroscience Intensive Care Progress Note  2019    Sherwin Keny Ramosfabricioolya is a 64 year old year old male with hx HTN, DM and cervical stenosis s/p recent anterior C3-4 arthroplasty (2 weeks prior) who was admitted on 2019 with bilateral lower extremity paraplegia and near loss of upper extremity movement, after a fall at rehab. Prior to the fall, he was able to ambulate with a cane. Taken to the OR for arthroplasty implant removal on . Found to have significant inflammation and 2 pharyngotomy sites. ENT closed 1 (unable to reach other) and drain placed for the phlegmon. Now on abx for cervical tissue + culture. S/P gtube placement  due to anticipated prolonged period of NPO.     Problem List  1. Cervical stenosis s/p C3-C4 arthroplasty   2. Cervical spinal injury due to fall, with C3-C5 fracture   3. Acute respiratory failure s/p intubation  4. Leukocytosis  5. Concern for pneumonia  6. Hx of hypertension  7. Hx of DM  8. Alcohol dependence  9. Secretions  10. Normocytic anemia    24 hour events:  - Overnight, concern for absence of L lung sounds, CXR with bibasilar atelectasis, slightly elevated pCO2 46, pt reported feeling well   - BP slightly elevated to max 184/94, otherwise vitally stable  - BG remain slightly high mid 100s, started on lantus 5 units yesterday    24 Hour Vital Signs Summary:  Temperatures:  Current - Temp: 97.7  F (36.5  C); Max - Temp  Av.9  F (36.6  C)  Min: 97.1  F (36.2  C)  Max: 98.7  F (37.1  C)  Respiration range: Resp  Av.9  Min: 8  Max: 20  Pulse range: Pulse  Av.6  Min: 67  Max: 98  Blood pressure range: Systolic (24hrs), Av , Min:140 , Max:184   ; Diastolic (24hrs), Av, Min:68, Max:95    Pulse oximetry range: SpO2  Av %  Min: 92 %  Max: 100 %    Ventilator Settings  Ventilation Mode: CPAP/PS  (Continuous positive airway pressure with Pressure Support)  FiO2 (%): 30 %  Rate Set (breaths/minute): 12 breaths/min  Tidal Volume Set (mL): 450  mL  PEEP (cm H2O): 5 cmH2O  Pressure Support (cm H2O): 7 cmH2O  Oxygen Concentration (%): 40 %  Resp: 20      Intake/Output Summary (Last 24 hours) at 12/6/2019 1156  Last data filed at 12/6/2019 0600  Gross per 24 hour   Intake 730.56 ml   Output 3466 ml   Net -2735.44 ml       Arterial Line BP: (171-205)/(66-79) 174/68  MAP:  [104 mmHg-129 mmHg] 107 mmHg  BP - Mean:  [] 125    Current Medications:    acetylcysteine  4 mL Nebulization Q4H     ampicillin-sulbactam (UNASYN) IV  3 g Intravenous Q6H     folic acid  1 mg Oral or Feeding Tube Daily     heparin lock flush  5-10 mL Intracatheter Q24H     insulin aspart  1-12 Units Subcutaneous Q4H     insulin glargine  5 Units Subcutaneous At Bedtime     ipratropium - albuterol 0.5 mg/2.5 mg/3 mL  3 mL Nebulization 4x daily     multivitamins w/minerals  15 mL Per Feeding Tube Daily     pantoprazole  40 mg Oral or Feeding Tube QAM AC     polyethylene glycol  17 g Oral or Feeding Tube Daily     senna-docusate  1 tablet Oral or Feeding Tube BID     thiamine  100 mg Oral or Feeding Tube Daily       PRN Medications:  acetaminophen, albuterol, bisacodyl, carboxymethylcellulose PF, IV fluid REPLACEMENT ONLY, glucose **OR** dextrose **OR** glucagon, fentaNYL, heparin lock flush, heparin lock flush, labetalol, lidocaine 4%, lidocaine 4%, lidocaine (buffered or not buffered), lidocaine (buffered or not buffered), magnesium sulfate, magnesium sulfate, melatonin, naloxone, ondansetron **OR** ondansetron, oxyCODONE, potassium chloride, potassium chloride with lidocaine, potassium chloride, potassium chloride, potassium chloride, potassium phosphate (KPHOS) in D5W IV, potassium phosphate (KPHOS) in D5W IV, potassium phosphate (KPHOS) in D5W IV, potassium phosphate (KPHOS) in D5W IV, potassium phosphate (KPHOS) in D5W IV, prochlorperazine **OR** prochlorperazine **OR** prochlorperazine, sodium chloride (PF), sodium chloride (PF)    Infusions:    dexmedetomidine Stopped (12/06/19  "0630)     IV fluid REPLACEMENT ONLY         No Known Allergies    Physical Examination:  Vitals: BP (!) 166/83   Pulse 72   Temp 97.7  F (36.5  C) (Oral)   Resp 20   Ht 1.727 m (5' 8\")   Wt 90.4 kg (199 lb 4.7 oz)   SpO2 95%   BMI 30.30 kg/m    Vitals: BP (!) 143/72   Pulse 56   Temp 98.4  F (36.9  C) (Axillary)   Resp 18   Ht 1.727 m (5' 8\")   Wt 93 kg (205 lb 0.4 oz)   SpO2 96%   BMI 31.17 kg/m    General: Adult male, collar in place  HEENT: Normocephalic.   CV: RRR  Resp: somewhat labored breathing on NC   Abd: Soft, distended  Skin: Warm, dry   Neuro: extubated, examined off sedation. Awake and alert. Responds to questions. Voice is soft. Follows commands. EOMI. Eyes are conjugate. Cough is weak. Moves both arms anti-gravity at elbows brisky, more overall movement in LUE noted and weaker in RUE. No movement in R fingers, very subtle movement in L fingers. No movement in bilateral lower extremities. Sensation intact in bilateral upper extremities proximately and distally including forearm and dorsal surface of hand. Decreased sensation over trunk and lower extremities starting at T4 dermatome.     Labs:  Recent Labs   Lab Test 12/06/19  0434 12/05/19  0340 12/04/19  0336    140 142   POTASSIUM 4.1 4.1 3.9   CHLORIDE 100 106 109   CO2 31 30 29   ANIONGAP 5 3 4   * 138* 222*   BUN 14 19 24   CR 0.44* 0.50* 0.59*   FLORINDA 8.6 8.1* 8.0*   WBC 9.8 8.8 12.0*   RBC 3.54* 2.81* 2.82*   HGB 10.3* 8.3* 8.2*    390 397       Lab 12/05/19  1945   PH 7.43   PCO2 46*   PO2 87   HCO3 30*   O2PER 4L       Cultures:  Blood culture (12/1 & 12/3): NGTD     Fungal Cervical spine tissue cx (12/1): NGTD     Gram stain from cervical spine tissue(12/1): Few gram + cocci, rare gram + rods     Anaerobic cervical spine tissue cx (12/1): Moderate fusobacterium nucleatum, moderate parvimonas micra, light prevotella species     Aerobic cervical spine tissue cx (12/1): Single colony staph aureus, light growth " strep anginosus, light eikinella corrodens     Imaging:     CXR: R lung with increased opacities, appear more consistent with pulmonary edema.    Assessment/Plan:  Sherwinsavage Angel is a 64 year old with hx of recent of anterior C3-4 arthro[facility]) who was admitted 12/1/2019 after a fall at rehab, resulting in lower extremity paraplegia. Found to have cervical stenosis. Now s/p pharyngotomy closure on 12/1. Arthroplasty implant removed with no replacement implant placed. He was on broad spectrum abx for + cervical tissue cultures with ID following, narrowed to Unasyn on 12/4.      Neuro:  #Cervical spinal cord injury  - Up with assist  - Collar at all times   - Q2H neuro checks   - Discussed plans with NSG and ENT, no plans for OR  - start DVT ppx today  - PT/OT     #Pain  - Oxycodone PRN  - Fentanyl PRN     #Hx of Alcohol dependence  - CIWA  - Thiamine & folate     Resp:  #Acute respiratory failure s/p extubation 12/5  #Pulmonary edema  - ABG with slightly elevated pCO2  - Incentive spirometry with minimal ability, will continue to try  - IV lasix 20 mg x 1 this morning  - Resume PTA bumex 2 mg  - Nasal BiPAP attempted, unable to tolerate  - Cough assist, intermittent bagging  - Goal of -1L at least today  - Repeat CXR at around 12pm, will consider diuresing further    #Secretions  - difficulty clearing, thick white secretions  - Mucomyst & duoneb q4h scheduled  - frequent suctioning     Cardio:  #Hx of hypertension   - Normotension  - Resume PTA amlodipine starting at 5 mg (home dose is 10 mg)  - Labetalol PRN ordered     Renal:  No active issues.  - Electrolyte replacement protocol   - Monitor I/O  - No longer on IVF     Endo:  #DM2  - Sliding scale insulin, high dose  -Continue lantus 5 units   - Monitor BG     Heme:  #Normocytic anemia, stable  - Monitor CBC     - Hg > 7.0   - Plt > 100k   - INR <1.5      GI:  No active issues.     #Constipation, resolved  - Miralax &  Senna scheduled  - PRN suppository available     #Diet  - NPO  - Per ENT, anticipate prolonged duration of NPO status (at least 2 weeks, may be longer) so Gtube placed  - Start tube feeding today  - Nutrition following, follow up tube feed recs     ID:  #Leukocytosis, resolved  - ID following  - Stopped Vancomycin, metronidazole, cefepime on 12/4  - Continue Unasyn 3 g q6h, ID to determine end date  - Trend CRP q2-3 days  - Follow up blood cultures (12/1 & 12/3): NGTD, pending  - Follow up spinal tissue cultures (12/1): multiple organisms growing, pending   - Daily CBC, trend CRP q2-3 days  - if T >100.4, will obtain blood/sputum cultures and UA      Lines:  - PICC  - PIV x 2  - Degroot   - ERVIN drain in neck  - NG     FEN: NPO, start tube feeds  PPX:    DVT prophylaxis: SCDs, start DVT ppx today    GI: Stop Pantoprazole  Code Status: Full, presumed     Dispo: Remains medically unstable     The patient was seen and discussed with the attending, Dr. Clark.     Randi Edmonds MD  Neurology PGY2  [phone]

## 2019-12-06 NOTE — PROGRESS NOTES
Bariatric Surgery  Progress Note  12/06/19    Interval Events:    No acute events. Minimal abdominal pain. Tube feeds started by primary at 6AM.    Objective:  Vitals reviewed  I/O reviewed  Appears comfortable  Non-labored breathing  Abdomen soft, non-tender; gtube in place; incisions c/d/i    Labs reviewed    Assessment:  64 year old male who is POD# 1 s/p lap gtube placement in gastric remnant. Doing well.    Plan:    Reduced tube feeds to 10cc/hr  OK to advance by 10cc every 8 hours  Flushes every 8 hours (receiving free water q4h)  Will need g-tube teaching  Surgery will sign off - please call with additional questions or concerns  Follow up with Dr. Santiago in 1 month (ordered)    Yuriy Solis MD  General Surgery (PGY-7)  Pager [phone]

## 2019-12-06 NOTE — PROGRESS NOTES
[facility] Progress Note     Patient:  Sherwin Angel   Date of birth 1955, Medical record number [id number]  Date of Visit:  12/06/19  Date of Admission: 12/1/2019  Consult Requester:Fernando Ashton MD            Assessment and Recommendations:   ASSESSMENT:  1. Traumatic cervical spinal cord injury s/p C3-4 arthroplasty on 11/15, now removed  2. Cervical stenosis C3-5  3. Vertebral phlegmon vs abscess s/p drain placement on 12/1 - cultures growing MSSA, Strep anginosus, Eikenella, and oral anaerobes  4. Possible osteomyelitis C3-C5  5. Leukocytosis      DISCUSSION:  64-year-old man with history of htn, DM2, and recent C3-4 arthroplasty on 11/15 now admitted with new paraplegia after a fall and found to have pharyngotomy and vertebral phlegmon now s/p OR with removal of arthroplasty and placement of drain.    Overall, from an infectious standpoint the patient appears to be improving. The bacteria grown from his tissue cultures have all been pan-sensitive and contained oral anaerobes as was expected. Expect unasyn to provide good coverage.    For epidural abscess, he will require 6 weeks of IV antibiotics, beginning from 12/2, the day after his surgery. While on IV antibiotics, he will need weekly CBC, CMP, and CRP for monitoring. ID follow-up will be arranged for the end of the course.    RECOMMENDATION:  - Continue Unasyn 3 g q6h -plan for a 6 week course of IV antibiotics beginning 12/2 (through 1/12)  - Please obtain weekly CBC, CMP, CRP while on IV antibiotics -fax to ID clinic attn: Charissa  - ID will arrange for outpatient follow-up appointment near the end of his antibiotic course  -please page me ([phone]) prior to discharge    ID will sign off. Please call with additional questions.    Thank you for this consult.  This patient was discussed with ID staff, Dr. Carrington.    Elaine Cowan MD  Internal Medicine, PGY-1      Attestation:  I have reviewed today's vital  signs, medications, labs and imaging.  Floor time: 25 minutes, Face-to-face time: 10 minutes, Total time: 35 minutes    Sherwin Keny Angel was seen in the hospital by Joya Carrington MD on 12/06/2019, with the resident. Dr. Camryn MD. [facility] Internal Medicine Resident. I reviewed the history & exam. Assessment and plan were jointly made.  I agree with and have edited the note and plan of care.      Joya Carrington MD.  ID Staff  [phone]      ________________________________________________________________           Overnight events:   PEG placed yesterday by bariatric surgery. Extubated later yesterday evening. Some difficulty breathing since extubation. Denies pain, has remained afebrile. Endorses some shortness of breath, no cough, no chest pain.    Unfortunately patient was re-intubated later this afternoon for increasing hypoxia not responsive to BiPAP.         Review of Systems:   Unable to obtain.         Past Medical History:   Diabetes  Hypertension  C3-C4 arthroplasty on 11/15/19         Past Surgical History:     Past Surgical History:   Procedure Laterality Date     IRRIGATION AND DEBRIDEMENT NECK, COMBINED N/A 12/1/2019    Procedure: IRRIGATION AND DEBRIDEMENT OF NECK; PHARYNGOTOMY REPAIR.;  Surgeon: Fernando Ashton MD;  Location: [facility] OR     REMOVE HARDWARE CERVICAL ANTERIOR SPINE N/A 12/1/2019    Procedure: REMOVAL, HARDWARE, SPINE, CERVICAL, ANTERIOR;  Surgeon: Fernando Ashton MD;  Location: [facility] OR            Family History:   History reviewed. No pertinent family history.         Social History:     Social History     Tobacco Use     Smoking status: Not on file   Substance Use Topics     Alcohol use: Not on file     History   Sexual Activity     Sexual activity: Not on file            Current Medications (antimicrobials listed in bold):       acetylcysteine  4 mL Nebulization Q4H     amLODIPine  5 mg Oral or Feeding Tube Daily     ampicillin-sulbactam (UNASYN) IV   3 g Intravenous Q6H     bumetanide  2 mg Oral or Feeding Tube BID     cyanocobalamin  1,000 mcg Intramuscular Q30 Days     folic acid  1 mg Oral or Feeding Tube Daily     heparin ANTICOAGULANT  5,000 Units Subcutaneous Q12H     heparin lock flush  5-10 mL Intracatheter Q24H     insulin aspart  1-12 Units Subcutaneous Q4H     insulin glargine  5 Units Subcutaneous At Bedtime     ipratropium - albuterol 0.5 mg/2.5 mg/3 mL  3 mL Nebulization 4x daily     multivitamins w/minerals  15 mL Per Feeding Tube Daily     polyethylene glycol  17 g Oral or Feeding Tube Daily     senna-docusate  1 tablet Oral or Feeding Tube BID     thiamine  100 mg Oral or Feeding Tube Daily            Allergies:   No Known Allergies         Physical Exam:     Ranges for his vital signs:   Temp:  [97.1  F (36.2  C)-98.3  F (36.8  C)] 97.7  F (36.5  C)  Pulse:  [72-98] 72  Heart Rate:  [65-98] 72  Resp:  [18-20] 20  BP: (148-184)/(73-95) 166/83  MAP:  [104 mmHg-129 mmHg] 107 mmHg  Arterial Line BP: (171-205)/(66-79) 174/68  FiO2 (%):  [30 %] 30 %  SpO2:  [92 %-100 %] 95 %    Physical Examination:  GENERAL: Sitting up in a chair; has a nasal airway in place; some difficulty speaking, but appears in no acute distress.   HEENT:  Head is normocephalic, atraumatic.   EYES:  Eyes have anicteric sclerae without conjunctival injection.    NECK:  Collar in place. Drain with serosanguinous output.  LUNGS:  Coarse breath sounds throughout anterior lung fields.  CARDIOVASCULAR:  Regular rate and rhythm with no murmurs, gallops or rubs.  ABDOMEN:  Normal bowel sounds, soft, nontender. No appreciable hepatosplenomegaly  SKIN:  No acute rashes.  Lines are in place without any surrounding erythema or exudate. No stigmata of endocarditis.           Laboratory Data:     Inflammatory Markers    Recent Labs   Lab Test 12/05/19  0340 12/02/19  0342 12/01/19  0112   .0* 220.0* 170.0*       Hematology Studies    Recent Labs   Lab Test 12/06/19  8028  12/05/19  0340 12/04/19  0336 12/03/19  0332 12/02/19  0342 12/01/19  1342  12/01/19  0112   WBC 9.8 8.8 12.0* 19.1* 15.0*  --   --  13.1*   ANEU 8.0 6.5 9.9* 16.6*  --   --   --   --    AEOS 0.1 0.2 0.2 0.1  --   --   --   --    HGB 10.3* 8.3* 8.2* 9.7* 10.1* 11.9*   < > 11.7*   MCV 92 93 93 94 92  --   --  90    390 397 481* 528*  --   --  565*    < > = values in this interval not displayed.     Metabolic Studies     Recent Labs   Lab Test 12/06/19  0434 12/05/19  0340 12/04/19  0336 12/03/19  0332 12/02/19  0342    140 142 138 139   POTASSIUM 4.1 4.1 3.9 3.9 4.3   CHLORIDE 100 106 109 106 108   CO2 31 30 29 29 25   BUN 14 19 24 18 22   CR 0.44* 0.50* 0.59* 0.58* 0.67   GFRESTIMATED >90 >90 >90 >90 >90       Hepatic Studies    Recent Labs   Lab Test 12/01/19  0112   BILITOTAL 0.6   ALKPHOS 103   ALBUMIN 2.1*   AST 17   ALT 32     Microbiology:  12/1 Blood culture NGTD  UA negative  12/1 Tissue culture: Cervical spine vertebral phlegmon:  -- Aerobic: Staph aureus, Strep anginosus, Eikenella corrodens  -- Anaerobic: Fusobacterium nucleatum, Parvimonas micra, Prevotella    Culture Micro   Date Value Ref Range Status   12/03/2019 No growth after 3 days  Preliminary   12/03/2019 No growth after 3 days  Preliminary   12/01/2019 No growth after 5 days  Preliminary   12/01/2019 (A)  Preliminary    Moderate growth  Fusobacterium nucleatum  Susceptibility testing not routinely done     12/01/2019 (A)  Preliminary    Moderate growth  Parvimonas micra  Susceptibility testing not routinely done     12/01/2019 (A)  Preliminary    Light growth  Prevotella species  Susceptibility testing not routinely done     12/01/2019 (A)  Preliminary    Light growth  Strain 2  Prevotella species  Identification obtained by MALDI-TOF mass spectrometry research use only database. Test   characteristics determined and verified by the [facility]   ([facility]) [place], [place].  Susceptibility testing not  "routinely done     2019 Culture in progress  Preliminary   2019 Culture negative after 4 days  Preliminary   2019 Single colony  Staphylococcus aureus   (A)  Final   2019 Light growth  Streptococcus anginosus   (A)  Final   2019 Light growth  Eikenella corrodens   (A)  Final       Imagin/1 MRI  Impression: Images are mildly degraded secondary to metallic artifact  from disc prosthesis. Follow-up imaging with CT could be considered  for further evaluation.  1. Traumatic cord injury at C3 and C4-5 as a result of C3-5  compression fractures.   2. There is 4 mm of retrolisthesis of C3 on C4.  3. Traumatic herniation of C4-5 intervertebral disc with retropulsion  of disc fragment into the spinal canal.  4. Extensive prevertebral thickening up to 2 cm anteriorly and up to 9  mm within the epidural space, favored to represent prevertebral edema  in the setting of recent trauma.       Of note [facility]:  MRI C-spine w/o contrast done on 2019 --  - \"Apparent osteomyelitis involvement of the C3, C4 and C5 vertebrae.\"  - \"Suspected extension of the infectious process into the ventral aspect of the central canal suspicious for a ventral epidural phlegmon versus abscess.\"  "

## 2019-12-06 NOTE — PROGRESS NOTES
"Otolaryngology Progress Note     S: No acute events overnight. Reintubated 12/6 & CXR w/ white out R lung. 40% and PEEP 5 now.      O: BP (!) 162/81   Pulse 65   Temp 100.7  F (38.2  C) (Axillary)   Resp 17   Ht 1.727 m (5' 8\")   Wt 87.5 kg (192 lb 14.4 oz)   SpO2 97%   BMI 29.33 kg/m     General: intubated/sedated  HEENT: Neck collar in place. EOMI.  Drain with serosang output.   Pulmonary: ventilated  Neuro/MSK: able to weakly squeeze hand bilaterally but not able to move legs.      ERVIN drain output(s): (last 24 hours)/(last shift)  Right Neck: 0 4 0    Cultures:   12/3 Blood cultures: No growth   12/1 Cervical spine tissue: moderate fusobacterium nucleatum. Moderate parvimonas micra. 3 strains of prevotella.  Susceptibilities not routinely done.   12/1 fungus: No growth  12/1 Aerobic: S aureus single colony, Strep anginosus light growth, Eikenella corrodens light growth    CXR: improved aeration R lung per radiology     Assessment and Plan  64 year old male w/ PMH HTN, DM, cervical stenosis s/p arthroplasty about 2-3 weeks ago who fell at rehab and presented to the [place] with paraplegia. Went to OR w/ NSG (Dr. Ashton) 12/1 & encountered inflammatory tissue over the spine and so ENT was consulted for assistance with the exposure.  During the exposure we noticed that there was a pharyngotomy that was seen from the cervical exposure. This was repaired transoral and transcervical. Patient remained intubated post-op and remains in the SICU. G-tube placed 12/5 and advancing tube feeds. Extubated 12/5. Reintubated 12/6 d/t increasing oxygen requirements and CXR w/ almost complete white out of right lung, suspected mucous plugging.     -Neurology Intensive Care following  - Antibiotics per ID, Cultures above. Unasyn through 1/12, outpatient follow up  - Anticipate prolonged duration of NPO status (at least 2 weeks)  - Continue to monitor drain for saliva. Serosanguinous currently & w/o saliva  - Will consider " scope exam if able to extubate  - Rest of cares per neurosurgery and ICU  - Page ENT on call for questions     -- Patient and above plan will be discussed with Dr. Kathy Cates-Ruthy Akins MD   Otolaryngology-Head & Neck Surgery  Please contact ENT with questions by dialing [phone] and entering job code [phone] when prompted.

## 2019-12-07 ENCOUNTER — APPOINTMENT (OUTPATIENT)
Dept: GENERAL RADIOLOGY | Facility: CLINIC | Age: 64
DRG: 003 | End: 2019-12-07
Attending: NEUROLOGICAL SURGERY
Payer: COMMERCIAL

## 2019-12-07 ENCOUNTER — APPOINTMENT (OUTPATIENT)
Dept: PHYSICAL THERAPY | Facility: CLINIC | Age: 64
DRG: 003 | End: 2019-12-07
Attending: STUDENT IN AN ORGANIZED HEALTH CARE EDUCATION/TRAINING PROGRAM
Payer: COMMERCIAL

## 2019-12-07 LAB
ABO + RH BLD: NORMAL
ABO + RH BLD: NORMAL
ALBUMIN UR-MCNC: 10 MG/DL
ANION GAP SERPL CALCULATED.3IONS-SCNC: 5 MMOL/L (ref 3–14)
APPEARANCE UR: CLEAR
BACTERIA SPEC CULT: NO GROWTH
BASOPHILS # BLD AUTO: 0 10E9/L (ref 0–0.2)
BASOPHILS NFR BLD AUTO: 0.3 %
BILIRUB UR QL STRIP: NEGATIVE
BLD GP AB SCN SERPL QL: NORMAL
BLOOD BANK CMNT PATIENT-IMP: NORMAL
BUN SERPL-MCNC: 25 MG/DL (ref 7–30)
CALCIUM SERPL-MCNC: 8.4 MG/DL (ref 8.5–10.1)
CHLORIDE SERPL-SCNC: 100 MMOL/L (ref 94–109)
CO2 SERPL-SCNC: 34 MMOL/L (ref 20–32)
COLOR UR AUTO: ABNORMAL
CREAT SERPL-MCNC: 0.74 MG/DL (ref 0.66–1.25)
DIFFERENTIAL METHOD BLD: ABNORMAL
EOSINOPHIL # BLD AUTO: 0.1 10E9/L (ref 0–0.7)
EOSINOPHIL NFR BLD AUTO: 0.8 %
ERYTHROCYTE [DISTWIDTH] IN BLOOD BY AUTOMATED COUNT: 12.3 % (ref 10–15)
GFR SERPL CREATININE-BSD FRML MDRD: >90 ML/MIN/{1.73_M2}
GLUCOSE BLDC GLUCOMTR-MCNC: 177 MG/DL (ref 70–99)
GLUCOSE BLDC GLUCOMTR-MCNC: 179 MG/DL (ref 70–99)
GLUCOSE BLDC GLUCOMTR-MCNC: 203 MG/DL (ref 70–99)
GLUCOSE BLDC GLUCOMTR-MCNC: 204 MG/DL (ref 70–99)
GLUCOSE BLDC GLUCOMTR-MCNC: 219 MG/DL (ref 70–99)
GLUCOSE BLDC GLUCOMTR-MCNC: 228 MG/DL (ref 70–99)
GLUCOSE BLDC GLUCOMTR-MCNC: 251 MG/DL (ref 70–99)
GLUCOSE SERPL-MCNC: 177 MG/DL (ref 70–99)
GLUCOSE UR STRIP-MCNC: NEGATIVE MG/DL
HCT VFR BLD AUTO: 30.6 % (ref 40–53)
HGB BLD-MCNC: 9.4 G/DL (ref 13.3–17.7)
HGB UR QL STRIP: ABNORMAL
HYALINE CASTS #/AREA URNS LPF: 4 /LPF (ref 0–2)
IMM GRANULOCYTES # BLD: 0.1 10E9/L (ref 0–0.4)
IMM GRANULOCYTES NFR BLD: 0.7 %
KETONES UR STRIP-MCNC: NEGATIVE MG/DL
LEUKOCYTE ESTERASE UR QL STRIP: NEGATIVE
LYMPHOCYTES # BLD AUTO: 1.4 10E9/L (ref 0.8–5.3)
LYMPHOCYTES NFR BLD AUTO: 13.6 %
MAGNESIUM SERPL-MCNC: 2.1 MG/DL (ref 1.6–2.3)
MCH RBC QN AUTO: 28.8 PG (ref 26.5–33)
MCHC RBC AUTO-ENTMCNC: 30.7 G/DL (ref 31.5–36.5)
MCV RBC AUTO: 94 FL (ref 78–100)
MONOCYTES # BLD AUTO: 0.8 10E9/L (ref 0–1.3)
MONOCYTES NFR BLD AUTO: 8 %
MUCOUS THREADS #/AREA URNS LPF: PRESENT /LPF
NEUTROPHILS # BLD AUTO: 7.8 10E9/L (ref 1.6–8.3)
NEUTROPHILS NFR BLD AUTO: 76.6 %
NITRATE UR QL: NEGATIVE
NRBC # BLD AUTO: 0 10*3/UL
NRBC BLD AUTO-RTO: 0 /100
PH UR STRIP: 5.5 PH (ref 5–7)
PHOSPHATE SERPL-MCNC: 2.9 MG/DL (ref 2.5–4.5)
PLATELET # BLD AUTO: 425 10E9/L (ref 150–450)
POTASSIUM SERPL-SCNC: 3.8 MMOL/L (ref 3.4–5.3)
RBC # BLD AUTO: 3.26 10E12/L (ref 4.4–5.9)
RBC #/AREA URNS AUTO: 4 /HPF (ref 0–2)
SODIUM SERPL-SCNC: 139 MMOL/L (ref 133–144)
SOURCE: ABNORMAL
SP GR UR STRIP: 1.01 (ref 1–1.03)
SPECIMEN EXP DATE BLD: NORMAL
SPECIMEN SOURCE: NORMAL
TRANS CELLS #/AREA URNS HPF: <1 /HPF (ref 0–1)
UROBILINOGEN UR STRIP-MCNC: NORMAL MG/DL (ref 0–2)
WBC # BLD AUTO: 10.2 10E9/L (ref 4–11)
WBC #/AREA URNS AUTO: 2 /HPF (ref 0–5)

## 2019-12-07 PROCEDURE — 87070 CULTURE OTHR SPECIMN AEROBIC: CPT | Performed by: STUDENT IN AN ORGANIZED HEALTH CARE EDUCATION/TRAINING PROGRAM

## 2019-12-07 PROCEDURE — 25000132 ZZH RX MED GY IP 250 OP 250 PS 637: Performed by: NEUROLOGICAL SURGERY

## 2019-12-07 PROCEDURE — 94640 AIRWAY INHALATION TREATMENT: CPT | Mod: 76

## 2019-12-07 PROCEDURE — 25000125 ZZHC RX 250: Performed by: STUDENT IN AN ORGANIZED HEALTH CARE EDUCATION/TRAINING PROGRAM

## 2019-12-07 PROCEDURE — 25000128 H RX IP 250 OP 636: Performed by: STUDENT IN AN ORGANIZED HEALTH CARE EDUCATION/TRAINING PROGRAM

## 2019-12-07 PROCEDURE — 81001 URINALYSIS AUTO W/SCOPE: CPT | Performed by: STUDENT IN AN ORGANIZED HEALTH CARE EDUCATION/TRAINING PROGRAM

## 2019-12-07 PROCEDURE — 20000004 ZZH R&B ICU UMMC

## 2019-12-07 PROCEDURE — 94003 VENT MGMT INPAT SUBQ DAY: CPT

## 2019-12-07 PROCEDURE — 86901 BLOOD TYPING SEROLOGIC RH(D): CPT | Performed by: STUDENT IN AN ORGANIZED HEALTH CARE EDUCATION/TRAINING PROGRAM

## 2019-12-07 PROCEDURE — 87106 FUNGI IDENTIFICATION YEAST: CPT | Performed by: STUDENT IN AN ORGANIZED HEALTH CARE EDUCATION/TRAINING PROGRAM

## 2019-12-07 PROCEDURE — 97110 THERAPEUTIC EXERCISES: CPT | Mod: GP

## 2019-12-07 PROCEDURE — 80048 BASIC METABOLIC PNL TOTAL CA: CPT | Performed by: STUDENT IN AN ORGANIZED HEALTH CARE EDUCATION/TRAINING PROGRAM

## 2019-12-07 PROCEDURE — 83735 ASSAY OF MAGNESIUM: CPT | Performed by: STUDENT IN AN ORGANIZED HEALTH CARE EDUCATION/TRAINING PROGRAM

## 2019-12-07 PROCEDURE — 86900 BLOOD TYPING SEROLOGIC ABO: CPT | Performed by: STUDENT IN AN ORGANIZED HEALTH CARE EDUCATION/TRAINING PROGRAM

## 2019-12-07 PROCEDURE — 25000132 ZZH RX MED GY IP 250 OP 250 PS 637: Performed by: STUDENT IN AN ORGANIZED HEALTH CARE EDUCATION/TRAINING PROGRAM

## 2019-12-07 PROCEDURE — 71045 X-RAY EXAM CHEST 1 VIEW: CPT

## 2019-12-07 PROCEDURE — 27210437 ZZH NUTRITION PRODUCT SEMIELEM INTERMED LITER

## 2019-12-07 PROCEDURE — 00000146 ZZHCL STATISTIC GLUCOSE BY METER IP

## 2019-12-07 PROCEDURE — 85025 COMPLETE CBC W/AUTO DIFF WBC: CPT | Performed by: STUDENT IN AN ORGANIZED HEALTH CARE EDUCATION/TRAINING PROGRAM

## 2019-12-07 PROCEDURE — 97530 THERAPEUTIC ACTIVITIES: CPT | Mod: GP

## 2019-12-07 PROCEDURE — 87040 BLOOD CULTURE FOR BACTERIA: CPT | Performed by: NEUROLOGICAL SURGERY

## 2019-12-07 PROCEDURE — 36592 COLLECT BLOOD FROM PICC: CPT | Performed by: STUDENT IN AN ORGANIZED HEALTH CARE EDUCATION/TRAINING PROGRAM

## 2019-12-07 PROCEDURE — 86850 RBC ANTIBODY SCREEN: CPT | Performed by: STUDENT IN AN ORGANIZED HEALTH CARE EDUCATION/TRAINING PROGRAM

## 2019-12-07 PROCEDURE — 25000132 ZZH RX MED GY IP 250 OP 250 PS 637: Performed by: PSYCHIATRY & NEUROLOGY

## 2019-12-07 PROCEDURE — 97162 PT EVAL MOD COMPLEX 30 MIN: CPT | Mod: GP

## 2019-12-07 PROCEDURE — 36415 COLL VENOUS BLD VENIPUNCTURE: CPT | Performed by: NEUROLOGICAL SURGERY

## 2019-12-07 PROCEDURE — 40000275 ZZH STATISTIC RCP TIME EA 10 MIN

## 2019-12-07 PROCEDURE — 87040 BLOOD CULTURE FOR BACTERIA: CPT | Performed by: STUDENT IN AN ORGANIZED HEALTH CARE EDUCATION/TRAINING PROGRAM

## 2019-12-07 PROCEDURE — 25000125 ZZHC RX 250: Performed by: NURSE PRACTITIONER

## 2019-12-07 PROCEDURE — 84100 ASSAY OF PHOSPHORUS: CPT | Performed by: STUDENT IN AN ORGANIZED HEALTH CARE EDUCATION/TRAINING PROGRAM

## 2019-12-07 PROCEDURE — 25800030 ZZH RX IP 258 OP 636: Performed by: NURSE PRACTITIONER

## 2019-12-07 PROCEDURE — 87205 SMEAR GRAM STAIN: CPT | Performed by: STUDENT IN AN ORGANIZED HEALTH CARE EDUCATION/TRAINING PROGRAM

## 2019-12-07 RX ORDER — ACETYLCYSTEINE 100 MG/ML
4 SOLUTION ORAL; RESPIRATORY (INHALATION)
Status: DISCONTINUED | OUTPATIENT
Start: 2019-12-08 | End: 2019-12-09

## 2019-12-07 RX ORDER — MIRTAZAPINE 15 MG/1
15 TABLET, FILM COATED ORAL EVERY EVENING
Status: DISCONTINUED | OUTPATIENT
Start: 2019-12-07 | End: 2019-12-11

## 2019-12-07 RX ORDER — HYDRALAZINE HYDROCHLORIDE 20 MG/ML
10 INJECTION INTRAMUSCULAR; INTRAVENOUS
Status: DISCONTINUED | OUTPATIENT
Start: 2019-12-07 | End: 2019-12-31 | Stop reason: HOSPADM

## 2019-12-07 RX ADMIN — IPRATROPIUM BROMIDE AND ALBUTEROL SULFATE 3 ML: .5; 3 SOLUTION RESPIRATORY (INHALATION) at 15:54

## 2019-12-07 RX ADMIN — BUMETANIDE 2 MG: 2 TABLET ORAL at 08:37

## 2019-12-07 RX ADMIN — FAMOTIDINE 20 MG: 40 POWDER, FOR SUSPENSION ORAL at 20:57

## 2019-12-07 RX ADMIN — IPRATROPIUM BROMIDE AND ALBUTEROL SULFATE 3 ML: .5; 3 SOLUTION RESPIRATORY (INHALATION) at 19:32

## 2019-12-07 RX ADMIN — MULTIVITAMIN 15 ML: LIQUID ORAL at 20:56

## 2019-12-07 RX ADMIN — DEXMEDETOMIDINE 0.4 MCG/KG/HR: 100 INJECTION, SOLUTION, CONCENTRATE INTRAVENOUS at 02:54

## 2019-12-07 RX ADMIN — FAMOTIDINE 20 MG: 40 POWDER, FOR SUSPENSION ORAL at 08:37

## 2019-12-07 RX ADMIN — MIRTAZAPINE 15 MG: 15 TABLET, FILM COATED ORAL at 20:56

## 2019-12-07 RX ADMIN — ACETYLCYSTEINE 4 ML: 100 INHALANT RESPIRATORY (INHALATION) at 19:32

## 2019-12-07 RX ADMIN — SENNOSIDES AND DOCUSATE SODIUM 1 TABLET: 8.6; 5 TABLET ORAL at 08:35

## 2019-12-07 RX ADMIN — POLYETHYLENE GLYCOL 3350 17 G: 17 POWDER, FOR SOLUTION ORAL at 08:34

## 2019-12-07 RX ADMIN — Medication 5000 UNITS: at 20:56

## 2019-12-07 RX ADMIN — Medication 100 MG: at 08:35

## 2019-12-07 RX ADMIN — AMLODIPINE BESYLATE 5 MG: 5 TABLET ORAL at 08:35

## 2019-12-07 RX ADMIN — BUMETANIDE 2 MG: 2 TABLET ORAL at 20:56

## 2019-12-07 RX ADMIN — AMPICILLIN AND SULBACTAM 3 G: 1; 2 INJECTION, POWDER, FOR SOLUTION INTRAMUSCULAR; INTRAVENOUS at 01:40

## 2019-12-07 RX ADMIN — Medication 5000 UNITS: at 08:35

## 2019-12-07 RX ADMIN — AMPICILLIN AND SULBACTAM 3 G: 1; 2 INJECTION, POWDER, FOR SOLUTION INTRAMUSCULAR; INTRAVENOUS at 14:52

## 2019-12-07 RX ADMIN — INSULIN GLARGINE 5 UNITS: 100 INJECTION, SOLUTION SUBCUTANEOUS at 21:18

## 2019-12-07 RX ADMIN — BISACODYL 10 MG: 10 SUPPOSITORY RECTAL at 17:49

## 2019-12-07 RX ADMIN — IPRATROPIUM BROMIDE AND ALBUTEROL SULFATE 3 ML: .5; 3 SOLUTION RESPIRATORY (INHALATION) at 12:29

## 2019-12-07 RX ADMIN — AMPICILLIN AND SULBACTAM 3 G: 1; 2 INJECTION, POWDER, FOR SOLUTION INTRAMUSCULAR; INTRAVENOUS at 08:36

## 2019-12-07 RX ADMIN — FOLIC ACID 1 MG: 1 TABLET ORAL at 08:35

## 2019-12-07 RX ADMIN — AMPICILLIN AND SULBACTAM 3 G: 1; 2 INJECTION, POWDER, FOR SOLUTION INTRAMUSCULAR; INTRAVENOUS at 20:57

## 2019-12-07 RX ADMIN — ACETYLCYSTEINE 4 ML: 100 INHALANT RESPIRATORY (INHALATION) at 03:45

## 2019-12-07 RX ADMIN — ACETYLCYSTEINE 4 ML: 100 INHALANT RESPIRATORY (INHALATION) at 12:29

## 2019-12-07 RX ADMIN — SENNOSIDES AND DOCUSATE SODIUM 1 TABLET: 8.6; 5 TABLET ORAL at 20:56

## 2019-12-07 RX ADMIN — ALBUTEROL SULFATE 2.5 MG: 2.5 SOLUTION RESPIRATORY (INHALATION) at 03:45

## 2019-12-07 RX ADMIN — ACETYLCYSTEINE 4 ML: 100 INHALANT RESPIRATORY (INHALATION) at 07:43

## 2019-12-07 RX ADMIN — POTASSIUM CHLORIDE 20 MEQ: 1.5 POWDER, FOR SOLUTION ORAL at 01:40

## 2019-12-07 RX ADMIN — IPRATROPIUM BROMIDE AND ALBUTEROL SULFATE 3 ML: .5; 3 SOLUTION RESPIRATORY (INHALATION) at 07:43

## 2019-12-07 RX ADMIN — ACETYLCYSTEINE 4 ML: 100 INHALANT RESPIRATORY (INHALATION) at 15:54

## 2019-12-07 ASSESSMENT — ACTIVITIES OF DAILY LIVING (ADL)
ADLS_ACUITY_SCORE: 41
ADLS_ACUITY_SCORE: 27
ADLS_ACUITY_SCORE: 41
ADLS_ACUITY_SCORE: 27
ADLS_ACUITY_SCORE: 41
ADLS_ACUITY_SCORE: 41

## 2019-12-07 ASSESSMENT — MIFFLIN-ST. JEOR: SCORE: 1644.5

## 2019-12-07 NOTE — PLAN OF CARE
Major Shift Events:  Oxycodone given x 1. Precedex remains at 0.4. Large amounts of secretions with suctioning. TF increased to 40, tolerating well.   Plan: Continue with plan of care.   For vital signs and complete assessments, please see documentation flowsheets.

## 2019-12-07 NOTE — PROGRESS NOTES
Neurosurgery Progress Note    Subjective:  Re-Intubated D/T Mucous Plugging and Atelectasis; started subQ heparin, tube feeds    Objective:  Awake, alert, nods yes or no to questions  Follows commands  At least antigravity in bilateral upper extremities, 4/5 biceps, 1/5 , triple flexion in BLE    Assessment:  64 years old gentleman status post C3-C4 artificial disc placement about 2 weeks ago presents after a fall with almost complete loss of strength in his upper extremities and paraplegia, found to have cervical stenosis. S/p OR 12/1 - Pharyngotomy closed with assistance from ENT. Arthroplasty implant removed. No replacement implant placed. S/p G-tube 12/5. Extubated 12/5. Re-Intubated 12/6 due to Mucous Plugging and Atelectasis.    Plan:  MAP > 65  Intubated, mechanically ventilated  Nebs, wean to extubate as able  DVT: SCDs while in bed  ERVIN drain per ENT  [place] J collar  Per ID: Unasyn 4-6 wks for vertebral phlegmon, weekly labs  started subQ heparin, tube feeds  Ok to remove NJ tube. Feeds per G-tube.  MRI before stopping abx in 6 wks  Disposition: 4A      Lion Vargas MD  Neurosurgery Resident PGY2    Please contact neurosurgery resident on call with questions.    Dial * * [phone], enter [phone] when prompted.

## 2019-12-07 NOTE — PROGRESS NOTES
Neuroscience Intensive Care Progress Note  2019    Sherwin Keny Angel is a 64 year old year old male with hx HTN, DM and cervical stenosis s/p recent anterior C3-4 arthroplasty (2 weeks prior) who was admitted on 2019 with bilateral lower extremity paraplegia and near loss of upper extremity movement, after a fall at rehab. Prior to the fall, he was able to ambulate with a cane. Taken to the OR for arthroplasty implant removal on . Found to have significant inflammation and 2 pharyngotomy sites. ENT closed 1 (unable to reach other) and drain placed for the phlegmon. Now on abx for cervical tissue + culture. S/P gtube placement  due to anticipated prolonged period of NPO. Extubated succesfully on , however, had worsening respiratory status with worsening white out of R lung concerning for mucus plugging as pt could not cough up secretions due to diaphragmatic weakness, subsequently re-intubated .     Problem List  1. Cervical stenosis s/p C3-C4 arthroplasty   2. Cervical spinal injury due to fall, with C3-C5 fracture   3. Acute respiratory failure s/p re-intubation  4. Leukocytosis  5. Vertebral phlegmon vs abscess s/p drain placement on , cultures +  6. Hx of hypertension  7. Hx of DM  8. Alcohol dependence  9. Secretions  10. Normocytic anemia  11. G-tube placement     24 hour events:  - Yesterday afternoon, increasing O2 requirements, CXR with white out of R lung likely 2/2 mucous plugging, subsequently re-intubated  - He was on propofol gtt which was weaned off, now on precedex gtt  - CXR this morning with some improvement in R lung  - Remains afebrile, /61 - 138/80, HR     24 Hour Vital Signs Summary:  Temperatures:  Current - Temp: 98.9  F (37.2  C); Max - Temp  Av.1  F (37.3  C)  Min: 98.9  F (37.2  C)  Max: 99.4  F (37.4  C)  Respiration range: Resp  Av.4  Min: 0  Max: 44  Pulse range: Pulse  Av.8  Min: 59  Max: 108  Blood pressure range:  Systolic (24hrs), Av , Min:74 , Max:197   ; Diastolic (24hrs), Av, Min:50, Max:111    Pulse oximetry range: SpO2  Av %  Min: 90 %  Max: 100 %    Ventilator Settings  Ventilation Mode: CMV/AC  (Continuous Mandatory Ventilation/ Assist Control)  FiO2 (%): 40 %  Rate Set (breaths/minute): 12 breaths/min  Tidal Volume Set (mL): 450 mL  PEEP (cm H2O): 5 cmH2O  Oxygen Concentration (%): 40 %  Resp: 12      Intake/Output   19   00:00 - 23:59 19   0000 - present   Input 0.669 L 0.30 L   Output 3.9 L 0.475 L   Net -3.23 L -0.169 L   Since Admit  +1.537 L     BP - Mean:  [] 94    Current Medications:    acetylcysteine  4 mL Nebulization Q4H     amLODIPine  5 mg Oral or Feeding Tube Daily     ampicillin-sulbactam (UNASYN) IV  3 g Intravenous Q6H     bumetanide  2 mg Oral or Feeding Tube BID     cyanocobalamin  1,000 mcg Intramuscular Q30 Days     famotidine  20 mg Per G Tube BID     folic acid  1 mg Oral or Feeding Tube Daily     heparin ANTICOAGULANT  5,000 Units Subcutaneous Q12H     heparin lock flush  5-10 mL Intracatheter Q24H     insulin aspart  1-12 Units Subcutaneous Q4H     insulin glargine  5 Units Subcutaneous At Bedtime     ipratropium - albuterol 0.5 mg/2.5 mg/3 mL  3 mL Nebulization 4x daily     multivitamins w/minerals  15 mL Per Feeding Tube Daily     polyethylene glycol  17 g Oral or Feeding Tube Daily     senna-docusate  1 tablet Oral or Feeding Tube BID     thiamine  100 mg Oral or Feeding Tube Daily       PRN Medications:  acetaminophen, albuterol, bisacodyl, carboxymethylcellulose PF, IV fluid REPLACEMENT ONLY, glucose **OR** dextrose **OR** glucagon, fentaNYL, heparin lock flush, labetalol, lidocaine 4%, lidocaine (buffered or not buffered), magnesium sulfate, magnesium sulfate, melatonin, naloxone, ondansetron **OR** ondansetron, oxyCODONE, potassium chloride, potassium chloride with lidocaine, potassium chloride, potassium chloride, potassium chloride, potassium phosphate  "(KPHOS) in D5W IV, potassium phosphate (KPHOS) in D5W IV, potassium phosphate (KPHOS) in D5W IV, potassium phosphate (KPHOS) in D5W IV, potassium phosphate (KPHOS) in D5W IV, prochlorperazine **OR** prochlorperazine **OR** prochlorperazine, sodium chloride (PF)    Infusions:    dexmedetomidine 0.4 mcg/kg/hr (12/07/19 0600)     IV fluid REPLACEMENT ONLY       propofol (DIPRIVAN) infusion Stopped (12/06/19 1536)       No Known Allergies    Physical Examination:   Vitals: /70   Pulse 64   Temp 98.9  F (37.2  C) (Axillary)   Resp 12   Ht 1.727 m (5' 8\")   Wt 87.5 kg (192 lb 14.4 oz)   SpO2 97%   BMI 29.33 kg/m    General: Adult male, collar in place  HEENT: Normocephalic.   CV: RRR  Resp: intubated  Abd: Soft, distended  Skin: Warm, dry   Neuro: intubated, on precedex. Awake and alert. Responds to questions with nodding. Follows commands. EOMI. Eyes are conjugate. Continues to moves both arms anti-gravity at elbows brisky, weaker in RUE. No movement in R fingers, subtle movement in L fingers. No movement in bilateral lower extremities. Sensation intact in bilateral upper extremities proximately and distally along entire arm. Decreased sensation over trunk and lower extremities starting at T4 dermatome and below.    Labs:  Recent Labs   Lab Test 12/07/19  0330 12/06/19  1828 12/06/19  0434 12/05/19  0340    138 136 140   POTASSIUM 3.8 3.8 4.1 4.1   CHLORIDE 100 99 100 106   CO2 34* 34* 31 30   ANIONGAP 5 4 5 3   * 145* 154* 138*   BUN 25 20 14 19   CR 0.74 0.57* 0.44* 0.50*   FLORINDA 8.4* 8.7 8.6 8.1*   WBC 10.2  --  9.8 8.8   RBC 3.26*  --  3.54* 2.81*   HGB 9.4*  --  10.3* 8.3*     --  419 390     Imaging:   CXR:   1. Endotracheal tube tip projects 2.0 cm above the joby.  2. Improved aeration of the right lung since the prior exam, although residual diffuse opacities remain.  3. Increased streaky left hilar and basilar opacities, favored to represent " atelectasis.     Assessment/Plan:  Sherwin Keny Angel is a 64 year old with hx of recent of anterior C3-4 arthroplasty (2 weeks ago at [facility]) who was admitted 12/1/2019 after a fall at rehab, resulting in lower extremity paraplegia. Found to have cervical stenosis. Now s/p pharyngotomy closure on 12/1. Arthroplasty implant removed with no replacement implant placed. He was on broad spectrum abx for + cervical tissue cultures with ID following, narrowed to Unasyn on 12/4.      Neuro:  #Cervical spinal cord injury  - Up with assist  - Collar at all times   - Neuro checks q4h  - Discussed plans with NSG and ENT, no plans for OR  - PT/OT     #Sedation/Pain  - Precedex gtt  - Oxycodone PRN  - Fentanyl PRN     #Hx of Alcohol dependence  - CIWA  - Thiamine & folate    #Depression  - Continue PTA miratazepine     Resp:  #Acute respiratory failure s/p re-intubation 12/6  #Pulmonary edema  #Mucous plugging  - CXR 12/7 with improvement in R lung mucous plugging  - Will likely need to consider trach as pt had weak cough 2/2 diaphragmatic weakness, will discuss with ENT, briefly mentioned to pt that we will need to consider this if inadequate improvement  - Continue PTA bumex 2 mg    #Secretions  - difficulty clearing, thick white secretions  - Mucomyst & duoneb q4h scheduled  - Bronchial hygiene per RT  - frequent suctioning     Cardio:  #Hx of hypertension   - Goal: Normotension, SBP < 160  - Resume PTA amlodipine 10 mg   - Hold PTA Metoprolol to avoid bradycardia with ongoing precedex gtt  - Hydralazine PRN      Renal:  No active issues.    - Continue PTA bumex  - Electrolyte replacement protocol   - Monitor I/O    Endo:  #DM2  - Sliding scale insulin, high dose  - Continue lantus 5 units  - Monitor BG     Heme:  #Normocytic anemia, stable  - Monitor CBC     - Hg > 7.0   - Plt > 100k   - INR <1.5      GI:  No active issues.     #Constipation  - Miralax & Senna scheduled  - PRN suppository available     #Diet  - NPO  - Per  ENT, anticipate prolonged duration of NPO (at least 2 weeks)   - Gtube placed 12/5  - Tube feeds not at goal yet, tolerating well  - Nutrition following, follow up tube feed recs     ID:  #Leukocytosis, resolved  - ID following  - Stopped Vancomycin, metronidazole, cefepime on 12/4  - Continue Unasyn (end date 12/2-1/12)  - Weekly CBC, CMP, CRP while on IV abx, fax to ID clinic attn: Charissa  - Blood cultures (12/1 & 12/3): NGTD  - Spinal tissue cultures (12/1): MSSA, Strep anginosus, Eikenella, and oral anaerobes  - Follow up in ID clinic near end of abx course, ID to arrange for this  - Will page ID attending at [id number] prior to discharging pt     Lines:  -ETT  - PICC  - PIV x 2  - Degroot   - ERVIN drain in neck  - NG     FEN: NPO, start tube feeds  PPX:    DVT prophylaxis: SCDs, Heparin subq    GI:  Pantoprazole  Code Status: Full, presumed     Dispo: 4A until stable, may need trach     The patient was seen and discussed with the attending, Dr. Clark.     Randi Edmonds MD  Neurology PGY2  [phone]

## 2019-12-07 NOTE — PLAN OF CARE
Major Shift Events:  poor resp ability- unable to protect airway- oxygenate- sats. Low 80s, DR aware- intubated per anesthesia,- on vent   Plan: pulm toilet  For vital signs and complete assessments, please see documentation flowsheets.

## 2019-12-07 NOTE — PROGRESS NOTES
"Otolaryngology Progress Note     S: No acute events overnight. Fevers up to 102.8 yesterday. ID recommending continuing Unasyn and blood and sputum cultures.      O: BP (!) 156/74   Pulse 64   Temp 100.9  F (38.3  C) (Axillary)   Resp 17   Ht 1.727 m (5' 8\")   Wt 89.2 kg (196 lb 10.4 oz)   SpO2 98%   BMI 29.90 kg/m     General: intubated  HEENT: Neck collar in place. EOMI.  Drain with serosang output.   Pulmonary: ventilated  Neuro/MSK: able to move upper extremities when asked, but not able to squeeze hands. No sensation or movement in lower extremities. weakly squeeze hand bilaterally but not able to move legs.      ERVIN drain output(s): (last 24 hours)/(last shift)  Right Neck: 0 0 0     Cultures:   12/8 Sputum cultures: NGTD, gram stain with mixed G+ nic  12/8 Blood cultures: pending   12/3 Blood cultures: No growth   12/1 Cervical spine tissue: moderate fusobacterium nucleatum. Moderate parvimonas micra. 3 strains of prevotella.  Susceptibilities not routinely done.   12/1 fungus: No growth  12/1 Aerobic: S aureus single colony, Strep anginosus light growth, Eikenella corrodens light growth     CXR 12/7: improved aeration R lung per radiology   CXR 12/8: near complete resolution of right opacities     Labs: CRP 56 (from 130 3 days ago), WBC 8.7, Hgb 9.6    Assessment and Plan  64 year old male w/ PMH HTN, DM, cervical stenosis s/p arthroplasty about 2-3 weeks ago who fell at rehab and presented to the [facility] with paraplegia. Went to OR w/ NSG (Dr. Ashton) 12/1 & encountered inflammatory tissue over the spine and so ENT was consulted for assistance with the exposure.  During the exposure we noticed that there was a pharyngotomy that was seen from the cervical exposure. This was repaired transoral and transcervical. Patient remained intubated post-op and remains in the SICU. G-tube placed 12/5 and advancing tube feeds. Extubated 12/5. Reintubated 12/6 d/t increasing oxygen requirements and CXR w/ " almost complete white out of right lung, suspected mucous plugging. CXR improved on 12/7-8.     - Antibiotics per ID, Cultures above. Unasyn through 1/12, follow cultures, outpatient follow up  - Anticipate prolonged duration of NPO status  - Continue to monitor drain for saliva. Serosanguinous currently & w/o saliva  - Will consider scope exam if able to extubate  - Rest of cares per neurosurgery and ICU, page ENT on call for questions     -- Patient and above plan will be discussed with Dr. Kathy Cates-Ruthy Akins MD   Otolaryngology-Head & Neck Surgery  Please contact ENT with questions by dialing * [phone] and entering job code [phone] when prompted.

## 2019-12-07 NOTE — PLAN OF CARE
Discharge Planner PT   Patient plan for discharge: not discussed   Current status: Intubated orally, 40% FIO2, 5 PEEP, CMV.  Able to shake head yes/no and follow questioning and commands as able 2/2 paraplegia.  Dependent lift from bed to chair via ceiling lift. BP stable with MAPS < 65 throughout session. PROM. Educated pt for try to actively participate in order to increase neural pathways as able.  Able to minimally A with B elbow flexion and shrugs B shoulders.  All joint WFL.    Barriers to return to prior living situation: paraplegia, medical status, intubated   Recommendations for discharge: IP rehab  Rationale for recommendations: Far below baseline 2/2 acute SCI       Entered by: Allan Holley 12/07/2019 1:15 PM     Pt at high risk of developing pressure sores 2/2 paraplegia and absent sensation to lower body.  When in chair, pt should be utilizing Rooke books, patt cushion and repositioned by lift.      Orders Rooke boots and patt cushion to room

## 2019-12-07 NOTE — PROGRESS NOTES
"   12/07/19 1200   Quick Adds   Type of Visit Initial PT Evaluation   Living Environment   Living Environment Comment Per yes/no questioning, pt confirms he lives with unidentified family in house.  IND at baseline.     Self-Care   Activity/Exercise/Self-Care Comment intubated unable to gather info   Functional Level Prior   Ambulation 0-->independent   Transferring 0-->independent   Toileting 0-->independent   Bathing 0-->independent   Communication 0-->understands/communicates without difficulty   Swallowing 0-->swallows foods/liquids without difficulty   Cognition 0 - no cognition issues reported   Fall history within last six months yes   Which of the above functional risks had a recent onset or change? ambulation;transferring;toileting;bathing;dressing;fall history   Prior Functional Level Comment unsure of specific detail 2/2 deliruim/intubation   General Information   Onset of Illness/Injury or Date of Surgery - Date 12/01/19   Referring Physician  Randi Edmonds MD   Patient/Family Goals Statement unable to state   Pertinent History of Current Problem (include personal factors and/or comorbidities that impact the POC) 64-year-old gentleman status post anterior C3-C4 arthroplasty about 2 weeks ago complicated by dysphasia was transferred from an outside hospital after a fall today in rehab, and now presents paraplegic and near loss of motor function upper extremities bilateral.   Precautions/Limitations spinal precautions   Heart Disease Risk Factors Gender;Age;Medical history   Cognitive Status Examination   Cognitive Comment Does follow commands well, nodding head yes/no minimally.  Unable to assess.  Does present with deliruim, uses letter board well to ask where he is and what happened.  Also reports he would like to \"go out\"   Pain Assessment   Patient Currently in Pain No   Posture    Posture Comments unable to assess   Range of Motion (ROM)   ROM Comment WFL BLE and BUE   Strength   Strength " "Comments flacid throughout BLE and UE except for L elbow flexion: 2+/5.  R elbow flexion: 2-/5.  B shoulder shrug: 3+/5.   Bed Mobility   Bed Mobility Comments unable to assess   Transfer Skills   Transfer Comments unable to assess   Gait   Gait Comments unable to assess   Balance   Balance Comments unable to assess   General Therapy Interventions   Planned Therapy Interventions balance training;bed mobility training;gait training;motor coordination training;neuromuscular re-education;strengthening;stretching;transfer training;risk factor education;home program guidelines;progressive activity/exercise   Clinical Impression   Criteria for Skilled Therapeutic Intervention yes, treatment indicated   PT Diagnosis impaired funcitonal mobility   Influenced by the following impairments weakness, sensation loss, delirium   Functional limitations due to impairments IND mobility   Clinical Presentation Evolving/Changing   Clinical Presentation Rationale clinical judgement   Clinical Decision Making (Complexity) Moderate complexity   Therapy Frequency 5x/week   Predicted Duration of Therapy Intervention (days/wks) 3 wks   Anticipated Discharge Disposition Transitional Care Facility;Acute Rehabilitation Facility;Long Term Care Facility   Risk & Benefits of therapy have been explained Yes   Patient, Family & other staff in agreement with plan of care Yes   [facility] [employer] TM \"6 Clicks\"   2016, Trustees of [facility], under license to [employer].  All rights reserved.   6 Clicks Short Forms Basic Mobility Inpatient Short Form   [facility] AM-PAC  \"6 Clicks\" V.2 Basic Mobility Inpatient Short Form   1. Turning from your back to your side while in a flat bed without using bedrails? 1 - Total   2. Moving from lying on your back to sitting on the side of a flat bed without using bedrails? 1 - Total   3. Moving to and from a bed to a chair (including a wheelchair)? 1 - Total   4. Standing up from a chair using " your arms (e.g., wheelchair, or bedside chair)? 1 - Total   5. To walk in hospital room? 1 - Total   6. Climbing 3-5 steps with a railing? 1 - Total   Basic Mobility Raw Score (Score out of 24.Lower scores equate to lower levels of function) 6   Total Evaluation Time   Total Evaluation Time (Minutes) 8

## 2019-12-08 ENCOUNTER — APPOINTMENT (OUTPATIENT)
Dept: GENERAL RADIOLOGY | Facility: CLINIC | Age: 64
DRG: 003 | End: 2019-12-08
Attending: STUDENT IN AN ORGANIZED HEALTH CARE EDUCATION/TRAINING PROGRAM
Payer: COMMERCIAL

## 2019-12-08 LAB
ANION GAP SERPL CALCULATED.3IONS-SCNC: 3 MMOL/L (ref 3–14)
BACTERIA SPEC CULT: ABNORMAL
BASOPHILS # BLD AUTO: 0 10E9/L (ref 0–0.2)
BASOPHILS NFR BLD AUTO: 0.3 %
BUN SERPL-MCNC: 33 MG/DL (ref 7–30)
CALCIUM SERPL-MCNC: 8.4 MG/DL (ref 8.5–10.1)
CHLORIDE SERPL-SCNC: 106 MMOL/L (ref 94–109)
CO2 SERPL-SCNC: 34 MMOL/L (ref 20–32)
CREAT SERPL-MCNC: 0.74 MG/DL (ref 0.66–1.25)
CRP SERPL-MCNC: 56 MG/L (ref 0–8)
DIFFERENTIAL METHOD BLD: ABNORMAL
EOSINOPHIL # BLD AUTO: 0 10E9/L (ref 0–0.7)
EOSINOPHIL NFR BLD AUTO: 0.2 %
ERYTHROCYTE [DISTWIDTH] IN BLOOD BY AUTOMATED COUNT: 12.7 % (ref 10–15)
GFR SERPL CREATININE-BSD FRML MDRD: >90 ML/MIN/{1.73_M2}
GLUCOSE BLDC GLUCOMTR-MCNC: 208 MG/DL (ref 70–99)
GLUCOSE BLDC GLUCOMTR-MCNC: 216 MG/DL (ref 70–99)
GLUCOSE BLDC GLUCOMTR-MCNC: 219 MG/DL (ref 70–99)
GLUCOSE BLDC GLUCOMTR-MCNC: 242 MG/DL (ref 70–99)
GLUCOSE BLDC GLUCOMTR-MCNC: 289 MG/DL (ref 70–99)
GLUCOSE SERPL-MCNC: 231 MG/DL (ref 70–99)
GRAM STN SPEC: NORMAL
HCT VFR BLD AUTO: 31.3 % (ref 40–53)
HGB BLD-MCNC: 9.6 G/DL (ref 13.3–17.7)
IMM GRANULOCYTES # BLD: 0.1 10E9/L (ref 0–0.4)
IMM GRANULOCYTES NFR BLD: 1.6 %
LYMPHOCYTES # BLD AUTO: 1.6 10E9/L (ref 0.8–5.3)
LYMPHOCYTES NFR BLD AUTO: 18.3 %
Lab: ABNORMAL
Lab: NORMAL
MAGNESIUM SERPL-MCNC: 2.2 MG/DL (ref 1.6–2.3)
MCH RBC QN AUTO: 28.7 PG (ref 26.5–33)
MCHC RBC AUTO-ENTMCNC: 30.7 G/DL (ref 31.5–36.5)
MCV RBC AUTO: 93 FL (ref 78–100)
MONOCYTES # BLD AUTO: 0.9 10E9/L (ref 0–1.3)
MONOCYTES NFR BLD AUTO: 10.3 %
NEUTROPHILS # BLD AUTO: 6 10E9/L (ref 1.6–8.3)
NEUTROPHILS NFR BLD AUTO: 69.3 %
NRBC # BLD AUTO: 0 10*3/UL
NRBC BLD AUTO-RTO: 0 /100
PHOSPHATE SERPL-MCNC: 2.1 MG/DL (ref 2.5–4.5)
PLATELET # BLD AUTO: 402 10E9/L (ref 150–450)
POTASSIUM SERPL-SCNC: 3.3 MMOL/L (ref 3.4–5.3)
POTASSIUM SERPL-SCNC: 3.7 MMOL/L (ref 3.4–5.3)
RBC # BLD AUTO: 3.35 10E12/L (ref 4.4–5.9)
SODIUM SERPL-SCNC: 143 MMOL/L (ref 133–144)
SPECIMEN SOURCE: ABNORMAL
SPECIMEN SOURCE: NORMAL
WBC # BLD AUTO: 8.7 10E9/L (ref 4–11)

## 2019-12-08 PROCEDURE — 40000275 ZZH STATISTIC RCP TIME EA 10 MIN

## 2019-12-08 PROCEDURE — 20000004 ZZH R&B ICU UMMC

## 2019-12-08 PROCEDURE — 86140 C-REACTIVE PROTEIN: CPT | Performed by: STUDENT IN AN ORGANIZED HEALTH CARE EDUCATION/TRAINING PROGRAM

## 2019-12-08 PROCEDURE — 25000125 ZZHC RX 250: Performed by: STUDENT IN AN ORGANIZED HEALTH CARE EDUCATION/TRAINING PROGRAM

## 2019-12-08 PROCEDURE — 94640 AIRWAY INHALATION TREATMENT: CPT | Mod: 76

## 2019-12-08 PROCEDURE — 84100 ASSAY OF PHOSPHORUS: CPT | Performed by: STUDENT IN AN ORGANIZED HEALTH CARE EDUCATION/TRAINING PROGRAM

## 2019-12-08 PROCEDURE — 83735 ASSAY OF MAGNESIUM: CPT | Performed by: STUDENT IN AN ORGANIZED HEALTH CARE EDUCATION/TRAINING PROGRAM

## 2019-12-08 PROCEDURE — 25000132 ZZH RX MED GY IP 250 OP 250 PS 637: Performed by: STUDENT IN AN ORGANIZED HEALTH CARE EDUCATION/TRAINING PROGRAM

## 2019-12-08 PROCEDURE — 25000128 H RX IP 250 OP 636: Performed by: STUDENT IN AN ORGANIZED HEALTH CARE EDUCATION/TRAINING PROGRAM

## 2019-12-08 PROCEDURE — 94003 VENT MGMT INPAT SUBQ DAY: CPT

## 2019-12-08 PROCEDURE — 25000132 ZZH RX MED GY IP 250 OP 250 PS 637: Performed by: NEUROLOGICAL SURGERY

## 2019-12-08 PROCEDURE — 94640 AIRWAY INHALATION TREATMENT: CPT

## 2019-12-08 PROCEDURE — 25000132 ZZH RX MED GY IP 250 OP 250 PS 637: Performed by: PSYCHIATRY & NEUROLOGY

## 2019-12-08 PROCEDURE — 00000146 ZZHCL STATISTIC GLUCOSE BY METER IP

## 2019-12-08 PROCEDURE — 25800030 ZZH RX IP 258 OP 636: Performed by: STUDENT IN AN ORGANIZED HEALTH CARE EDUCATION/TRAINING PROGRAM

## 2019-12-08 PROCEDURE — 71045 X-RAY EXAM CHEST 1 VIEW: CPT

## 2019-12-08 PROCEDURE — 25800030 ZZH RX IP 258 OP 636: Performed by: NURSE PRACTITIONER

## 2019-12-08 PROCEDURE — 25000125 ZZHC RX 250: Performed by: NURSE PRACTITIONER

## 2019-12-08 PROCEDURE — 84132 ASSAY OF SERUM POTASSIUM: CPT | Performed by: NEUROLOGICAL SURGERY

## 2019-12-08 PROCEDURE — 80048 BASIC METABOLIC PNL TOTAL CA: CPT | Performed by: STUDENT IN AN ORGANIZED HEALTH CARE EDUCATION/TRAINING PROGRAM

## 2019-12-08 PROCEDURE — 85025 COMPLETE CBC W/AUTO DIFF WBC: CPT | Performed by: STUDENT IN AN ORGANIZED HEALTH CARE EDUCATION/TRAINING PROGRAM

## 2019-12-08 RX ORDER — POLYETHYLENE GLYCOL 3350 17 G/17G
17 POWDER, FOR SOLUTION ORAL 2 TIMES DAILY
Status: DISCONTINUED | OUTPATIENT
Start: 2019-12-08 | End: 2019-12-11

## 2019-12-08 RX ORDER — MINERAL OIL 100 G/100G
1 OIL RECTAL ONCE
Status: COMPLETED | OUTPATIENT
Start: 2019-12-08 | End: 2019-12-08

## 2019-12-08 RX ADMIN — ACETYLCYSTEINE 4 ML: 100 INHALANT RESPIRATORY (INHALATION) at 16:32

## 2019-12-08 RX ADMIN — DEXMEDETOMIDINE 0.6 MCG/KG/HR: 100 INJECTION, SOLUTION, CONCENTRATE INTRAVENOUS at 11:33

## 2019-12-08 RX ADMIN — ACETYLCYSTEINE 4 ML: 100 INHALANT RESPIRATORY (INHALATION) at 07:49

## 2019-12-08 RX ADMIN — FAMOTIDINE 20 MG: 40 POWDER, FOR SUSPENSION ORAL at 20:30

## 2019-12-08 RX ADMIN — SENNOSIDES AND DOCUSATE SODIUM 1 TABLET: 8.6; 5 TABLET ORAL at 08:33

## 2019-12-08 RX ADMIN — AMPICILLIN AND SULBACTAM 3 G: 1; 2 INJECTION, POWDER, FOR SOLUTION INTRAMUSCULAR; INTRAVENOUS at 08:34

## 2019-12-08 RX ADMIN — Medication 5000 UNITS: at 08:34

## 2019-12-08 RX ADMIN — POTASSIUM CHLORIDE 40 MEQ: 1.5 POWDER, FOR SOLUTION ORAL at 06:41

## 2019-12-08 RX ADMIN — POTASSIUM PHOSPHATE, MONOBASIC AND POTASSIUM PHOSPHATE, DIBASIC 15 MMOL: 224; 236 INJECTION, SOLUTION INTRAVENOUS at 08:33

## 2019-12-08 RX ADMIN — BUMETANIDE 2 MG: 2 TABLET ORAL at 20:28

## 2019-12-08 RX ADMIN — MULTIVITAMIN 15 ML: LIQUID ORAL at 20:28

## 2019-12-08 RX ADMIN — IPRATROPIUM BROMIDE AND ALBUTEROL SULFATE 3 ML: .5; 3 SOLUTION RESPIRATORY (INHALATION) at 16:32

## 2019-12-08 RX ADMIN — FAMOTIDINE 20 MG: 40 POWDER, FOR SUSPENSION ORAL at 09:07

## 2019-12-08 RX ADMIN — SENNOSIDES AND DOCUSATE SODIUM 1 TABLET: 8.6; 5 TABLET ORAL at 20:28

## 2019-12-08 RX ADMIN — POLYETHYLENE GLYCOL (3350) 17 G: 17 POWDER, FOR SOLUTION ORAL at 20:29

## 2019-12-08 RX ADMIN — POTASSIUM CHLORIDE 20 MEQ: 1.5 POWDER, FOR SOLUTION ORAL at 08:33

## 2019-12-08 RX ADMIN — Medication 100 MG: at 08:33

## 2019-12-08 RX ADMIN — MINERAL OIL 1 ENEMA: 118 ENEMA RECTAL at 08:00

## 2019-12-08 RX ADMIN — IPRATROPIUM BROMIDE AND ALBUTEROL SULFATE 3 ML: .5; 3 SOLUTION RESPIRATORY (INHALATION) at 13:12

## 2019-12-08 RX ADMIN — AMPICILLIN AND SULBACTAM 3 G: 1; 2 INJECTION, POWDER, FOR SOLUTION INTRAMUSCULAR; INTRAVENOUS at 20:30

## 2019-12-08 RX ADMIN — SODIUM PHOSPHATE 1 ENEMA: 7; 19 ENEMA RECTAL at 08:00

## 2019-12-08 RX ADMIN — AMLODIPINE BESYLATE 5 MG: 5 TABLET ORAL at 08:33

## 2019-12-08 RX ADMIN — ACETAMINOPHEN 650 MG: 325 SOLUTION ORAL at 02:07

## 2019-12-08 RX ADMIN — ACETYLCYSTEINE 4 ML: 100 INHALANT RESPIRATORY (INHALATION) at 19:33

## 2019-12-08 RX ADMIN — AMPICILLIN AND SULBACTAM 3 G: 1; 2 INJECTION, POWDER, FOR SOLUTION INTRAMUSCULAR; INTRAVENOUS at 02:07

## 2019-12-08 RX ADMIN — Medication 1 MG: at 23:12

## 2019-12-08 RX ADMIN — DEXMEDETOMIDINE 0.6 MCG/KG/HR: 100 INJECTION, SOLUTION, CONCENTRATE INTRAVENOUS at 05:36

## 2019-12-08 RX ADMIN — Medication 5000 UNITS: at 20:29

## 2019-12-08 RX ADMIN — POTASSIUM CHLORIDE 20 MEQ: 1.5 POWDER, FOR SOLUTION ORAL at 17:27

## 2019-12-08 RX ADMIN — BUMETANIDE 2 MG: 2 TABLET ORAL at 08:33

## 2019-12-08 RX ADMIN — POLYETHYLENE GLYCOL 3350 17 G: 17 POWDER, FOR SOLUTION ORAL at 08:33

## 2019-12-08 RX ADMIN — IPRATROPIUM BROMIDE AND ALBUTEROL SULFATE 3 ML: .5; 3 SOLUTION RESPIRATORY (INHALATION) at 19:33

## 2019-12-08 RX ADMIN — IPRATROPIUM BROMIDE AND ALBUTEROL SULFATE 3 ML: .5; 3 SOLUTION RESPIRATORY (INHALATION) at 07:49

## 2019-12-08 RX ADMIN — AMPICILLIN AND SULBACTAM 3 G: 1; 2 INJECTION, POWDER, FOR SOLUTION INTRAMUSCULAR; INTRAVENOUS at 14:12

## 2019-12-08 RX ADMIN — FOLIC ACID 1 MG: 1 TABLET ORAL at 08:33

## 2019-12-08 RX ADMIN — MIRTAZAPINE 15 MG: 15 TABLET, FILM COATED ORAL at 20:28

## 2019-12-08 ASSESSMENT — MIFFLIN-ST. JEOR: SCORE: 1661.5

## 2019-12-08 ASSESSMENT — ACTIVITIES OF DAILY LIVING (ADL)
ADLS_ACUITY_SCORE: 27
ADLS_ACUITY_SCORE: 27
ADLS_ACUITY_SCORE: 26
ADLS_ACUITY_SCORE: 27

## 2019-12-08 NOTE — PLAN OF CARE
Major Shift Events:  Precedex @ 0.6. TF @ goal. Degroot in place, adequate output. Denies pain. T-max 102.8, pan-cultured.   Plan: Continue plan of care.  For vital signs and complete assessments, please see documentation flowsheets.

## 2019-12-08 NOTE — PLAN OF CARE
Major Shift Events:  up out of bed , lift to chair. Cannot move legs - cannot feel sensation to lower extremities.  Gross motor to hands - arms - DR aware, sats low 90s on 40% vent-  suction large amts secretions. - un able to pressure support with sats low . Neuro surg. Aware.  Plan: wean tomorrow  if anle - good pulm toilet-  suction often  For vital signs and complete assessments, please see documentation flowsheets.

## 2019-12-08 NOTE — PROGRESS NOTES
Neurosurgery Progress Note    Subjective:  Fever overnight. Pan-cultured.    Objective:  Intubated, partially sedated  Awake, alert, nods yes or no to questions  Follows commands  Deltoids 4/5, biceps 4/5, triceps 1/5,  0/5, lower extremities 0/5    Assessment:  64 years old gentleman status post C3-C4 artificial disc placement about 2 weeks ago presents after a fall with almost complete loss of strength in his upper extremities and paraplegia, found to have cervical stenosis. S/p OR 12/1 - Pharyngotomy closed with assistance from ENT. Arthroplasty implant removed. No replacement implant placed. S/p G-tube 12/5. Extubated 12/5. Re-Intubated 12/6 due to Mucous Plugging and Atelectasis.    Plan:  Intubated, mechanically ventilated  Nebs, wean to extubate as able  DVT: SCDs while in bed  ERVIN drain per ENT  Giles J collar  Per ID: Unasyn 4-6 wks for vertebral phlegmon, weekly labs  Follow-up cultures  SubQ heparin, tube feeds  MRI before stopping abx in 6 wks  Disposition: 4A      -----------------------------------  Bijan Dukes MD, MS  Neurosurgery PGY-3    Please contact neurosurgery resident on call with questions.    Dial * [phone], enter [phone] when prompted.

## 2019-12-08 NOTE — PROGRESS NOTES
"Neuroscience Intensive Care Progress Note  12/08/2019    Sherwin Keny Angel is a 64 year old year old male with hx HTN, DM and cervical stenosis s/p recent anterior C3-4 arthroplasty (2 weeks prior) who was admitted on 12/1/2019 with bilateral lower extremity paraplegia and near loss of upper extremity movement, after a fall at rehab. Prior to the fall, he was able to ambulate with a cane. Taken to the OR for arthroplasty implant removal on 12/1. Found to have significant inflammation and 2 pharyngotomy sites. ENT closed 1 (unable to reach other) and drain placed for the phlegmon. Now on abx for cervical tissue + culture. S/P gtube placement 12/5 due to anticipated prolonged period of NPO. Extubated succesfully on 12/5, however, had worsening respiratory status with worsening white out of R lung concerning for mucus plugging as pt could not cough up secretions due to diaphragmatic weakness, subsequently re-intubated 12/6.     Problem List  1. Cervical stenosis s/p C3-C4 arthroplasty   2. Cervical spinal injury due to fall, with C3-C5 fracture   3. Acute respiratory failure s/p re-intubation  4. Leukocytosis  5. Vertebral phlegmon vs abscess s/p drain placement on 12/1, cultures +  6. Hx of hypertension  7. Hx of DM  8. Alcohol dependence  9. Secretions  10. Normocytic anemia  11. G-tube placement 12/5    24 hour events:  - CXR this AM showing near complete resolution of previously demonstrated right lung  Opacities  - He is currently on precedex gtt at 0.6  - Tmax 102.8,  - 160  - Glucose running high 210-250s      24 Hour Vital Signs Summary:  BP Readings from Last 1 Encounters:   12/08/19 (!) 158/74     Pulse Readings from Last 1 Encounters:   12/08/19 76     Wt Readings from Last 1 Encounters:   12/08/19 89.2 kg (196 lb 10.4 oz)     Ht Readings from Last 1 Encounters:   12/01/19 1.727 m (5' 8\")     Estimated body mass index is 29.9 kg/m  as calculated from the following:    Height as of this encounter: " "1.727 m (5' 8\").    Weight as of this encounter: 89.2 kg (196 lb 10.4 oz).    Temp Readings from Last 1 Encounters:   12/08/19 101.6  F (38.7  C) (Axillary)         Ventilator Settings  Ventilation Mode: CMV/AC  (Continuous Mandatory Ventilation/ Assist Control)  FiO2 (%): 50 %  Rate Set (breaths/minute): 12 breaths/min  Tidal Volume Set (mL): 450 mL  PEEP (cm H2O): 5 cmH2O  Pressure Support (cm H2O): 7 cmH2O  Oxygen Concentration (%): 50 %  Peak Inspiratory Pressure (cm H2O) (Drager Eliana): 20  Resp: 22      Intake/Output    Intake/Output Summary (Last 24 hours) at 12/8/2019 0817  Last data filed at 12/8/2019 0600  Gross per 24 hour   Intake 1475.96 ml   Output 2450 ml   Net -974.04 ml         BP - Mean:  [] 112    Current Medications:    acetylcysteine  4 mL Nebulization 3 times daily     amLODIPine  5 mg Oral or Feeding Tube Daily     ampicillin-sulbactam (UNASYN) IV  3 g Intravenous Q6H     bumetanide  2 mg Oral or Feeding Tube BID     cyanocobalamin  1,000 mcg Intramuscular Q30 Days     famotidine  20 mg Per G Tube BID     folic acid  1 mg Oral or Feeding Tube Daily     heparin ANTICOAGULANT  5,000 Units Subcutaneous Q12H     heparin lock flush  5-10 mL Intracatheter Q24H     insulin aspart  1-12 Units Subcutaneous Q4H     insulin glargine  10 Units Subcutaneous At Bedtime     ipratropium - albuterol 0.5 mg/2.5 mg/3 mL  3 mL Nebulization 4x daily     mineral oil  1 enema Rectal Once     mirtazapine  15 mg Oral or Feeding Tube QPM     multivitamins w/minerals  15 mL Per Feeding Tube Daily     polyethylene glycol  17 g Oral or Feeding Tube BID     senna-docusate  1 tablet Oral or Feeding Tube BID     sodium phosphate  1 enema Rectal Daily     thiamine  100 mg Oral or Feeding Tube Daily       PRN Medications:  acetaminophen, albuterol, bisacodyl, carboxymethylcellulose PF, IV fluid REPLACEMENT ONLY, glucose **OR** dextrose **OR** glucagon, fentaNYL, heparin lock flush, hydrALAZINE, lidocaine 4%, lidocaine " "(buffered or not buffered), magnesium sulfate, magnesium sulfate, melatonin, naloxone, ondansetron **OR** ondansetron, oxyCODONE, potassium chloride, potassium chloride with lidocaine, potassium chloride, potassium chloride, potassium chloride, potassium phosphate (KPHOS) in D5W IV, potassium phosphate (KPHOS) in D5W IV, potassium phosphate (KPHOS) in D5W IV, potassium phosphate (KPHOS) in D5W IV, potassium phosphate (KPHOS) in D5W IV, prochlorperazine **OR** prochlorperazine **OR** prochlorperazine, sodium chloride (PF)    Infusions:    dexmedetomidine 0.6 mcg/kg/hr (12/08/19 0900)     IV fluid REPLACEMENT ONLY         No Known Allergies    Physical Examination:   Vitals: BP (!) 158/74   Pulse 76   Temp 101.6  F (38.7  C) (Axillary)   Resp 22   Ht 1.727 m (5' 8\")   Wt 89.2 kg (196 lb 10.4 oz)   SpO2 90%   BMI 29.90 kg/m    General: Adult male, collar in place  HEENT: Normocephalic.   CV: RRR  Resp: intubated  Abd: Soft, distended  Skin: Warm, dry   Neuro: intubated, on precedex. Awake and alert. Responds to questions with nodding. Follows commands. EOMI. Eyes are conjugate. Continues to moves both arms anti-gravity at elbows brisky, weaker in RUE. No movement in R fingers, subtle movement in L fingers. No movement in bilateral lower extremities. Sensation intact in bilateral upper extremities proximately and distally along entire arm. Decreased sensation over trunk and lower extremities starting at T4 dermatome and below.    Labs:  Recent Labs   Lab Test 12/07/19  0330 12/06/19  1828 12/06/19  0434 12/05/19  0340    138 136 140   POTASSIUM 3.8 3.8 4.1 4.1   CHLORIDE 100 99 100 106   CO2 34* 34* 31 30   ANIONGAP 5 4 5 3   * 145* 154* 138*   BUN 25 20 14 19   CR 0.74 0.57* 0.44* 0.50*   FLORINDA 8.4* 8.7 8.6 8.1*   WBC 10.2  --  9.8 8.8   RBC 3.26*  --  3.54* 2.81*   HGB 9.4*  --  10.3* 8.3*     --  419 390     Imaging:   CXR:   1. Endotracheal tube tip projects 2.0 cm above the joby.  2. " Improved aeration of the right lung since the prior exam, although residual diffuse opacities remain.  3. Increased streaky left hilar and basilar opacities, favored to represent atelectasis.     Assessment/Plan:  Sherwin Angel is a 64 year old with hx of recent of anterior C3-4 arthroplasty ([facility]) who was admitted 12/1/2019 after a fall at rehab, resulting in lower extremity paraplegia. Found to have cervical stenosis. Now s/p pharyngotomy closure on 12/1. Arthroplasty implant removed with no replacement implant placed. He was on broad spectrum abx for + cervical tissue cultures with ID following, narrowed to Unasyn on 12/4.      Neuro:  #Cervical spinal cord injury  - Collar at all times, Up with assist  - Neuro checks q4h  - Discussed plans with NSG and ENT, no plans for OR for now  - PT/OT     #Sedation/Pain  - Precedex gtt  - Oxycodone PRN  - Fentanyl PRN     #Hx of Alcohol dependence  - CIWA  - Thiamine & folate daily    #Depression  - Continue PTA miratazepine     Resp:  #Acute respiratory failure s/p re-intubation 12/6  #Mucous plugging; right lung- resolved  - CXR 12/8 with near complete resolution of previously demonstrated right lung opacities  - Will likely need trach as pt had weak cough 2/2 diaphragmatic weakness, will discuss with ENT, mentioned to pt and he prefers to get trach instead of trying another extubation  - Continue PTA bumex 2 mg    #Secretions  - difficulty clearing, thick white secretions  - Mucomyst & duoneb q4h scheduled  - Bronchial hygiene per RT  - frequent suctioning      Cardio:  #Hx of hypertension   - Goal: Normotension, SBP < 160  - Resume PTA amlodipine 10 mg   - Hold PTA Metoprolol to avoid bradycardia with ongoing precedex gtt; if HR ok tomorrow, we may consider resuming it   - Hydralazine PRN      Renal:  No active issues.  - Continue PTA bumex  - Electrolyte replacement protocol   - Monitor I/O    Endo:  #DM2  - Sliding scale insulin, high dose  -  "Increasing lantus 10 units  - Monitor BG     Heme:  #Normocytic anemia, stable  - Monitor CBC     - Hg > 7.0   - Plt > 100k   - INR <1.5      GI:  No active issues.     #Constipation  - Miralax & Senna scheduled at [facility]  - PRN suppository/enema available     #Diet  - Per ENT, anticipate prolonged duration of NPO (at least 2 weeks)   - G-tube placed 12/5  - Tube feeds at goal, tolerating well  - Nutrition following, follow up tube feed recs     ID:  #Leukocytosis, resolved  - Stopped Vancomycin, metronidazole, cefepime on 12/4  - Continue Unasyn per ID (end date 12/2-1/12): Discussed with ID after spiking fevers: recs if another fever spike- switch unasyn to zosyn  - Weekly CBC, CMP, CRP while on IV abx, fax to ID clinic attn: Charissa  - Blood cultures (12/1 & 12/3): NGTD  - Spinal tissue cultures (12/1): MSSA, Strep anginosus, Eikenella, and oral anaerobes and multiple more  - Follow up in ID clinic near end of abx course, ID to arrange for this  - Will page ID attending at [id number] prior to discharging pt     Lines:  -ETT  - PICC  - PIV x 2  - Degroot   - EVRIN drain in neck  - NG     FEN: NPO, on tube feeds  PPX:    DVT prophylaxis: SCDs, Heparin subq    GI:  Pantoprazole  Code Status: Full, presumed     Dispo: 4A until stable, may need trach     The patient was seen and discussed with the attending, Dr. Clark.     Chalino Hargrove MD  Fellow, Vascular Neurology  Text Page    To page stroke neurology after hours through [facility], click here: [facility]  Choose \"On Call\" tab at top, then search dropdown box for \"Neurology Adult\"    "

## 2019-12-09 ENCOUNTER — APPOINTMENT (OUTPATIENT)
Dept: PHYSICAL THERAPY | Facility: CLINIC | Age: 64
DRG: 003 | End: 2019-12-09
Attending: NEUROLOGICAL SURGERY
Payer: COMMERCIAL

## 2019-12-09 ENCOUNTER — ANESTHESIA EVENT (OUTPATIENT)
Dept: INTENSIVE CARE | Facility: CLINIC | Age: 64
DRG: 003 | End: 2019-12-09
Payer: COMMERCIAL

## 2019-12-09 PROBLEM — J96.01 ACUTE RESPIRATORY FAILURE WITH HYPOXIA (H): Status: ACTIVE | Noted: 2019-11-30

## 2019-12-09 LAB
ANION GAP SERPL CALCULATED.3IONS-SCNC: 3 MMOL/L (ref 3–14)
BACTERIA SPEC CULT: NO GROWTH
BACTERIA SPEC CULT: NO GROWTH
BASOPHILS # BLD AUTO: 0 10E9/L (ref 0–0.2)
BASOPHILS NFR BLD AUTO: 0.2 %
BUN SERPL-MCNC: 42 MG/DL (ref 7–30)
CALCIUM SERPL-MCNC: 8.1 MG/DL (ref 8.5–10.1)
CHLORIDE SERPL-SCNC: 109 MMOL/L (ref 94–109)
CO2 SERPL-SCNC: 34 MMOL/L (ref 20–32)
CREAT SERPL-MCNC: 0.74 MG/DL (ref 0.66–1.25)
DIFFERENTIAL METHOD BLD: ABNORMAL
EOSINOPHIL # BLD AUTO: 0 10E9/L (ref 0–0.7)
EOSINOPHIL NFR BLD AUTO: 0.4 %
ERYTHROCYTE [DISTWIDTH] IN BLOOD BY AUTOMATED COUNT: 12.9 % (ref 10–15)
GFR SERPL CREATININE-BSD FRML MDRD: >90 ML/MIN/{1.73_M2}
GLUCOSE BLDC GLUCOMTR-MCNC: 227 MG/DL (ref 70–99)
GLUCOSE BLDC GLUCOMTR-MCNC: 243 MG/DL (ref 70–99)
GLUCOSE BLDC GLUCOMTR-MCNC: 248 MG/DL (ref 70–99)
GLUCOSE BLDC GLUCOMTR-MCNC: 251 MG/DL (ref 70–99)
GLUCOSE BLDC GLUCOMTR-MCNC: 273 MG/DL (ref 70–99)
GLUCOSE BLDC GLUCOMTR-MCNC: 274 MG/DL (ref 70–99)
GLUCOSE BLDC GLUCOMTR-MCNC: 290 MG/DL (ref 70–99)
GLUCOSE SERPL-MCNC: 269 MG/DL (ref 70–99)
HCT VFR BLD AUTO: 30.9 % (ref 40–53)
HGB BLD-MCNC: 9.5 G/DL (ref 13.3–17.7)
IMM GRANULOCYTES # BLD: 0.2 10E9/L (ref 0–0.4)
IMM GRANULOCYTES NFR BLD: 1.8 %
LYMPHOCYTES # BLD AUTO: 1.7 10E9/L (ref 0.8–5.3)
LYMPHOCYTES NFR BLD AUTO: 19.6 %
MAGNESIUM SERPL-MCNC: 2.3 MG/DL (ref 1.6–2.3)
MCH RBC QN AUTO: 29.1 PG (ref 26.5–33)
MCHC RBC AUTO-ENTMCNC: 30.7 G/DL (ref 31.5–36.5)
MCV RBC AUTO: 95 FL (ref 78–100)
MONOCYTES # BLD AUTO: 0.9 10E9/L (ref 0–1.3)
MONOCYTES NFR BLD AUTO: 10.8 %
NEUTROPHILS # BLD AUTO: 5.7 10E9/L (ref 1.6–8.3)
NEUTROPHILS NFR BLD AUTO: 67.2 %
NRBC # BLD AUTO: 0 10*3/UL
NRBC BLD AUTO-RTO: 0 /100
PHOSPHATE SERPL-MCNC: 3.2 MG/DL (ref 2.5–4.5)
PLATELET # BLD AUTO: 380 10E9/L (ref 150–450)
POTASSIUM SERPL-SCNC: 3.6 MMOL/L (ref 3.4–5.3)
RBC # BLD AUTO: 3.27 10E12/L (ref 4.4–5.9)
SODIUM SERPL-SCNC: 146 MMOL/L (ref 133–144)
SPECIMEN SOURCE: NORMAL
SPECIMEN SOURCE: NORMAL
WBC # BLD AUTO: 8.4 10E9/L (ref 4–11)

## 2019-12-09 PROCEDURE — 80048 BASIC METABOLIC PNL TOTAL CA: CPT | Performed by: STUDENT IN AN ORGANIZED HEALTH CARE EDUCATION/TRAINING PROGRAM

## 2019-12-09 PROCEDURE — 25000125 ZZHC RX 250: Performed by: STUDENT IN AN ORGANIZED HEALTH CARE EDUCATION/TRAINING PROGRAM

## 2019-12-09 PROCEDURE — 40000275 ZZH STATISTIC RCP TIME EA 10 MIN

## 2019-12-09 PROCEDURE — 94003 VENT MGMT INPAT SUBQ DAY: CPT

## 2019-12-09 PROCEDURE — 25000128 H RX IP 250 OP 636: Performed by: STUDENT IN AN ORGANIZED HEALTH CARE EDUCATION/TRAINING PROGRAM

## 2019-12-09 PROCEDURE — 25000131 ZZH RX MED GY IP 250 OP 636 PS 637: Performed by: NURSE PRACTITIONER

## 2019-12-09 PROCEDURE — 20000004 ZZH R&B ICU UMMC

## 2019-12-09 PROCEDURE — 25000132 ZZH RX MED GY IP 250 OP 250 PS 637: Performed by: PSYCHIATRY & NEUROLOGY

## 2019-12-09 PROCEDURE — 25000132 ZZH RX MED GY IP 250 OP 250 PS 637: Performed by: STUDENT IN AN ORGANIZED HEALTH CARE EDUCATION/TRAINING PROGRAM

## 2019-12-09 PROCEDURE — 25000125 ZZHC RX 250: Performed by: NURSE PRACTITIONER

## 2019-12-09 PROCEDURE — 94640 AIRWAY INHALATION TREATMENT: CPT | Mod: 76

## 2019-12-09 PROCEDURE — 84100 ASSAY OF PHOSPHORUS: CPT | Performed by: STUDENT IN AN ORGANIZED HEALTH CARE EDUCATION/TRAINING PROGRAM

## 2019-12-09 PROCEDURE — 83735 ASSAY OF MAGNESIUM: CPT | Performed by: STUDENT IN AN ORGANIZED HEALTH CARE EDUCATION/TRAINING PROGRAM

## 2019-12-09 PROCEDURE — 25000128 H RX IP 250 OP 636: Performed by: NURSE PRACTITIONER

## 2019-12-09 PROCEDURE — 27210437 ZZH NUTRITION PRODUCT SEMIELEM INTERMED LITER

## 2019-12-09 PROCEDURE — 25000125 ZZHC RX 250: Performed by: CLINICAL NURSE SPECIALIST

## 2019-12-09 PROCEDURE — 25000128 H RX IP 250 OP 636: Performed by: PSYCHIATRY & NEUROLOGY

## 2019-12-09 PROCEDURE — 25800025 ZZH RX 258: Performed by: STUDENT IN AN ORGANIZED HEALTH CARE EDUCATION/TRAINING PROGRAM

## 2019-12-09 PROCEDURE — 25800030 ZZH RX IP 258 OP 636: Performed by: NURSE PRACTITIONER

## 2019-12-09 PROCEDURE — 00000146 ZZHCL STATISTIC GLUCOSE BY METER IP

## 2019-12-09 PROCEDURE — 25000132 ZZH RX MED GY IP 250 OP 250 PS 637: Performed by: NEUROLOGICAL SURGERY

## 2019-12-09 PROCEDURE — 94640 AIRWAY INHALATION TREATMENT: CPT

## 2019-12-09 PROCEDURE — 85025 COMPLETE CBC W/AUTO DIFF WBC: CPT | Performed by: STUDENT IN AN ORGANIZED HEALTH CARE EDUCATION/TRAINING PROGRAM

## 2019-12-09 PROCEDURE — 97110 THERAPEUTIC EXERCISES: CPT | Mod: GP

## 2019-12-09 RX ORDER — FENTANYL CITRATE 50 UG/ML
25-50 INJECTION, SOLUTION INTRAMUSCULAR; INTRAVENOUS
Status: CANCELLED | OUTPATIENT
Start: 2019-12-09

## 2019-12-09 RX ORDER — SODIUM CHLORIDE, SODIUM LACTATE, POTASSIUM CHLORIDE, CALCIUM CHLORIDE 600; 310; 30; 20 MG/100ML; MG/100ML; MG/100ML; MG/100ML
INJECTION, SOLUTION INTRAVENOUS CONTINUOUS
Status: CANCELLED | OUTPATIENT
Start: 2019-12-09

## 2019-12-09 RX ORDER — ALBUTEROL SULFATE 0.83 MG/ML
2.5 SOLUTION RESPIRATORY (INHALATION)
Status: DISCONTINUED | OUTPATIENT
Start: 2019-12-09 | End: 2019-12-15

## 2019-12-09 RX ORDER — ONDANSETRON 4 MG/1
4 TABLET, ORALLY DISINTEGRATING ORAL EVERY 30 MIN PRN
Status: CANCELLED | OUTPATIENT
Start: 2019-12-09

## 2019-12-09 RX ORDER — ACETYLCYSTEINE 100 MG/ML
4 SOLUTION ORAL; RESPIRATORY (INHALATION)
Status: DISCONTINUED | OUTPATIENT
Start: 2019-12-09 | End: 2019-12-11

## 2019-12-09 RX ORDER — PIPERACILLIN SODIUM, TAZOBACTAM SODIUM 4; .5 G/20ML; G/20ML
4.5 INJECTION, POWDER, LYOPHILIZED, FOR SOLUTION INTRAVENOUS EVERY 6 HOURS
Status: DISCONTINUED | OUTPATIENT
Start: 2019-12-09 | End: 2019-12-11

## 2019-12-09 RX ORDER — NALOXONE HYDROCHLORIDE 0.4 MG/ML
.1-.4 INJECTION, SOLUTION INTRAMUSCULAR; INTRAVENOUS; SUBCUTANEOUS
Status: CANCELLED | OUTPATIENT
Start: 2019-12-09 | End: 2019-12-10

## 2019-12-09 RX ORDER — IPRATROPIUM BROMIDE AND ALBUTEROL SULFATE 2.5; .5 MG/3ML; MG/3ML
3 SOLUTION RESPIRATORY (INHALATION) EVERY 4 HOURS PRN
Status: DISCONTINUED | OUTPATIENT
Start: 2019-12-09 | End: 2019-12-31 | Stop reason: HOSPADM

## 2019-12-09 RX ORDER — HYDROMORPHONE HYDROCHLORIDE 1 MG/ML
.3-.5 INJECTION, SOLUTION INTRAMUSCULAR; INTRAVENOUS; SUBCUTANEOUS EVERY 5 MIN PRN
Status: CANCELLED | OUTPATIENT
Start: 2019-12-09

## 2019-12-09 RX ORDER — ONDANSETRON 2 MG/ML
4 INJECTION INTRAMUSCULAR; INTRAVENOUS EVERY 30 MIN PRN
Status: CANCELLED | OUTPATIENT
Start: 2019-12-09

## 2019-12-09 RX ADMIN — DEXMEDETOMIDINE 0.6 MCG/KG/HR: 100 INJECTION, SOLUTION, CONCENTRATE INTRAVENOUS at 05:40

## 2019-12-09 RX ADMIN — DEXMEDETOMIDINE 0.6 MCG/KG/HR: 100 INJECTION, SOLUTION, CONCENTRATE INTRAVENOUS at 11:29

## 2019-12-09 RX ADMIN — ACETYLCYSTEINE 4 ML: 100 INHALANT RESPIRATORY (INHALATION) at 09:05

## 2019-12-09 RX ADMIN — INSULIN HUMAN 10 UNITS: 100 INJECTION, SUSPENSION SUBCUTANEOUS at 12:41

## 2019-12-09 RX ADMIN — PIPERACILLIN SODIUM AND TAZOBACTAM SODIUM 4.5 G: 4; .5 INJECTION, POWDER, LYOPHILIZED, FOR SOLUTION INTRAVENOUS at 10:01

## 2019-12-09 RX ADMIN — BUMETANIDE 2 MG: 2 TABLET ORAL at 19:38

## 2019-12-09 RX ADMIN — AMPICILLIN AND SULBACTAM 3 G: 1; 2 INJECTION, POWDER, FOR SOLUTION INTRAMUSCULAR; INTRAVENOUS at 02:18

## 2019-12-09 RX ADMIN — CARBOXYMETHYLCELLULOSE SODIUM 1 DROP: 5 SOLUTION/ DROPS OPHTHALMIC at 20:48

## 2019-12-09 RX ADMIN — PIPERACILLIN SODIUM AND TAZOBACTAM SODIUM 4.5 G: 4; .5 INJECTION, POWDER, LYOPHILIZED, FOR SOLUTION INTRAVENOUS at 15:39

## 2019-12-09 RX ADMIN — Medication 100 MG: at 09:31

## 2019-12-09 RX ADMIN — OXYCODONE HYDROCHLORIDE 10 MG: 5 SOLUTION ORAL at 19:47

## 2019-12-09 RX ADMIN — HYDRALAZINE HYDROCHLORIDE 10 MG: 20 INJECTION INTRAMUSCULAR; INTRAVENOUS at 00:07

## 2019-12-09 RX ADMIN — ACETAMINOPHEN 650 MG: 325 SOLUTION ORAL at 14:14

## 2019-12-09 RX ADMIN — PIPERACILLIN SODIUM AND TAZOBACTAM SODIUM 4.5 G: 4; .5 INJECTION, POWDER, LYOPHILIZED, FOR SOLUTION INTRAVENOUS at 21:05

## 2019-12-09 RX ADMIN — DEXMEDETOMIDINE 0.6 MCG/KG/HR: 100 INJECTION, SOLUTION, CONCENTRATE INTRAVENOUS at 19:38

## 2019-12-09 RX ADMIN — FOLIC ACID 1 MG: 1 TABLET ORAL at 09:31

## 2019-12-09 RX ADMIN — ACETAMINOPHEN 650 MG: 325 SOLUTION ORAL at 10:01

## 2019-12-09 RX ADMIN — ACETYLCYSTEINE 4 ML: 100 INHALANT RESPIRATORY (INHALATION) at 15:56

## 2019-12-09 RX ADMIN — OXYCODONE HYDROCHLORIDE 10 MG: 5 SOLUTION ORAL at 10:51

## 2019-12-09 RX ADMIN — AMLODIPINE BESYLATE 5 MG: 5 TABLET ORAL at 09:30

## 2019-12-09 RX ADMIN — MIRTAZAPINE 15 MG: 15 TABLET, FILM COATED ORAL at 19:38

## 2019-12-09 RX ADMIN — Medication 5000 UNITS: at 09:31

## 2019-12-09 RX ADMIN — ALBUTEROL SULFATE 2.5 MG: 2.5 SOLUTION RESPIRATORY (INHALATION) at 20:02

## 2019-12-09 RX ADMIN — POLYETHYLENE GLYCOL (3350) 17 G: 17 POWDER, FOR SOLUTION ORAL at 19:38

## 2019-12-09 RX ADMIN — BUMETANIDE 2 MG: 2 TABLET ORAL at 09:30

## 2019-12-09 RX ADMIN — IPRATROPIUM BROMIDE AND ALBUTEROL SULFATE 3 ML: .5; 3 SOLUTION RESPIRATORY (INHALATION) at 09:05

## 2019-12-09 RX ADMIN — SENNOSIDES AND DOCUSATE SODIUM 1 TABLET: 8.6; 5 TABLET ORAL at 19:38

## 2019-12-09 RX ADMIN — ALBUTEROL SULFATE 2.5 MG: 2.5 SOLUTION RESPIRATORY (INHALATION) at 15:56

## 2019-12-09 RX ADMIN — FENTANYL CITRATE 25 MCG: 50 INJECTION, SOLUTION INTRAMUSCULAR; INTRAVENOUS at 14:13

## 2019-12-09 RX ADMIN — FAMOTIDINE 20 MG: 40 POWDER, FOR SUSPENSION ORAL at 09:32

## 2019-12-09 RX ADMIN — ACETAMINOPHEN 650 MG: 325 SOLUTION ORAL at 00:17

## 2019-12-09 RX ADMIN — FAMOTIDINE 20 MG: 40 POWDER, FOR SUSPENSION ORAL at 21:05

## 2019-12-09 RX ADMIN — ACETYLCYSTEINE 4 ML: 100 INHALANT RESPIRATORY (INHALATION) at 20:02

## 2019-12-09 RX ADMIN — OXYCODONE HYDROCHLORIDE 10 MG: 5 SOLUTION ORAL at 02:24

## 2019-12-09 RX ADMIN — MULTIVITAMIN 15 ML: LIQUID ORAL at 19:38

## 2019-12-09 RX ADMIN — Medication 5000 UNITS: at 19:38

## 2019-12-09 RX ADMIN — POTASSIUM CHLORIDE 20 MEQ: 29.8 INJECTION, SOLUTION INTRAVENOUS at 05:42

## 2019-12-09 RX ADMIN — IPRATROPIUM BROMIDE AND ALBUTEROL SULFATE 3 ML: .5; 3 SOLUTION RESPIRATORY (INHALATION) at 11:44

## 2019-12-09 RX ADMIN — OXYCODONE HYDROCHLORIDE 10 MG: 5 SOLUTION ORAL at 15:39

## 2019-12-09 ASSESSMENT — ACTIVITIES OF DAILY LIVING (ADL)
ADLS_ACUITY_SCORE: 27
ADLS_ACUITY_SCORE: 27
ADLS_ACUITY_SCORE: 26

## 2019-12-09 ASSESSMENT — MIFFLIN-ST. JEOR: SCORE: 1627.5

## 2019-12-09 NOTE — PROGRESS NOTES
[facility] ID SERVICE Progress Note     Patient:  Sherwin Angel   Date of birth 1955, Medical record number [id number]  Date of Visit:  12/09/19  Date of Admission: 12/1/2019  Consult Requester:Fernando Ashton MD            Assessment and Recommendations:   ASSESSMENT:  1. Traumatic cervical spinal cord injury s/p C3-4 arthroplasty on 11/15, now removed  2. Cervical stenosis C3-5  3. Vertebral phlegmon vs abscess s/p drain placement on 12/1 - cultures growing MSSA, Strep anginosus, Eikenella, and oral anaerobes  4. Possible osteomyelitis C3-C5  5. Leukocytosis, ongoing fevers      DISCUSSION:  64-year-old man with history of htn, DM2, and recent C3-4 arthroplasty on 11/15 now admitted with new paraplegia after a fall and found to have pharyngotomy and vertebral phlegmon now s/p OR with removal of arthroplasty and placement of drain. On Unasyn -plan was for a 6 week course (to end 1/12/20).     Febrile this am and for the past 48 hours. No localizing findings on exam. Blood cultures, sputum negative. UA negative. Consider broadening antibiotics to Zosyn today to see if fevers stop on this regimen.        RECOMMENDATION:  -Stop Unasyn  -Start Zosyn given ongoing fevers  - follow up pending cultures            Attestation:  I have reviewed today's vital signs, medications, labs and imaging.  Floor time: 25 minutes, Face-to-face time: 10 minutes, Total time: 35 minutes    Joya Carrington MD.  ID Staff  [id number]      ________________________________________________________________           Overnight events:   Patient febrile this am. Denies pain or discomfort.          Review of Systems:   Unable to obtain, patient intubated.         Past Medical History:   Diabetes  Hypertension  C3-C4 arthroplasty on 11/15/19         Past Surgical History:     Past Surgical History:   Procedure Laterality Date     IRRIGATION AND DEBRIDEMENT NECK, COMBINED N/A 12/1/2019    Procedure: IRRIGATION AND  DEBRIDEMENT OF NECK; PHARYNGOTOMY REPAIR.;  Surgeon: Fernando Ashton MD;  Location: [facility] OR     LAPAROSCOPIC ASSISTED INSERTION TUBE GASTROTOMY N/A 12/5/2019    Procedure: laproscopic assisted gastrostomy tube placement;  Surgeon: Luis Santiago MD;  Location: [facility] OR     REMOVE HARDWARE CERVICAL ANTERIOR SPINE N/A 12/1/2019    Procedure: REMOVAL, HARDWARE, SPINE, CERVICAL, ANTERIOR;  Surgeon: Fernando Ashton MD;  Location: [facility] OR            Family History:   History reviewed. No pertinent family history.         Social History:     Social History     Tobacco Use     Smoking status: Not on file   Substance Use Topics     Alcohol use: Not on file     History   Sexual Activity     Sexual activity: Not on file            Current Medications (antimicrobials listed in bold):       acetylcysteine  4 mL Nebulization 3 times daily     amLODIPine  5 mg Oral or Feeding Tube Daily     bumetanide  2 mg Oral or Feeding Tube BID     cyanocobalamin  1,000 mcg Intramuscular Q30 Days     famotidine  20 mg Per G Tube BID     folic acid  1 mg Oral or Feeding Tube Daily     heparin ANTICOAGULANT  5,000 Units Subcutaneous Q12H     heparin lock flush  5-10 mL Intracatheter Q24H     insulin aspart  1-12 Units Subcutaneous Q4H     insulin glargine  15 Units Subcutaneous QPM     insulin isophane human  10 Units Subcutaneous Q24H     ipratropium - albuterol 0.5 mg/2.5 mg/3 mL  3 mL Nebulization 4x daily     mirtazapine  15 mg Oral or Feeding Tube QPM     multivitamins w/minerals  15 mL Per Feeding Tube Daily     piperacillin-tazobactam  4.5 g Intravenous Q6H     polyethylene glycol  17 g Oral or Feeding Tube BID     senna-docusate  1 tablet Oral or Feeding Tube BID     sodium phosphate  1 enema Rectal Daily     thiamine  100 mg Oral or Feeding Tube Daily            Allergies:   No Known Allergies         Physical Exam:     Ranges for his vital signs:   Temp:  [99  F (37.2  C)-101.2  F (38.4  C)] 100.9  F (38.3  C)  Pulse:   [57-87] 64  Heart Rate:  [57-87] 65  Resp:  [0-55] 21  BP: (115-177)/(59-98) 145/72  FiO2 (%):  [50 %] 50 %  SpO2:  [92 %-99 %] 97 %    Physical Examination:  GENERAL: Sitting up in a chair; has a nasal airway in place; some difficulty speaking, but appears in no acute distress.   HEENT:  Head is normocephalic, atraumatic.   EYES:  Eyes have anicteric sclerae without conjunctival injection.    NECK:  Collar in place. Drain with serosanguinous output.  LUNGS:  Coarse breath sounds throughout anterior lung fields.  CARDIOVASCULAR:  Regular rate and rhythm with no murmurs, gallops or rubs.  ABDOMEN:  Normal bowel sounds, soft, nontender. No appreciable hepatosplenomegaly  SKIN:  No acute rashes.  Lines are in place without any surrounding erythema or exudate. No stigmata of endocarditis.           Laboratory Data:     Inflammatory Markers    Recent Labs   Lab Test 12/08/19  0348 12/05/19  0340 12/02/19  0342 12/01/19  0112   CRP 56.0* 130.0* 220.0* 170.0*       Hematology Studies    Recent Labs   Lab Test 12/09/19  0409 12/08/19  0348 12/07/19  0330 12/06/19  0434 12/05/19  0340 12/04/19  0336   WBC 8.4 8.7 10.2 9.8 8.8 12.0*   ANEU 5.7 6.0 7.8 8.0 6.5 9.9*   AEOS 0.0 0.0 0.1 0.1 0.2 0.2   HGB 9.5* 9.6* 9.4* 10.3* 8.3* 8.2*   MCV 95 93 94 92 93 93    402 425 419 390 397     Metabolic Studies     Recent Labs   Lab Test 12/09/19  0409 12/08/19  1655 12/08/19  0348 12/07/19  0330 12/06/19  1828 12/06/19  0434   *  --  143 139 138 136   POTASSIUM 3.6 3.7 3.3* 3.8 3.8 4.1   CHLORIDE 109  --  106 100 99 100   CO2 34*  --  34* 34* 34* 31   BUN 42*  --  33* 25 20 14   CR 0.74  --  0.74 0.74 0.57* 0.44*   GFRESTIMATED >90  --  >90 >90 >90 >90       Hepatic Studies    Recent Labs   Lab Test 12/01/19  0112   BILITOTAL 0.6   ALKPHOS 103   ALBUMIN 2.1*   AST 17   ALT 32     Microbiology:  12/7 Sputum culture pending  12/7 Blood cultures NGTD  12/3 Blood cultures NGTD    12/1 Blood culture NGTD  UA negative  12/1 Tissue  culture: Cervical spine vertebral phlegmon:  -- Aerobic: Staph aureus, Strep anginosus, Eikenella corrodens  -- Anaerobic: Fusobacterium nucleatum, Parvimonas micra, Prevotella    Culture Micro   Date Value Ref Range Status   2019 Culture negative < 24 hours, reincubate  Preliminary   2019 No growth after 1 day  Preliminary   2019 No growth after 1 day  Preliminary   2019 No growth  Final   2019 No growth  Final   2019 No growth  Final   2019 (A)  Final    Moderate growth  Fusobacterium nucleatum  Susceptibility testing not routinely done     2019 (A)  Final    Moderate growth  Parvimonas micra  Susceptibility testing not routinely done     2019 (A)  Final    Light growth  Prevotella species  Susceptibility testing not routinely done     2019 (A)  Final    Light growth  Strain 2  Prevotella species  Identification obtained by MALDI-TOF mass spectrometry research use only database. Test   characteristics determined and verified by the [facility]   ([facility]) [place], [place].  Susceptibility testing not routinely done     2019 (A)  Final    Moderate growth  Strain 3  Prevotella species  Identification obtained by MALDI-TOF mass spectrometry research use only database. Test   characteristics determined and verified by the [facility]   ([facility]) [place], [place].  Beta lactamase positive  Susceptibility testing not routinely done     2019 Culture negative after 1 week  Preliminary   2019 Single colony  Staphylococcus aureus   (A)  Final   2019 Light growth  Streptococcus anginosus   (A)  Final   2019 Light growth  Eikenella corrodens   (A)  Final       Imagin/1 MRI  Impression: Images are mildly degraded secondary to metallic artifact  from disc prosthesis. Follow-up imaging with CT could be considered  for further evaluation.  1. Traumatic cord injury at C3 and C4-5 as a  "result of C3-5  compression fractures.   2. There is 4 mm of retrolisthesis of C3 on C4.  3. Traumatic herniation of C4-5 intervertebral disc with retropulsion  of disc fragment into the spinal canal.  4. Extensive prevertebral thickening up to 2 cm anteriorly and up to 9  mm within the epidural space, favored to represent prevertebral edema  in the setting of recent trauma.       Of note [facility]:  MRI C-spine w/o contrast done on 11/30/2019 --  - \"Apparent osteomyelitis involvement of the C3, C4 and C5 vertebrae.\"  - \"Suspected extension of the infectious process into the ventral aspect of the central canal suspicious for a ventral epidural phlegmon versus abscess.\"  "

## 2019-12-09 NOTE — PROGRESS NOTES
Major Shift Events:  Has had low grade temps this evening. Medicated w/ oxycodone and tylenol w/ relief obtained. Receiving tube feedings and had less stooling tonoc. Potassium replaced. Slept fairly well. Blood glucoses running in high 200's and covered w/ sliding scale insulin.  Plan: PS trials. oob to the chair. Work w/ therapies.  For vital signs and complete assessments, please see documentation flowsheets.

## 2019-12-09 NOTE — PROGRESS NOTES
"Otolaryngology Progress Note  December 9, 2019      S: No events      O: /61 (BP Location: Left arm)   Pulse 61   Temp 100  F (37.8  C) (Axillary)   Resp 16   Ht 1.727 m (5' 8\")   Wt 85.8 kg (189 lb 2.5 oz)   SpO2 98%   BMI 28.76 kg/m     General: intubated  HEENT: C-collar in place. Drain with appropriate serosang output.   Pulmonary: ventilated  Neuro/MSK: able to move upper extremities when asked. No sensation or movement in lower extremities. weakly squeeze hand bilaterally but not able to move legs.      ERVIN drain output(s):  Right Neck: to be removed today     Labs: CRP 56 (from 130 3 days ago), WBC 8.4, Hgb 9.5, Plt 380    Assessment and Plan  64 year old male w/ PMH HTN, DM, cervical stenosis s/p arthroplasty about 2-3 weeks ago who fell at rehab and [facility]. Went to OR w/ NSG (Dr. Ashton) 12/1 & encountered inflammatory tissue over the spine and so ENT was consulted for assistance with the exposure.  During the exposure we noticed that there was a pharyngotomy that was seen from the cervical exposure. This was repaired transoral and transcervical. Patient remained intubated post-op and remains in the SICU. G-tube placed 12/5 and advancing tube feeds. Extubated 12/5. Reintubated 12/6 d/t increasing oxygen requirements and CXR w/ almost complete white out of right lung, suspected mucous plugging. CXR improved on 12/7-8.     - Antibiotics per ID, Cultures above. Unasyn through 1/12, follow cultures, outpatient follow up  - Anticipate prolonged duration of NPO status  - Looking into OR time for open tracheostomy placement. Will obtain consent. Potentially available tomorrow afternoon pending OR availability   - Rest of cares per neurosurgery and ICU, page ENT on call for questions     -- Patient and above plan will be discussed with Dr. Kathy Bautista MD   Otolaryngology-Head & Neck Surgery  Please contact ENT with questions by dialing * [phone] " [phone] and entering job code [phone] when prompted.

## 2019-12-09 NOTE — ANESTHESIA PREPROCEDURE EVALUATION
Anesthesia Pre-Procedure Evaluation    Patient: Sherwin Angel   MRN:     [id number] Gender:   male   Age:    64 year old :      1955        Preoperative Diagnosis: Acute respiratory failure with hypoxia (H) [J96.01]   Procedure(s):  CREATION, TRACHEOSTOMY     History reviewed. No pertinent past medical history.   Past Surgical History:   Procedure Laterality Date     IRRIGATION AND DEBRIDEMENT NECK, COMBINED N/A 2019    Procedure: IRRIGATION AND DEBRIDEMENT OF NECK; PHARYNGOTOMY REPAIR.;  Surgeon: Fernando Ashton MD;  Location: [facility] OR     LAPAROSCOPIC ASSISTED INSERTION TUBE GASTROTOMY N/A 2019    Procedure: laproscopic assisted gastrostomy tube placement;  Surgeon: Luis Santiago MD;  Location: [facility] OR     REMOVE HARDWARE CERVICAL ANTERIOR SPINE N/A 2019    Procedure: REMOVAL, HARDWARE, SPINE, CERVICAL, ANTERIOR;  Surgeon: Fernando Ashton MD;  Location: [facility] OR          Anesthesia Evaluation     . Pt has had prior anesthetic. Type: General and MAC    No history of anesthetic complications          ROS/MED HX    ENT/Pulmonary:  - neg pulmonary ROS     Neurologic: Comment: Pt had recent fall resulting in BLE paraplegia and partial upper extremity plegia      (+)Spinal cord injury year sustained: 2019 level of injury: C5     Cardiovascular:     (+) hypertension----. : . . . :. .       METS/Exercise Tolerance:  3 - Able to walk 1-2 blocks without stopping   Hematologic:  - neg hematologic  ROS       Musculoskeletal:  - neg musculoskeletal ROS       GI/Hepatic:  - neg GI/hepatic ROS       Renal/Genitourinary:  - ROS Renal section negative       Endo:  - neg endo ROS       Psychiatric:  - neg psychiatric ROS       Infectious Disease:         Malignancy:      - no malignancy   Other:    (+) C-spine cleared: No, no H/O Chronic Pain,                   [facility] AN PHYSICAL EXAM    LABS:  CBC:   Lab Results   Component Value Date    WBC 8.4 2019    WBC 8.7 2019     "HGB 9.5 (L) 12/09/2019    HGB 9.6 (L) 12/08/2019    HCT 30.9 (L) 12/09/2019    HCT 31.3 (L) 12/08/2019     12/09/2019     12/08/2019     BMP:   Lab Results   Component Value Date     (H) 12/09/2019     12/08/2019    POTASSIUM 3.6 12/09/2019    POTASSIUM 3.7 12/08/2019    CHLORIDE 109 12/09/2019    CHLORIDE 106 12/08/2019    CO2 34 (H) 12/09/2019    CO2 34 (H) 12/08/2019    BUN 42 (H) 12/09/2019    BUN 33 (H) 12/08/2019    CR 0.74 12/09/2019    CR 0.74 12/08/2019     (H) 12/09/2019     (H) 12/08/2019     COAGS:   Lab Results   Component Value Date    PTT 35 12/01/2019    INR 1.30 (H) 12/04/2019     POC:   Lab Results   Component Value Date     (H) 12/09/2019     OTHER:   Lab Results   Component Value Date    PH 7.43 12/05/2019    LACT 1.2 12/01/2019    A1C 6.5 (H) 12/01/2019    FLORINDA 8.1 (L) 12/09/2019    PHOS 3.2 12/09/2019    MAG 2.3 12/09/2019    ALBUMIN 2.1 (L) 12/01/2019    PROTTOTAL 7.4 12/01/2019    ALT 32 12/01/2019    AST 17 12/01/2019    ALKPHOS 103 12/01/2019    BILITOTAL 0.6 12/01/2019    CRP 56.0 (H) 12/08/2019        Preop Vitals    BP Readings from Last 3 Encounters:   12/09/19 (!) 145/72    Pulse Readings from Last 3 Encounters:   12/09/19 64      Resp Readings from Last 3 Encounters:   12/09/19 21    SpO2 Readings from Last 3 Encounters:   12/09/19 97%      Temp Readings from Last 1 Encounters:   12/09/19 38.3  C (100.9  F) (Axillary)    Ht Readings from Last 1 Encounters:   12/01/19 1.727 m (5' 8\")      Wt Readings from Last 1 Encounters:   12/09/19 85.8 kg (189 lb 2.5 oz)    Estimated body mass index is 28.76 kg/m  as calculated from the following:    Height as of this encounter: 1.727 m (5' 8\").    Weight as of this encounter: 85.8 kg (189 lb 2.5 oz).     LDA:  Peripheral IV 12/01/19 Right Upper arm (Active)   Site Assessment WDL 12/9/2019  4:00 AM   Line Status Saline locked 12/9/2019 12:00 AM   Phlebitis Scale 0-->no symptoms 12/9/2019 12:00 AM "   Infiltration Scale 0 12/9/2019 12:00 AM   Infiltration Site Treatment Method  None 12/8/2019  4:00 PM   Extravasation? No 12/9/2019 12:00 AM   Number of days: 8       Peripheral IV 12/01/19 Left Lower forearm (Active)   Site Assessment WDL 12/9/2019  4:00 AM   Line Status Saline locked 12/9/2019 12:00 AM   Phlebitis Scale 0-->no symptoms 12/9/2019 12:00 AM   Infiltration Scale 0 12/9/2019 12:00 AM   Infiltration Site Treatment Method  None 12/8/2019  4:00 PM   Extravasation? No 12/9/2019 12:00 AM   Dressing Intervention New dressing  12/2/2019  4:00 PM   Number of days: 8       PICC Triple Lumen 12/02/19 Right Basilic Access. RN notified PICC was ready to use. (Active)   Site Assessment WDL 12/9/2019  4:00 AM   External Cath Length (cm) 3 cm 12/2/2019 11:00 AM   Extremity Circumference (cm) 33 cm 12/2/2019 11:00 AM   Dressing Intervention Chlorhexidine patch;Transparent 12/8/2019  4:00 PM   Dressing Change Due 12/10/19 12/9/2019 12:00 AM   PICC Comment CDI 12/7/2019  8:00 PM   Lumen A - Color PURPLE 12/9/2019 12:00 AM   Lumen A - Status infusing 12/9/2019 12:00 AM   Lumen A - Cap Change Due 12/10/19 12/9/2019 12:00 AM   Lumen B - Color GRAY 12/9/2019 12:00 AM   Lumen B - Status infusing 12/9/2019 12:00 AM   Lumen B - Cap Change Due 12/10/19 12/9/2019 12:00 AM   Lumen C - Color RED 12/9/2019 12:00 AM   Lumen C - Status infusing 12/9/2019 12:00 AM   Lumen C - Cap Change Due 12/10/19 12/9/2019 12:00 AM   Extravasation? No 12/9/2019 12:00 AM   Line Necessity Yes, meets criteria 12/9/2019 12:00 AM   Number of days: 7       ETT (adult) 8 (Active)   Secured By Commercial tube godinez 12/9/2019  9:15 AM   Site Appearance Clean and dry 12/9/2019  9:15 AM   Secured at (cm) to lip 27 cm 12/9/2019  9:15 AM   Site of ET Tube Securement At lip 12/9/2019  9:15 AM   Cuff Pressure - Type minimal leak technique 12/9/2019  9:15 AM   Tube Care/Reposition repositioned tube left side of mouth 12/9/2019  9:15 AM   Bite Block Secure and  Patent 12/9/2019  9:15 AM   Safety Measures manual resuscitator at bedside 12/9/2019  9:15 AM   Number of days: 8       Gastrostomy/Enterostomy Gastrostomy  18 fr  (Active)   Site Description WDL 12/9/2019  8:00 AM   Site care cleansed with soap and water 12/9/2019  4:00 AM   Drainage Appearance Yellow;Bile 12/8/2019  4:00 AM   Status - Gastrostomy Continuous Enteral Feedings 12/9/2019  8:00 AM   Status - Jejunostomy Continuous Enteral Feedings 12/9/2019  4:00 AM   Dressing Status Normal: Clean, Dry & Intact 12/9/2019  8:00 AM   Dressing Change Due 12/10/19 12/9/2019  4:00 AM   Intake (ml) 60 ml 12/9/2019  8:00 AM   Flush/Free Water (mL) 30 mL 12/9/2019  8:00 AM   Output (ml) 20 ml 12/6/2019  6:00 AM   Number of days: 4       Urethral Catheter (Active)   Tube Description UTV 12/9/2019  4:00 AM   Catheter Care Done;Catheter wipes 12/9/2019  4:00 AM   Collection Container Standard;Patent 12/9/2019  4:00 AM   Securement Method Securing device (Describe) 12/9/2019  4:00 AM   Rationale for Continued Use Strict 1-2 Hour I&O 12/9/2019  4:00 AM   Number of days: 8        Assessment:   ASA SCORE: 3    H&P: History and physical reviewed and following examination; no interval change.   Smoking Status:  Non-Smoker/Unknown   NPO Status: NPO Appropriate     Plan:   Anes. Type:  General   Pre-Medication: None   Induction:  IV (Standard)   Airway: ETT; Oral   Access/Monitoring: PIV   Maintenance: Balanced     Postop Plan:   Postop Pain: Opioids  Postop Sedation/Airway: Not planned     PONV Management:   Adult Risk Factors:, Non-Smoker, Postop Opioids   Prevention: Ondansetron, Dexamethasone                   Moise Otto MD         Patient seen , spoke to patients sister about anesthesia plan, she voiced understanding and agreed to proceed as planned. Questions were sought and answered. Called ENT to ensure that they were okay to proceed with a PEEP of 8 on the ventilator for the tracheostomy. Received a call back from ENT  team and they wanted to wait till the PEEP was lower prior to proceeding with the tracheostomy        Karson Soriano MD

## 2019-12-09 NOTE — PROGRESS NOTES
Neurosurgery Progress Note    Subjective:  No acute events    Objective:  Intubated, partially sedated  Awake, alert, nods yes or no to questions  Follows commands  Deltoids 4/5, biceps 4/5, triceps 1/5,  0/5, lower extremities 0/5    Assessment:  64 years old gentleman status post C3-C4 artificial disc placement about 2 weeks ago presents after a fall with almost complete loss of strength in his upper extremities and paraplegia, found to have cervical stenosis. S/p OR 12/1 - Pharyngotomy closed with assistance from ENT. Arthroplasty implant removed. No replacement implant placed. S/p G-tube 12/5. Extubated 12/5. Re-Intubated 12/6 due to Mucous Plugging and Atelectasis.    Plan:  Intubated, mechanically ventilated  Nebs, wean to extubate as able, discuss with ENT regarding tracheostomy  DVT: SCDs while in bed  ERVIN drain per ENT  [place] J collar  Per ID: due to fever - broaden Unasyn -> Zosyn 4-6 wks for vertebral phlegmon, weekly labs  Follow-up cultures  SubQ heparin, tube feeds  MRI before stopping abx in 6 wks  Disposition: 4A      Lion Vargas MD  Neurosurgery Resident PGY2    Please contact neurosurgery resident on call with questions.    Dial * * [phone], enter [phone] when prompted.

## 2019-12-09 NOTE — PLAN OF CARE
Discharge Planner PT   Patient plan for discharge: not discussed   Current status: PROM performed in order to maintain functional ROM and increase muscle activation through neural pathway.  Able to flex B elbows, L>R, and shrugs B shoulders, minimal L shoulder ABD noted.    Barriers to return to prior living situation: paraplegia, medical status, intubated   Recommendations for discharge: IP rehab  Rationale for recommendations: Far below baseline 2/2 acute SCI        Pt at high risk of developing pressure sores 2/2 paraplegia and absent sensation to lower body.  When in chair, pt should be utilizing [place] books, patt cushion and repositioned by lift.       Ordered patt cushion to room       Entered by: Allan Holley 12/09/2019 4:14 PM

## 2019-12-09 NOTE — CONSULTS
Diabetes/Hyperglycemia Management Consult    Chief Complaint hyperglycemia refractory to high sliding scale  Consult requested by: Fernanda Salcedo CNP  History of Present Illness Sherwin Angel is a 64 year old male with history of type 2 diabetes (s/p Ca-en-y  2008), hypertension, ETOH abuse ( with withdrawal with DT's), asthma,  status post C3-C4 artificial disc placement about 2 weeks ago presents after a fall with almost complete loss of strength in his upper extremities and paraplegia, found to have cervical stenosis. S/p OR 12/1 - Pharyngotomy closed with assistance from ENT. Arthroplasty implant removed. No replacement implant placed. S/p G-tube 12/5. Extubated 12/5. Re-Intubated 12/6 due to Mucous Plugging and Atelectasis.  With possible osteomyelitis C3-C5      Information was obtained by review of medical records and a briefinterview with patient ( only able to answer yes/no questions due to intubation)  Mr. Angel confirms that he was dx with type 2 diabetes before 2008, and was on insulin. After his gastric bypass (9/2008), he was off all diabetes medications and was not testing his blood sugars. [facility] Endorses that he does not remember when his last A1C was drawn nor remember the value.    Blood sugars in the 200's. Started on glargine 5 units with a increase to 10 units on (12/8) + novolog sliding scale. Despite the increase in glargine blood sugars remain above target.Is on continous TF Impact peptide at 55 ml/hour. Reached goal on (12/7).      Recent Labs   Lab 12/09/19  0942 12/09/19  0411 12/09/19  0409 12/09/19  0015 12/08/19  2024 12/08/19  1647 12/08/19  1236  12/08/19  0348  12/07/19  0330  12/06/19  1828  12/06/19  0434  12/05/19  0340   GLC  --   --  269*  --   --   --   --   --  231*  --  177*  --  145*  --  154*  --  138*   * 274*  --  290* 216* 208* 289*   < >  --    < >  --    < >  --    < >  --    < >  --     < > = values in this interval not  displayed.         Diabetes Type:   Type 2 Diabetes  Diabetes Duration: years ago, before 2008  Usual Diabetes Regimen:   No medications prior to admission    Ability to [place] Prescribed Regimen: unknown  Diabetes Control:   Lab Results   Component Value Date    A1C 6.5 12/01/2019     Diabetes Complications: unknown  History of DKA: unknown  Able to Detect Hypoglycemia: unknown  [facility]ual [facility] Care Provider: [facility]  Factors Impacting Glucose Control: enteral feeds, illness      Review of Systems  10 point ROS difficult to complete due to patient being intubated    Past medical, family and social histories are reviewed and updated.    Past Medical History  -Type 2 diabetes ( s/p)Ca-en-y gastric bypass in 2008  - hypertension,   -ETOH abuse ( with withdrawal with DT's),   -asthma    Family History  No family history specific for diabetes    Social History  Social History     Socioeconomic History     Marital status: None     Spouse name: None     Number of children: None     Years of education: None     Highest education level: None   Occupational History     None   Social Needs     Financial resource strain: None     Food insecurity:     Worry: None     Inability: None     Transportation needs:     Medical: None     Non-medical: None   Tobacco Use     Smoking status: None   Substance and Sexual Activity     Alcohol use: None     Drug use: None     Sexual activity: None   Lifestyle     Physical activity:     Days per week: None     Minutes per session: None     Stress: None   Relationships     Social connections:     Talks on phone: None     Gets together: None     Attends [employer] service: None     Active member of club or organization: None     Attends meetings of clubs or organizations: None     Relationship status: None     Intimate partner violence:     Fear of current or ex partner: None     Emotionally abused: None     Physically abused: None     Forced sexual activity: None   Other Topics Concern     None  "  Social History Narrative     None         Physical Exam  /71   Pulse 67   Temp 102.5  F (39.2  C) (Axillary)   Resp 20   Ht 1.727 m (5' 8\")   Wt 85.8 kg (189 lb 2.5 oz)   SpO2 94%   BMI 28.76 kg/m      General:  pleasant  resting in bed, in no distress.   HEENT: PER and anicteric, non-injected, oral mucous membranes moist.   Lungs:  Intubated and vented  ABD: soft  Skin: warm and dry  MSK: paraplegia  Mental status:  alert, oriented person  Psych:  calm, even mood    Laboratory  Recent Labs   Lab Test 12/09/19  0409 12/08/19  1655 12/08/19  0348   *  --  143   POTASSIUM 3.6 3.7 3.3*   CHLORIDE 109  --  106   CO2 34*  --  34*   ANIONGAP 3  --  3   *  --  231*   BUN 42*  --  33*   CR 0.74  --  0.74   FLORINDA 8.1*  --  8.4*     CBC RESULTS:   Recent Labs   Lab Test 12/09/19  0409   WBC 8.4   RBC 3.27*   HGB 9.5*   HCT 30.9*   MCV 95   MCH 29.1   MCHC 30.7*   RDW 12.9          Liver Function Studies -   Recent Labs   Lab Test 12/01/19  0112   PROTTOTAL 7.4   ALBUMIN 2.1*   BILITOTAL 0.6   ALKPHOS 103   AST 17   ALT 32       Active Medications  Current Facility-Administered Medications   Medication     acetaminophen (TYLENOL) solution 650 mg     acetylcysteine (MUCOMYST) 10 % nebulizer solution 4 mL     albuterol (PROVENTIL) neb solution 2.5 mg     amLODIPine (NORVASC) tablet 5 mg     bisacodyl (DULCOLAX) Suppository 10 mg     bumetanide (BUMEX) tablet 2 mg     carboxymethylcellulose PF (REFRESH PLUS) 0.5 % ophthalmic solution 1 drop     cyanocobalamin injection 1,000 mcg     dexmedetomidine (PRECEDEX) 400 mcg in 0.9% sodium chloride 100 mL     dextrose 10 % 1,000 mL infusion     glucose gel 15-30 g    Or     dextrose 50 % injection 25-50 mL    Or     glucagon injection 1 mg     famotidine (PEPCID) suspension 20 mg     fentaNYL (PF) (SUBLIMAZE) injection 25 mcg     folic acid (FOLVITE) tablet 1 mg     heparin ANTICOAGULANT injection 5,000 Units     heparin lock flush 10 UNIT/ML injection " 5-10 mL     heparin lock flush 10 UNIT/ML injection 5-10 mL     hydrALAZINE (APRESOLINE) injection 10 mg     insulin aspart (NovoLOG) inj (RAPID ACTING)     insulin glargine (LANTUS PEN) injection 15 Units     ipratropium - albuterol 0.5 mg/2.5 mg/3 mL (DUONEB) neb solution 3 mL     lidocaine (LMX4) cream     lidocaine 1 % 0.1-1 mL     magnesium sulfate 2 g in NS intermittent infusion ([facility] or [facility] C[facility])     magnesium sulfate 4 g in 100 mL sterile water (premade)     melatonin tablet 1 mg     mirtazapine (REMERON) tablet 15 mg     multivitamins w/minerals (CERTAVITE) liquid 15 mL     naloxone (NARCAN) injection 0.1-0.4 mg     ondansetron (ZOFRAN-ODT) ODT tab 4-8 mg    Or     ondansetron (ZOFRAN) injection 4-8 mg     oxyCODONE (ROXICODONE) solution 5-10 mg     piperacillin-tazobactam (ZOSYN) 4.5 g vial to attach to  mL bag     polyethylene glycol (MIRALAX/GLYCOLAX) Packet 17 g     potassium chloride (KLOR-CON) Packet 20-40 mEq     potassium chloride 10 mEq in 100 mL intermittent infusion with 10 mg lidocaine     potassium chloride 10 mEq in 100 mL sterile water intermittent infusion (premix)     potassium chloride 20 mEq in 50 mL intermittent infusion     potassium chloride ER (K-DUR/KLOR-CON M) CR tablet 20-40 mEq     potassium phosphate 10 mmol in D5W 250 mL intermittent infusion     potassium phosphate 15 mmol in D5W 250 mL intermittent infusion     potassium phosphate 20 mmol in D5W 250 mL intermittent infusion     potassium phosphate 20 mmol in D5W 500 mL intermittent infusion     potassium phosphate 25 mmol in D5W 500 mL intermittent infusion     prochlorperazine (COMPAZINE) injection 10 mg    Or     prochlorperazine (COMPAZINE) tablet 10 mg    Or     prochlorperazine (COMPAZINE) Suppository 25 mg     senna-docusate (SENOKOT-S/PERICOLACE) 8.6-50 MG per tablet 1 tablet     sodium chloride (PF) 0.9% PF flush 10-20 mL     sodium phosphate (FLEET ENEMA) 1 enema     vitamin B1 (THIAMINE) tablet 100 mg      No current outpatient medications on file.       Current Diet  Orders Placed This Encounter      NPO for Medical/Clinical Reasons Except for: No Exceptions        Assessment: Sherwin Angel is a 64 year old male with history of type 2 diabetes ( Ca-en-y  2008), hypertension, ETOH abuse ( with withdrawal with DT's), asthma,  status post C3-C4 artificial disc placement about 2 weeks ago presents after a fall with almost complete loss of strength in his upper extremities and paraplegia, found to have cervical stenosis. S/p OR 12/1 - Pharyngotomy closed with assistance from ENT. Arthroplasty implant removed. No replacement implant placed. S/p G-tube 12/5. Extubated 12/5. Re-Intubated 12/6 due to Mucous Plugging and Atelectasis.    Hx of type 2 diabetes: s/p gastric bypass surgery and on no medications since 2008  continous TF Impact peptide at 55 ml/hour.   -will change to NPH, able to make adjustments more quickly    Plan  -discontinue lantus  - NPH 10 units am ( 1100) ASAP  -NPH increase dose this evening to 20 units( 2300)  - novolog high intensity sliding scale every 4 hours  -monitor glucose every 4 hours  -hypoglycemia protocol  -will continue to follow     Franco Lazo, [phone]  Diabetes Management Team job code: [id number]    I spent a total of 80 minutes bedside and on the inpatient unit managing the glycemic care of Sherwin Angel. Over 50% of my time on the unit was spent counseling the patient  and/or coordinating care regarding .  See note for details.

## 2019-12-09 NOTE — PROGRESS NOTES
"Neuroscience Intensive Care Progress Note  12/09/2019    Sherwin Keny Angel is a 64 year old year old male with hx HTN, DM and cervical stenosis s/p recent anterior C3-4 arthroplasty (2 weeks prior) who was admitted on 12/1/2019 with bilateral lower extremity paraplegia and near loss of upper extremity movement, after a fall at rehab. Prior to the fall, he was able to ambulate with a cane. Taken to the OR for arthroplasty implant removal on 12/1. Found to have significant inflammation and 2 pharyngotomy sites. ENT closed 1 site (unable to reach other) and drain placed for the phlegmon. Now on abx for cervical tissue + culture. S/P gtube placement 12/5 due to anticipated prolonged period of NPO. Extubated succesfully on 12/5, however, had worsening respiratory status with worsening white out of R lung concerning for mucus plugging as pt could not cough up secretions due to diaphragmatic weakness, subsequently re-intubated 12/6.     Problem List  1. Cervical stenosis s/p C3-C4 arthroplasty   2. Cervical spinal injury due to fall, with C3-C5 fracture   3. Acute respiratory failure s/p re-intubation  4. Leukocytosis  5. Vertebral phlegmon vs abscess s/p drain placement on 12/1, cultures +  6. Hx of hypertension  7. Hx of DM  8. Alcohol dependence  9. Secretions  10. Normocytic anemia  11. G-tube placement 12/5    24 hour events:  - plan for trach 12/10 afternoon   - He is currently on precedex gtt at 0.6  - Tmax 101.6  - Glucose running high up to 290      24 Hour Vital Signs Summary:  BP Readings from Last 1 Encounters:   12/09/19 135/63     Pulse Readings from Last 1 Encounters:   12/09/19 60     Wt Readings from Last 1 Encounters:   12/09/19 85.8 kg (189 lb 2.5 oz)     Ht Readings from Last 1 Encounters:   12/01/19 1.727 m (5' 8\")     Estimated body mass index is 28.76 kg/m  as calculated from the following:    Height as of this encounter: 1.727 m (5' 8\").    Weight as of this encounter: 85.8 kg (189 lb 2.5 " oz).    Temp Readings from Last 1 Encounters:   12/09/19 100  F (37.8  C) (Axillary)         Ventilator Settings  Ventilation Mode: CMV/AC  (Continuous Mandatory Ventilation/ Assist Control)  FiO2 (%): 50 %  Rate Set (breaths/minute): 12 breaths/min  Tidal Volume Set (mL): 450 mL  PEEP (cm H2O): 5 cmH2O  Pressure Support (cm H2O): 7 cmH2O  Oxygen Concentration (%): 50 %  Peak Inspiratory Pressure (cm H2O) (Drager Eliana): 20  Resp: 18      Intake/Output    Intake/Output Summary (Last 24 hours) at 12/8/2019 0817  Last data filed at 12/8/2019 0600  Gross per 24 hour   Intake 1475.96 ml   Output 2450 ml   Net -974.04 ml         BP - Mean:  [] 97    Current Medications:    acetylcysteine  4 mL Nebulization 3 times daily     amLODIPine  5 mg Oral or Feeding Tube Daily     ampicillin-sulbactam (UNASYN) IV  3 g Intravenous Q6H     bumetanide  2 mg Oral or Feeding Tube BID     cyanocobalamin  1,000 mcg Intramuscular Q30 Days     famotidine  20 mg Per G Tube BID     folic acid  1 mg Oral or Feeding Tube Daily     heparin ANTICOAGULANT  5,000 Units Subcutaneous Q12H     heparin lock flush  5-10 mL Intracatheter Q24H     insulin aspart  1-12 Units Subcutaneous Q4H     insulin glargine  10 Units Subcutaneous At Bedtime     ipratropium - albuterol 0.5 mg/2.5 mg/3 mL  3 mL Nebulization 4x daily     mirtazapine  15 mg Oral or Feeding Tube QPM     multivitamins w/minerals  15 mL Per Feeding Tube Daily     polyethylene glycol  17 g Oral or Feeding Tube BID     senna-docusate  1 tablet Oral or Feeding Tube BID     sodium phosphate  1 enema Rectal Daily     thiamine  100 mg Oral or Feeding Tube Daily       PRN Medications:  acetaminophen, albuterol, bisacodyl, carboxymethylcellulose PF, IV fluid REPLACEMENT ONLY, glucose **OR** dextrose **OR** glucagon, fentaNYL, heparin lock flush, hydrALAZINE, lidocaine 4%, lidocaine (buffered or not buffered), magnesium sulfate, magnesium sulfate, melatonin, naloxone, ondansetron **OR**  "ondansetron, oxyCODONE, potassium chloride, potassium chloride with lidocaine, potassium chloride, potassium chloride, potassium chloride, potassium phosphate (KPHOS) in D5W IV, potassium phosphate (KPHOS) in D5W IV, potassium phosphate (KPHOS) in D5W IV, potassium phosphate (KPHOS) in D5W IV, potassium phosphate (KPHOS) in D5W IV, prochlorperazine **OR** prochlorperazine **OR** prochlorperazine, sodium chloride (PF)    Infusions:    dexmedetomidine 0.6 mcg/kg/hr (12/09/19 0540)     IV fluid REPLACEMENT ONLY         No Known Allergies    Physical Examination:   Vitals: /63   Pulse 60   Temp 100  F (37.8  C) (Axillary)   Resp 18   Ht 1.727 m (5' 8\")   Wt 85.8 kg (189 lb 2.5 oz)   SpO2 98%   BMI 28.76 kg/m    General: Adult male, collar in place  HEENT: Normocephalic.   CV: RRR  Resp: intubated  Abd: Soft, distended  Skin: Warm, dry   Neuro: intubated, on precedex. Awake and alert. Responds to questions with nodding appropriately. Follows commands. EOMI. Eyes are conjugate. Continues to moves both arms anti-gravity at elbows brisky, weaker in RUE. No movement in R fingers, subtle movement in L fingers. No movement in bilateral lower extremities. Sensation intact in bilateral upper extremities proximately and distally along entire arm. Decreased sensation in LE but does have some sensation.     Labs:  Recent Labs   Lab Test 12/07/19  0330 12/06/19  1828 12/06/19  0434 12/05/19  0340    138 136 140   POTASSIUM 3.8 3.8 4.1 4.1   CHLORIDE 100 99 100 106   CO2 34* 34* 31 30   ANIONGAP 5 4 5 3   * 145* 154* 138*   BUN 25 20 14 19   CR 0.74 0.57* 0.44* 0.50*   FLORINDA 8.4* 8.7 8.6 8.1*   WBC 10.2  --  9.8 8.8   RBC 3.26*  --  3.54* 2.81*   HGB 9.4*  --  10.3* 8.3*     --  419 390     Imaging:   CXR:   1. Endotracheal tube tip projects 2.0 cm above the joby.  2. Improved aeration of the right lung since the prior exam, although residual diffuse opacities remain.  3. Increased streaky left hilar " and basilar opacities, favored to represent atelectasis.     Assessment/Plan:  Sherwin Angel is a 64 year old with hx of recent of anterior C3-4 arthroplasty ([facility]) who was admitted 12/1/2019 after a fall at rehab, resulting in lower extremity paraplegia. Found to have cervical stenosis. Now s/p one pharyngotomy closure on 12/1, still has one pharyngotomy present. Arthroplasty implant removed with no replacement implant placed. He has been on abx for cervical tissue cultures with ID following, narrowed to Unasyn on 12/4 but switched to zosyn 12/9 for ongoing fever.      Neuro:  #Cervical spinal cord injury  - Collar at all times, Up with assist  - Neuro checks q4h  - PT/OT     #Sedation/Pain  - Precedex gtt  - Oxycodone PRN  - Fentanyl PRN     #Hx of Alcohol dependence  - CIWA  - Thiamine & folate daily    #Depression  - Continue PTA miratazepine     Resp:  #Acute respiratory failure s/p re-intubation 12/6, suspect diaphragmatic weakness related to spine injury.   #Mucous plugging; right lung- resolved, CXR 12/8 with near complete resolution of previously demonstrated right lung opacities  - ENT plans for trach 12/10 afternoon   - Continue PTA bumex 2 mg    #Secretions  - difficulty clearing, thick white secretions  - Mucomyst & duoneb q4h scheduled  - Bronchial hygiene per RT  - frequent suctioning      Cardio:  #Hx of hypertension   - Goal: Normotension, SBP < 160  - Resume PTA amlodipine 10 mg   - Hold PTA Metoprolol to avoid bradycardia while on precedex gtt  - Hydralazine PRN      Renal:  No active issues.  - Continue PTA bumex  - Electrolyte replacement protocol   - Monitor I/O, elevating BUN and sodium trending up suspect slightly hypovolemic     Endo:  #DM2  - Sliding scale insulin, high dose  - Increasing lantus 10 units to 15 units  - Monitor BG     Heme:  #Normocytic anemia, stable     - Hg > 7.0   - Plt > 100k   - INR <1.5      GI:  #Constipation  - Miralax & Senna scheduled at  BID  - PRN suppository/enema available     #Diet  - G-tube placed 12/5  - Tube feeds at goal, tolerating well  - Nutrition following     ID:  #Leukocytosis, resolved  - Stopped Vancomycin, metronidazole, cefepime on 12/4  - switched Unasyn to Zosyn after ongoing fever (abx plan for 12/2-1/12)  - Weekly CBC, CMP, CRP while on IV abx, fax to ID clinic attn: Charissa  - Blood cultures (12/1, 12/3, 12/7): NGTD  - Spinal tissue cultures (12/1): MSSA, Strep anginosus, Eikenella, and oral anaerobes and multiple more   - Follow up in ID clinic near end of abx course, ID to arrange for this  - Will page ID attending at [id number] prior to discharging pt     Lines:  -ETT, PICC, PIV x 2, Degroot, ERVIN drain in neck, NG     FEN: TF plan to hold at midnight prior to trach   PPX:    DVT prophylaxis: SCDs, Heparin subcutaneous, hold for trach 12/10     GI:  Pantoprazole  Code Status: Full, presumed     Dispo: 4A until medically stable      The patient was seen and discussed with the attending, Dr. Sanford.     Ladan Stevens, DO  Neurology PGY-2   [phone]

## 2019-12-09 NOTE — PLAN OF CARE
Problem: Adult Inpatient Plan of Care  Goal: Plan of Care Review  12/8/2019 1831 by Korey Springer, RN  Outcome: No Change     Problem: Hypertension Comorbidity  Goal: Blood Pressure in Desired Range  12/8/2019 1831 by Korey Springer, RN  Outcome: Improving  12/8/2019 0608 by Ariella Clark RN  Outcome: No Change   Major Shift Events:  pressure support today tv 200 rr 30s- sats decreased to 90 on 50% fio2  Plan: up out of bed- suction often  For vital signs and complete assessments, please see documentation flowsheets.

## 2019-12-10 ENCOUNTER — ANESTHESIA (OUTPATIENT)
Dept: INTENSIVE CARE | Facility: CLINIC | Age: 64
DRG: 003 | End: 2019-12-10
Payer: COMMERCIAL

## 2019-12-10 ENCOUNTER — APPOINTMENT (OUTPATIENT)
Dept: MRI IMAGING | Facility: CLINIC | Age: 64
DRG: 003 | End: 2019-12-10
Attending: NEUROLOGICAL SURGERY
Payer: COMMERCIAL

## 2019-12-10 LAB
ABO + RH BLD: NORMAL
ABO + RH BLD: NORMAL
ANION GAP SERPL CALCULATED.3IONS-SCNC: 4 MMOL/L (ref 3–14)
BACTERIA SPEC CULT: ABNORMAL
BASOPHILS # BLD AUTO: 0 10E9/L (ref 0–0.2)
BASOPHILS NFR BLD AUTO: 0.4 %
BLD GP AB SCN SERPL QL: NORMAL
BLOOD BANK CMNT PATIENT-IMP: NORMAL
BUN SERPL-MCNC: 42 MG/DL (ref 7–30)
CALCIUM SERPL-MCNC: 8.2 MG/DL (ref 8.5–10.1)
CHLORIDE SERPL-SCNC: 108 MMOL/L (ref 94–109)
CO2 SERPL-SCNC: 34 MMOL/L (ref 20–32)
CREAT SERPL-MCNC: 0.8 MG/DL (ref 0.66–1.25)
DIFFERENTIAL METHOD BLD: ABNORMAL
EOSINOPHIL # BLD AUTO: 0.2 10E9/L (ref 0–0.7)
EOSINOPHIL NFR BLD AUTO: 1.6 %
ERYTHROCYTE [DISTWIDTH] IN BLOOD BY AUTOMATED COUNT: 13.2 % (ref 10–15)
GFR SERPL CREATININE-BSD FRML MDRD: >90 ML/MIN/{1.73_M2}
GLUCOSE BLDC GLUCOMTR-MCNC: 144 MG/DL (ref 70–99)
GLUCOSE BLDC GLUCOMTR-MCNC: 150 MG/DL (ref 70–99)
GLUCOSE BLDC GLUCOMTR-MCNC: 183 MG/DL (ref 70–99)
GLUCOSE BLDC GLUCOMTR-MCNC: 223 MG/DL (ref 70–99)
GLUCOSE BLDC GLUCOMTR-MCNC: 224 MG/DL (ref 70–99)
GLUCOSE BLDC GLUCOMTR-MCNC: 235 MG/DL (ref 70–99)
GLUCOSE BLDC GLUCOMTR-MCNC: 241 MG/DL (ref 70–99)
GLUCOSE SERPL-MCNC: 222 MG/DL (ref 70–99)
HCT VFR BLD AUTO: 31.8 % (ref 40–53)
HGB BLD-MCNC: 9.5 G/DL (ref 13.3–17.7)
IMM GRANULOCYTES # BLD: 0.1 10E9/L (ref 0–0.4)
IMM GRANULOCYTES NFR BLD: 1.2 %
LYMPHOCYTES # BLD AUTO: 2.2 10E9/L (ref 0.8–5.3)
LYMPHOCYTES NFR BLD AUTO: 22.1 %
Lab: ABNORMAL
MAGNESIUM SERPL-MCNC: 2.5 MG/DL (ref 1.6–2.3)
MCH RBC QN AUTO: 28.7 PG (ref 26.5–33)
MCHC RBC AUTO-ENTMCNC: 29.9 G/DL (ref 31.5–36.5)
MCV RBC AUTO: 96 FL (ref 78–100)
MONOCYTES # BLD AUTO: 0.7 10E9/L (ref 0–1.3)
MONOCYTES NFR BLD AUTO: 7.6 %
NEUTROPHILS # BLD AUTO: 6.6 10E9/L (ref 1.6–8.3)
NEUTROPHILS NFR BLD AUTO: 67.1 %
NRBC # BLD AUTO: 0 10*3/UL
NRBC BLD AUTO-RTO: 0 /100
PHOSPHATE SERPL-MCNC: 4.6 MG/DL (ref 2.5–4.5)
PLATELET # BLD AUTO: 398 10E9/L (ref 150–450)
POTASSIUM SERPL-SCNC: 3.3 MMOL/L (ref 3.4–5.3)
RBC # BLD AUTO: 3.31 10E12/L (ref 4.4–5.9)
SODIUM SERPL-SCNC: 147 MMOL/L (ref 133–144)
SPECIMEN EXP DATE BLD: NORMAL
SPECIMEN SOURCE: ABNORMAL
WBC # BLD AUTO: 9.8 10E9/L (ref 4–11)

## 2019-12-10 PROCEDURE — 94640 AIRWAY INHALATION TREATMENT: CPT

## 2019-12-10 PROCEDURE — 83735 ASSAY OF MAGNESIUM: CPT | Performed by: STUDENT IN AN ORGANIZED HEALTH CARE EDUCATION/TRAINING PROGRAM

## 2019-12-10 PROCEDURE — 25000125 ZZHC RX 250: Performed by: NURSE PRACTITIONER

## 2019-12-10 PROCEDURE — 25000132 ZZH RX MED GY IP 250 OP 250 PS 637: Performed by: STUDENT IN AN ORGANIZED HEALTH CARE EDUCATION/TRAINING PROGRAM

## 2019-12-10 PROCEDURE — 86901 BLOOD TYPING SEROLOGIC RH(D): CPT | Performed by: STUDENT IN AN ORGANIZED HEALTH CARE EDUCATION/TRAINING PROGRAM

## 2019-12-10 PROCEDURE — 25800030 ZZH RX IP 258 OP 636: Performed by: NURSE PRACTITIONER

## 2019-12-10 PROCEDURE — 25000128 H RX IP 250 OP 636: Performed by: NURSE ANESTHETIST, CERTIFIED REGISTERED

## 2019-12-10 PROCEDURE — 40000275 ZZH STATISTIC RCP TIME EA 10 MIN

## 2019-12-10 PROCEDURE — 40000671 ZZH STATISTIC ANESTHESIA CASE

## 2019-12-10 PROCEDURE — 25000128 H RX IP 250 OP 636: Performed by: PSYCHIATRY & NEUROLOGY

## 2019-12-10 PROCEDURE — 00000146 ZZHCL STATISTIC GLUCOSE BY METER IP

## 2019-12-10 PROCEDURE — 25000132 ZZH RX MED GY IP 250 OP 250 PS 637: Performed by: PSYCHIATRY & NEUROLOGY

## 2019-12-10 PROCEDURE — 20000004 ZZH R&B ICU UMMC

## 2019-12-10 PROCEDURE — 84100 ASSAY OF PHOSPHORUS: CPT | Performed by: STUDENT IN AN ORGANIZED HEALTH CARE EDUCATION/TRAINING PROGRAM

## 2019-12-10 PROCEDURE — A9585 GADOBUTROL INJECTION: HCPCS | Performed by: NEUROLOGICAL SURGERY

## 2019-12-10 PROCEDURE — 25800025 ZZH RX 258: Performed by: STUDENT IN AN ORGANIZED HEALTH CARE EDUCATION/TRAINING PROGRAM

## 2019-12-10 PROCEDURE — 25000125 ZZHC RX 250: Performed by: CLINICAL NURSE SPECIALIST

## 2019-12-10 PROCEDURE — 25000128 H RX IP 250 OP 636: Performed by: COUNSELOR

## 2019-12-10 PROCEDURE — 94003 VENT MGMT INPAT SUBQ DAY: CPT

## 2019-12-10 PROCEDURE — 85025 COMPLETE CBC W/AUTO DIFF WBC: CPT | Performed by: STUDENT IN AN ORGANIZED HEALTH CARE EDUCATION/TRAINING PROGRAM

## 2019-12-10 PROCEDURE — 25000131 ZZH RX MED GY IP 250 OP 636 PS 637: Performed by: NURSE PRACTITIONER

## 2019-12-10 PROCEDURE — 80048 BASIC METABOLIC PNL TOTAL CA: CPT | Performed by: STUDENT IN AN ORGANIZED HEALTH CARE EDUCATION/TRAINING PROGRAM

## 2019-12-10 PROCEDURE — 86850 RBC ANTIBODY SCREEN: CPT | Performed by: STUDENT IN AN ORGANIZED HEALTH CARE EDUCATION/TRAINING PROGRAM

## 2019-12-10 PROCEDURE — 86900 BLOOD TYPING SEROLOGIC ABO: CPT | Performed by: STUDENT IN AN ORGANIZED HEALTH CARE EDUCATION/TRAINING PROGRAM

## 2019-12-10 PROCEDURE — 94640 AIRWAY INHALATION TREATMENT: CPT | Mod: 76

## 2019-12-10 PROCEDURE — 25500064 ZZH RX 255 OP 636: Performed by: NEUROLOGICAL SURGERY

## 2019-12-10 PROCEDURE — 72156 MRI NECK SPINE W/O & W/DYE: CPT

## 2019-12-10 PROCEDURE — 25000128 H RX IP 250 OP 636: Performed by: STUDENT IN AN ORGANIZED HEALTH CARE EDUCATION/TRAINING PROGRAM

## 2019-12-10 PROCEDURE — 25000132 ZZH RX MED GY IP 250 OP 250 PS 637: Performed by: NEUROLOGICAL SURGERY

## 2019-12-10 RX ORDER — HEPARIN SODIUM 5000 [USP'U]/.5ML
5000 INJECTION, SOLUTION INTRAVENOUS; SUBCUTANEOUS EVERY 12 HOURS
Status: DISCONTINUED | OUTPATIENT
Start: 2019-12-10 | End: 2019-12-12

## 2019-12-10 RX ORDER — LORAZEPAM 2 MG/ML
2 INJECTION INTRAMUSCULAR ONCE
Status: COMPLETED | OUTPATIENT
Start: 2019-12-10 | End: 2019-12-10

## 2019-12-10 RX ORDER — GADOBUTROL 604.72 MG/ML
7.5 INJECTION INTRAVENOUS ONCE
Status: COMPLETED | OUTPATIENT
Start: 2019-12-10 | End: 2019-12-10

## 2019-12-10 RX ORDER — LORAZEPAM 2 MG/ML
0.5 INJECTION INTRAMUSCULAR
Status: COMPLETED | OUTPATIENT
Start: 2019-12-10 | End: 2019-12-10

## 2019-12-10 RX ORDER — DEXAMETHASONE SODIUM PHOSPHATE 4 MG/ML
10 INJECTION, SOLUTION INTRA-ARTICULAR; INTRALESIONAL; INTRAMUSCULAR; INTRAVENOUS; SOFT TISSUE
Status: DISCONTINUED | OUTPATIENT
Start: 2019-12-10 | End: 2019-12-15

## 2019-12-10 RX ADMIN — OXYCODONE HYDROCHLORIDE 10 MG: 5 SOLUTION ORAL at 17:15

## 2019-12-10 RX ADMIN — SENNOSIDES AND DOCUSATE SODIUM 1 TABLET: 8.6; 5 TABLET ORAL at 20:41

## 2019-12-10 RX ADMIN — PIPERACILLIN SODIUM AND TAZOBACTAM SODIUM 4.5 G: 4; .5 INJECTION, POWDER, LYOPHILIZED, FOR SOLUTION INTRAVENOUS at 21:10

## 2019-12-10 RX ADMIN — ALBUTEROL SULFATE 2.5 MG: 2.5 SOLUTION RESPIRATORY (INHALATION) at 08:13

## 2019-12-10 RX ADMIN — POTASSIUM CHLORIDE 40 MEQ: 1.5 POWDER, FOR SOLUTION ORAL at 05:42

## 2019-12-10 RX ADMIN — GADOBUTROL 7.5 ML: 604.72 INJECTION INTRAVENOUS at 20:01

## 2019-12-10 RX ADMIN — ALBUTEROL SULFATE 2.5 MG: 2.5 SOLUTION RESPIRATORY (INHALATION) at 20:29

## 2019-12-10 RX ADMIN — SENNOSIDES AND DOCUSATE SODIUM 1 TABLET: 8.6; 5 TABLET ORAL at 07:58

## 2019-12-10 RX ADMIN — FAMOTIDINE 20 MG: 40 POWDER, FOR SUSPENSION ORAL at 07:59

## 2019-12-10 RX ADMIN — INSULIN ASPART 2 UNITS: 100 INJECTION, SOLUTION INTRAVENOUS; SUBCUTANEOUS at 15:48

## 2019-12-10 RX ADMIN — PIPERACILLIN SODIUM AND TAZOBACTAM SODIUM 4.5 G: 4; .5 INJECTION, POWDER, LYOPHILIZED, FOR SOLUTION INTRAVENOUS at 15:35

## 2019-12-10 RX ADMIN — BUMETANIDE 2 MG: 2 TABLET ORAL at 07:58

## 2019-12-10 RX ADMIN — ALBUTEROL SULFATE 2.5 MG: 2.5 SOLUTION RESPIRATORY (INHALATION) at 16:13

## 2019-12-10 RX ADMIN — POTASSIUM CHLORIDE 20 MEQ: 1.5 POWDER, FOR SOLUTION ORAL at 07:57

## 2019-12-10 RX ADMIN — POLYETHYLENE GLYCOL (3350) 17 G: 17 POWDER, FOR SOLUTION ORAL at 20:41

## 2019-12-10 RX ADMIN — LORAZEPAM 2 MG: 2 INJECTION INTRAMUSCULAR; INTRAVENOUS at 20:15

## 2019-12-10 RX ADMIN — DEXMEDETOMIDINE 0.7 MCG/KG/HR: 100 INJECTION, SOLUTION, CONCENTRATE INTRAVENOUS at 21:50

## 2019-12-10 RX ADMIN — ALBUTEROL SULFATE 2.5 MG: 2.5 SOLUTION RESPIRATORY (INHALATION) at 13:17

## 2019-12-10 RX ADMIN — ACETAMINOPHEN 650 MG: 325 SOLUTION ORAL at 11:53

## 2019-12-10 RX ADMIN — DEXTROSE MONOHYDRATE: 100 INJECTION, SOLUTION INTRAVENOUS at 00:20

## 2019-12-10 RX ADMIN — FENTANYL CITRATE 25 MCG: 50 INJECTION, SOLUTION INTRAMUSCULAR; INTRAVENOUS at 18:33

## 2019-12-10 RX ADMIN — ACETYLCYSTEINE 4 ML: 100 INHALANT RESPIRATORY (INHALATION) at 13:16

## 2019-12-10 RX ADMIN — ACETYLCYSTEINE 4 ML: 100 INHALANT RESPIRATORY (INHALATION) at 16:14

## 2019-12-10 RX ADMIN — PIPERACILLIN SODIUM AND TAZOBACTAM SODIUM 4.5 G: 4; .5 INJECTION, POWDER, LYOPHILIZED, FOR SOLUTION INTRAVENOUS at 09:38

## 2019-12-10 RX ADMIN — LORAZEPAM 0.5 MG: 2 INJECTION INTRAMUSCULAR; INTRAVENOUS at 19:10

## 2019-12-10 RX ADMIN — INSULIN ASPART 2 UNITS: 100 INJECTION, SOLUTION INTRAVENOUS; SUBCUTANEOUS at 20:50

## 2019-12-10 RX ADMIN — ACETAMINOPHEN 650 MG: 325 SOLUTION ORAL at 15:35

## 2019-12-10 RX ADMIN — Medication 100 MG: at 07:58

## 2019-12-10 RX ADMIN — ACETYLCYSTEINE 4 ML: 100 INHALANT RESPIRATORY (INHALATION) at 20:29

## 2019-12-10 RX ADMIN — PIPERACILLIN SODIUM AND TAZOBACTAM SODIUM 4.5 G: 4; .5 INJECTION, POWDER, LYOPHILIZED, FOR SOLUTION INTRAVENOUS at 03:59

## 2019-12-10 RX ADMIN — POLYETHYLENE GLYCOL (3350) 17 G: 17 POWDER, FOR SOLUTION ORAL at 07:58

## 2019-12-10 RX ADMIN — CARBOXYMETHYLCELLULOSE SODIUM 1 DROP: 5 SOLUTION/ DROPS OPHTHALMIC at 17:54

## 2019-12-10 RX ADMIN — HEPARIN SODIUM 5000 UNITS: 5000 INJECTION, SOLUTION INTRAVENOUS; SUBCUTANEOUS at 17:15

## 2019-12-10 RX ADMIN — INSULIN HUMAN 38 UNITS: 100 INJECTION, SUSPENSION SUBCUTANEOUS at 21:10

## 2019-12-10 RX ADMIN — MULTIVITAMIN 15 ML: LIQUID ORAL at 20:41

## 2019-12-10 RX ADMIN — ACETYLCYSTEINE 4 ML: 100 INHALANT RESPIRATORY (INHALATION) at 08:13

## 2019-12-10 RX ADMIN — FAMOTIDINE 20 MG: 40 POWDER, FOR SUSPENSION ORAL at 20:42

## 2019-12-10 RX ADMIN — INSULIN ASPART 4 UNITS: 100 INJECTION, SOLUTION INTRAVENOUS; SUBCUTANEOUS at 23:44

## 2019-12-10 RX ADMIN — ACETAMINOPHEN 650 MG: 325 SOLUTION ORAL at 07:57

## 2019-12-10 RX ADMIN — MIRTAZAPINE 15 MG: 15 TABLET, FILM COATED ORAL at 20:41

## 2019-12-10 RX ADMIN — AMLODIPINE BESYLATE 5 MG: 5 TABLET ORAL at 07:58

## 2019-12-10 RX ADMIN — FOLIC ACID 1 MG: 1 TABLET ORAL at 07:58

## 2019-12-10 RX ADMIN — OXYCODONE HYDROCHLORIDE 10 MG: 5 SOLUTION ORAL at 13:24

## 2019-12-10 RX ADMIN — OXYCODONE HYDROCHLORIDE 10 MG: 5 SOLUTION ORAL at 07:57

## 2019-12-10 RX ADMIN — DEXMEDETOMIDINE 0.7 MCG/KG/HR: 100 INJECTION, SOLUTION, CONCENTRATE INTRAVENOUS at 11:53

## 2019-12-10 RX ADMIN — MIDAZOLAM 2 MG: 1 INJECTION INTRAMUSCULAR; INTRAVENOUS at 12:05

## 2019-12-10 RX ADMIN — DEXMEDETOMIDINE 0.6 MCG/KG/HR: 100 INJECTION, SOLUTION, CONCENTRATE INTRAVENOUS at 04:14

## 2019-12-10 ASSESSMENT — ACTIVITIES OF DAILY LIVING (ADL)
ADLS_ACUITY_SCORE: 27

## 2019-12-10 ASSESSMENT — MIFFLIN-ST. JEOR: SCORE: 1621.5

## 2019-12-10 NOTE — PROGRESS NOTES
Major Shift Events:  Neuro checks unchanged. Oxycodone x 1 for neck pain. Vent settings unchanged. Adequate urine output. Loose stool x 1. TF off since 0000 for trach placement, D10 running @ 55 per protocol.   Plan: Trach today. Continue plan of care.  For vital signs and complete assessments, please see documentation flowsheets.

## 2019-12-10 NOTE — PROGRESS NOTES
[facility] Progress Note     Patient:  Sherwin Angel   Date of birth 1955, Medical record number [id number]  Date of Visit:  12/10/19  Date of Admission: 12/1/2019  Consult Requester:Fernando Ashton MD            Assessment and Recommendations:   ASSESSMENT:  1. Traumatic cervical spinal cord injury s/p C3-4 arthroplasty on 11/15, now removed  2. Cervical stenosis C3-5  3. Vertebral phlegmon vs abscess s/p drain placement on 12/1 - cultures growing MSSA, Strep anginosus, Eikenella, and oral anaerobes  4. Possible osteomyelitis C3-C5  5. Leukocytosis, ongoing fevers      DISCUSSION:  64-year-old man with history of htn, DM2, and recent C3-4 arthroplasty on 11/15 now admitted with new paraplegia after a fall and found to have pharyngotomy and vertebral phlegmon now s/p OR with removal of arthroplasty and placement of drain. On Unasyn -plan was for a 6 week course (to end 1/12/20).     Febrile this am and for the past 48 hours. No localizing findings on exam. Blood cultures, sputum negative. UA negative. Broadened to Zosyn. Would consider reimaging cervical spine to evaluate for abscess. Differential also includes drug fever. If imaging negative, will try to change antibiotics.       RECOMMENDATION:  -Continue Zosyn  -Consider imaging cervical spine to evaluate for abscess    ID will follow.           Attestation:  I have reviewed today's vital signs, medications, labs and imaging.  Floor time: 25 minutes, Face-to-face time: 10 minutes, Total time: 35 minutes    Joya Carrington MD.  ID Staff  [id number]      ________________________________________________________________           Overnight events:   Patient febrile this am. Denies pain or discomfort.          Review of Systems:   Unable to obtain, patient intubated.         Past Medical History:   Diabetes  Hypertension  C3-C4 arthroplasty on 11/15/19         Past Surgical History:     Past Surgical History:   Procedure Laterality  Date     IRRIGATION AND DEBRIDEMENT NECK, COMBINED N/A 12/1/2019    Procedure: IRRIGATION AND DEBRIDEMENT OF NECK; PHARYNGOTOMY REPAIR.;  Surgeon: Fernando Ashton MD;  Location: [facility] OR     LAPAROSCOPIC ASSISTED INSERTION TUBE GASTROTOMY N/A 12/5/2019    Procedure: laproscopic assisted gastrostomy tube placement;  Surgeon: Luis Santiago MD;  Location: [facility] OR     REMOVE HARDWARE CERVICAL ANTERIOR SPINE N/A 12/1/2019    Procedure: REMOVAL, HARDWARE, SPINE, CERVICAL, ANTERIOR;  Surgeon: Fernando Ashton MD;  Location: [facility] OR            Family History:   History reviewed. No pertinent family history.         Social History:     Social History     Tobacco Use     Smoking status: Not on file   Substance Use Topics     Alcohol use: Not on file     History   Sexual Activity     Sexual activity: Not on file            Current Medications (antimicrobials listed in bold):       acetylcysteine  4 mL Nebulization 4x daily     albuterol  2.5 mg Nebulization 4x daily     amLODIPine  5 mg Oral or Feeding Tube Daily     cyanocobalamin  1,000 mcg Intramuscular Q30 Days     dexamethasone  10 mg Intravenous Pre-Op/Pre-procedure x 1 dose     famotidine  20 mg Per G Tube BID     folic acid  1 mg Oral or Feeding Tube Daily     heparin lock flush  5-10 mL Intracatheter Q24H     insulin aspart  1-12 Units Subcutaneous Q4H     insulin isophane human  20 Units Subcutaneous Q24H     insulin isophane human  30 Units Subcutaneous Q24H     mirtazapine  15 mg Oral or Feeding Tube QPM     multivitamins w/minerals  15 mL Per Feeding Tube Daily     piperacillin-tazobactam  4.5 g Intravenous Q6H     polyethylene glycol  17 g Oral or Feeding Tube BID     senna-docusate  1 tablet Oral or Feeding Tube BID     sodium phosphate  1 enema Rectal Daily     thiamine  100 mg Oral or Feeding Tube Daily            Allergies:   No Known Allergies         Physical Exam:     Ranges for his vital signs:   Temp:  [97.9  F (36.6  C)-102.5  F (39.2  C)]  101.3  F (38.5  C)  Pulse:  [56-90] 64  Heart Rate:  [57-92] 59  Resp:  [12-25] 13  BP: ()/(61-76) 105/66  FiO2 (%):  [45 %-60 %] 50 %  SpO2:  [88 %-100 %] 100 %    Physical Examination:  GENERAL: Sitting up in a chair; has a nasal airway in place; some difficulty speaking, but appears in no acute distress.   HEENT:  Head is normocephalic, atraumatic.   EYES:  Eyes have anicteric sclerae without conjunctival injection.    NECK:  Collar in place. Drain with serosanguinous output.  LUNGS:  Coarse breath sounds throughout anterior lung fields.  CARDIOVASCULAR:  Regular rate and rhythm with no murmurs, gallops or rubs.  ABDOMEN:  Normal bowel sounds, soft, nontender. No appreciable hepatosplenomegaly  SKIN:  No acute rashes.  Lines are in place without any surrounding erythema or exudate. No stigmata of endocarditis.           Laboratory Data:     Inflammatory Markers    Recent Labs   Lab Test 12/08/19  0348 12/05/19  0340 12/02/19  0342 12/01/19  0112   CRP 56.0* 130.0* 220.0* 170.0*       Hematology Studies    Recent Labs   Lab Test 12/10/19  0403 12/09/19  0409 12/08/19  0348 12/07/19  0330 12/06/19  0434 12/05/19  0340   WBC 9.8 8.4 8.7 10.2 9.8 8.8   ANEU 6.6 5.7 6.0 7.8 8.0 6.5   AEOS 0.2 0.0 0.0 0.1 0.1 0.2   HGB 9.5* 9.5* 9.6* 9.4* 10.3* 8.3*   MCV 96 95 93 94 92 93    380 402 425 419 390     Metabolic Studies     Recent Labs   Lab Test 12/10/19  0403 12/09/19  0409 12/08/19  1655 12/08/19  0348 12/07/19  0330 12/06/19  1828   * 146*  --  143 139 138   POTASSIUM 3.3* 3.6 3.7 3.3* 3.8 3.8   CHLORIDE 108 109  --  106 100 99   CO2 34* 34*  --  34* 34* 34*   BUN 42* 42*  --  33* 25 20   CR 0.80 0.74  --  0.74 0.74 0.57*   GFRESTIMATED >90 >90  --  >90 >90 >90       Hepatic Studies    Recent Labs   Lab Test 12/01/19  0112   BILITOTAL 0.6   ALKPHOS 103   ALBUMIN 2.1*   AST 17   ALT 32     Microbiology:  12/7 Sputum culture pending  12/7 Blood cultures NGTD  12/3 Blood cultures NGTD    12/1 Blood  culture NGTD  UA negative   Tissue culture: Cervical spine vertebral phlegmon:  -- Aerobic: Staph aureus, Strep anginosus, Eikenella corrodens  -- Anaerobic: Fusobacterium nucleatum, Parvimonas micra, Prevotella    Culture Micro   Date Value Ref Range Status   2019 (A)  Final    Light growth  Candida albicans / dubliniensis  Candida albicans and Candida dubliniensis are not routinely speciated  Susceptibility testing not routinely done     2019 No growth after 2 days  Preliminary   2019 No growth after 2 days  Preliminary   2019 No growth  Final   2019 No growth  Final   2019 No growth  Final   2019 (A)  Final    Moderate growth  Fusobacterium nucleatum  Susceptibility testing not routinely done     2019 (A)  Final    Moderate growth  Parvimonas micra  Susceptibility testing not routinely done     2019 (A)  Final    Light growth  Prevotella species  Susceptibility testing not routinely done     2019 (A)  Final    Light growth  Strain 2  Prevotella species  Identification obtained by MALDI-TOF mass spectrometry research use only database. Test   characteristics determined and verified by the [facility]   ([facility]) [place], [place].  Susceptibility testing not routinely done     2019 (A)  Final    Moderate growth  Strain 3  Prevotella species  Identification obtained by MALDI-TOF mass spectrometry research use only database. Test   characteristics determined and verified by the [facility]   ([facility]) [place], [place].  Beta lactamase positive  Susceptibility testing not routinely done     2019 Culture negative after 1 week  Preliminary   2019 Single colony  Staphylococcus aureus   (A)  Final   2019 Light growth  Streptococcus anginosus   (A)  Final   2019 Light growth  Eikenella corrodens   (A)  Final       Imagin/1 MRI  Impression: Images are mildly degraded  "secondary to metallic artifact  from disc prosthesis. Follow-up imaging with CT could be considered  for further evaluation.  1. Traumatic cord injury at C3 and C4-5 as a result of C3-5  compression fractures.   2. There is 4 mm of retrolisthesis of C3 on C4.  3. Traumatic herniation of C4-5 intervertebral disc with retropulsion  of disc fragment into the spinal canal.  4. Extensive prevertebral thickening up to 2 cm anteriorly and up to 9  mm within the epidural space, favored to represent prevertebral edema  in the setting of recent trauma.       Of note [facility]:  MRI C-spine w/o contrast done on 11/30/2019 --  - \"Apparent osteomyelitis involvement of the C3, C4 and C5 vertebrae.\"  - \"Suspected extension of the infectious process into the ventral aspect of the central canal suspicious for a ventral epidural phlegmon versus abscess.\"  "

## 2019-12-10 NOTE — PROGRESS NOTES
Diabetes Consult Daily  Progress Note          Assessment/Plan:                           Sherwin Angel is a 64 year old male with history of type 2 diabetes ( Ca-en-y  2008), hypertension, ETOH abuse ( with withdrawal with DT's), asthma,  status post C3-C4 artificial disc placement about 2 weeks ago presents after a fall with almost complete loss of strength in his upper extremities and paraplegia, found to have cervical stenosis. S/p OR 12/1 - Pharyngotomy closed with assistance from ENT. Arthroplasty implant removed. No replacement implant placed. S/p G-tube 12/5. Extubated 12/5. Re-Intubated 12/6 due to Mucous Plugging and Atelectasis.     Hx of type 2 diabetes: s/p gastric bypass surgery and on no medications since 2008  continous TF Impact peptide at 55 ml/hour.        Plan  - NPH 30 units at am (1000), will increase to 38 units starting (12/11)  -NPH  20 units  evening ( 2300), will increase to 38 units  - novolog high intensity sliding scale every 4 hours, changed to 2 units per 30 > 140 every 4 hours  -monitor glucose every 4 hours  -hypoglycemia protocol  -will continue to follow      Outpatient diabetes follow up: TBD  Plan discussed with patient and bedside RN           Interval History:   The last 24 hours progress and nursing notes reviewed.  Blood sugars continue to be in the 200's despite increase in NPH  Has continued on continous TF and is tolerating  Plan was to have trach placed today, but procedure was cancelled       Nutrition:  NPO  continous TF Impact peptide at 55 ml/hour.     Recent Labs   Lab 12/10/19  0403 12/10/19  0356 12/09/19  2302 12/09/19  1950 12/09/19  1738 12/09/19  1238 12/09/19  0942  12/09/19  0409  12/08/19  0348  12/07/19  0330  12/06/19  1828  12/06/19  0434   *  --   --   --   --   --   --   --  269*  --  231*  --  177*  --  145*  --  154*   BGM  --  235* 248* 227* 273* 251* 243*   < >  --    < >  --    < >  --    < >  --    < >  --     < >  "= values in this interval not displayed.               Review of Systems:   See interval hx          Medications:     Orders Placed This Encounter      NPO per Anesthesia Guidelines for Procedure/Surgery Except for: Meds       Physical Exam:  Gen: Alert, resting in bed, in NAD   HEENT:  mucous membranes are moist  Resp: intubated and vented  Neuro:oriented person ( place and time not assessed)  /72   Pulse 60   Temp (P) 100.7  F (38.2  C) (Axillary)   Resp 18   Ht 1.727 m (5' 8\")   Wt 85.2 kg (187 lb 13.3 oz)   SpO2 97%   BMI 28.56 kg/m             Data:     Lab Results   Component Value Date    A1C 6.5 12/01/2019              CBC RESULTS:   Recent Labs   Lab Test 12/10/19  0403   WBC 9.8   RBC 3.31*   HGB 9.5*   HCT 31.8*   MCV 96   MCH 28.7   MCHC 29.9*   RDW 13.2        Recent Labs   Lab Test 12/10/19  0403 12/09/19  0409   * 146*   POTASSIUM 3.3* 3.6   CHLORIDE 108 109   CO2 34* 34*   ANIONGAP 4 3   * 269*   BUN 42* 42*   CR 0.80 0.74   FLORINDA 8.2* 8.1*     Liver Function Studies -   Recent Labs   Lab Test 12/01/19  0112   PROTTOTAL 7.4   ALBUMIN 2.1*   BILITOTAL 0.6   ALKPHOS 103   AST 17   ALT 32     Lab Results   Component Value Date    INR 1.30 12/04/2019    INR 1.17 12/01/2019       sheron Lazo -[phone]  Diabetes Management job code [id number]            "

## 2019-12-10 NOTE — ANESTHESIA CARE TRANSFER NOTE
Patient: Sherwin Angel    Procedure(s):  CREATION, TRACHEOSTOMY    Diagnosis: Acute respiratory failure with hypoxia (H) [J96.01]  Diagnosis Additional Information: No value filed.    Anesthesia Type:   General     Note:  Airway :ETT  Patient transferred to:ICU  Comments: At bedside with pt and ICU RN, in process of transporting pt to OR when we were made aware of case being cancelled by ENT service due to increased PEEP of 8. Pt received Versed 2mg prior to case cancellation notification. VSS, RN aware, FiO2 increased to 60% while pt sedated but other vent settings unchanged, remains on Precedex gtt. Attending anesthesiologist Dr. Soriano also aware. Care transfer completed to pt's RN.ICU Handoff: Call for PAUSE to initiate/utilize ICU HANDOFF, Identified Patient, Identified Responsible Provider, Reviewed the Pertinent Medical History, Discussed Surgical Course, Reviewed Intra-OP Anesthesia Management and Issues during Anesthesia, Set Expectations for Post Procedure Period and Allowed Opportunity for Questions and Acknowledgement of Understanding      Vitals: (Last set prior to Anesthesia Care Transfer)              Electronically Signed By: JORGE L Miranda CRNA  December 10, 2019  12:26 PM

## 2019-12-10 NOTE — PROGRESS NOTES
OtoHNS Note  12/10/2019    The patient was scheduled for an open tracheostomy in the OR today. The patient required an increase in his PEEP requirements yesterday afternoon, from 5 to 8cmH20. Given that he is still requiring this amount of PEEP and his current poor neurological status, it was decided that it was in the best interest of the patient to postpone the procedure at this time.    At the time of cancellation, the anesthesia team was preparing the patient for transport to the operating room and sedation was administered. Cancellation was discussed with anesthesia directly.    The operation will be scheduled at a later date once the patient's PEEP/oxygen requirements have normalized.    Please contact the on call ENT resident with questions/concerns    MD Mustapha Gonzalez PGY4

## 2019-12-10 NOTE — PLAN OF CARE
Major Shift Events:  No new neurological changes.  Precedex continues @ 0.6.  Febrile with TMax 102.5 - Neuro critical care aware.  Tylenol given around the clock and cooling blanket placed on patient.  Increased PEEP to 8 after patient PS today due to increasing FiO2 demand.  Collar remains in place on neck.  No family at bedside today.  Soft touch call light in place and patient demonstrates appropriately how to use it so long as it is in reach of him.  PRN pain medications given for neck pain/discomfort.    Plan: Trach tomorrow.    For vital signs and complete assessments, please see documentation flowsheets.    ?

## 2019-12-10 NOTE — PROGRESS NOTES
Care Coordinator Progress Note    Admission Date/Time:  12/1/2019  Attending MD:  Fernando Ashton MD    Data  Chart reviewed, discussed with interdisciplinary team.   Patient was admitted for:    Quadriplegia (H)  Acute respiratory failure with hypoxia (H)       Assessment  Pt is s/p OR 12/1 - Pharyngotomy closed with assistance from ENT.  Pt had PEG placement done 12/5 and extubated, reintubated the next day, 12/6.  Pt was scheduled to have trach placement done today.  Procedure canceled due to high vent setting.  Attempt to visit family yesterday and today for support.  No family was in the room at time of my visits.  Pt will need LTAC placement for vent weaning once trach placed.  RNCC will cont to follow plan of care.     Plan  Anticipated Discharge Date:  TBD.  Anticipated Discharge Plan:   LTAC.  RNCC will cont to follow plan of care.      Edilson Xavier RN, PHN, BSN  4A and 4E/ ICU  Care Coordinator  Phone: [phone]  Pager: [phone]

## 2019-12-10 NOTE — PROGRESS NOTES
"Neuroscience Intensive Care Progress Note  12/10/2019    Sherwin Keny Angel is a 64 year old year old male with hx HTN, DM and cervical stenosis s/p recent anterior C3-4 arthroplasty (2 weeks prior) who was admitted on 12/1/2019 with bilateral lower extremity paraplegia and near loss of upper extremity movement, after a fall at rehab. Prior to the fall, he was able to ambulate with a cane. Taken to the OR for arthroplasty implant removal on 12/1. Found to have significant inflammation and 2 pharyngotomy sites. ENT closed 1 site (unable to reach other) and drain placed for the phlegmon. Now on abx for cervical tissue + culture. S/P gtube placement 12/5 due to anticipated prolonged period of NPO. Extubated succesfully on 12/5, however, had worsening respiratory status with worsening white out of R lung concerning for mucus plugging as pt could not cough up secretions due to diaphragmatic weakness, subsequently re-intubated 12/6.     Problem List  1. Cervical stenosis s/p C3-C4 arthroplasty   2. Cervical spinal injury due to fall, with C3-C5 fracture   3. Acute respiratory failure s/p re-intubation  4. Leukocytosis  5. Vertebral phlegmon vs abscess s/p drain placement on 12/1, cultures +  6. Hx of hypertension  7. Hx of DM  8. Alcohol dependence  9. Secretions  10. Normocytic anemia  11. G-tube placement 12/5    24 hour events:  - plan for trach today  - He is currently on precedex gtt at 0.6  - Tmax 102.5  - Glucose running high up to 220    24 Hour Vital Signs Summary:  BP Readings from Last 1 Encounters:   12/10/19 131/72     Pulse Readings from Last 1 Encounters:   12/10/19 60     Wt Readings from Last 1 Encounters:   12/10/19 85.2 kg (187 lb 13.3 oz)     Ht Readings from Last 1 Encounters:   12/01/19 1.727 m (5' 8\")     Estimated body mass index is 28.56 kg/m  as calculated from the following:    Height as of this encounter: 1.727 m (5' 8\").    Weight as of this encounter: 85.2 kg (187 lb 13.3 oz).    Temp " Readings from Last 1 Encounters:   12/10/19 98  F (36.7  C) (Axillary)         Ventilator Settings  Ventilation Mode: CMV/AC  (Continuous Mandatory Ventilation/ Assist Control)  FiO2 (%): 50 %  Rate Set (breaths/minute): 12 breaths/min  Tidal Volume Set (mL): 450 mL  PEEP (cm H2O): 8 cmH2O  Pressure Support (cm H2O): 7 cmH2O  Oxygen Concentration (%): 45 %  Resp: 18      Intake/Output    Intake/Output Summary (Last 24 hours) at 12/8/2019 0817  Last data filed at 12/8/2019 0600  Gross per 24 hour   Intake 1475.96 ml   Output 2450 ml   Net -974.04 ml         BP - Mean:  [] 96    Current Medications:    acetylcysteine  4 mL Nebulization 4x daily     albuterol  2.5 mg Nebulization 4x daily     amLODIPine  5 mg Oral or Feeding Tube Daily     bumetanide  2 mg Oral or Feeding Tube BID     cyanocobalamin  1,000 mcg Intramuscular Q30 Days     dexamethasone  10 mg Intravenous Pre-Op/Pre-procedure x 1 dose     famotidine  20 mg Per G Tube BID     folic acid  1 mg Oral or Feeding Tube Daily     heparin lock flush  5-10 mL Intracatheter Q24H     insulin aspart  1-12 Units Subcutaneous Q4H     insulin isophane human  20 Units Subcutaneous Q24H     insulin isophane human  20 Units Subcutaneous Q24H     mirtazapine  15 mg Oral or Feeding Tube QPM     multivitamins w/minerals  15 mL Per Feeding Tube Daily     piperacillin-tazobactam  4.5 g Intravenous Q6H     polyethylene glycol  17 g Oral or Feeding Tube BID     senna-docusate  1 tablet Oral or Feeding Tube BID     sodium phosphate  1 enema Rectal Daily     thiamine  100 mg Oral or Feeding Tube Daily       PRN Medications:  acetaminophen, bisacodyl, carboxymethylcellulose PF, IV fluid REPLACEMENT ONLY, glucose **OR** dextrose **OR** glucagon, fentaNYL, heparin lock flush, hydrALAZINE, ipratropium - albuterol 0.5 mg/2.5 mg/3 mL, lidocaine 4%, lidocaine (buffered or not buffered), magnesium sulfate, magnesium sulfate, melatonin, naloxone, ondansetron **OR** ondansetron,  "oxyCODONE, potassium chloride, potassium chloride with lidocaine, potassium chloride, potassium chloride, potassium chloride, potassium phosphate (KPHOS) in D5W IV, potassium phosphate (KPHOS) in D5W IV, potassium phosphate (KPHOS) in D5W IV, potassium phosphate (KPHOS) in D5W IV, potassium phosphate (KPHOS) in D5W IV, prochlorperazine **OR** prochlorperazine **OR** prochlorperazine, sodium chloride (PF)    Infusions:    dexmedetomidine 0.6 mcg/kg/hr (12/10/19 0700)     IV fluid REPLACEMENT ONLY 55 mL/hr at 12/10/19 0020     no pre procedure antibiotic needed         No Known Allergies    Physical Examination:   Vitals: /72   Pulse 60   Temp 98  F (36.7  C) (Axillary)   Resp 18   Ht 1.727 m (5' 8\")   Wt 85.2 kg (187 lb 13.3 oz)   SpO2 97%   BMI 28.56 kg/m    General: Adult male, collar in place  HEENT: Normocephalic.   CV: RRR  Resp: intubated  Abd: Soft, distended  Skin: Warm, dry   Neuro: intubated, on precedex. Awake and alert. Responds to questions with nodding appropriately. Follows commands. EOMI. Eyes are conjugate. Continues to moves both arms anti-gravity at elbows brisky, weaker in RUE. No movement in R fingers, subtle movement in L fingers. No movement in bilateral lower extremities. Sensation intact in bilateral upper extremities proximately and distally along entire arm. Decreased sensation in LE but does have some sensation.     Labs:  Recent Labs   Lab Test 12/07/19  0330 12/06/19  1828 12/06/19  0434 12/05/19  0340    138 136 140   POTASSIUM 3.8 3.8 4.1 4.1   CHLORIDE 100 99 100 106   CO2 34* 34* 31 30   ANIONGAP 5 4 5 3   * 145* 154* 138*   BUN 25 20 14 19   CR 0.74 0.57* 0.44* 0.50*   FLORINDA 8.4* 8.7 8.6 8.1*   WBC 10.2  --  9.8 8.8   RBC 3.26*  --  3.54* 2.81*   HGB 9.4*  --  10.3* 8.3*     --  419 390     Imaging:   CXR: 12/8  1. Endotracheal tube tip projects 2.0 cm above the joby.  2. Improved aeration of the right lung since the prior exam, although residual " diffuse opacities remain.  3. Increased streaky left hilar and basilar opacities, favored to represent atelectasis.     Assessment/Plan:  Sherwin Angel is a 64 year old with hx of recent of anterior C3-4 arthroplasty (2 weeks ago at [facility]) who was admitted 12/1/2019 after a fall at rehab, resulting in lower extremity paraplegia. Found to have cervical stenosis. Now s/p one pharyngotomy closure on 12/1, still has one pharyngotomy present. Arthroplasty implant removed with no replacement implant placed. He has been on abx for cervical tissue cultures with ID following, narrowed to Unasyn on 12/4 but switched to zosyn 12/9 for ongoing fever.      Neuro:  #Cervical spinal cord injury  - Collar at all times, Up with assist  - Neuro checks q4h  - PT/OT     #Sedation/Pain  - Precedex gtt  - Oxycodone PRN  - Fentanyl PRN     #Hx of Alcohol dependence  - CIWA  - Thiamine & folate daily    #Depression  - Continue PTA miratazepine     Resp:  #Acute respiratory failure s/p re-intubation 12/6, suspect diaphragmatic weakness related to spine injury.   #Mucous plugging; right lung- resolved, CXR 12/8 with near complete resolution of previously demonstrated right lung opacities  - Mucomyst & duoneb q4h scheduled  - ENT plans for trach 12/10 afternoon but unfortunately still req peep of 8, decided to hold off      Cardio:  #Hx of hypertension   - Goal: Normotension  - Resume PTA amlodipine 10 mg   - Hold PTA Metoprolol to avoid bradycardia while on precedex gtt     Renal:  No active issues.  - Stopped PTA bumex  - Electrolyte replacement protocol   - Monitor I/O, elevating BUN and sodium trending up suspect slightly hypovolemic     Endo:  #DM2  - Endocrine following   - Sliding scale insulin, high dose  - 20 U NPH am/pm      Heme:  #Normocytic anemia, stable  - Hg > 7.0   - Plt > 100k   - INR <1.5      GI:  #Constipation - resolved last BM 12/10   - Miralax & Senna scheduled at BID  - PRN  suppository/enema available     #Diet  - G-tube in place  - Tube feeds at goal      ID:  #Leukocytosis, resolved - plan to repeat CT spine to look for ongoing infection   - Stopped Vancomycin, metronidazole, cefepime on 12/4  - switched Unasyn to Zosyn after ongoing fever (abx plan for 12/2-1/12)  - Weekly CBC, CMP, CRP while on IV abx, fax to ID clinic attn: Charissa  - Blood cultures (12/1, 12/3, 12/7): NGTD  - Spinal tissue cultures (12/1): MSSA, Strep anginosus, Eikenella, and oral anaerobes and multiple more   - Follow up in ID clinic near end of abx course, ID to arrange for this  - Will page ID attending at [id number] prior to discharging pt     Lines:  -ETT, PICC, PIV x 2, Degroot, PEG     FEN: TF   PPX:    DVT prophylaxis: SCDs, Heparin subcutaneous    GI:  Pantoprazole  Code Status: Full, presumed     Dispo: 4A until medically stable      The patient was seen and discussed with the attending, Dr. Sanford.     Ladan Stevens,   Neurology PGY-2   [phone]

## 2019-12-10 NOTE — PLAN OF CARE
OT-4a: Hold- pt is being followed by PT only, as he likely does not have a need for two therapy disciplines at this time, will initiate OT as pt becomes more appropriate.

## 2019-12-10 NOTE — PROGRESS NOTES
Neurosurgery Progress Note    Subjective:  febrile, broadened Unasyn -> Zosyn; endocrine consulted for diabetes, cultures negative, ERVIN drain removed by ENT    Objective:  Intubated, partially sedated  Awake, alert, nods yes or no to questions  Follows commands  Deltoids 4/5, biceps 4/5, triceps 1/5,  0/5, lower extremities 0/5    Assessment:  64 years old gentleman status post C3-C4 artificial disc placement about 2 weeks ago presents after a fall with almost complete loss of strength in his upper extremities and paraplegia, found to have cervical stenosis. S/p OR 12/1 - Pharyngotomy closed with assistance from ENT. Arthroplasty implant removed. No replacement implant placed. S/p G-tube 12/5. Extubated 12/5. Re-Intubated 12/6 due to Mucous Plugging and Atelectasis.    Plan:  Intubated, mechanically ventilated  Nebs, wean to extubate as able, plan for tracheostomy with ENT 12/10  DVT: SCDs while in bed  ERVIN drain removed 12/9 per ENT  [place] collar  Per ID: Zosyn 4-6 wks for vertebral phlegmon, weekly labs  Follow-up cultures  SubQ heparin & tube feeds held at midnight 12/10 for tracheostomy  MRI before stopping abx in 6 wks  endocrine consulted for diabetes - appreciate recs  Disposition: 4A      Lion Vargas MD  Neurosurgery Resident PGY2    Please contact neurosurgery resident on call with questions.    Dial * [phone], enter [phone] when prompted.

## 2019-12-11 ENCOUNTER — APPOINTMENT (OUTPATIENT)
Dept: GENERAL RADIOLOGY | Facility: CLINIC | Age: 64
DRG: 003 | End: 2019-12-11
Attending: STUDENT IN AN ORGANIZED HEALTH CARE EDUCATION/TRAINING PROGRAM
Payer: COMMERCIAL

## 2019-12-11 ENCOUNTER — APPOINTMENT (OUTPATIENT)
Dept: PHYSICAL THERAPY | Facility: CLINIC | Age: 64
DRG: 003 | End: 2019-12-11
Attending: NEUROLOGICAL SURGERY
Payer: COMMERCIAL

## 2019-12-11 LAB
ALBUMIN UR-MCNC: 30 MG/DL
ANION GAP SERPL CALCULATED.3IONS-SCNC: 4 MMOL/L (ref 3–14)
APPEARANCE UR: CLEAR
BASE EXCESS BLDA CALC-SCNC: 6 MMOL/L
BASOPHILS # BLD AUTO: 0 10E9/L (ref 0–0.2)
BASOPHILS NFR BLD AUTO: 0.3 %
BILIRUB UR QL STRIP: NEGATIVE
BUN SERPL-MCNC: 40 MG/DL (ref 7–30)
CALCIUM SERPL-MCNC: 8.3 MG/DL (ref 8.5–10.1)
CHLORIDE SERPL-SCNC: 113 MMOL/L (ref 94–109)
CO2 SERPL-SCNC: 33 MMOL/L (ref 20–32)
COLOR UR AUTO: YELLOW
CREAT SERPL-MCNC: 0.88 MG/DL (ref 0.66–1.25)
CRP SERPL-MCNC: 16 MG/L (ref 0–8)
DIFFERENTIAL METHOD BLD: ABNORMAL
EOSINOPHIL # BLD AUTO: 0.2 10E9/L (ref 0–0.7)
EOSINOPHIL NFR BLD AUTO: 2.3 %
ERYTHROCYTE [DISTWIDTH] IN BLOOD BY AUTOMATED COUNT: 13.4 % (ref 10–15)
GFR SERPL CREATININE-BSD FRML MDRD: >90 ML/MIN/{1.73_M2}
GLUCOSE BLDC GLUCOMTR-MCNC: 144 MG/DL (ref 70–99)
GLUCOSE BLDC GLUCOMTR-MCNC: 168 MG/DL (ref 70–99)
GLUCOSE BLDC GLUCOMTR-MCNC: 170 MG/DL (ref 70–99)
GLUCOSE BLDC GLUCOMTR-MCNC: 173 MG/DL (ref 70–99)
GLUCOSE BLDC GLUCOMTR-MCNC: 199 MG/DL (ref 70–99)
GLUCOSE BLDC GLUCOMTR-MCNC: 210 MG/DL (ref 70–99)
GLUCOSE SERPL-MCNC: 142 MG/DL (ref 70–99)
GLUCOSE UR STRIP-MCNC: NEGATIVE MG/DL
GRAM STN SPEC: NORMAL
GRAM STN SPEC: NORMAL
HCO3 BLD-SCNC: 30 MMOL/L (ref 21–28)
HCT VFR BLD AUTO: 31 % (ref 40–53)
HGB BLD-MCNC: 9.2 G/DL (ref 13.3–17.7)
HGB UR QL STRIP: NEGATIVE
IMM GRANULOCYTES # BLD: 0.1 10E9/L (ref 0–0.4)
IMM GRANULOCYTES NFR BLD: 0.7 %
KETONES UR STRIP-MCNC: NEGATIVE MG/DL
LEUKOCYTE ESTERASE UR QL STRIP: NEGATIVE
LYMPHOCYTES # BLD AUTO: 2.1 10E9/L (ref 0.8–5.3)
LYMPHOCYTES NFR BLD AUTO: 20.7 %
Lab: NORMAL
MAGNESIUM SERPL-MCNC: 2.7 MG/DL (ref 1.6–2.3)
MCH RBC QN AUTO: 28.7 PG (ref 26.5–33)
MCHC RBC AUTO-ENTMCNC: 29.7 G/DL (ref 31.5–36.5)
MCV RBC AUTO: 97 FL (ref 78–100)
MONOCYTES # BLD AUTO: 0.6 10E9/L (ref 0–1.3)
MONOCYTES NFR BLD AUTO: 6.1 %
MUCOUS THREADS #/AREA URNS LPF: PRESENT /LPF
NEUTROPHILS # BLD AUTO: 7 10E9/L (ref 1.6–8.3)
NEUTROPHILS NFR BLD AUTO: 69.9 %
NITRATE UR QL: NEGATIVE
NRBC # BLD AUTO: 0 10*3/UL
NRBC BLD AUTO-RTO: 0 /100
O2/TOTAL GAS SETTING VFR VENT: 100 %
PCO2 BLD: 40 MM HG (ref 35–45)
PH BLD: 7.49 PH (ref 7.35–7.45)
PH UR STRIP: 5.5 PH (ref 5–7)
PHOSPHATE SERPL-MCNC: 4.1 MG/DL (ref 2.5–4.5)
PLATELET # BLD AUTO: 339 10E9/L (ref 150–450)
PO2 BLD: 105 MM HG (ref 80–105)
POTASSIUM SERPL-SCNC: 3.5 MMOL/L (ref 3.4–5.3)
RBC # BLD AUTO: 3.21 10E12/L (ref 4.4–5.9)
RBC #/AREA URNS AUTO: 1 /HPF (ref 0–2)
SODIUM SERPL-SCNC: 150 MMOL/L (ref 133–144)
SOURCE: ABNORMAL
SP GR UR STRIP: 1.03 (ref 1–1.03)
SPECIMEN SOURCE: NORMAL
TRANS CELLS #/AREA URNS HPF: <1 /HPF (ref 0–1)
UROBILINOGEN UR STRIP-MCNC: NORMAL MG/DL (ref 0–2)
WBC # BLD AUTO: 10 10E9/L (ref 4–11)
WBC #/AREA URNS AUTO: 2 /HPF (ref 0–5)

## 2019-12-11 PROCEDURE — 40000894 ZZH STATISTIC OT IP EVAL DEFER: Performed by: OCCUPATIONAL THERAPIST

## 2019-12-11 PROCEDURE — 81001 URINALYSIS AUTO W/SCOPE: CPT | Performed by: PSYCHIATRY & NEUROLOGY

## 2019-12-11 PROCEDURE — 20000004 ZZH R&B ICU UMMC

## 2019-12-11 PROCEDURE — 80048 BASIC METABOLIC PNL TOTAL CA: CPT | Performed by: STUDENT IN AN ORGANIZED HEALTH CARE EDUCATION/TRAINING PROGRAM

## 2019-12-11 PROCEDURE — 94003 VENT MGMT INPAT SUBQ DAY: CPT

## 2019-12-11 PROCEDURE — 25000132 ZZH RX MED GY IP 250 OP 250 PS 637: Performed by: STUDENT IN AN ORGANIZED HEALTH CARE EDUCATION/TRAINING PROGRAM

## 2019-12-11 PROCEDURE — 25000132 ZZH RX MED GY IP 250 OP 250 PS 637: Performed by: NEUROLOGICAL SURGERY

## 2019-12-11 PROCEDURE — 25000128 H RX IP 250 OP 636: Performed by: NURSE PRACTITIONER

## 2019-12-11 PROCEDURE — 40000961 ZZH STATISTIC INTRAPULMONARY PERCUSSIVE VENT

## 2019-12-11 PROCEDURE — 94640 AIRWAY INHALATION TREATMENT: CPT | Mod: 76

## 2019-12-11 PROCEDURE — 25800030 ZZH RX IP 258 OP 636: Performed by: NURSE PRACTITIONER

## 2019-12-11 PROCEDURE — 27210437 ZZH NUTRITION PRODUCT SEMIELEM INTERMED LITER

## 2019-12-11 PROCEDURE — 87070 CULTURE OTHR SPECIMN AEROBIC: CPT | Performed by: PSYCHIATRY & NEUROLOGY

## 2019-12-11 PROCEDURE — 25000125 ZZHC RX 250: Performed by: CLINICAL NURSE SPECIALIST

## 2019-12-11 PROCEDURE — 36415 COLL VENOUS BLD VENIPUNCTURE: CPT | Performed by: NURSE PRACTITIONER

## 2019-12-11 PROCEDURE — 25000128 H RX IP 250 OP 636: Performed by: COUNSELOR

## 2019-12-11 PROCEDURE — 97110 THERAPEUTIC EXERCISES: CPT | Mod: GP

## 2019-12-11 PROCEDURE — 87040 BLOOD CULTURE FOR BACTERIA: CPT | Performed by: NURSE PRACTITIONER

## 2019-12-11 PROCEDURE — 25000132 ZZH RX MED GY IP 250 OP 250 PS 637: Performed by: PSYCHIATRY & NEUROLOGY

## 2019-12-11 PROCEDURE — 00000146 ZZHCL STATISTIC GLUCOSE BY METER IP

## 2019-12-11 PROCEDURE — 25000132 ZZH RX MED GY IP 250 OP 250 PS 637: Performed by: COUNSELOR

## 2019-12-11 PROCEDURE — 87106 FUNGI IDENTIFICATION YEAST: CPT | Performed by: PSYCHIATRY & NEUROLOGY

## 2019-12-11 PROCEDURE — 40000275 ZZH STATISTIC RCP TIME EA 10 MIN

## 2019-12-11 PROCEDURE — 25800030 ZZH RX IP 258 OP 636: Performed by: NEUROLOGICAL SURGERY

## 2019-12-11 PROCEDURE — 84100 ASSAY OF PHOSPHORUS: CPT | Performed by: STUDENT IN AN ORGANIZED HEALTH CARE EDUCATION/TRAINING PROGRAM

## 2019-12-11 PROCEDURE — 85025 COMPLETE CBC W/AUTO DIFF WBC: CPT | Performed by: STUDENT IN AN ORGANIZED HEALTH CARE EDUCATION/TRAINING PROGRAM

## 2019-12-11 PROCEDURE — 25000125 ZZHC RX 250: Performed by: NURSE PRACTITIONER

## 2019-12-11 PROCEDURE — 25000125 ZZHC RX 250: Performed by: STUDENT IN AN ORGANIZED HEALTH CARE EDUCATION/TRAINING PROGRAM

## 2019-12-11 PROCEDURE — 25000131 ZZH RX MED GY IP 250 OP 636 PS 637: Performed by: NURSE PRACTITIONER

## 2019-12-11 PROCEDURE — 25000128 H RX IP 250 OP 636: Performed by: PSYCHIATRY & NEUROLOGY

## 2019-12-11 PROCEDURE — 83735 ASSAY OF MAGNESIUM: CPT | Performed by: STUDENT IN AN ORGANIZED HEALTH CARE EDUCATION/TRAINING PROGRAM

## 2019-12-11 PROCEDURE — 87205 SMEAR GRAM STAIN: CPT | Performed by: PSYCHIATRY & NEUROLOGY

## 2019-12-11 PROCEDURE — 25000132 ZZH RX MED GY IP 250 OP 250 PS 637: Performed by: NURSE PRACTITIONER

## 2019-12-11 PROCEDURE — 97530 THERAPEUTIC ACTIVITIES: CPT | Mod: GP

## 2019-12-11 PROCEDURE — 82803 BLOOD GASES ANY COMBINATION: CPT | Performed by: STUDENT IN AN ORGANIZED HEALTH CARE EDUCATION/TRAINING PROGRAM

## 2019-12-11 PROCEDURE — 86140 C-REACTIVE PROTEIN: CPT | Performed by: STUDENT IN AN ORGANIZED HEALTH CARE EDUCATION/TRAINING PROGRAM

## 2019-12-11 PROCEDURE — 25000128 H RX IP 250 OP 636: Performed by: NEUROLOGICAL SURGERY

## 2019-12-11 PROCEDURE — 25000128 H RX IP 250 OP 636: Performed by: STUDENT IN AN ORGANIZED HEALTH CARE EDUCATION/TRAINING PROGRAM

## 2019-12-11 PROCEDURE — 71045 X-RAY EXAM CHEST 1 VIEW: CPT

## 2019-12-11 RX ORDER — MIRTAZAPINE 15 MG/1
30 TABLET, FILM COATED ORAL EVERY EVENING
Status: DISCONTINUED | OUTPATIENT
Start: 2019-12-11 | End: 2019-12-30

## 2019-12-11 RX ORDER — ACETYLCYSTEINE 100 MG/ML
4 SOLUTION ORAL; RESPIRATORY (INHALATION) EVERY 4 HOURS
Status: DISCONTINUED | OUTPATIENT
Start: 2019-12-11 | End: 2019-12-15

## 2019-12-11 RX ORDER — ACETYLCYSTEINE 200 MG/ML
2 SOLUTION ORAL; RESPIRATORY (INHALATION) EVERY 4 HOURS
Status: DISCONTINUED | OUTPATIENT
Start: 2019-12-11 | End: 2019-12-11

## 2019-12-11 RX ORDER — HYDROMORPHONE HCL/0.9% NACL/PF 0.2MG/0.2
.2-.5 SYRINGE (ML) INTRAVENOUS
Status: DISCONTINUED | OUTPATIENT
Start: 2019-12-11 | End: 2019-12-11

## 2019-12-11 RX ORDER — LIDOCAINE 4 G/G
2 PATCH TOPICAL
Status: DISCONTINUED | OUTPATIENT
Start: 2019-12-11 | End: 2019-12-18

## 2019-12-11 RX ORDER — HYDROMORPHONE HCL/0.9% NACL/PF 0.2MG/0.2
.2-.4 SYRINGE (ML) INTRAVENOUS
Status: DISCONTINUED | OUTPATIENT
Start: 2019-12-11 | End: 2019-12-23

## 2019-12-11 RX ORDER — TRAZODONE HYDROCHLORIDE 50 MG/1
50 TABLET, FILM COATED ORAL AT BEDTIME
Status: DISCONTINUED | OUTPATIENT
Start: 2019-12-11 | End: 2019-12-13

## 2019-12-11 RX ORDER — LORAZEPAM 0.5 MG/1
0.5 TABLET ORAL EVERY 4 HOURS PRN
Status: DISCONTINUED | OUTPATIENT
Start: 2019-12-11 | End: 2019-12-12

## 2019-12-11 RX ORDER — ACETYLCYSTEINE 200 MG/ML
4 SOLUTION ORAL; RESPIRATORY (INHALATION) EVERY 4 HOURS
Status: DISCONTINUED | OUTPATIENT
Start: 2019-12-11 | End: 2019-12-11

## 2019-12-11 RX ADMIN — SENNOSIDES AND DOCUSATE SODIUM 1 TABLET: 8.6; 5 TABLET ORAL at 20:37

## 2019-12-11 RX ADMIN — LORAZEPAM 0.5 MG: 0.5 TABLET ORAL at 22:39

## 2019-12-11 RX ADMIN — INSULIN ASPART 2 UNITS: 100 INJECTION, SOLUTION INTRAVENOUS; SUBCUTANEOUS at 03:16

## 2019-12-11 RX ADMIN — AMLODIPINE BESYLATE 5 MG: 5 TABLET ORAL at 08:36

## 2019-12-11 RX ADMIN — HEPARIN SODIUM 5000 UNITS: 5000 INJECTION, SOLUTION INTRAVENOUS; SUBCUTANEOUS at 18:14

## 2019-12-11 RX ADMIN — CARBOXYMETHYLCELLULOSE SODIUM 1 DROP: 5 SOLUTION/ DROPS OPHTHALMIC at 03:04

## 2019-12-11 RX ADMIN — INSULIN ASPART 4 UNITS: 100 INJECTION, SOLUTION INTRAVENOUS; SUBCUTANEOUS at 21:14

## 2019-12-11 RX ADMIN — Medication 0.2 MG: at 20:52

## 2019-12-11 RX ADMIN — LIDOCAINE 2 PATCH: 560 PATCH PERCUTANEOUS; TOPICAL; TRANSDERMAL at 21:15

## 2019-12-11 RX ADMIN — ALBUTEROL SULFATE 2.5 MG: 2.5 SOLUTION RESPIRATORY (INHALATION) at 19:37

## 2019-12-11 RX ADMIN — OXYCODONE HYDROCHLORIDE 10 MG: 5 SOLUTION ORAL at 20:53

## 2019-12-11 RX ADMIN — ACETAMINOPHEN 650 MG: 325 SOLUTION ORAL at 20:36

## 2019-12-11 RX ADMIN — CEFEPIME HYDROCHLORIDE 2 G: 2 INJECTION, POWDER, FOR SOLUTION INTRAVENOUS at 16:28

## 2019-12-11 RX ADMIN — ACETYLCYSTEINE 4 ML: 100 INHALANT RESPIRATORY (INHALATION) at 19:37

## 2019-12-11 RX ADMIN — POTASSIUM CHLORIDE 20 MEQ: 1.5 POWDER, FOR SOLUTION ORAL at 04:39

## 2019-12-11 RX ADMIN — PIPERACILLIN SODIUM AND TAZOBACTAM SODIUM 4.5 G: 4; .5 INJECTION, POWDER, LYOPHILIZED, FOR SOLUTION INTRAVENOUS at 03:04

## 2019-12-11 RX ADMIN — OXYCODONE HYDROCHLORIDE 10 MG: 5 SOLUTION ORAL at 05:52

## 2019-12-11 RX ADMIN — INSULIN ASPART 2 UNITS: 100 INJECTION, SOLUTION INTRAVENOUS; SUBCUTANEOUS at 23:16

## 2019-12-11 RX ADMIN — VANCOMYCIN HYDROCHLORIDE 2250 MG: 1 INJECTION, POWDER, LYOPHILIZED, FOR SOLUTION INTRAVENOUS at 16:30

## 2019-12-11 RX ADMIN — CEFEPIME HYDROCHLORIDE 2 G: 2 INJECTION, POWDER, FOR SOLUTION INTRAVENOUS at 22:39

## 2019-12-11 RX ADMIN — ACETYLCYSTEINE 4 ML: 100 INHALANT RESPIRATORY (INHALATION) at 15:33

## 2019-12-11 RX ADMIN — MULTIVITAMIN 15 ML: LIQUID ORAL at 20:36

## 2019-12-11 RX ADMIN — SODIUM CHLORIDE, PRESERVATIVE FREE 5 ML: 5 INJECTION INTRAVENOUS at 11:57

## 2019-12-11 RX ADMIN — Medication 100 MG: at 08:36

## 2019-12-11 RX ADMIN — FAMOTIDINE 20 MG: 40 POWDER, FOR SUSPENSION ORAL at 20:38

## 2019-12-11 RX ADMIN — ALBUTEROL SULFATE 2.5 MG: 2.5 SOLUTION RESPIRATORY (INHALATION) at 13:01

## 2019-12-11 RX ADMIN — DEXMEDETOMIDINE 0.5 MCG/KG/HR: 100 INJECTION, SOLUTION, CONCENTRATE INTRAVENOUS at 10:30

## 2019-12-11 RX ADMIN — ACETYLCYSTEINE 4 ML: 100 INHALANT RESPIRATORY (INHALATION) at 13:01

## 2019-12-11 RX ADMIN — FAMOTIDINE 20 MG: 40 POWDER, FOR SUSPENSION ORAL at 08:37

## 2019-12-11 RX ADMIN — MIRTAZAPINE 30 MG: 15 TABLET, FILM COATED ORAL at 20:37

## 2019-12-11 RX ADMIN — FOLIC ACID 1 MG: 1 TABLET ORAL at 08:36

## 2019-12-11 RX ADMIN — METRONIDAZOLE 500 MG: 500 INJECTION, SOLUTION INTRAVENOUS at 23:14

## 2019-12-11 RX ADMIN — ACETAMINOPHEN 650 MG: 325 SOLUTION ORAL at 08:36

## 2019-12-11 RX ADMIN — ACETYLCYSTEINE 4 ML: 200 SOLUTION ORAL; RESPIRATORY (INHALATION) at 05:45

## 2019-12-11 RX ADMIN — HEPARIN SODIUM 5000 UNITS: 5000 INJECTION, SOLUTION INTRAVENOUS; SUBCUTANEOUS at 05:57

## 2019-12-11 RX ADMIN — PIPERACILLIN SODIUM AND TAZOBACTAM SODIUM 4.5 G: 4; .5 INJECTION, POWDER, LYOPHILIZED, FOR SOLUTION INTRAVENOUS at 09:17

## 2019-12-11 RX ADMIN — INSULIN ASPART 4 UNITS: 100 INJECTION, SOLUTION INTRAVENOUS; SUBCUTANEOUS at 16:32

## 2019-12-11 RX ADMIN — ALBUTEROL SULFATE 2.5 MG: 2.5 SOLUTION RESPIRATORY (INHALATION) at 15:33

## 2019-12-11 RX ADMIN — ALBUTEROL SULFATE 2.5 MG: 2.5 SOLUTION RESPIRATORY (INHALATION) at 05:45

## 2019-12-11 RX ADMIN — METRONIDAZOLE 500 MG: 500 INJECTION, SOLUTION INTRAVENOUS at 14:51

## 2019-12-11 RX ADMIN — INSULIN ASPART 4 UNITS: 100 INJECTION, SOLUTION INTRAVENOUS; SUBCUTANEOUS at 11:59

## 2019-12-11 RX ADMIN — INSULIN ASPART 6 UNITS: 100 INJECTION, SOLUTION INTRAVENOUS; SUBCUTANEOUS at 08:48

## 2019-12-11 RX ADMIN — OXYCODONE HYDROCHLORIDE 10 MG: 5 SOLUTION ORAL at 14:51

## 2019-12-11 RX ADMIN — ACETAMINOPHEN 650 MG: 325 SOLUTION ORAL at 14:51

## 2019-12-11 RX ADMIN — DEXMEDETOMIDINE 0.7 MCG/KG/HR: 100 INJECTION, SOLUTION, CONCENTRATE INTRAVENOUS at 03:04

## 2019-12-11 RX ADMIN — SENNOSIDES AND DOCUSATE SODIUM 1 TABLET: 8.6; 5 TABLET ORAL at 08:36

## 2019-12-11 RX ADMIN — OXYCODONE HYDROCHLORIDE 10 MG: 5 SOLUTION ORAL at 09:54

## 2019-12-11 RX ADMIN — IPRATROPIUM BROMIDE AND ALBUTEROL SULFATE 3 ML: .5; 3 SOLUTION RESPIRATORY (INHALATION) at 07:54

## 2019-12-11 RX ADMIN — INSULIN HUMAN 38 UNITS: 100 INJECTION, SUSPENSION SUBCUTANEOUS at 21:31

## 2019-12-11 RX ADMIN — TRAZODONE HYDROCHLORIDE 50 MG: 50 TABLET ORAL at 22:39

## 2019-12-11 ASSESSMENT — ACTIVITIES OF DAILY LIVING (ADL)
ADLS_ACUITY_SCORE: 27

## 2019-12-11 ASSESSMENT — MIFFLIN-ST. JEOR: SCORE: 1620.5

## 2019-12-11 NOTE — PLAN OF CARE
OT 4A: Cancel and DEFER.  Acknowledge orders were placed for OT eval and treat.  Pt not demonstrating functional return.  Will discontinue OT orders.    Discharge Planner OT   Patient plan for discharge: anticipate discharge to LTACH  Current status: Pt is new quad with some active movement in biceps, deltoids and traps only.   DEFER to PT to address ROM, maximize active movements as able, positioning and tolerance for upright posture.  RECOMMEND SLP Consult - to address communication strategies as able.  Barriers to return to prior living situation: new paralysis, respiratory needs, acute medical needs  Recommendations for discharge: Likely LTACH - Defer to PT for ongoing therapy recommendations  Rationale for recommendations: Defer to PT for further recommendations.         Entered by: Trini Scott 12/11/2019 11:12 AM

## 2019-12-11 NOTE — PROGRESS NOTES
Neuroscience Intensive Care Progress Note  12/11/2019    Sherwin Keny Ramosfabricioolya is a 64 year old year old male with hx HTN, DM and cervical stenosis s/p recent anterior C3-4 arthroplasty (2 weeks prior) who was admitted on 12/1/2019 with bilateral lower extremity paraplegia and near loss of upper extremity movement, after a fall at rehab. Prior to the fall, he was able to ambulate with a cane. Taken to the OR for arthroplasty implant removal on 12/1. Found to have significant inflammation and 2 pharyngotomy sites. ENT closed 1 site (unable to reach other) and drain placed for the phlegmon. Now on abx for cervical tissue + culture. S/P gtube placement 12/5 due to anticipated prolonged period of NPO. Extubated succesfully on 12/5, however, had worsening respiratory status with worsening white out of R lung concerning for mucus plugging as pt could not cough up secretions due to diaphragmatic weakness, subsequently re-intubated 12/6.     Problem List  1. Cervical stenosis s/p C3-C4 arthroplasty   2. Cervical spinal injury due to fall, with C3-C5 fracture   3. Acute respiratory failure s/p re-intubation  4. Leukocytosis  5. Vertebral phlegmon vs abscess s/p drain placement on 12/1, cultures +  6. Hx of hypertension  7. Hx of DM  8. Alcohol dependence  9. Secretions  10. Normocytic anemia  11. G-tube placement 12/5    24 hour events:  - unable to get trach, requiring higher vent support   - Worsening resp status with rt sided mucous plugging, positional changes and started duonebs and metanebs   - C spine MRI showed increased inflammation but no abscess   - Ongoing anxiety and pain in neck despite current pain regimen   - He is currently on precedex gtt at 0.7  - Tmax 101.5  - increased free water flushes with hypernatremia     24 Hour Vital Signs Summary:  BP Readings from Last 1 Encounters:   12/11/19 119/71     Pulse Readings from Last 1 Encounters:   12/11/19 73     Wt Readings from Last 1 Encounters:   12/11/19 85.1  "kg (187 lb 9.8 oz)     Ht Readings from Last 1 Encounters:   12/01/19 1.727 m (5' 8\")     Estimated body mass index is 28.53 kg/m  as calculated from the following:    Height as of this encounter: 1.727 m (5' 8\").    Weight as of this encounter: 85.1 kg (187 lb 9.8 oz).    Temp Readings from Last 1 Encounters:   12/11/19 99.6  F (37.6  C) (Axillary)         Ventilator Settings  Ventilation Mode: CMV/AC  (Continuous Mandatory Ventilation/ Assist Control)  FiO2 (%): 60 %  Rate Set (breaths/minute): 12 breaths/min  Tidal Volume Set (mL): 450 mL  PEEP (cm H2O): (S) 10 cmH2O  Pressure Support (cm H2O): (S) 10 cmH2O  Oxygen Concentration (%): (S) 100 %  Resp: 18      Intake/Output    Intake/Output Summary (Last 24 hours) at 12/8/2019 0817  Last data filed at 12/8/2019 0600  Gross per 24 hour   Intake 1475.96 ml   Output 2450 ml   Net -974.04 ml         BP - Mean:  [] 87    Current Medications:    acetylcysteine  4 mL Nebulization Q4H     albuterol  2.5 mg Nebulization 4x daily     amLODIPine  5 mg Oral or Feeding Tube Daily     cyanocobalamin  1,000 mcg Intramuscular Q30 Days     dexamethasone  10 mg Intravenous Pre-Op/Pre-procedure x 1 dose     famotidine  20 mg Per G Tube BID     folic acid  1 mg Oral or Feeding Tube Daily     heparin ANTICOAGULANT  5,000 Units Subcutaneous Q12H     heparin lock flush  5-10 mL Intracatheter Q24H     insulin aspart  2-16 Units Subcutaneous Q4H     insulin isophane human  38 Units Subcutaneous Q24H     insulin isophane human  38 Units Subcutaneous Q24H     mirtazapine  15 mg Oral or Feeding Tube QPM     multivitamins w/minerals  15 mL Per Feeding Tube Daily     piperacillin-tazobactam  4.5 g Intravenous Q6H     polyethylene glycol  17 g Oral or Feeding Tube BID     senna-docusate  1 tablet Oral or Feeding Tube BID     sodium phosphate  1 enema Rectal Daily     thiamine  100 mg Oral or Feeding Tube Daily       PRN Medications:  acetaminophen, bisacodyl, carboxymethylcellulose PF, IV " "fluid REPLACEMENT ONLY, glucose **OR** dextrose **OR** glucagon, fentaNYL, heparin lock flush, hydrALAZINE, ipratropium - albuterol 0.5 mg/2.5 mg/3 mL, lidocaine 4%, lidocaine (buffered or not buffered), magnesium sulfate, magnesium sulfate, melatonin, naloxone, ondansetron **OR** ondansetron, oxyCODONE, potassium chloride, potassium chloride with lidocaine, potassium chloride, potassium chloride, potassium chloride, potassium phosphate (KPHOS) in D5W IV, potassium phosphate (KPHOS) in D5W IV, potassium phosphate (KPHOS) in D5W IV, potassium phosphate (KPHOS) in D5W IV, potassium phosphate (KPHOS) in D5W IV, prochlorperazine **OR** prochlorperazine **OR** prochlorperazine, sodium chloride (PF)    Infusions:    dexmedetomidine 0.7 mcg/kg/hr (12/11/19 0700)     IV fluid REPLACEMENT ONLY Stopped (12/10/19 1200)     no pre procedure antibiotic needed         No Known Allergies    Physical Examination:   Vitals: /71   Pulse 73   Temp 99.6  F (37.6  C) (Axillary)   Resp 18   Ht 1.727 m (5' 8\")   Wt 85.1 kg (187 lb 9.8 oz)   SpO2 100%   BMI 28.53 kg/m    General: Adult male, collar in place  HEENT: Normocephalic.   CV: RRR  Resp: intubated  Abd: Soft, distended  Skin: Warm, dry   Neuro: intubated, on precedex. Awake and alert. Responds to questions with nodding appropriately. Follows commands. EOMI. Eyes are conjugate. Continues to moves both arms anti-gravity at elbows brisky, weaker in RUE. No movement in R fingers, subtle movement in L fingers. No movement in bilateral lower extremities. Sensation intact in bilateral upper extremities proximately and distally along entire arm. Decreased sensation in LE but does have some sensation.     Labs:  Recent Labs   Lab Test 12/07/19  0330 12/06/19  1828 12/06/19  0434 12/05/19  0340    138 136 140   POTASSIUM 3.8 3.8 4.1 4.1   CHLORIDE 100 99 100 106   CO2 34* 34* 31 30   ANIONGAP 5 4 5 3   * 145* 154* 138*   BUN 25 20 14 19   CR 0.74 0.57* 0.44* 0.50* "   FLORINDA 8.4* 8.7 8.6 8.1*   WBC 10.2  --  9.8 8.8   RBC 3.26*  --  3.54* 2.81*   HGB 9.4*  --  10.3* 8.3*     --  419 390     Imaging:   CXR: 12/8  1. Endotracheal tube tip projects 2.0 cm above the joby.  2. Improved aeration of the right lung since the prior exam, although residual diffuse opacities remain.  3. Increased streaky left hilar and basilar opacities, favored to represent atelectasis.     Assessment/Plan:  Sherwin Anegl is a 64 year old with hx of recent of anterior C3-4 arthroplasty ([facility] weeks ago at [facility]) who was admitted 12/1/2019 after a fall at rehab, resulting in lower extremity paraplegia. Found to have cervical stenosis. Now s/p one pharyngotomy closure on 12/1, still has one pharyngotomy present. Arthroplasty implant removed with no replacement implant placed. He has been on abx for cervical tissue cultures with ID following, narrowed to Unasyn on 12/4 but switched to zosyn 12/9 for ongoing fever.      Neuro:  #Cervical spinal cord injury  - Collar at all times, Up with assist  - Neuro checks q4h during the day   - PT/OT     #Anxiety/Pain  - Wean Precedex gtt, concern for fever related to precedex   - Dilauded PRN  - added ativan 0.5 mg q 4 prn   - flexeril 7.5 mg TID prn     #Hx of Alcohol dependence  - Thiamine & folate daily    #Depression  - Continue PTA miratazepine  - restarted trazodone lower dose 50 mg at bedtime      Resp:  #Acute respiratory failure s/p re-intubation 12/6, suspect diaphragmatic weakness related to spine injury.   #Mucous plugging Rt lung, had improved then worse again 12/11  - Mucomyst & chest physio   - Requiring increased ventilator support, wean Fi02 as able      Cardio:  #Hx of hypertension   - PTA amlodipine 10 mg      Renal:  No active issues.  - Electrolyte replacement protocol   - Monitor I/O, elevating BUN and sodium trending up still  suspect slightly hypovolemic  - Increased free water flushes 75 q hr   - remove folley, bladder scan high risk  for retention      Endo:  #DM2  - Endocrine following   - Sliding scale insulin, high dose  - 38 U NPH am/pm      Heme:  #Normocytic anemia, stable  - Hg > 7.0   - Plt > 100k   - INR <1.5      GI:  #Constipation - resolved last BM x4 12/11  - Senna scheduled at BID  - PRN suppository/enema available     #Diet  - G-tube in place  - Tube feeds at goal      ID:   #Fever   #Leukocytosis, resolved, CRP down trending (16)  #repeat CT spine with increased inflammatory changes and prevertebral soft tissue thickening   - Considering vanc, cefepime, metronidazole regimen which patient was on prior to fever starting  - Drug fever is also possibility, dex could contribute plan to wean  - Pan cultured today if negative would consider US for dvt   - Weekly CBC, CMP, CRP while on IV abx, fax to ID clinic attn: Charissa  - Blood cultures (12/1, 12/3, 12/7): NGTD  - Spinal tissue cultures (12/1): MSSA, Strep anginosus, Eikenella, and oral anaerobes and multiple more   - Plan for repeat MRI spine around 1/12 prior to visit with ID to address ongoing antibiotic regimen   - Will page ID attending at [id number] prior to discharging pt    Previous abx    - Stopped Vancomycin, metronidazole, cefepime on 12/4, fever started 12/7  - switched Unasyn to Zosyn after ongoing fever (abx plan for 12/2-1/12)    Lines:  -ETT, PICC, PIV x 2, Degroot, PEG     FEN: TF   PPX:    DVT prophylaxis: SCDs, Heparin subcutaneous    GI:  Pantoprazole  Code Status: Full, presumed     Dispo: 4A until medically stable      The patient was seen and discussed with the attending, Dr. Sanford.     Ladan Stevens, DO  Neurology PGY-2   [phone]

## 2019-12-11 NOTE — PROGRESS NOTES
[facility] Progress Note     Patient:  Sehrwin Angel   Date of birth 1955, Medical record number [id number]  Date of Visit:  12/11/19  Date of Admission: 12/1/2019  Consult Requester:Fernando Ashton MD            Assessment and Recommendations:   ASSESSMENT:  1. Traumatic cervical spinal cord injury s/p C3-4 arthroplasty on 11/15, now removed  2. Cervical stenosis C3-5  3. Vertebral phlegmon vs abscess s/p drain placement on 12/1 - cultures growing MSSA, Strep anginosus, Eikenella, and oral anaerobes  4. Possible osteomyelitis C3-C5  5. Leukocytosis, ongoing fevers      DISCUSSION:  64-year-old man with history of htn, DM2, and recent C3-4 arthroplasty on 11/15 now admitted with new paraplegia after a fall and found to have pharyngotomy and vertebral phlegmon now s/p OR with removal of arthroplasty and placement of drain. Initially improving on Unasyn -plan was for a 6 week course (to end 1/12/20).     Newly febrile over the weekend 12/6-12/8. No localizing findings on exam. Blood cultures, sputum negative. UA negative. Broadened to Zosyn. Spine re-imaged with MRI 12/10 with no evidence for new abscess. CRP continues to trend down, WBC count remains wnl. Differential also includes drug fever, and suspicion for this is increased with the improving CRP. Would transition off zosyn back to vanc/cefepime/flagyl to see if fevers resolve.      RECOMMENDATION:  - Discontinue zosyn  - Re-start on vancomycin, cefepime, and flagyl  - May need up to 48-72 hours to see resolution of fever if it is related to the antibiotics  - If able to collect, please send stool sample to rule out C diff      ID will follow.   This patient was discussed with ID staff, Dr. Carrington.    Elaine Cowan MD  Internal Medicine, PGY-1  [phone]      Attestation:  I have reviewed today's vital signs, medications, labs and imaging.  Floor time: 25 minutes, Face-to-face time: 10 minutes, Total time: 35  minutes    Sherwin Keny Angel was seen in the hospital by Joya Carrington MD on 12/11/2019, with the resident Dr. Camryn MD. [facility] Internal Medicine Resident. I reviewed the history & exam. Assessment and plan were jointly made.  I agree with and have edited the note and plan of care.      Joya Carrington MD.  ID Staff  [phone]        ________________________________________________________________           Overnight events:   Patient again febrile over last 24 hours. Increased PEEP and O2 needs this AM. Patient denies pain, but does endorse feeling hot today.         Review of Systems:   Unable to obtain, patient intubated.         Past Medical History:   Diabetes  Hypertension  C3-C4 arthroplasty on 11/15/19         Past Surgical History:     Past Surgical History:   Procedure Laterality Date     IRRIGATION AND DEBRIDEMENT NECK, COMBINED N/A 12/1/2019    Procedure: IRRIGATION AND DEBRIDEMENT OF NECK; PHARYNGOTOMY REPAIR.;  Surgeon: Fernando Ashton MD;  Location: [facility] OR     LAPAROSCOPIC ASSISTED INSERTION TUBE GASTROTOMY N/A 12/5/2019    Procedure: laproscopic assisted gastrostomy tube placement;  Surgeon: Luis Santiago MD;  Location: [facility] OR     REMOVE HARDWARE CERVICAL ANTERIOR SPINE N/A 12/1/2019    Procedure: REMOVAL, HARDWARE, SPINE, CERVICAL, ANTERIOR;  Surgeon: Fernando Ashton MD;  Location: [facility] OR            Family History:   History reviewed. No pertinent family history.         Social History:     Social History     Tobacco Use     Smoking status: Not on file   Substance Use Topics     Alcohol use: Not on file     History   Sexual Activity     Sexual activity: Not on file            Current Medications (antimicrobials listed in bold):       acetylcysteine  4 mL Nebulization Q4H     albuterol  2.5 mg Nebulization 4x daily     amLODIPine  5 mg Oral or Feeding Tube Daily     cyanocobalamin  1,000 mcg Intramuscular Q30 Days     dexamethasone  10 mg Intravenous  Pre-Op/Pre-procedure x 1 dose     famotidine  20 mg Per G Tube BID     folic acid  1 mg Oral or Feeding Tube Daily     heparin ANTICOAGULANT  5,000 Units Subcutaneous Q12H     heparin lock flush  5-10 mL Intracatheter Q24H     insulin aspart  2-16 Units Subcutaneous Q4H     insulin isophane human  38 Units Subcutaneous Q24H     insulin isophane human  38 Units Subcutaneous Q24H     mirtazapine  15 mg Oral or Feeding Tube QPM     multivitamins w/minerals  15 mL Per Feeding Tube Daily     piperacillin-tazobactam  4.5 g Intravenous Q6H     polyethylene glycol  17 g Oral or Feeding Tube BID     senna-docusate  1 tablet Oral or Feeding Tube BID     sodium phosphate  1 enema Rectal Daily     thiamine  100 mg Oral or Feeding Tube Daily            Allergies:   No Known Allergies         Physical Exam:     Ranges for his vital signs:   Temp:  [98  F (36.7  C)-101.7  F (38.7  C)] 101.7  F (38.7  C)  Pulse:  [58-77] 73  Heart Rate:  [57-82] 73  Resp:  [13-22] 18  BP: ()/(61-81) 119/71  FiO2 (%):  [40 %-100 %] 80 %  SpO2:  [86 %-100 %] 100 %    Physical Examination:  GENERAL: Intubated, sitting up in chair, appears tired but responsive to simple questions.  HEENT:  Head is normocephalic, atraumatic; ETT in place.  EYES:  Eyes have anicteric sclerae without conjunctival injection.    NECK:  Collar in place. Drain with serosanguinous output.  LUNGS:  Coarse breath sounds throughout anterior lung fields.  CARDIOVASCULAR:  Regular rate and rhythm with no murmurs, gallops or rubs.  ABDOMEN:  Normal bowel sounds, soft, nontender. No appreciable hepatosplenomegaly  SKIN:  No acute rashes.  Lines are in place without any surrounding erythema or exudate. No stigmata of endocarditis.           Laboratory Data:   Labs reviewed. Pertinent below.    Inflammatory Markers    Recent Labs   Lab Test 12/11/19  0317 12/08/19  0348 12/05/19  0340 12/02/19  0342 12/01/19  0112   CRP 16.0* 56.0* 130.0* 220.0* 170.0*       Hematology Studies   "  Recent Labs   Lab Test 19  0317 12/10/19  0403 19  0409 19  0348 19  0330 19  0434   WBC 10.0 9.8 8.4 8.7 10.2 9.8   ANEU 7.0 6.6 5.7 6.0 7.8 8.0   AEOS 0.2 0.2 0.0 0.0 0.1 0.1   HGB 9.2* 9.5* 9.5* 9.6* 9.4* 10.3*   MCV 97 96 95 93 94 92    398 380 402 425 419       Microbiology:   Tissue culture: Cervical spine vertebral phlegmon:  -- Aerobic: Staph aureus, Strep anginosus, Eikenella corrodens  -- Anaerobic: Fusobacterium nucleatum, Parvimonas micra, Prevotella     Blood culture negative  12/3 Blood culture x2 negative   Blood culture x2 NGTD     Sputum culture with only candida     UA negative           Imagin/10 MRI C-spine (prelim read)  1. Postsurgical changes of C3-4 disc prosthesis removal with adjacent  inflammatory change. Prevertebral soft tissues are thickened up to 2  cm, greatest from C3 through C5, unchanged from 2019. No discrete  abscess identified.  2. Redemonstration of C3-5 fractures with unchanged severe spinal  canal stenosis from C3 through 5 and increased amount of abnormal T2  signal within the spinal cord at these levels and extending inferiorly  to C6.     MRI  Impression: Images are mildly degraded secondary to metallic artifact  from disc prosthesis. Follow-up imaging with CT could be considered  for further evaluation.  1. Traumatic cord injury at C3 and C4-5 as a result of C3-5  compression fractures.   2. There is 4 mm of retrolisthesis of C3 on C4.  3. Traumatic herniation of C4-5 intervertebral disc with retropulsion  of disc fragment into the spinal canal.  4. Extensive prevertebral thickening up to 2 cm anteriorly and up to 9  mm within the epidural space, favored to represent prevertebral edema  in the setting of recent trauma.     [facility]  [facility] C-spine w/o contrast done on 2019 --  - \"Apparent osteomyelitis involvement of the C3, C4 and C5 vertebrae.\"  - \"Suspected " "extension of the infectious process into the ventral aspect of the central canal suspicious for a ventral epidural phlegmon versus abscess.\"  "

## 2019-12-11 NOTE — PROGRESS NOTES
Major Shift Events:  Neuro status remains unchanged. FiO2 60%, PEEP 5 overnight. Around 0500, sats dropped to mid-high 80's, did not improve with suctioning or repositioning. FiO2 increased to 75% without improvement. RT at bedside adjusting vent, PEEP increased to 10 and FiO2 to 100% with sats ranging 88-91%. MD notified. Chest x-ray/ABG obtained. New orders placed. Lungs coarse, diminished on right side. Precedex at 0.7, remains anxious. Oxycodone for pain.   Plan: Trach when appropriate. Pain control. Continue plan of care.  For vital signs and complete assessments, please see documentation flowsheets.

## 2019-12-11 NOTE — PLAN OF CARE
Major Shift Events:  FiO2 titrated down to 40% from 100%. PEEP titrated from 10 to 8. Up in chair for 2.5 hours. Tolerates left side better than right. Still anxious, precedex weaned from 0.7 to 0.3. 10 mg oxy given X2.  Plan: Wean precedex, attempt to wean vent, trach once PEEP is down   For vital signs and complete assessments, please see documentation flowsheets.

## 2019-12-11 NOTE — PLAN OF CARE
Discharge Planner PT   Patient plan for discharge: TBD  Current status: Focus on UE movement and overall ROM.  PROM completed to all LE joints as well as lower arms.  Pt able to complete shoulder shrugs, shoulder flexion (slight abduction) and elbow flexion.  Use of lift to position in chair.  Continue to encourage chair or bed repositioning with use of cushion and boots.   Barriers to return to prior living situation: Need for increased assist with all mobility, O2 needs  Recommendations for discharge: IP rehab  Rationale for recommendations: Due to acute SCI and strength deficits       Entered by: Sammi Hilliard 12/11/2019 11:32 AM

## 2019-12-11 NOTE — PHARMACY-VANCOMYCIN DOSING SERVICE
Pharmacy Vancomycin Initial Note  Date of Service 2019  Patient's  1955  64 year old, male    Indication: Osteomyelitis after spine surgery [facility] for spinal inf vs osteo    Current estimated CrCl = Estimated Creatinine Clearance: 91.3 mL/min (based on SCr of 0.88 mg/dL).    Creatinine for last 3 days  2019:  4:09 AM Creatinine 0.74 mg/dL  12/10/2019:  4:03 AM Creatinine 0.80 mg/dL  2019:  3:17 AM Creatinine 0.88 mg/dL    Recent Vancomycin Level(s) for last 3 days  No results found for requested labs within last 72 hours.      Vancomycin IV Administrations (past 72 hours)      No vancomycin orders with administrations in past 72 hours.                Nephrotoxins and other renal medications (From now, onward)    Start     Dose/Rate Route Frequency Ordered Stop    19 0400  vancomycin (VANCOCIN) 2,000 mg in sodium chloride 0.9 % 250 mL intermittent infusion      2,000 mg (central catheter)  over 60 Minutes Intravenous EVERY 12 HOURS 19 1438      19 1600  vancomycin (VANCOCIN) 2,250 mg in sodium chloride 0.9 % 250 mL intermittent infusion      2,250 mg (central catheter)  over 60 Minutes Intravenous ONCE 19 1438            Contrast Orders - past 72 hours (72h ago, onward)    Start     Dose/Rate Route Frequency Ordered Stop    12/10/19 2015  gadobutrol (GADAVIST) injection 7.5 mL      7.5 mL Intravenous ONCE 12/10/19 2000 12/10/19 2001                Plan:  1.  Start vancomycin  2250 mg IV x1 (26 mg/kg), followed by 2000 mg IV q12h based on recent dosing history.   2.  Goal Trough Level: 15-20 mg/L   3.  Pharmacy will check trough levels as appropriate in 1-3 Days.    4. Serum creatinine levels will be ordered daily for the first week of therapy and at least twice weekly for subsequent weeks.    5. [place] method utilized to dose vancomycin therapy: Method 2    Derek Srinivasan, Pharm.D.

## 2019-12-11 NOTE — PROGRESS NOTES
Diabetes Consult Daily  Progress Note          Assessment/Plan:     Sherwin Angel is a 64 year old male with history of type 2 diabetes ( Ca-en-y  2008), hypertension, ETOH abuse ( with withdrawal with DT's), asthma,  status post C3-C4 artificial disc placement about 2 weeks ago presents after a fall with almost complete loss of strength in his upper extremities and paraplegia, found to have cervical stenosis. S/p OR 12/1 - Pharyngotomy closed with assistance from ENT. Arthroplasty implant removed. No replacement implant placed. S/p G-tube 12/5. Extubated 12/5. Re-Intubated 12/6 due to Mucous Plugging and Atelectasis.     Hx of type 2 diabetes: s/p gastric bypass surgery and on no medications since 2008  continous TF Impact peptide at 55 ml/hour.         Plan  - NPH 38 units at am (1000), will increase to 42 units  (12/12)  -NPH  38 units  evening ( 2300)  - novolog high intensity sliding scale every 4 hours, changed to 2 units per 30 > 140 every 4 hours  -monitor glucose every 4 hours  -hypoglycemia protocol  -will continue to follow                            Outpatient diabetes follow up: TBD  Plan discussed with  bedside RN           Interval History:   The last 24 hours progress and nursing notes reviewed.  With increase in NPH blood sugars looked much better, 183--> 144--> 142  Am blood sugars 210 and 199.  Per RN, Sherwin is more agitated during the day which may be contributing to the elevated blood sugars.  Tolerating continous TF  Will plan for trach once PEEP is 5 or less    Nutrition:  NPO  continous TF Impact peptide at 55 ml/hour    Recent Labs   Lab 12/11/19  0317 12/11/19  0315 12/10/19  2343 12/10/19  2050 12/10/19  1547 12/10/19  1141 12/10/19  0941  12/10/19  0403  12/09/19  0409  12/08/19  0348  12/07/19  0330  12/06/19  1828   *  --   --   --   --   --   --   --  222*  --  269*  --  231*  --  177*  --  145*   BGM  --  144* 183* 144* 150* 223* 241*   < >  --    < >   "--    < >  --    < >  --    < >  --     < > = values in this interval not displayed.               Review of Systems:   See interval hx          Medications:     Orders Placed This Encounter      NPO per Anesthesia Guidelines for Procedure/Surgery Except for: Meds       Physical Exam:  Gen:  NAD   HEENT:  mucous membranes are moist  Resp: intubated and ventulated  Ext: paraplegic  Neuro: sleeping  /71   Pulse 73   Temp 99.6  F (37.6  C) (Axillary)   Resp 18   Ht 1.727 m (5' 8\")   Wt 85.1 kg (187 lb 9.8 oz)   SpO2 100%   BMI 28.53 kg/m             Data:     Lab Results   Component Value Date    A1C 6.5 12/01/2019              CBC RESULTS:   Recent Labs   Lab Test 12/11/19 0317   WBC 10.0   RBC 3.21*   HGB 9.2*   HCT 31.0*   MCV 97   MCH 28.7   MCHC 29.7*   RDW 13.4        Recent Labs   Lab Test 12/11/19 0317 12/10/19  0403   * 147*   POTASSIUM 3.5 3.3*   CHLORIDE 113* 108   CO2 33* 34*   ANIONGAP 4 4   * 222*   BUN 40* 42*   CR 0.88 0.80   FLORINDA 8.3* 8.2*     Liver Function Studies -   Recent Labs   Lab Test 12/01/19  0112   PROTTOTAL 7.4   ALBUMIN 2.1*   BILITOTAL 0.6   ALKPHOS 103   AST 17   ALT 32     Lab Results   Component Value Date    INR 1.30 12/04/2019    INR 1.17 12/01/2019             Franco Lazo -[phone]  Diabetes Management job code [id number]            "

## 2019-12-11 NOTE — PROGRESS NOTES
Neurosurgery Progress Note    Subjective:  Tracheostomy cancelled due to increased PEEP demands (8); PEEP weaned from 8 -> 5; MRI cervical spine with no abscess, but increased T2 cord signal; desat overnight due to mucous plug, increased FiO2 & PEEP to 10, nebs, plan for chest physiotherapy     Objective:  Intubated, partially sedated  Awake, alert, nods yes or no to questions  Follows commands  Deltoids 4/5, biceps 4/5, triceps 1/5,  0/5, lower extremities 0/5    Assessment:  64 years old gentleman status post C3-C4 artificial disc placement about 2 weeks ago presents after a fall with almost complete loss of strength in his upper extremities and paraplegia, found to have cervical stenosis. S/p OR 12/1 - Pharyngotomy closed with assistance from ENT. Arthroplasty implant removed. No replacement implant placed. S/p G-tube 12/5. Extubated 12/5. Re-Intubated 12/6 due to Mucous Plugging and Atelectasis.    Plan:  Intubated, mechanically ventilated  Nebs, chest physiotherapy; wean to extubate as able, plan for tracheostomy with ENT  DVT: SCDs while in bed  [place] J collar  Per ID: Zosyn 4-6 wks for vertebral phlegmon, weekly labs  Follow-up cultures  SubQ heparin & tube feeds; hold at midnight day prior to tracheostomy  MRI before stopping abx in 6 wks  endocrine consulted for diabetes - appreciate recs  Disposition: 4A      Lion Vargas MD  Neurosurgery Resident PGY2    Please contact neurosurgery resident on call with questions.    Dial * * [phone], enter [phone] when prompted.

## 2019-12-12 ENCOUNTER — APPOINTMENT (OUTPATIENT)
Dept: GENERAL RADIOLOGY | Facility: CLINIC | Age: 64
DRG: 003 | End: 2019-12-12
Attending: COUNSELOR
Payer: COMMERCIAL

## 2019-12-12 ENCOUNTER — APPOINTMENT (OUTPATIENT)
Dept: PHYSICAL THERAPY | Facility: CLINIC | Age: 64
DRG: 003 | End: 2019-12-12
Attending: NEUROLOGICAL SURGERY
Payer: COMMERCIAL

## 2019-12-12 LAB
ANION GAP SERPL CALCULATED.3IONS-SCNC: 4 MMOL/L (ref 3–14)
APPEARANCE FLD: NORMAL
BASOPHILS # BLD AUTO: 0.1 10E9/L (ref 0–0.2)
BASOPHILS NFR BLD AUTO: 0.3 %
BUN SERPL-MCNC: 42 MG/DL (ref 7–30)
CALCIUM SERPL-MCNC: 8.4 MG/DL (ref 8.5–10.1)
CHLORIDE SERPL-SCNC: 116 MMOL/L (ref 94–109)
CO2 SERPL-SCNC: 31 MMOL/L (ref 20–32)
COLOR FLD: COLORLESS
CREAT SERPL-MCNC: 0.68 MG/DL (ref 0.66–1.25)
CRP SERPL-MCNC: 36 MG/L (ref 0–8)
DIFFERENTIAL METHOD BLD: ABNORMAL
EOSINOPHIL # BLD AUTO: 0.4 10E9/L (ref 0–0.7)
EOSINOPHIL NFR BLD AUTO: 2.3 %
ERYTHROCYTE [DISTWIDTH] IN BLOOD BY AUTOMATED COUNT: 13.7 % (ref 10–15)
GFR SERPL CREATININE-BSD FRML MDRD: >90 ML/MIN/{1.73_M2}
GLUCOSE BLDC GLUCOMTR-MCNC: 129 MG/DL (ref 70–99)
GLUCOSE BLDC GLUCOMTR-MCNC: 168 MG/DL (ref 70–99)
GLUCOSE BLDC GLUCOMTR-MCNC: 185 MG/DL (ref 70–99)
GLUCOSE BLDC GLUCOMTR-MCNC: 188 MG/DL (ref 70–99)
GLUCOSE BLDC GLUCOMTR-MCNC: 189 MG/DL (ref 70–99)
GLUCOSE BLDC GLUCOMTR-MCNC: 192 MG/DL (ref 70–99)
GLUCOSE BLDC GLUCOMTR-MCNC: 208 MG/DL (ref 70–99)
GLUCOSE SERPL-MCNC: 169 MG/DL (ref 70–99)
GRAM STN SPEC: NORMAL
GRAM STN SPEC: NORMAL
HCT VFR BLD AUTO: 33.3 % (ref 40–53)
HGB BLD-MCNC: 10 G/DL (ref 13.3–17.7)
IMM GRANULOCYTES # BLD: 0.1 10E9/L (ref 0–0.4)
IMM GRANULOCYTES NFR BLD: 0.6 %
LYMPHOCYTES # BLD AUTO: 2.6 10E9/L (ref 0.8–5.3)
LYMPHOCYTES NFR BLD AUTO: 17.1 %
MAGNESIUM SERPL-MCNC: 2.8 MG/DL (ref 1.6–2.3)
MCH RBC QN AUTO: 29 PG (ref 26.5–33)
MCHC RBC AUTO-ENTMCNC: 30 G/DL (ref 31.5–36.5)
MCV RBC AUTO: 97 FL (ref 78–100)
MONOCYTES # BLD AUTO: 0.7 10E9/L (ref 0–1.3)
MONOCYTES NFR BLD AUTO: 4.5 %
NEUTROPHILS # BLD AUTO: 11.6 10E9/L (ref 1.6–8.3)
NEUTROPHILS NFR BLD AUTO: 75.2 %
NRBC # BLD AUTO: 0 10*3/UL
NRBC BLD AUTO-RTO: 0 /100
PHOSPHATE SERPL-MCNC: 2.2 MG/DL (ref 2.5–4.5)
PLATELET # BLD AUTO: 359 10E9/L (ref 150–450)
POTASSIUM SERPL-SCNC: 3.4 MMOL/L (ref 3.4–5.3)
PROCALCITONIN SERPL-MCNC: <0.05 NG/ML
RADIOLOGIST FLAGS: ABNORMAL
RBC # BLD AUTO: 3.45 10E12/L (ref 4.4–5.9)
SODIUM SERPL-SCNC: 150 MMOL/L (ref 133–144)
SPECIMEN SOURCE FLD: NORMAL
SPECIMEN SOURCE: NORMAL
WBC # BLD AUTO: 15.4 10E9/L (ref 4–11)
WBC # FLD AUTO: NORMAL /UL

## 2019-12-12 PROCEDURE — 0B948ZZ DRAINAGE OF RIGHT UPPER LOBE BRONCHUS, VIA NATURAL OR ARTIFICIAL OPENING ENDOSCOPIC: ICD-10-PCS | Performed by: PSYCHIATRY & NEUROLOGY

## 2019-12-12 PROCEDURE — 20000004 ZZH R&B ICU UMMC

## 2019-12-12 PROCEDURE — 25000128 H RX IP 250 OP 636: Performed by: NEUROLOGICAL SURGERY

## 2019-12-12 PROCEDURE — 25800030 ZZH RX IP 258 OP 636: Performed by: NEUROLOGICAL SURGERY

## 2019-12-12 PROCEDURE — 25000128 H RX IP 250 OP 636: Performed by: COUNSELOR

## 2019-12-12 PROCEDURE — 25000128 H RX IP 250 OP 636: Performed by: STUDENT IN AN ORGANIZED HEALTH CARE EDUCATION/TRAINING PROGRAM

## 2019-12-12 PROCEDURE — 25000125 ZZHC RX 250: Performed by: STUDENT IN AN ORGANIZED HEALTH CARE EDUCATION/TRAINING PROGRAM

## 2019-12-12 PROCEDURE — 25000125 ZZHC RX 250: Performed by: NURSE PRACTITIONER

## 2019-12-12 PROCEDURE — 85025 COMPLETE CBC W/AUTO DIFF WBC: CPT | Performed by: STUDENT IN AN ORGANIZED HEALTH CARE EDUCATION/TRAINING PROGRAM

## 2019-12-12 PROCEDURE — 94640 AIRWAY INHALATION TREATMENT: CPT

## 2019-12-12 PROCEDURE — 25000132 ZZH RX MED GY IP 250 OP 250 PS 637: Performed by: PSYCHIATRY & NEUROLOGY

## 2019-12-12 PROCEDURE — 00000146 ZZHCL STATISTIC GLUCOSE BY METER IP

## 2019-12-12 PROCEDURE — 40000275 ZZH STATISTIC RCP TIME EA 10 MIN

## 2019-12-12 PROCEDURE — 25000132 ZZH RX MED GY IP 250 OP 250 PS 637: Performed by: STUDENT IN AN ORGANIZED HEALTH CARE EDUCATION/TRAINING PROGRAM

## 2019-12-12 PROCEDURE — 25000132 ZZH RX MED GY IP 250 OP 250 PS 637: Performed by: NURSE PRACTITIONER

## 2019-12-12 PROCEDURE — 40000961 ZZH STATISTIC INTRAPULMONARY PERCUSSIVE VENT

## 2019-12-12 PROCEDURE — 71045 X-RAY EXAM CHEST 1 VIEW: CPT

## 2019-12-12 PROCEDURE — 97530 THERAPEUTIC ACTIVITIES: CPT | Mod: GP

## 2019-12-12 PROCEDURE — 83735 ASSAY OF MAGNESIUM: CPT | Performed by: STUDENT IN AN ORGANIZED HEALTH CARE EDUCATION/TRAINING PROGRAM

## 2019-12-12 PROCEDURE — 25000128 H RX IP 250 OP 636: Performed by: PSYCHIATRY & NEUROLOGY

## 2019-12-12 PROCEDURE — 89051 BODY FLUID CELL COUNT: CPT | Performed by: PSYCHIATRY & NEUROLOGY

## 2019-12-12 PROCEDURE — 80048 BASIC METABOLIC PNL TOTAL CA: CPT | Performed by: STUDENT IN AN ORGANIZED HEALTH CARE EDUCATION/TRAINING PROGRAM

## 2019-12-12 PROCEDURE — 25800030 ZZH RX IP 258 OP 636: Performed by: STUDENT IN AN ORGANIZED HEALTH CARE EDUCATION/TRAINING PROGRAM

## 2019-12-12 PROCEDURE — 87205 SMEAR GRAM STAIN: CPT | Performed by: PSYCHIATRY & NEUROLOGY

## 2019-12-12 PROCEDURE — 36592 COLLECT BLOOD FROM PICC: CPT | Performed by: STUDENT IN AN ORGANIZED HEALTH CARE EDUCATION/TRAINING PROGRAM

## 2019-12-12 PROCEDURE — 86140 C-REACTIVE PROTEIN: CPT | Performed by: STUDENT IN AN ORGANIZED HEALTH CARE EDUCATION/TRAINING PROGRAM

## 2019-12-12 PROCEDURE — 31622 DX BRONCHOSCOPE/WASH: CPT

## 2019-12-12 PROCEDURE — 87070 CULTURE OTHR SPECIMN AEROBIC: CPT | Performed by: PSYCHIATRY & NEUROLOGY

## 2019-12-12 PROCEDURE — 97112 NEUROMUSCULAR REEDUCATION: CPT | Mod: GP

## 2019-12-12 PROCEDURE — 97110 THERAPEUTIC EXERCISES: CPT | Mod: GP

## 2019-12-12 PROCEDURE — 94640 AIRWAY INHALATION TREATMENT: CPT | Mod: 76

## 2019-12-12 PROCEDURE — 25000132 ZZH RX MED GY IP 250 OP 250 PS 637: Performed by: COUNSELOR

## 2019-12-12 PROCEDURE — 84100 ASSAY OF PHOSPHORUS: CPT | Performed by: STUDENT IN AN ORGANIZED HEALTH CARE EDUCATION/TRAINING PROGRAM

## 2019-12-12 PROCEDURE — 25000125 ZZHC RX 250

## 2019-12-12 PROCEDURE — 87106 FUNGI IDENTIFICATION YEAST: CPT | Performed by: PSYCHIATRY & NEUROLOGY

## 2019-12-12 PROCEDURE — 27210437 ZZH NUTRITION PRODUCT SEMIELEM INTERMED LITER

## 2019-12-12 PROCEDURE — 25000132 ZZH RX MED GY IP 250 OP 250 PS 637: Performed by: NEUROLOGICAL SURGERY

## 2019-12-12 PROCEDURE — 94003 VENT MGMT INPAT SUBQ DAY: CPT

## 2019-12-12 PROCEDURE — 25000128 H RX IP 250 OP 636: Performed by: NURSE PRACTITIONER

## 2019-12-12 PROCEDURE — 0B958ZZ DRAINAGE OF RIGHT MIDDLE LOBE BRONCHUS, VIA NATURAL OR ARTIFICIAL OPENING ENDOSCOPIC: ICD-10-PCS | Performed by: PSYCHIATRY & NEUROLOGY

## 2019-12-12 PROCEDURE — 84145 PROCALCITONIN (PCT): CPT | Performed by: STUDENT IN AN ORGANIZED HEALTH CARE EDUCATION/TRAINING PROGRAM

## 2019-12-12 RX ORDER — AMLODIPINE BESYLATE 10 MG/1
10 TABLET ORAL DAILY
Status: DISCONTINUED | OUTPATIENT
Start: 2019-12-13 | End: 2019-12-31 | Stop reason: HOSPADM

## 2019-12-12 RX ORDER — LORAZEPAM 2 MG/ML
0.5 INJECTION INTRAMUSCULAR ONCE
Status: COMPLETED | OUTPATIENT
Start: 2019-12-12 | End: 2019-12-12

## 2019-12-12 RX ORDER — LIDOCAINE HYDROCHLORIDE 10 MG/ML
INJECTION, SOLUTION EPIDURAL; INFILTRATION; INTRACAUDAL; PERINEURAL
Status: COMPLETED
Start: 2019-12-12 | End: 2019-12-12

## 2019-12-12 RX ORDER — LABETALOL 20 MG/4 ML (5 MG/ML) INTRAVENOUS SYRINGE
10-20 EVERY 4 HOURS PRN
Status: DISCONTINUED | OUTPATIENT
Start: 2019-12-12 | End: 2019-12-13

## 2019-12-12 RX ORDER — HEPARIN SODIUM 5000 [USP'U]/.5ML
5000 INJECTION, SOLUTION INTRAVENOUS; SUBCUTANEOUS 2 TIMES DAILY
Status: DISCONTINUED | OUTPATIENT
Start: 2019-12-13 | End: 2019-12-13

## 2019-12-12 RX ORDER — HEPARIN SODIUM 5000 [USP'U]/.5ML
5000 INJECTION, SOLUTION INTRAVENOUS; SUBCUTANEOUS EVERY 8 HOURS
Status: DISCONTINUED | OUTPATIENT
Start: 2019-12-12 | End: 2019-12-12

## 2019-12-12 RX ORDER — FENTANYL CITRATE 50 UG/ML
100 INJECTION, SOLUTION INTRAMUSCULAR; INTRAVENOUS ONCE
Status: COMPLETED | OUTPATIENT
Start: 2019-12-12 | End: 2019-12-12

## 2019-12-12 RX ORDER — ACETAMINOPHEN 325 MG/1
650 TABLET ORAL EVERY 6 HOURS
Status: DISCONTINUED | OUTPATIENT
Start: 2019-12-12 | End: 2019-12-19

## 2019-12-12 RX ORDER — LORAZEPAM 2 MG/ML
0.5 INJECTION INTRAMUSCULAR
Status: COMPLETED | OUTPATIENT
Start: 2019-12-12 | End: 2019-12-12

## 2019-12-12 RX ORDER — FENTANYL CITRATE 50 UG/ML
INJECTION, SOLUTION INTRAMUSCULAR; INTRAVENOUS
Status: DISCONTINUED
Start: 2019-12-12 | End: 2019-12-13 | Stop reason: HOSPADM

## 2019-12-12 RX ORDER — HEPARIN SODIUM 5000 [USP'U]/.5ML
5000 INJECTION, SOLUTION INTRAVENOUS; SUBCUTANEOUS
Status: DISCONTINUED | OUTPATIENT
Start: 2019-12-12 | End: 2019-12-12

## 2019-12-12 RX ORDER — LORAZEPAM 1 MG/1
1 TABLET ORAL EVERY 4 HOURS PRN
Status: DISCONTINUED | OUTPATIENT
Start: 2019-12-12 | End: 2019-12-13

## 2019-12-12 RX ORDER — METOPROLOL TARTRATE 25 MG/1
25 TABLET, FILM COATED ORAL 2 TIMES DAILY
Status: DISCONTINUED | OUTPATIENT
Start: 2019-12-12 | End: 2019-12-13

## 2019-12-12 RX ADMIN — ACETAMINOPHEN 650 MG: 325 SOLUTION ORAL at 01:46

## 2019-12-12 RX ADMIN — LABETALOL 20 MG/4 ML (5 MG/ML) INTRAVENOUS SYRINGE 10 MG: at 22:10

## 2019-12-12 RX ADMIN — INSULIN ASPART 6 UNITS: 100 INJECTION, SOLUTION INTRAVENOUS; SUBCUTANEOUS at 11:41

## 2019-12-12 RX ADMIN — OXYCODONE HYDROCHLORIDE 10 MG: 5 SOLUTION ORAL at 19:31

## 2019-12-12 RX ADMIN — LIDOCAINE 2 PATCH: 560 PATCH PERCUTANEOUS; TOPICAL; TRANSDERMAL at 19:35

## 2019-12-12 RX ADMIN — Medication 0.4 MG: at 23:37

## 2019-12-12 RX ADMIN — CEFEPIME HYDROCHLORIDE 2 G: 2 INJECTION, POWDER, FOR SOLUTION INTRAVENOUS at 06:18

## 2019-12-12 RX ADMIN — LORAZEPAM 0.5 MG: 0.5 TABLET ORAL at 02:43

## 2019-12-12 RX ADMIN — Medication 0.4 MG: at 17:17

## 2019-12-12 RX ADMIN — METRONIDAZOLE 500 MG: 500 INJECTION, SOLUTION INTRAVENOUS at 23:07

## 2019-12-12 RX ADMIN — FOLIC ACID 1 MG: 1 TABLET ORAL at 07:46

## 2019-12-12 RX ADMIN — LORAZEPAM 0.5 MG: 0.5 TABLET ORAL at 17:54

## 2019-12-12 RX ADMIN — ALBUTEROL SULFATE 2.5 MG: 2.5 SOLUTION RESPIRATORY (INHALATION) at 07:19

## 2019-12-12 RX ADMIN — Medication 0.2 MG: at 01:47

## 2019-12-12 RX ADMIN — INSULIN ASPART 4 UNITS: 100 INJECTION, SOLUTION INTRAVENOUS; SUBCUTANEOUS at 19:35

## 2019-12-12 RX ADMIN — ALBUTEROL SULFATE 2.5 MG: 2.5 SOLUTION RESPIRATORY (INHALATION) at 11:48

## 2019-12-12 RX ADMIN — ACETYLCYSTEINE 4 ML: 100 INHALANT RESPIRATORY (INHALATION) at 15:22

## 2019-12-12 RX ADMIN — CEFEPIME HYDROCHLORIDE 2 G: 2 INJECTION, POWDER, FOR SOLUTION INTRAVENOUS at 15:30

## 2019-12-12 RX ADMIN — HEPARIN SODIUM 5000 UNITS: 5000 INJECTION, SOLUTION INTRAVENOUS; SUBCUTANEOUS at 05:43

## 2019-12-12 RX ADMIN — OXYCODONE HYDROCHLORIDE 10 MG: 5 SOLUTION ORAL at 01:46

## 2019-12-12 RX ADMIN — LORAZEPAM 0.5 MG: 2 INJECTION INTRAMUSCULAR; INTRAVENOUS at 18:13

## 2019-12-12 RX ADMIN — Medication 0.4 MG: at 05:43

## 2019-12-12 RX ADMIN — TRAZODONE HYDROCHLORIDE 50 MG: 50 TABLET ORAL at 21:09

## 2019-12-12 RX ADMIN — ACETYLCYSTEINE 4 ML: 100 INHALANT RESPIRATORY (INHALATION) at 07:19

## 2019-12-12 RX ADMIN — VANCOMYCIN HYDROCHLORIDE 2000 MG: 10 INJECTION, POWDER, LYOPHILIZED, FOR SOLUTION INTRAVENOUS at 04:43

## 2019-12-12 RX ADMIN — ALBUTEROL SULFATE 2.5 MG: 2.5 SOLUTION RESPIRATORY (INHALATION) at 19:54

## 2019-12-12 RX ADMIN — HYDRALAZINE HYDROCHLORIDE 10 MG: 20 INJECTION INTRAMUSCULAR; INTRAVENOUS at 02:43

## 2019-12-12 RX ADMIN — LORAZEPAM 0.5 MG: 0.5 TABLET ORAL at 06:42

## 2019-12-12 RX ADMIN — INSULIN ASPART 4 UNITS: 100 INJECTION, SOLUTION INTRAVENOUS; SUBCUTANEOUS at 07:56

## 2019-12-12 RX ADMIN — VANCOMYCIN HYDROCHLORIDE 2000 MG: 10 INJECTION, POWDER, LYOPHILIZED, FOR SOLUTION INTRAVENOUS at 16:17

## 2019-12-12 RX ADMIN — Medication 0.4 MG: at 03:54

## 2019-12-12 RX ADMIN — FAMOTIDINE 20 MG: 40 POWDER, FOR SUSPENSION ORAL at 07:48

## 2019-12-12 RX ADMIN — Medication 100 MG: at 07:46

## 2019-12-12 RX ADMIN — INSULIN HUMAN 42 UNITS: 100 INJECTION, SUSPENSION SUBCUTANEOUS at 22:01

## 2019-12-12 RX ADMIN — SENNOSIDES AND DOCUSATE SODIUM 1 TABLET: 8.6; 5 TABLET ORAL at 07:50

## 2019-12-12 RX ADMIN — INSULIN ASPART 4 UNITS: 100 INJECTION, SOLUTION INTRAVENOUS; SUBCUTANEOUS at 23:28

## 2019-12-12 RX ADMIN — Medication 0.4 MG: at 19:32

## 2019-12-12 RX ADMIN — FENTANYL CITRATE 100 MCG: 50 INJECTION, SOLUTION INTRAMUSCULAR; INTRAVENOUS at 15:07

## 2019-12-12 RX ADMIN — POTASSIUM CHLORIDE 20 MEQ: 29.8 INJECTION, SOLUTION INTRAVENOUS at 03:18

## 2019-12-12 RX ADMIN — METRONIDAZOLE 500 MG: 500 INJECTION, SOLUTION INTRAVENOUS at 06:18

## 2019-12-12 RX ADMIN — INSULIN ASPART 2 UNITS: 100 INJECTION, SOLUTION INTRAVENOUS; SUBCUTANEOUS at 03:20

## 2019-12-12 RX ADMIN — LABETALOL 20 MG/4 ML (5 MG/ML) INTRAVENOUS SYRINGE 10 MG: at 15:07

## 2019-12-12 RX ADMIN — METOPROLOL TARTRATE 25 MG: 25 TABLET ORAL at 09:32

## 2019-12-12 RX ADMIN — ACETYLCYSTEINE 4 ML: 100 INHALANT RESPIRATORY (INHALATION) at 11:48

## 2019-12-12 RX ADMIN — HYDRALAZINE HYDROCHLORIDE 10 MG: 20 INJECTION INTRAMUSCULAR; INTRAVENOUS at 01:49

## 2019-12-12 RX ADMIN — LABETALOL 20 MG/4 ML (5 MG/ML) INTRAVENOUS SYRINGE 10 MG: at 03:57

## 2019-12-12 RX ADMIN — MIRTAZAPINE 30 MG: 15 TABLET, FILM COATED ORAL at 21:09

## 2019-12-12 RX ADMIN — SENNOSIDES AND DOCUSATE SODIUM 1 TABLET: 8.6; 5 TABLET ORAL at 19:31

## 2019-12-12 RX ADMIN — HEPARIN SODIUM 5000 UNITS: 5000 INJECTION, SOLUTION INTRAVENOUS; SUBCUTANEOUS at 17:17

## 2019-12-12 RX ADMIN — LABETALOL 20 MG/4 ML (5 MG/ML) INTRAVENOUS SYRINGE 20 MG: at 18:13

## 2019-12-12 RX ADMIN — METRONIDAZOLE 500 MG: 500 INJECTION, SOLUTION INTRAVENOUS at 15:06

## 2019-12-12 RX ADMIN — LIDOCAINE HYDROCHLORIDE: 10 INJECTION, SOLUTION EPIDURAL; INFILTRATION; INTRACAUDAL; PERINEURAL at 15:10

## 2019-12-12 RX ADMIN — ALBUTEROL SULFATE 2.5 MG: 2.5 SOLUTION RESPIRATORY (INHALATION) at 15:22

## 2019-12-12 RX ADMIN — ACETAMINOPHEN 650 MG: 325 SOLUTION ORAL at 07:46

## 2019-12-12 RX ADMIN — AMLODIPINE BESYLATE 5 MG: 5 TABLET ORAL at 07:46

## 2019-12-12 RX ADMIN — METOPROLOL TARTRATE 25 MG: 25 TABLET ORAL at 19:31

## 2019-12-12 RX ADMIN — CEFEPIME HYDROCHLORIDE 2 G: 2 INJECTION, POWDER, FOR SOLUTION INTRAVENOUS at 22:01

## 2019-12-12 RX ADMIN — ACETYLCYSTEINE 4 ML: 100 INHALANT RESPIRATORY (INHALATION) at 19:54

## 2019-12-12 RX ADMIN — HYDRALAZINE HYDROCHLORIDE 10 MG: 20 INJECTION INTRAMUSCULAR; INTRAVENOUS at 00:00

## 2019-12-12 RX ADMIN — LABETALOL 20 MG/4 ML (5 MG/ML) INTRAVENOUS SYRINGE 10 MG: at 05:50

## 2019-12-12 RX ADMIN — ACETAMINOPHEN 650 MG: 325 TABLET, FILM COATED ORAL at 15:10

## 2019-12-12 RX ADMIN — FAMOTIDINE 20 MG: 40 POWDER, FOR SUSPENSION ORAL at 19:31

## 2019-12-12 RX ADMIN — MULTIVITAMIN 15 ML: LIQUID ORAL at 19:31

## 2019-12-12 RX ADMIN — HYDRALAZINE HYDROCHLORIDE 10 MG: 20 INJECTION INTRAMUSCULAR; INTRAVENOUS at 06:42

## 2019-12-12 RX ADMIN — POTASSIUM PHOSPHATE, MONOBASIC AND POTASSIUM PHOSPHATE, DIBASIC 15 MMOL: 224; 236 INJECTION, SOLUTION INTRAVENOUS at 05:47

## 2019-12-12 RX ADMIN — Medication 7.5 MG: at 04:21

## 2019-12-12 RX ADMIN — LORAZEPAM 0.5 MG: 2 INJECTION INTRAMUSCULAR; INTRAVENOUS at 23:07

## 2019-12-12 RX ADMIN — ACETAMINOPHEN 650 MG: 325 TABLET, FILM COATED ORAL at 19:31

## 2019-12-12 ASSESSMENT — MIFFLIN-ST. JEOR: SCORE: 1639.5

## 2019-12-12 ASSESSMENT — ACTIVITIES OF DAILY LIVING (ADL)
ADLS_ACUITY_SCORE: 27
ADLS_ACUITY_SCORE: 25
ADLS_ACUITY_SCORE: 27

## 2019-12-12 NOTE — PLAN OF CARE
Major Shift Events:  Bronch, right side of lung filled with very tenacious/thick sputum.  Culture sent.  FIO2 down to 50%.  BM X 1.    Plan: Wean vent to allow for Trach.  For vital signs and complete assessments, please see documentation flowsheets.

## 2019-12-12 NOTE — PROVIDER NOTIFICATION
Spoke with neurocrit resident about blood sugars being elevated throughout the day. Told not to initiate insulin drip / will wait for endocrinology to decide tomorrow. Also informed him that I turned FiO2 to 60% (from 50%) after turning patient on right side and desatting/not recovering within 15min.

## 2019-12-12 NOTE — PROGRESS NOTES
Diabetes Consult Daily  Progress Note          Assessment/Plan:                           Sherwin Angel is a 64 year old male with history of type 2 diabetes ( Ca-en-y  2008), hypertension, ETOH abuse ( with withdrawal with DT's), asthma,  status post C3-C4 artificial disc placement about 2 weeks ago presents after a fall with almost complete loss of strength in his upper extremities and paraplegia, found to have cervical stenosis. S/p OR 12/1 - Pharyngotomy closed with assistance from ENT. Arthroplasty implant removed. No replacement implant placed. S/p G-tube 12/5. Extubated 12/5. Re-Intubated 12/6 due to Mucous Plugging and Atelectasis.     Hx of type 2 diabetes: s/p gastric bypass surgery and on no medications since 2008  continous TF Impact peptide at 55 ml/hour.         Plan  - NPH 38 units at am (1000), will increase to 42 units  (12/12)  -NPH  38 units  evening ( 2300), increased to 42 units for this evening  - novolog sliding scale 2 units per 30 > 140 every 4 hours  -monitor glucose every 4 hours  -pharmacy consult: to change acetaminophen to sugar free  -hypoglycemia protocol  -will continue to follow                          Outpatient diabetes follow up: TBD  Plan discussed with primary team           Interval History:   The last 24 hours progress and nursing notes reviewed.  Blood sugars re improving but still not at target  =/< 180  Glucose 168--> 169--. 168--> 192--> 208   During the day glucose is more elevated   Seems to be tolerating continous Tf at current rate      Nutrition:  NPO  continous TF Impact peptide at 55 ml/hour      Medications:  Acetaminophen 650 mg in solution every 6 hours ( started 12/12)    Recent Labs   Lab 12/12/19  0754 12/12/19  0320 12/12/19  0210 12/11/19  2315 12/11/19  2114 12/11/19  1632 12/11/19  1159  12/11/19  0317  12/10/19  0403  12/09/19  0409  12/08/19  0348  12/07/19  0330   GLC  --   --  169*  --   --   --   --   --  142*  --  222*  --  " 269*  --  231*  --  177*   * 168*  --  168* 173* 170* 199*   < >  --    < >  --    < >  --    < >  --    < >  --     < > = values in this interval not displayed.               Review of Systems:   See interval hx          Medications:     Orders Placed This Encounter      NPO per Anesthesia Guidelines for Procedure/Surgery Except for: Meds       Physical Exam:  Gen: Alert, resting in bed, in NAD   HEENT:  mucous membranes are moist  Resp: intubated and vented  Ext: No lower extremity edema   Neuro:oriented person ( place and time not assessed) communicating clearly  /71   Pulse 92   Temp 97.1  F (36.2  C) (Axillary)   Resp 24   Ht 1.727 m (5' 8\")   Wt 87 kg (191 lb 12.8 oz)   SpO2 94%   BMI 29.16 kg/m             Data:     Lab Results   Component Value Date    A1C 6.5 12/01/2019              CBC RESULTS:   Recent Labs   Lab Test 12/12/19  0210   WBC 15.4*   RBC 3.45*   HGB 10.0*   HCT 33.3*   MCV 97   MCH 29.0   MCHC 30.0*   RDW 13.7        Recent Labs   Lab Test 12/12/19  0210 12/11/19  0317   * 150*   POTASSIUM 3.4 3.5   CHLORIDE 116* 113*   CO2 31 33*   ANIONGAP 4 4   * 142*   BUN 42* 40*   CR 0.68 0.88   FLORINDA 8.4* 8.3*     Liver Function Studies -   Recent Labs   Lab Test 12/01/19  0112   PROTTOTAL 7.4   ALBUMIN 2.1*   BILITOTAL 0.6   ALKPHOS 103   AST 17   ALT 32     Lab Results   Component Value Date    INR 1.30 12/04/2019    INR 1.17 12/01/2019         Franco Lazo -[phone]  Diabetes Management job code [id number]            "

## 2019-12-12 NOTE — PROGRESS NOTES
Neurosurgery Progress Note    Subjective:   ID feels fevers related to Zosyn, changed to Vanc, Cefepime, Flagyl; no bronch, weaned PEEP 10 -> 8    Objective:  Intubated, partially sedated  Awake, alert, nods yes or no to questions  Follows commands  Deltoids 4/5, biceps 4/5, triceps 1/5,  0/5, lower extremities 0/5    Assessment:  64 years old gentleman status post C3-C4 artificial disc placement about 2 weeks ago presents after a fall with almost complete loss of strength in his upper extremities and paraplegia, found to have cervical stenosis. S/p OR 12/1 - Pharyngotomy closed with assistance from ENT. Arthroplasty implant removed. No replacement implant placed. S/p G-tube 12/5. Extubated 12/5. Re-Intubated 12/6 due to Mucous Plugging and Atelectasis.    Plan:  Intubated, mechanically ventilated  Nebs, chest physiotherapy; wean to extubate as able, plan for tracheostomy with ENT  DVT: SCDs while in bed  [place] J collar  Per ID: Zosyn changed to vancomycin, cefepime, flagyl 4-6 wks for vertebral phlegmon, weekly labs  Follow-up cultures  SubQ heparin & tube feeds; hold at midnight day prior to tracheostomy  MRI before stopping abx in 6 wks  endocrine consulted for diabetes - appreciate recs  Disposition: 4A      Lion Vargas MD  Neurosurgery Resident PGY2    Please contact neurosurgery resident on call with questions.    Dial * * [phone], enter [phone] when prompted.

## 2019-12-12 NOTE — PROCEDURES
Procedure Note  12/12/2019     Procedure: Diagnostic Bronchoscopy  Indications: Mucous plugging/Desaturations     Informed consent: Obtained and placed in paper chart.      Timeout was completed at the bedside.     Premedication: Lidocaine 1% 6 mL subglottic instillation, intravenous fentanyl 100 mcg.    Procedure description: The bronchoscope was advanced via the endotracheal tube. Inspection of the trachea and bronchus of the right and left bronchial tree to the sub-segmental level revealed no endobronchial lesions. Secretions were noted in the right upper and middle bronchi, which were aspirated. Airway wall was friable throughout.     The patient tolerated the procedure well without undue discomfort, hypotension or arrhythmia. The procedure was performed in the ICU and vital sign parameters were closely monitored.    No immediate complications.     Estimated blood loss: None    Dr. Figueroa was present for the entire procedure.     Dianna Jaffe  Neurocritical Care Fellow

## 2019-12-12 NOTE — PROGRESS NOTES
[facility] Progress Note     Patient:  Sherwin Angel   Date of birth 1955, Medical record number [id number]  Date of Visit:  12/11/19  Date of Admission: 12/1/2019  Consult Requester:Fernando Ashton MD            Assessment and Recommendations:   ASSESSMENT:  1. Traumatic cervical spinal cord injury s/p C3-4 arthroplasty on 11/15, now removed  2. Cervical stenosis C3-5  3. Vertebral phlegmon vs abscess s/p drain placement on 12/1 - cultures growing MSSA, Strep anginosus, Eikenella, and oral anaerobes. Repeat MRI shows progressive epidural abscess.   4. Osteomyelitis C3-C5  5. Leukocytosis, ongoing fevers      DISCUSSION:  64-year-old man with history of htn, DM2, and recent C3-4 arthroplasty on 11/15 now admitted with new paraplegia after a fall and found to have pharyngotomy and vertebral phlegmon now s/p OR with removal of arthroplasty and placement of drain.    Initially improving on Unasyn. Newly febrile over the weekend 12/6-12/8. No localizing findings on exam. Blood cultures, sputum negative. UA negative. Broadened to zosyn; subsequently changed back to vanc/cefepime/flagyl d/t concern for drug fever related to zosyn.    Spine re-imaged with MRI 12/10 with final read suggesting progression of epidural abscess and cord compression. This is despite antibiotic coverage that should adequately cover the organisms he has growing. Thus, he is failing medical therapy.     RECOMMENDATION:  - Continue vancomycin, cefepime, and flagyl  - Worsening epidural abscess in the setting of adequate antibiotic coverage suggests need for further surgical intervention       ID will follow.   This patient was discussed with ID staff, Dr. Carrington.    Elaine Cowan MD  Internal Medicine, PGY-1  [phone]      Attestation:  I have reviewed today's vital signs, medications, labs and imaging.  Floor time: 25 minutes, Face-to-face time: 10 minutes, Total time: 35 minutes    Sherwin Bustillo  Jeanne was seen in the hospital by Joya Carrington MD on 12/12/2019, with the resident, Dr. Elaine Cowan MD. [facility] Internal Medicine Resident. I reviewed the history & exam. Assessment and plan were jointly made.  I agree with and have edited the note and plan of care.      Joya Carrington MD.  ID Staff  [id number]      ________________________________________________________________           Overnight events:   Patient again febrile over last 24 hours. Continues to endorse intermittent episodes of feeling warm and flushed. Also having new worsening vision. Denies pain.         Review of Systems:   Unable to obtain, patient intubated.         Past Medical History:   Diabetes  Hypertension  C3-C4 arthroplasty on 11/15/19         Past Surgical History:     Past Surgical History:   Procedure Laterality Date     IRRIGATION AND DEBRIDEMENT NECK, COMBINED N/A 12/1/2019    Procedure: IRRIGATION AND DEBRIDEMENT OF NECK; PHARYNGOTOMY REPAIR.;  Surgeon: Fernando Ashton MD;  Location: [facility] OR     LAPAROSCOPIC ASSISTED INSERTION TUBE GASTROTOMY N/A 12/5/2019    Procedure: laproscopic assisted gastrostomy tube placement;  Surgeon: Luis Santiago MD;  Location: [facility] OR     REMOVE HARDWARE CERVICAL ANTERIOR SPINE N/A 12/1/2019    Procedure: REMOVAL, HARDWARE, SPINE, CERVICAL, ANTERIOR;  Surgeon: Fernando Ashton MD;  Location: [facility] OR            Family History:   History reviewed. No pertinent family history.         Social History:     Social History     Tobacco Use     Smoking status: Not on file   Substance Use Topics     Alcohol use: Not on file     History   Sexual Activity     Sexual activity: Not on file            Current Antimicrobials:     Vancomycin  Cefepime  Metronidazole         Allergies:   No Known Allergies         Physical Exam:     Ranges for his vital signs:   Temp:  [96.2  F (35.7  C)-102.4  F (39.1  C)] 96.2  F (35.7  C)  Pulse:  [] 75  Heart Rate:  []  71  Resp:  [18-26] 24  BP: (108-182)/(62-89) 169/82  FiO2 (%):  [40 %-60 %] 60 %  SpO2:  [87 %-98 %] 97 %    Physical Examination:  GENERAL: Intubated, sitting up in chair, appears tired but responsive to simple questions.  HEENT:  Head is normocephalic, atraumatic; ETT in place.  EYES:  Eyes have anicteric sclerae without conjunctival injection.    NECK:  Collar in place. Drain with serosanguinous output.  LUNGS:  Coarse breath sounds throughout anterior lung fields.  CARDIOVASCULAR:  Regular rate and rhythm with no murmurs, gallops or rubs.  ABDOMEN:  Normal bowel sounds, soft, nontender. No appreciable hepatosplenomegaly  SKIN:  No acute rashes.  Lines are in place without any surrounding erythema or exudate. No stigmata of endocarditis.           Laboratory Data:   Labs reviewed. Pertinent below.    Inflammatory Markers    Recent Labs   Lab Test 19  0210 19  0317 19  0348 19  0340 19  0342 19  0112   CRP 36.0* 16.0* 56.0* 130.0* 220.0* 170.0*       Hematology Studies    Recent Labs   Lab Test 19  0210 19  0317 12/10/19  0403 19  0409 19  0348 19  0330   WBC 15.4* 10.0 9.8 8.4 8.7 10.2   ANEU 11.6* 7.0 6.6 5.7 6.0 7.8   AEOS 0.4 0.2 0.2 0.0 0.0 0.1   HGB 10.0* 9.2* 9.5* 9.5* 9.6* 9.4*   MCV 97 97 96 95 93 94    339 398 380 402 425       Microbiology:   Tissue culture: Cervical spine vertebral phlegmon:  -- Aerobic: Staph aureus, Strep anginosus, Eikenella corrodens  -- Anaerobic: Fusobacterium nucleatum, Parvimonas micra, Prevotella     Blood culture negative  12/3 Blood culture x2 negative   Blood culture x2 NGTD     Sputum culture with only candida     UA negative           Imagin/10 MRI C-spine  Extensive prevertebral phlegmon extending from C2 through C5. Anterior  epidural abscess extending from C2/C3 through C5/C6. Resultant severe  canal stenosis at C3-C5 with cord edema. These findings have  progressed  "since 12/1/2019.   Abnormal C3-C4 and C4-C5 disc signal and C3-C5 vertebral marrow  signal. Findings suggest discitis/osteomyelitis, epidural abscess and  prevertebral phlegmon.    12/1 MRI  Impression: Images are mildly degraded secondary to metallic artifact  from disc prosthesis. Follow-up imaging with CT could be considered  for further evaluation.  1. Traumatic cord injury at C3 and C4-5 as a result of C3-5  compression fractures.   2. There is 4 mm of retrolisthesis of C3 on C4.  3. Traumatic herniation of C4-5 intervertebral disc with retropulsion  of disc fragment into the spinal canal.  4. Extensive prevertebral thickening up to 2 cm anteriorly and up to 9  mm within the epidural space, favored to represent prevertebral edema  in the setting of recent trauma.     Of [facility]:  MRI C-spine w/o contrast done on 11/30/2019 --  - \"Apparent osteomyelitis involvement of the C3, C4 and C5 vertebrae.\"  - \"Suspected extension of the infectious process into the ventral aspect of the central canal suspicious for a ventral epidural phlegmon versus abscess.\"  "

## 2019-12-12 NOTE — PLAN OF CARE
Major Shift Events:  Precedex turned off around 8pm. Pt became more anxious throughout night despite PRN Dilaudid, Oxy, Ativan, Flexiril. SBP high 160s-170s, RR and HR also increased. PRN Hydralazine and Labetolol given multiple times to maintain SBP < 160. Straight cathed x 2 for retention - díaz placed. Blood sugars 160s. Desats when positioned on right side. FiO2 increased from 50% to 60% for ~2hrs after episode of desatting from being on right side, back to 50% around 0400 and tolerating well. Team aware of all changes.  Midnight and 0400 nebs not given by overnight RT - charge nurse and charge RT aware.  For vital signs and complete assessments, please see documentation flowsheets.

## 2019-12-12 NOTE — PROGRESS NOTES
Neuroscience Intensive Care Progress Note  12/12/2019    Sherwin Keny Ramosfabricioolya is a 64 year old year old male with hx HTN, DM and cervical stenosis s/p recent anterior C3-4 arthroplasty (2 weeks prior) who was admitted on 12/1/2019 with bilateral lower extremity paraplegia and near loss of upper extremity movement, after a fall at rehab. Prior to the fall, he was able to ambulate with a cane. Taken to the OR for arthroplasty implant removal on 12/1. Found to have significant inflammation and 2 pharyngotomy sites. ENT closed 1 site (unable to reach other) and drain placed for the phlegmon. Now on abx for cervical tissue + culture. S/P gtube placement 12/5 due to anticipated prolonged period of NPO. Extubated succesfully on 12/5, however, had worsening respiratory status with worsening white out of R lung concerning for mucus plugging as pt could not cough up secretions due to diaphragmatic weakness, subsequently re-intubated 12/6.     Problem List  1. Cervical stenosis s/p C3-C4 arthroplasty   2. Cervical spinal injury due to fall, with C3-C5 fracture   3. Acute respiratory failure s/p re-intubation  4. Leukocytosis  5. Vertebral phlegmon vs abscess s/p drain placement on 12/1, cultures +  6. Hx of hypertension  7. Hx of DM  8. Alcohol dependence  9. Secretions  10. Normocytic anemia  11. G-tube placement 12/5    24 hour events:  - off of dex since 8 pm last night, anxiety much improved   - Received labetolol 10 mg x 2 for SBP > 160   - Tmax improved 100  - switched zosyn -> vanc, cefepime and flagyl with concern for ongoing fever and worsening discitis/osteo on MRI   - díaz wean trial failed, ongoing retention   -tolerated reduction in vent settings to PEEP 8 and fiO2 50, ongoing desaturation when positioned on Rt side. Suspect diaphragmatic weakness related to spine .     24 Hour Vital Signs Summary:  BP Readings from Last 1 Encounters:   12/12/19 130/71     Pulse Readings from Last 1 Encounters:   12/12/19 92  "    Wt Readings from Last 1 Encounters:   12/12/19 87 kg (191 lb 12.8 oz)     Ht Readings from Last 1 Encounters:   12/01/19 1.727 m (5' 8\")     Estimated body mass index is 29.16 kg/m  as calculated from the following:    Height as of this encounter: 1.727 m (5' 8\").    Weight as of this encounter: 87 kg (191 lb 12.8 oz).    Temp Readings from Last 1 Encounters:   12/12/19 97.1  F (36.2  C) (Axillary)         Ventilator Settings  Ventilation Mode: CMV/AC  (Continuous Mandatory Ventilation/ Assist Control)  FiO2 (%): 50 %  Rate Set (breaths/minute): 12 breaths/min  Tidal Volume Set (mL): 450 mL  PEEP (cm H2O): 8 cmH2O  Oxygen Concentration (%): 50 %  Resp: 24      Intake/Output    Intake/Output Summary (Last 24 hours) at 12/8/2019 0817  Last data filed at 12/8/2019 0600  Gross per 24 hour   Intake 1475.96 ml   Output 2450 ml   Net -974.04 ml         BP - Mean:  [] 95    Current Medications:    acetaminophen  650 mg Oral or Feeding Tube Q6H     acetylcysteine  4 mL Nebulization Q4H     albuterol  2.5 mg Nebulization 4x daily     [START ON 12/13/2019] amLODIPine  10 mg Oral or Feeding Tube Daily     ceFEPIme (MAXIPIME) IV  2 g Intravenous Q8H     cyanocobalamin  1,000 mcg Intramuscular Q30 Days     dexamethasone  10 mg Intravenous Pre-Op/Pre-procedure x 1 dose     famotidine  20 mg Per G Tube BID     folic acid  1 mg Oral or Feeding Tube Daily     heparin ANTICOAGULANT  5,000 Units Subcutaneous Q12H     heparin lock flush  5-10 mL Intracatheter Q24H     insulin aspart  2-16 Units Subcutaneous Q4H     insulin isophane human  38 Units Subcutaneous Q24H     insulin isophane human  42 Units Subcutaneous Q24H     lidocaine  2 patch Transdermal Q24h    And     lidocaine   Transdermal Q24H    And     lidocaine   Transdermal Q8H     metoprolol tartrate  25 mg Oral BID     metroNIDAZOLE  500 mg Intravenous Q8H     mirtazapine  30 mg Oral or Feeding Tube QPM     multivitamins w/minerals  15 mL Per Feeding Tube Daily     " "senna-docusate  1 tablet Oral or Feeding Tube BID     sodium phosphate  1 enema Rectal Daily     traZODone  50 mg Oral At Bedtime     vancomycin (VANCOCIN) IV  2,000 mg (central catheter) Intravenous Q12H     thiamine  100 mg Oral or Feeding Tube Daily       PRN Medications:  bisacodyl, carboxymethylcellulose PF, cyclobenzaprine, IV fluid REPLACEMENT ONLY, glucose **OR** dextrose **OR** glucagon, heparin lock flush, hydrALAZINE, HYDROmorphone, ipratropium - albuterol 0.5 mg/2.5 mg/3 mL, labetalol, lidocaine 4%, lidocaine (buffered or not buffered), LORazepam, magnesium sulfate, magnesium sulfate, melatonin, naloxone, ondansetron **OR** ondansetron, oxyCODONE, potassium chloride, potassium chloride with lidocaine, potassium chloride, potassium chloride, potassium chloride, potassium phosphate (KPHOS) in D5W IV, potassium phosphate (KPHOS) in D5W IV, potassium phosphate (KPHOS) in D5W IV, potassium phosphate (KPHOS) in D5W IV, potassium phosphate (KPHOS) in D5W IV, prochlorperazine **OR** prochlorperazine **OR** prochlorperazine, sodium chloride (PF)    Infusions:    dexmedetomidine Stopped (12/11/19 2105)     IV fluid REPLACEMENT ONLY Stopped (12/10/19 1200)     no pre procedure antibiotic needed         No Known Allergies    Physical Examination:   Vitals: /71   Pulse 92   Temp 97.1  F (36.2  C) (Axillary)   Resp 24   Ht 1.727 m (5' 8\")   Wt 87 kg (191 lb 12.8 oz)   SpO2 94%   BMI 29.16 kg/m    General: Adult male, collar in place, appears calm   HEENT: Normocephalic.   CV: RRR  Resp: intubated  Abd: Soft, distended  Skin: Warm, dry   Neuro: intubated, on precedex. Awake and alert. Responds to questions with nodding appropriately. Follows commands. EOMI. Eyes are conjugate. Continues to moves both arms anti-gravity at elbows brisky, weaker in RUE. No movement in R fingers, subtle movement in L fingers. No movement in bilateral lower extremities. Sensation intact in bilateral upper " extremities proximately and distally along entire arm. Decreased sensation in LE but does have some sensation.     Labs:  Recent Labs   Lab Test 12/07/19  0330 12/06/19  1828 12/06/19  0434 12/05/19  0340    138 136 140   POTASSIUM 3.8 3.8 4.1 4.1   CHLORIDE 100 99 100 106   CO2 34* 34* 31 30   ANIONGAP 5 4 5 3   * 145* 154* 138*   BUN 25 20 14 19   CR 0.74 0.57* 0.44* 0.50*   FLORINDA 8.4* 8.7 8.6 8.1*   WBC 10.2  --  9.8 8.8   RBC 3.26*  --  3.54* 2.81*   HGB 9.4*  --  10.3* 8.3*     --  419 390     Imaging:   CXR: 12/8  1. Endotracheal tube tip projects 2.0 cm above the joby.  2. Improved aeration of the right lung since the prior exam, although residual diffuse opacities remain.  3. Increased streaky left hilar and basilar opacities, favored to represent atelectasis.     Assessment/Plan:  Sherwin Angel is a 64 year old with hx of recent of anterior C3-4 [facility] ([facility]) who was admitted 12/1/2019 after a fall at rehab, resulting in lower extremity paraplegia. Found to have cervical stenosis. Now s/p one pharyngotomy closure on 12/1, still has one pharyngotomy present. Arthroplasty implant removed with no replacement implant placed. He has been on abx for cervical tissue cultures with ID following, narrowed to Unasyn on 12/4 but switched to zosyn 12/9 for ongoing fever.      Neuro:  #Cervical spinal cord injury  - Collar at all times, Up with assist  - Neuro checks q4h during the day   - PT/OT     #Anxiety/Pain  - Dilauded PRN  - Ativan 0.5 mg q 4 prn   - flexeril 7.5 mg TID prn    #Hx of Alcohol dependence  - Thiamine & folate daily    #Depression  - Continue PTA miratazepine  - restarted trazodone lower dose 50 mg at bedtime      Resp:  #Acute respiratory failure s/p re-intubation 12/6, suspect diaphragmatic weakness related to spine injury.   #Mucous plugging Rt lung, had improved then worse again 12/11  - Mucomyst & chest physio   - wean ventilator support as able  -  desaturation when positioned on Rt side  - ENT plans for trach once able to tolerate peep of 5      Cardio:  #Hx of hypertension   - PTA amlodipine 10 mg   - PTA metoprolol restarted lower dose 25 mg BID      Renal:  Hypernatremia  Hypophosphatemia   - Electrolyte replacement protocol   - Monitor I/O, stable BUN ~ 40 and hypernatremia suspect slightly hypovolemic  - Continue free water flushes 75 q hr     Neurogenic urinary retention   - failed díaz wean trial, bladder scans with straight cath x 3     Endo:  #DM2  - Endocrine following   - Sliding scale insulin, high dose  - 38 U NPH am/pm      Heme:  #Normocytic anemia, stable  - Hg > 7.0   - Plt > 100k   - INR <1.5      GI:  #Constipation - resolved last BM 12/11  - Senna scheduled at BID  - PRN suppository/enema available     #Diet  - G-tube in place  - Tube feeds at goal      ID:   #Fever - improving   #Leukocytosis increased today (10->15) CRP down trending (16)  #repeat CT spine with increased inflammatory changes and prevertebral soft tissue thickening   - Restarted vanc, cefepime, metronidazole regimen which patient was on prior to fever starting, concern for drug fever related to zosyn  - Drug fever related to dex could contribute   - Weekly CBC, CMP, CRP while on IV abx, fax to ID clinic attn: Charissa  - Blood cultures (12/1, 12/3, 12/7, 12/11): NGTD  - Spinal tissue cultures (12/1): MSSA, Strep anginosus, Eikenella, and oral anaerobes   - Plan for repeat MRI spine around 1/12 prior to visit with ID to address ongoing antibiotic regimen   - Will page ID attending at [id number] prior to discharging pt    Previous abx    - Stopped Vancomycin, metronidazole, cefepime on 12/4, fever started 12/7  - switched Unasyn to Zosyn after ongoing fever (abx plan for 12/2-1/12)    Lines:  -ETT, PICC, PIV x 2, Díaz, PEG     FEN: TF   PPX:    DVT prophylaxis: SCDs, Heparin subcutaneous    GI:  Pantoprazole  Code Status: Full, presumed     Dispo: 4A until medically stable       The patient was seen and discussed with the attending, Dr. Figueroa.     Ladan Stevens, DO  Neurology PGY-2   [phone]

## 2019-12-12 NOTE — PROGRESS NOTES
CLINICAL NUTRITION SERVICES - REASSESSMENT NOTE     Nutrition Prescription    RECOMMENDATIONS FOR MDs/PROVIDERS TO ORDER:  - Total daily fluids/adjustments per MD   - Electrolyte replacement per protocol as indicated     Malnutrition Status:    - Patient does not meet two of the established criteria necessary for diagnosing malnutrition    Recommendations already ordered by Registered Dietitian (RD):  - Continue Impact Peptide 1.5 at goal 55 ml/hr (1320 ml/day) to provide 1980 kcals (28 kcal/kg/day), 124 g PRO (1.8 g/kg/day), 1016 ml free H2O, 84 g Fat (50% from MCTs), 185 g CHO and no Fiber daily.     Future/Additional Recommendations:  - Ongoing EN tolerance via new access/PEG  - Change to standard regimen prior to discharge (contact ENDO with changes)     EVALUATION OF THE PROGRESS TOWARD GOALS   Diet: NPO  Nutrition Support: Impact Peptide 1.5 at goal 55 ml/hr (1320 ml/day) to provide 1980 kcals (28 kcal/kg/day), 124 g PRO (1.8 g/kg/day), 1016 ml free H2O, 84 g Fat (50% from MCTs), 185 g CHO and no Fiber daily.   Intake: 7-day averages = 800 ml, 1200 kcals (17 kcals/kg) and 75 gm PRO (1.1 gm/kg).        NEW FINDINGS   Nutrition/GI: Pt continues on EN via current access/gastrostomy. Free water flushes increased to 100 q1hr per team. + BMs, loose watery stools.     Resp: plan for trach with ENT.     Neuro: Cervical spinal cord injury; paraplegia.    Endo: Endo following for DM type II.   Insulin regimen per endo  NPH 42 units (12/12)  NPH  38 units evening ( 2300)  novolog high intensity sliding scale every 4 hours; 2 units per 30 > 140 every 4 hours    Labs/meds: Na+ 150 (H); magnesium: 2.8 (H); phos: 2.2 (L). Folic acid, Insulin, Remeron, Certavite, Senokot, Thiamine.     Weights: Weight stable from admit with fluctuations likely r/t fluid status.     MALNUTRITION  % Intake: decreased intake does not meet criteria   % Weight Loss: None noted  Subcutaneous Fat Loss: None observed  Muscle Loss: None  observed  Fluid Accumulation/Edema: None noted  Malnutrition Diagnosis: Patient does not meet two of the established criteria necessary for diagnosing malnutrition    Previous Goals   Total avg nutritional intake to meet a minimum of 25 kcal/kg and 1.5 g PRO/kg daily (per dosing wt 70 kg).  Evaluation: Not met    Previous Nutrition Diagnosis  Predicted inadequate nutrient intake (protein-energy) related to no diet order and intubated.  Evaluation: Improving    CURRENT NUTRITION DIAGNOSIS  Inadequate protein-energy intake related to disruptions to EN infusions as evidenced by pt not meeting goal EN provisions with 7-day averages meeting 17 kcals/kg and 1.1 gm PRO/kg dosing weight       INTERVENTIONS  Implementation  Enteral Nutrition - continue EN as ordered. Monitor regimen; change regimen when/if appropriate prior to discharge     Goals  Total avg nutritional intake to meet a minimum of 25 kcal/kg and 1.5 g PRO/kg daily (per dosing wt 70 kg).    Monitoring/Evaluation  Progress toward goals will be monitored and evaluated per protocol.     Angélica Marina RD, DIANA, [facility]  Neuro ICU  Pager: [phone]

## 2019-12-13 ENCOUNTER — APPOINTMENT (OUTPATIENT)
Dept: GENERAL RADIOLOGY | Facility: CLINIC | Age: 64
DRG: 003 | End: 2019-12-13
Attending: NURSE PRACTITIONER
Payer: COMMERCIAL

## 2019-12-13 ENCOUNTER — APPOINTMENT (OUTPATIENT)
Dept: PHYSICAL THERAPY | Facility: CLINIC | Age: 64
DRG: 003 | End: 2019-12-13
Attending: NEUROLOGICAL SURGERY
Payer: COMMERCIAL

## 2019-12-13 LAB
ABO + RH BLD: NORMAL
ABO + RH BLD: NORMAL
ANION GAP SERPL CALCULATED.3IONS-SCNC: 4 MMOL/L (ref 3–14)
BACTERIA SPEC CULT: ABNORMAL
BASOPHILS # BLD AUTO: 0 10E9/L (ref 0–0.2)
BASOPHILS NFR BLD AUTO: 0.3 %
BLD GP AB SCN SERPL QL: NORMAL
BLOOD BANK CMNT PATIENT-IMP: NORMAL
BUN SERPL-MCNC: 32 MG/DL (ref 7–30)
CALCIUM SERPL-MCNC: 8.1 MG/DL (ref 8.5–10.1)
CHLORIDE SERPL-SCNC: 114 MMOL/L (ref 94–109)
CO2 SERPL-SCNC: 29 MMOL/L (ref 20–32)
CREAT SERPL-MCNC: 0.46 MG/DL (ref 0.66–1.25)
DIFFERENTIAL METHOD BLD: ABNORMAL
EOSINOPHIL # BLD AUTO: 0.4 10E9/L (ref 0–0.7)
EOSINOPHIL NFR BLD AUTO: 3 %
ERYTHROCYTE [DISTWIDTH] IN BLOOD BY AUTOMATED COUNT: 13.5 % (ref 10–15)
GFR SERPL CREATININE-BSD FRML MDRD: >90 ML/MIN/{1.73_M2}
GLUCOSE BLDC GLUCOMTR-MCNC: 114 MG/DL (ref 70–99)
GLUCOSE BLDC GLUCOMTR-MCNC: 116 MG/DL (ref 70–99)
GLUCOSE BLDC GLUCOMTR-MCNC: 143 MG/DL (ref 70–99)
GLUCOSE BLDC GLUCOMTR-MCNC: 163 MG/DL (ref 70–99)
GLUCOSE BLDC GLUCOMTR-MCNC: 90 MG/DL (ref 70–99)
GLUCOSE SERPL-MCNC: 129 MG/DL (ref 70–99)
HCT VFR BLD AUTO: 32 % (ref 40–53)
HGB BLD-MCNC: 9.8 G/DL (ref 13.3–17.7)
IMM GRANULOCYTES # BLD: 0.1 10E9/L (ref 0–0.4)
IMM GRANULOCYTES NFR BLD: 0.5 %
LYMPHOCYTES # BLD AUTO: 1.6 10E9/L (ref 0.8–5.3)
LYMPHOCYTES NFR BLD AUTO: 13.6 %
Lab: ABNORMAL
MAGNESIUM SERPL-MCNC: 2.4 MG/DL (ref 1.6–2.3)
MCH RBC QN AUTO: 29.5 PG (ref 26.5–33)
MCHC RBC AUTO-ENTMCNC: 30.6 G/DL (ref 31.5–36.5)
MCV RBC AUTO: 96 FL (ref 78–100)
MONOCYTES # BLD AUTO: 0.5 10E9/L (ref 0–1.3)
MONOCYTES NFR BLD AUTO: 3.8 %
NEUTROPHILS # BLD AUTO: 9.3 10E9/L (ref 1.6–8.3)
NEUTROPHILS NFR BLD AUTO: 78.8 %
NRBC # BLD AUTO: 0 10*3/UL
NRBC BLD AUTO-RTO: 0 /100
PHOSPHATE SERPL-MCNC: 2.4 MG/DL (ref 2.5–4.5)
PLATELET # BLD AUTO: 301 10E9/L (ref 150–450)
POTASSIUM SERPL-SCNC: 3.4 MMOL/L (ref 3.4–5.3)
RBC # BLD AUTO: 3.32 10E12/L (ref 4.4–5.9)
SODIUM SERPL-SCNC: 146 MMOL/L (ref 133–144)
SPECIMEN EXP DATE BLD: NORMAL
SPECIMEN SOURCE: ABNORMAL
VANCOMYCIN SERPL-MCNC: 19.2 MG/L
WBC # BLD AUTO: 11.8 10E9/L (ref 4–11)

## 2019-12-13 PROCEDURE — 86900 BLOOD TYPING SEROLOGIC ABO: CPT | Performed by: STUDENT IN AN ORGANIZED HEALTH CARE EDUCATION/TRAINING PROGRAM

## 2019-12-13 PROCEDURE — 25000132 ZZH RX MED GY IP 250 OP 250 PS 637: Performed by: NURSE PRACTITIONER

## 2019-12-13 PROCEDURE — 25000125 ZZHC RX 250: Performed by: STUDENT IN AN ORGANIZED HEALTH CARE EDUCATION/TRAINING PROGRAM

## 2019-12-13 PROCEDURE — 25000132 ZZH RX MED GY IP 250 OP 250 PS 637: Performed by: STUDENT IN AN ORGANIZED HEALTH CARE EDUCATION/TRAINING PROGRAM

## 2019-12-13 PROCEDURE — 85025 COMPLETE CBC W/AUTO DIFF WBC: CPT | Performed by: STUDENT IN AN ORGANIZED HEALTH CARE EDUCATION/TRAINING PROGRAM

## 2019-12-13 PROCEDURE — 40000275 ZZH STATISTIC RCP TIME EA 10 MIN

## 2019-12-13 PROCEDURE — 94640 AIRWAY INHALATION TREATMENT: CPT | Mod: 76

## 2019-12-13 PROCEDURE — 94640 AIRWAY INHALATION TREATMENT: CPT

## 2019-12-13 PROCEDURE — 94003 VENT MGMT INPAT SUBQ DAY: CPT

## 2019-12-13 PROCEDURE — 80048 BASIC METABOLIC PNL TOTAL CA: CPT | Performed by: STUDENT IN AN ORGANIZED HEALTH CARE EDUCATION/TRAINING PROGRAM

## 2019-12-13 PROCEDURE — 00000146 ZZHCL STATISTIC GLUCOSE BY METER IP

## 2019-12-13 PROCEDURE — 97112 NEUROMUSCULAR REEDUCATION: CPT | Mod: GP

## 2019-12-13 PROCEDURE — 25000132 ZZH RX MED GY IP 250 OP 250 PS 637: Performed by: PSYCHIATRY & NEUROLOGY

## 2019-12-13 PROCEDURE — 97530 THERAPEUTIC ACTIVITIES: CPT | Mod: GP

## 2019-12-13 PROCEDURE — 25000128 H RX IP 250 OP 636: Performed by: COUNSELOR

## 2019-12-13 PROCEDURE — 25000125 ZZHC RX 250: Performed by: COUNSELOR

## 2019-12-13 PROCEDURE — 27210437 ZZH NUTRITION PRODUCT SEMIELEM INTERMED LITER

## 2019-12-13 PROCEDURE — 71045 X-RAY EXAM CHEST 1 VIEW: CPT

## 2019-12-13 PROCEDURE — 86901 BLOOD TYPING SEROLOGIC RH(D): CPT | Performed by: STUDENT IN AN ORGANIZED HEALTH CARE EDUCATION/TRAINING PROGRAM

## 2019-12-13 PROCEDURE — 93010 ELECTROCARDIOGRAM REPORT: CPT | Performed by: INTERNAL MEDICINE

## 2019-12-13 PROCEDURE — 25000128 H RX IP 250 OP 636: Performed by: STUDENT IN AN ORGANIZED HEALTH CARE EDUCATION/TRAINING PROGRAM

## 2019-12-13 PROCEDURE — 25000132 ZZH RX MED GY IP 250 OP 250 PS 637: Performed by: NEUROLOGICAL SURGERY

## 2019-12-13 PROCEDURE — 25800030 ZZH RX IP 258 OP 636: Performed by: STUDENT IN AN ORGANIZED HEALTH CARE EDUCATION/TRAINING PROGRAM

## 2019-12-13 PROCEDURE — 83735 ASSAY OF MAGNESIUM: CPT | Performed by: STUDENT IN AN ORGANIZED HEALTH CARE EDUCATION/TRAINING PROGRAM

## 2019-12-13 PROCEDURE — 20000004 ZZH R&B ICU UMMC

## 2019-12-13 PROCEDURE — 25800025 ZZH RX 258: Performed by: STUDENT IN AN ORGANIZED HEALTH CARE EDUCATION/TRAINING PROGRAM

## 2019-12-13 PROCEDURE — 93005 ELECTROCARDIOGRAM TRACING: CPT

## 2019-12-13 PROCEDURE — 25800030 ZZH RX IP 258 OP 636: Performed by: NEUROLOGICAL SURGERY

## 2019-12-13 PROCEDURE — 80202 ASSAY OF VANCOMYCIN: CPT | Performed by: NEUROLOGICAL SURGERY

## 2019-12-13 PROCEDURE — 25000128 H RX IP 250 OP 636: Performed by: NURSE PRACTITIONER

## 2019-12-13 PROCEDURE — 25000128 H RX IP 250 OP 636: Performed by: PSYCHIATRY & NEUROLOGY

## 2019-12-13 PROCEDURE — 25000125 ZZHC RX 250: Performed by: NURSE PRACTITIONER

## 2019-12-13 PROCEDURE — 40000961 ZZH STATISTIC INTRAPULMONARY PERCUSSIVE VENT

## 2019-12-13 PROCEDURE — 86850 RBC ANTIBODY SCREEN: CPT | Performed by: STUDENT IN AN ORGANIZED HEALTH CARE EDUCATION/TRAINING PROGRAM

## 2019-12-13 PROCEDURE — 25000132 ZZH RX MED GY IP 250 OP 250 PS 637: Performed by: COUNSELOR

## 2019-12-13 PROCEDURE — 84100 ASSAY OF PHOSPHORUS: CPT | Performed by: STUDENT IN AN ORGANIZED HEALTH CARE EDUCATION/TRAINING PROGRAM

## 2019-12-13 PROCEDURE — 25000128 H RX IP 250 OP 636: Performed by: NEUROLOGICAL SURGERY

## 2019-12-13 PROCEDURE — 25000125 ZZHC RX 250: Performed by: CLINICAL NURSE SPECIALIST

## 2019-12-13 RX ORDER — METOPROLOL TARTRATE 50 MG
50 TABLET ORAL 2 TIMES DAILY
Status: DISCONTINUED | OUTPATIENT
Start: 2019-12-13 | End: 2019-12-14

## 2019-12-13 RX ORDER — LORAZEPAM 2 MG/ML
.5-1 INJECTION INTRAMUSCULAR EVERY 4 HOURS PRN
Status: COMPLETED | OUTPATIENT
Start: 2019-12-13 | End: 2019-12-13

## 2019-12-13 RX ORDER — HEPARIN SODIUM 5000 [USP'U]/.5ML
5000 INJECTION, SOLUTION INTRAVENOUS; SUBCUTANEOUS EVERY 8 HOURS
Status: DISCONTINUED | OUTPATIENT
Start: 2019-12-13 | End: 2019-12-18

## 2019-12-13 RX ORDER — TRAZODONE HYDROCHLORIDE 50 MG/1
100 TABLET, FILM COATED ORAL AT BEDTIME
Status: DISCONTINUED | OUTPATIENT
Start: 2019-12-13 | End: 2019-12-31 | Stop reason: HOSPADM

## 2019-12-13 RX ORDER — ERYTHROMYCIN 5 MG/G
OINTMENT OPHTHALMIC EVERY 6 HOURS SCHEDULED
Status: DISCONTINUED | OUTPATIENT
Start: 2019-12-13 | End: 2019-12-14

## 2019-12-13 RX ORDER — LABETALOL 20 MG/4 ML (5 MG/ML) INTRAVENOUS SYRINGE
10-20 EVERY 4 HOURS PRN
Status: DISCONTINUED | OUTPATIENT
Start: 2019-12-13 | End: 2019-12-31 | Stop reason: HOSPADM

## 2019-12-13 RX ADMIN — OXYCODONE HYDROCHLORIDE 10 MG: 5 SOLUTION ORAL at 08:20

## 2019-12-13 RX ADMIN — ERYTHROMYCIN 1 G: 5 OINTMENT OPHTHALMIC at 18:11

## 2019-12-13 RX ADMIN — Medication 100 MG: at 08:21

## 2019-12-13 RX ADMIN — POTASSIUM PHOSPHATE, MONOBASIC AND POTASSIUM PHOSPHATE, DIBASIC 15 MMOL: 224; 236 INJECTION, SOLUTION INTRAVENOUS at 04:40

## 2019-12-13 RX ADMIN — AMLODIPINE BESYLATE 10 MG: 10 TABLET ORAL at 08:21

## 2019-12-13 RX ADMIN — CEFEPIME HYDROCHLORIDE 2 G: 2 INJECTION, POWDER, FOR SOLUTION INTRAVENOUS at 06:10

## 2019-12-13 RX ADMIN — ACETAMINOPHEN 650 MG: 325 TABLET, FILM COATED ORAL at 01:45

## 2019-12-13 RX ADMIN — ALBUTEROL SULFATE 2.5 MG: 2.5 SOLUTION RESPIRATORY (INHALATION) at 20:51

## 2019-12-13 RX ADMIN — ACETAMINOPHEN 650 MG: 325 TABLET, FILM COATED ORAL at 08:21

## 2019-12-13 RX ADMIN — TRAZODONE HYDROCHLORIDE 100 MG: 50 TABLET ORAL at 21:30

## 2019-12-13 RX ADMIN — ACETYLCYSTEINE 4 ML: 100 INHALANT RESPIRATORY (INHALATION) at 00:06

## 2019-12-13 RX ADMIN — CEFEPIME HYDROCHLORIDE 2 G: 2 INJECTION, POWDER, FOR SOLUTION INTRAVENOUS at 22:25

## 2019-12-13 RX ADMIN — ACETAMINOPHEN 650 MG: 325 TABLET, FILM COATED ORAL at 21:24

## 2019-12-13 RX ADMIN — Medication 0.4 MG: at 03:30

## 2019-12-13 RX ADMIN — METOPROLOL TARTRATE 50 MG: 50 TABLET, FILM COATED ORAL at 21:28

## 2019-12-13 RX ADMIN — OXYCODONE HYDROCHLORIDE 10 MG: 5 SOLUTION ORAL at 21:14

## 2019-12-13 RX ADMIN — INSULIN HUMAN 42 UNITS: 100 INJECTION, SUSPENSION SUBCUTANEOUS at 21:56

## 2019-12-13 RX ADMIN — HEPARIN SODIUM 5000 UNITS: 5000 INJECTION, SOLUTION INTRAVENOUS; SUBCUTANEOUS at 08:32

## 2019-12-13 RX ADMIN — METOPROLOL TARTRATE 25 MG: 25 TABLET ORAL at 08:21

## 2019-12-13 RX ADMIN — DEXTROSE MONOHYDRATE: 100 INJECTION, SOLUTION INTRAVENOUS at 23:41

## 2019-12-13 RX ADMIN — INSULIN ASPART 2 UNITS: 100 INJECTION, SOLUTION INTRAVENOUS; SUBCUTANEOUS at 21:56

## 2019-12-13 RX ADMIN — MIRTAZAPINE 30 MG: 15 TABLET, FILM COATED ORAL at 21:28

## 2019-12-13 RX ADMIN — LABETALOL 20 MG/4 ML (5 MG/ML) INTRAVENOUS SYRINGE 20 MG: at 10:25

## 2019-12-13 RX ADMIN — MULTIVITAMIN 15 ML: LIQUID ORAL at 21:28

## 2019-12-13 RX ADMIN — ERYTHROMYCIN 1 G: 5 OINTMENT OPHTHALMIC at 12:17

## 2019-12-13 RX ADMIN — HYDRALAZINE HYDROCHLORIDE 10 MG: 20 INJECTION INTRAMUSCULAR; INTRAVENOUS at 05:38

## 2019-12-13 RX ADMIN — OXYCODONE HYDROCHLORIDE 10 MG: 5 SOLUTION ORAL at 17:00

## 2019-12-13 RX ADMIN — Medication 0.4 MG: at 01:33

## 2019-12-13 RX ADMIN — ALBUTEROL SULFATE 2.5 MG: 2.5 SOLUTION RESPIRATORY (INHALATION) at 12:36

## 2019-12-13 RX ADMIN — INSULIN ASPART 4 UNITS: 100 INJECTION, SOLUTION INTRAVENOUS; SUBCUTANEOUS at 23:58

## 2019-12-13 RX ADMIN — ACETYLCYSTEINE 4 ML: 100 INHALANT RESPIRATORY (INHALATION) at 08:40

## 2019-12-13 RX ADMIN — ALBUTEROL SULFATE 2.5 MG: 2.5 SOLUTION RESPIRATORY (INHALATION) at 16:24

## 2019-12-13 RX ADMIN — HYDRALAZINE HYDROCHLORIDE 10 MG: 20 INJECTION INTRAMUSCULAR; INTRAVENOUS at 18:50

## 2019-12-13 RX ADMIN — CARBOXYMETHYLCELLULOSE SODIUM 1 DROP: 5 SOLUTION/ DROPS OPHTHALMIC at 06:54

## 2019-12-13 RX ADMIN — Medication 12.5 MG: at 21:30

## 2019-12-13 RX ADMIN — ACETYLCYSTEINE 4 ML: 100 INHALANT RESPIRATORY (INHALATION) at 12:37

## 2019-12-13 RX ADMIN — INSULIN ASPART 2 UNITS: 100 INJECTION, SOLUTION INTRAVENOUS; SUBCUTANEOUS at 08:31

## 2019-12-13 RX ADMIN — FOLIC ACID 1 MG: 1 TABLET ORAL at 08:21

## 2019-12-13 RX ADMIN — LORAZEPAM 1 MG: 2 INJECTION INTRAMUSCULAR; INTRAVENOUS at 04:39

## 2019-12-13 RX ADMIN — IPRATROPIUM BROMIDE AND ALBUTEROL SULFATE 3 ML: .5; 3 SOLUTION RESPIRATORY (INHALATION) at 00:06

## 2019-12-13 RX ADMIN — OXYCODONE HYDROCHLORIDE 10 MG: 5 SOLUTION ORAL at 12:11

## 2019-12-13 RX ADMIN — VANCOMYCIN HYDROCHLORIDE 2000 MG: 10 INJECTION, POWDER, LYOPHILIZED, FOR SOLUTION INTRAVENOUS at 17:02

## 2019-12-13 RX ADMIN — CEFEPIME HYDROCHLORIDE 2 G: 2 INJECTION, POWDER, FOR SOLUTION INTRAVENOUS at 15:35

## 2019-12-13 RX ADMIN — ERYTHROMYCIN 1 G: 5 OINTMENT OPHTHALMIC at 23:46

## 2019-12-13 RX ADMIN — HEPARIN SODIUM 5000 UNITS: 5000 INJECTION, SOLUTION INTRAVENOUS; SUBCUTANEOUS at 23:46

## 2019-12-13 RX ADMIN — LABETALOL 20 MG/4 ML (5 MG/ML) INTRAVENOUS SYRINGE 10 MG: at 03:08

## 2019-12-13 RX ADMIN — HEPARIN SODIUM 5000 UNITS: 5000 INJECTION, SOLUTION INTRAVENOUS; SUBCUTANEOUS at 17:04

## 2019-12-13 RX ADMIN — Medication 0.4 MG: at 05:35

## 2019-12-13 RX ADMIN — ACETAMINOPHEN 650 MG: 325 TABLET, FILM COATED ORAL at 14:22

## 2019-12-13 RX ADMIN — METRONIDAZOLE 500 MG: 500 INJECTION, SOLUTION INTRAVENOUS at 08:16

## 2019-12-13 RX ADMIN — LABETALOL 20 MG/4 ML (5 MG/ML) INTRAVENOUS SYRINGE 10 MG: at 03:57

## 2019-12-13 RX ADMIN — LIDOCAINE 2 PATCH: 560 PATCH PERCUTANEOUS; TOPICAL; TRANSDERMAL at 21:26

## 2019-12-13 RX ADMIN — VANCOMYCIN HYDROCHLORIDE 2000 MG: 10 INJECTION, POWDER, LYOPHILIZED, FOR SOLUTION INTRAVENOUS at 03:09

## 2019-12-13 RX ADMIN — ALBUTEROL SULFATE 2.5 MG: 2.5 SOLUTION RESPIRATORY (INHALATION) at 08:40

## 2019-12-13 RX ADMIN — ACETYLCYSTEINE 4 ML: 100 INHALANT RESPIRATORY (INHALATION) at 20:51

## 2019-12-13 RX ADMIN — LABETALOL 20 MG/4 ML (5 MG/ML) INTRAVENOUS SYRINGE 20 MG: at 17:14

## 2019-12-13 RX ADMIN — METRONIDAZOLE 500 MG: 500 INJECTION, SOLUTION INTRAVENOUS at 23:28

## 2019-12-13 RX ADMIN — METRONIDAZOLE 500 MG: 500 INJECTION, SOLUTION INTRAVENOUS at 15:36

## 2019-12-13 RX ADMIN — POTASSIUM CHLORIDE 20 MEQ: 29.8 INJECTION, SOLUTION INTRAVENOUS at 03:54

## 2019-12-13 RX ADMIN — FAMOTIDINE 20 MG: 40 POWDER, FOR SUSPENSION ORAL at 08:21

## 2019-12-13 RX ADMIN — ACETYLCYSTEINE 4 ML: 100 INHALANT RESPIRATORY (INHALATION) at 16:24

## 2019-12-13 RX ADMIN — ACETYLCYSTEINE 4 ML: 100 INHALANT RESPIRATORY (INHALATION) at 03:30

## 2019-12-13 RX ADMIN — IPRATROPIUM BROMIDE AND ALBUTEROL SULFATE 3 ML: .5; 3 SOLUTION RESPIRATORY (INHALATION) at 03:30

## 2019-12-13 RX ADMIN — FAMOTIDINE 20 MG: 40 POWDER, FOR SUSPENSION ORAL at 21:26

## 2019-12-13 ASSESSMENT — ACTIVITIES OF DAILY LIVING (ADL)
ADLS_ACUITY_SCORE: 27

## 2019-12-13 NOTE — PROGRESS NOTES
Diabetes Consult Daily  Progress Note          Assessment/Plan:   Sherwin Angel is a 64 year old male with history of type 2 diabetes ( Ca-en-y  2008), hypertension, ETOH abuse ( with withdrawal with DT's), asthma,  status post C3-C4 artificial disc placement about 2 weeks ago presents after a fall with almost complete loss of strength in his upper extremities and paraplegia, found to have cervical stenosis. S/p OR 12/1 - Pharyngotomy closed with assistance from ENT. Arthroplasty implant removed. No replacement implant placed. S/p G-tube 12/5. Extubated 12/5. Re-intubated 12/6 due to mucous plugging and atelectasis.     Hx of type 2 diabetes: s/p gastric bypass surgery and on no medications since 2008  Experiencing enteral feed hyperglycemia; on continous TF Impact peptide at 55 ml/hour.         Recommendations:  -continue NPH 42 units twice daily   -novolog sliding scale 2 units per 30 > 140 every 4 hours  -monitor glucose every 4 hours  -changed acetaminophen to sugar free 12/12  -if tube feeds are interrupted, please initiate D10% at nutritional rate (55cc/hour) to prevent hypoglycemia with NPH on board.   -hypoglycemia protocol               Outpatient diabetes follow up: TBD  Paged  Neuro service today.     IP Diabetes team to sign off today; please do not hesitate to call back with questions prior to discharge, or for recommendations if tube feeds are going to change .        Interval History:   The last 24 hours progress and nursing notes reviewed.  Glycemic control stable to improved with slight increase in NPH insulin yesterday.   Tolerating TF at goal, 55cc/hour; no plans for adjustment or cycling anytime soon  Not candidate for trach until tolerating PEEP of 5.     Nutrition:  Strict NPO  continous TF Impact peptide at 55 ml/hour      Medications:  Acetaminophen 650 mg every 6 hours (started 12/12)    Recent Labs   Lab 12/13/19  1215 12/13/19  0830 12/13/19  0308 12/13/19  0230  12/12/19  2326 12/12/19  2200 12/12/19  1935  12/12/19  0210  12/11/19  0317  12/10/19  0403  12/09/19  0409  12/08/19  0348   GLC  --   --   --  129*  --   --   --   --  169*  --  142*  --  222*  --  269*  --  231*   * 143* 114*  --  185* 189* 188*   < >  --    < >  --    < >  --    < >  --    < >  --     < > = values in this interval not displayed.               Review of Systems:   See interval hx          Medications:     Current Facility-Administered Medications:      acetaminophen (TYLENOL) tablet 650 mg, 650 mg, Oral or Feeding Tube, Q6H, Fernando Ashton MD, 650 mg at 12/13/19 0821     acetylcysteine (MUCOMYST) 10 % nebulizer solution 4 mL, 4 mL, Nebulization, Q4H, Sandra Aleman CNP, 4 mL at 12/13/19 1237     albuterol (PROVENTIL) neb solution 2.5 mg, 2.5 mg, Nebulization, 4x daily, Roman Pratt MD, 2.5 mg at 12/13/19 1236     amLODIPine (NORVASC) tablet 10 mg, 10 mg, Oral or Feeding Tube, Daily, Ladan Stevens MD, 10 mg at 12/13/19 0821     bisacodyl (DULCOLAX) Suppository 10 mg, 10 mg, Rectal, Daily PRN, Vinay Sharma MD, 10 mg at 12/07/19 1749     carboxymethylcellulose PF (REFRESH PLUS) 0.5 % ophthalmic solution 1 drop, 1 drop, Both Eyes, TID PRN, Randi Edmonds MD, 1 drop at 12/13/19 0654     ceFEPIme (MAXIPIME) 2 g vial to attach to  ml bag for ADULTS or 50 ml bag for PEDS, 2 g, Intravenous, Q8H, Ladan Stevens MD, 2 g at 12/13/19 0610     cyanocobalamin injection 1,000 mcg, 1,000 mcg, Intramuscular, Q30 Days, Kg Ty MD, 1,000 mcg at 12/06/19 1056     cyclobenzaprine (FLEXERIL) half-tab 7.5 mg, 7.5 mg, Oral, TID PRN, Ladan Stevens MD, 7.5 mg at 12/12/19 0421     dexamethasone (DECADRON) injection 10 mg, 10 mg, Intravenous, Pre-Op/Pre-procedure x 1 dose, Kathy Mercado MD     dextrose 10 % 1,000 mL infusion, , Intravenous, Continuous PRN, Ramin, Reagan Saul MD, Stopped at 12/10/19 1200     glucose gel 15-30 g, 15-30 g, Oral, Q15 Min  PRN **OR** dextrose 50 % injection 25-50 mL, 25-50 mL, Intravenous, Q15 Min PRN **OR** glucagon injection 1 mg, 1 mg, Subcutaneous, Q15 Min PRN, Vinay Sharma MD     erythromycin (ROMYCIN) ophthalmic ointment, , Left Eye, Q6H CARITO, Ladan Stevens MD, 1 g at 12/13/19 1217     famotidine (PEPCID) suspension 20 mg, 20 mg, Per G Tube, BID, Pearl Clark MD, 20 mg at 12/13/19 0821     folic acid (FOLVITE) tablet 1 mg, 1 mg, Oral or Feeding Tube, Daily, Fernando Ashton MD, 1 mg at 12/13/19 0821     heparin ANTICOAGULANT injection 5,000 Units, 5,000 Units, Subcutaneous, Q8H, Ladan Stevens MD     heparin lock flush 10 UNIT/ML injection 5-10 mL, 5-10 mL, Intracatheter, Q24H, Bijan Dukes MD, 5 mL at 12/11/19 1157     heparin lock flush 10 UNIT/ML injection 5-10 mL, 5-10 mL, Intracatheter, Q1H PRN, Bijan Dukes MD     hydrALAZINE (APRESOLINE) injection 10 mg, 10 mg, Intravenous, Q1H PRN, Roland Jaffe MD, 10 mg at 12/13/19 0538     HYDROmorphone (DILAUDID) injection 0.2-0.4 mg, 0.2-0.4 mg, Intravenous, Q2H PRN, Sandra Aleman, CNP, 0.4 mg at 12/13/19 0535     insulin aspart (NovoLOG) inj (RAPID ACTING), 2-16 Units, Subcutaneous, Q4H, Franco Lazo APRN CNP, 2 Units at 12/13/19 0831     insulin isophane human (HumuLIN N PEN) injection 42 Units, 42 Units, Subcutaneous, Q24H, Franco Lazo APRN CNP, 42 Units at 12/12/19 2201     insulin isophane human (HumuLIN N PEN) injection 42 Units, 42 Units, Subcutaneous, Q24H, Franco Lazo JORGE L CNP, 42 Units at 12/13/19 0947     ipratropium - albuterol 0.5 mg/2.5 mg/3 mL (DUONEB) neb solution 3 mL, 3 mL, Nebulization, Q4H PRN, Ramandeep Weaver, JORGE L CNS, 3 mL at 12/13/19 0330     labetalol (NORMODYNE/TRANDATE) syringe 10-20 mg, 10-20 mg, Intravenous, Q4H PRN, Lion Vargas MD, 20 mg at 12/13/19 1025     Lidocaine (LIDOCARE) 4 % Patch 2 patch, 2 patch, Transdermal, Q24h, 2 patch at 12/12/19 1935 **AND** lidocaine patch  REMOVAL, , Transdermal, Q24H **AND** lidocaine patch in PLACE, , Transdermal, Q8H, Fernanda Salcedo APRN CNP     lidocaine (LMX4) cream, , Topical, Q1H PRN, Bijan Dukes MD     lidocaine 1 % 0.1-1 mL, 0.1-1 mL, Other, Q1H PRN, Bijan Dukes MD     magnesium sulfate 2 g in NS intermittent infusion ([employer] or [facility] [employer]), 2 g, Intravenous, Daily PRN, Reagan Faustin MD     magnesium sulfate 4 g in 100 mL sterile water (premade), 4 g, Intravenous, Q4H PRN, Reagan Faustin MD     melatonin tablet 5 mg, 5 mg, Oral or Feeding Tube, At Bedtime PRN, Roland Jaffe MD     metoprolol tartrate (LOPRESSOR) tablet 50 mg, 50 mg, Oral or Feeding Tube, BID, Sandra Aleman, CNP     metroNIDAZOLE (FLAGYL) infusion 500 mg, 500 mg, Intravenous, Q8H, Ladan Stevens MD, Last Rate: 100 mL/hr at 12/13/19 0816, 500 mg at 12/13/19 0816     mirtazapine (REMERON) tablet 30 mg, 30 mg, Oral or Feeding Tube, QPM, Roland Jaffe MD, 30 mg at 12/12/19 2109     multivitamins w/minerals (CERTAVITE) liquid 15 mL, 15 mL, Per Feeding Tube, Daily, Reagan Faustin MD, 15 mL at 12/12/19 1931     naloxone (NARCAN) injection 0.1-0.4 mg, 0.1-0.4 mg, Intravenous, Q2 Min PRN, Vinay Sharma MD     ondansetron (ZOFRAN-ODT) ODT tab 4-8 mg, 4-8 mg, Oral, Q6H PRN **OR** ondansetron (ZOFRAN) injection 4-8 mg, 4-8 mg, Intravenous, Q6H PRN, Reagan Faustin MD, 8 mg at 12/06/19 1136     oxyCODONE (ROXICODONE) solution 5-10 mg, 5-10 mg, Oral or Feeding Tube, Q4H PRN, Fernando Ashton MD, 10 mg at 12/13/19 1211     potassium chloride (KLOR-CON) Packet 20-40 mEq, 20-40 mEq, Oral or Feeding Tube, Q2H PRN, Reagan Faustin MD, 20 mEq at 12/11/19 0439     potassium chloride 10 mEq in 100 mL intermittent infusion with 10 mg lidocaine, 10 mEq, Intravenous, Q1H PRN, Reagan Faustin MD     potassium chloride 10 mEq in 100 mL sterile water intermittent infusion (premix), 10 mEq, Intravenous, Q1H PRN,  Reagan Faustin MD     potassium chloride 20 mEq in 50 mL intermittent infusion, 20 mEq, Intravenous, Q1H PRN, Reagan Faustin MD, 20 mEq at 12/13/19 0354     potassium chloride ER (K-DUR/KLOR-CON M) CR tablet 20-40 mEq, 20-40 mEq, Oral, Q2H PRN, Reagan Faustin MD     potassium phosphate 10 mmol in D5W 250 mL intermittent infusion, 10 mmol, Intravenous, Daily PRN, Reagan Faustin MD, 10 mmol at 12/04/19 1513     potassium phosphate 15 mmol in D5W 250 mL intermittent infusion, 15 mmol, Intravenous, Daily PRN, Reagan Faustin MD, 15 mmol at 12/13/19 0440     potassium phosphate 20 mmol in D5W 250 mL intermittent infusion, 20 mmol, Intravenous, Q6H PRN, Reagan Faustin MD, 20 mmol at 12/04/19 0550     potassium phosphate 20 mmol in D5W 500 mL intermittent infusion, 20 mmol, Intravenous, Q6H PRN, Reagan Faustin MD     potassium phosphate 25 mmol in D5W 500 mL intermittent infusion, 25 mmol, Intravenous, Q8H PRN, Reagan Faustin MD     PRE OP antibiotics NOT needed for this surgical procedure, 1 each, As instructed, Continuous, Kathy Mercado MD     prochlorperazine (COMPAZINE) injection 10 mg, 10 mg, Intravenous, Q6H PRN **OR** prochlorperazine (COMPAZINE) tablet 10 mg, 10 mg, Oral, Q6H PRN **OR** prochlorperazine (COMPAZINE) Suppository 25 mg, 25 mg, Rectal, Q12H PRN, Reagan Faustin MD     senna-docusate (SENOKOT-S/PERICOLACE) 8.6-50 MG per tablet 1 tablet, 1 tablet, Oral or Feeding Tube, BID, 1 tablet at 12/12/19 1931 **OR** [DISCONTINUED] senna-docusate (SENOKOT-S/PERICOLACE) 8.6-50 MG per tablet 2 tablet, 2 tablet, Oral or Feeding Tube, BID PRN, Fernando Ashton MD     sodium chloride (PF) 0.9% PF flush 10-20 mL, 10-20 mL, Intracatheter, q1 min prn, Bijan Dukes MD     sodium phosphate (FLEET ENEMA) 1 enema, 1 enema, Rectal, Daily, Bijan Dukes MD, 1 enema at 12/08/19 0800     traZODone (DESYREL) tablet 100 mg, 100 mg, Oral or Feeding Tube, At  "Bedtime, Sandra Aleman, CNP     vancomycin (VANCOCIN) 2,000 mg in sodium chloride 0.9 % 250 mL intermittent infusion, 2,000 mg (central catheter), Intravenous, Q12H, Fernando Ashton MD, 2,000 mg at 12/13/19 0309     vitamin B1 (THIAMINE) tablet 100 mg, 100 mg, Oral or Feeding Tube, Daily, Fernando Ashton MD, 100 mg at 12/13/19 0821      Physical Exam:  Gen: Alert, resting in bed, in NAD   HEENT:  mucous membranes are moist  Resp: intubated and vented  Ext: No lower extremity edema   Neuro: sleeping, did not assess today  BP (!) 143/73   Pulse 66   Temp 98.2  F (36.8  C)   Resp 27   Ht 1.727 m (5' 8\")   Wt 87 kg (191 lb 12.8 oz)   SpO2 98%   BMI 29.16 kg/m             Data:     Lab Results   Component Value Date    A1C 6.5 12/01/2019          CBC RESULTS:   Recent Labs   Lab Test 12/12/19 0210   WBC 15.4*   RBC 3.45*   HGB 10.0*   HCT 33.3*   MCV 97   MCH 29.0   MCHC 30.0*   RDW 13.7        Recent Labs   Lab Test 12/12/19 0210 12/11/19  0317   * 150*   POTASSIUM 3.4 3.5   CHLORIDE 116* 113*   CO2 31 33*   ANIONGAP 4 4   * 142*   BUN 42* 40*   CR 0.68 0.88   FLORINDA 8.4* 8.3*     Liver Function Studies -   Recent Labs   Lab Test 12/01/19  0112   PROTTOTAL 7.4   ALBUMIN 2.1*   BILITOTAL 0.6   ALKPHOS 103   AST 17   ALT 32     Lab Results   Component Value Date    INR 1.30 12/04/2019    INR 1.17 12/01/2019     Jessica Torres PA-C  Diabetes Management Service  Pager [phone]              "

## 2019-12-13 NOTE — PROGRESS NOTES
Neurosurgery Progress Note    Subjective:  Bedside Bronchoscopy - Copious Secretions/Right Mucous Plug; Evaluated by Endocrinology -- Insulin increased; ativan prn for restlessness    Objective:  Intubated, partially sedated  Awake, alert, nods yes or no to questions  Follows commands  Deltoids 4/5, biceps 4/5, triceps 1/5,  0/5, lower extremities 0/5    Assessment:  64 years old gentleman status post C3-C4 artificial disc placement about 2 weeks ago presents after a fall with almost complete loss of strength in his upper extremities and paraplegia, found to have cervical stenosis. S/p OR 12/1 - Pharyngotomy closed with assistance from ENT. Arthroplasty implant removed. No replacement implant placed. S/p G-tube 12/5. Extubated 12/5. Re-Intubated 12/6 due to Mucous Plugging and Atelectasis.    Plan:  Intubated, mechanically ventilated  Nebs, chest physiotherapy; wean to extubate as able, plan for tracheostomy with ENT; bronchoscopy prn per neurocritical care  DVT: SCDs while in bed  [place] J collar  Per ID: Zosyn changed to vancomycin, cefepime, flagyl 4-6 wks for vertebral phlegmon, weekly labs  Follow-up cultures  SubQ heparin & tube feeds; hold at midnight day prior to tracheostomy  MRI before stopping abx in 6 wks  endocrine consulted for diabetes - appreciate recs  Disposition: 4A      Lion Vargas MD  Neurosurgery Resident PGY2    Please contact neurosurgery resident on call with questions.    Dial * * [phone], enter [phone] when prompted.

## 2019-12-13 NOTE — PROGRESS NOTES
"Neuroscience Intensive Care Progress Note  12/13/2019    Sherwin Keny Angel is a 64 year old year old male with hx HTN, DM and cervical stenosis s/p recent anterior C3-4 arthroplasty (2 weeks prior) who was admitted on 12/1/2019 with bilateral lower extremity paraplegia and near loss of upper extremity movement, after a fall at rehab. Prior to the fall, he was able to ambulate with a cane. Taken to the OR for arthroplasty implant removal on 12/1. Found to have significant inflammation and 2 pharyngotomy sites. ENT closed 1 site (unable to reach other) and drain placed for the phlegmon. Now on abx for cervical tissue + culture. S/P gtube placement 12/5 due to anticipated prolonged period of NPO. Extubated succesfully on 12/5, however, had worsening respiratory status with worsening white out of R lung concerning for mucus plugging as pt could not cough up secretions due to diaphragmatic weakness, subsequently re-intubated 12/6.     Problem List  1. Cervical stenosis s/p C3-C4 arthroplasty   2. Cervical spinal injury due to fall, with C3-C5 fracture   3. Acute respiratory failure s/p re-intubation  4. Leukocytosis  5. Vertebral phlegmon vs abscess s/p drain placement on 12/1, cultures +  6. Hx of hypertension  7. Hx of DM  8. Alcohol dependence  9. Secretions  10. Normocytic anemia  11. G-tube placement 12/5    24 hour events:  - weaning off of ventilator, PEEP down to 5, fiO2 60.   - Ativan switched to IV for ongoing anxiety.  - bronch yesterday with thick output   - left eye discharge/irritation      24 Hour Vital Signs Summary:  BP Readings from Last 1 Encounters:   12/13/19 (!) 153/75     Pulse Readings from Last 1 Encounters:   12/13/19 66     Wt Readings from Last 1 Encounters:   12/12/19 87 kg (191 lb 12.8 oz)     Ht Readings from Last 1 Encounters:   12/01/19 1.727 m (5' 8\")     Estimated body mass index is 29.16 kg/m  as calculated from the following:    Height as of this encounter: 1.727 m (5' 8\").    " Weight as of this encounter: 87 kg (191 lb 12.8 oz).    Temp Readings from Last 1 Encounters:   12/13/19 97.8  F (36.6  C) (Axillary)         Ventilator Settings  Ventilation Mode: CMV/AC  (Continuous Mandatory Ventilation/ Assist Control)  FiO2 (%): 60 %  Rate Set (breaths/minute): 12 breaths/min  Tidal Volume Set (mL): 450 mL  PEEP (cm H2O): 5 cmH2O  Pressure Support (cm H2O): 7 cmH2O  Oxygen Concentration (%): 60 %  Resp: 25      Intake/Output    Intake/Output Summary (Last 24 hours) at 12/13/2019 1016  Last data filed at 12/13/2019 0800  Gross per 24 hour   Intake 5175 ml   Output 2440 ml   Net 2735 ml         BP - Mean:  [] 118    Current Medications:    acetaminophen  650 mg Oral or Feeding Tube Q6H     acetylcysteine  4 mL Nebulization Q4H     albuterol  2.5 mg Nebulization 4x daily     amLODIPine  10 mg Oral or Feeding Tube Daily     ceFEPIme (MAXIPIME) IV  2 g Intravenous Q8H     cyanocobalamin  1,000 mcg Intramuscular Q30 Days     dexamethasone  10 mg Intravenous Pre-Op/Pre-procedure x 1 dose     erythromycin   Left Eye Q6H CARITO     famotidine  20 mg Per G Tube BID     folic acid  1 mg Oral or Feeding Tube Daily     heparin ANTICOAGULANT  5,000 Units Subcutaneous Q8H     heparin lock flush  5-10 mL Intracatheter Q24H     insulin aspart  2-16 Units Subcutaneous Q4H     insulin isophane human  42 Units Subcutaneous Q24H     insulin isophane human  42 Units Subcutaneous Q24H     lidocaine  2 patch Transdermal Q24h    And     lidocaine   Transdermal Q24H    And     lidocaine   Transdermal Q8H     metoprolol tartrate  50 mg Oral or Feeding Tube BID     metroNIDAZOLE  500 mg Intravenous Q8H     mirtazapine  30 mg Oral or Feeding Tube QPM     multivitamins w/minerals  15 mL Per Feeding Tube Daily     senna-docusate  1 tablet Oral or Feeding Tube BID     sodium phosphate  1 enema Rectal Daily     traZODone  100 mg Oral or Feeding Tube At Bedtime     vancomycin (VANCOCIN) IV  2,000 mg (central catheter)  "Intravenous Q12H     thiamine  100 mg Oral or Feeding Tube Daily       PRN Medications:  bisacodyl, carboxymethylcellulose PF, cyclobenzaprine, IV fluid REPLACEMENT ONLY, glucose **OR** dextrose **OR** glucagon, heparin lock flush, hydrALAZINE, HYDROmorphone, ipratropium - albuterol 0.5 mg/2.5 mg/3 mL, labetalol, lidocaine 4%, lidocaine (buffered or not buffered), magnesium sulfate, magnesium sulfate, melatonin, naloxone, ondansetron **OR** ondansetron, oxyCODONE, potassium chloride, potassium chloride with lidocaine, potassium chloride, potassium chloride, potassium chloride, potassium phosphate (KPHOS) in D5W IV, potassium phosphate (KPHOS) in D5W IV, potassium phosphate (KPHOS) in D5W IV, potassium phosphate (KPHOS) in D5W IV, potassium phosphate (KPHOS) in D5W IV, prochlorperazine **OR** prochlorperazine **OR** prochlorperazine, sodium chloride (PF)    Infusions:    IV fluid REPLACEMENT ONLY Stopped (12/10/19 1200)     no pre procedure antibiotic needed         No Known Allergies    Physical Examination:   Vitals: BP (!) 153/75   Pulse 66   Temp 97.8  F (36.6  C) (Axillary)   Resp 25   Ht 1.727 m (5' 8\")   Wt 87 kg (191 lb 12.8 oz)   SpO2 98%   BMI 29.16 kg/m    General: Adult male, collar in place, appears mildly anxious   HEENT: Normocephalic.   CV: RRR  Resp: intubated  Abd: Soft, distended  Skin: Warm, dry   Neuro: intubated, on precedex. Awake and alert. Responds to questions with nodding appropriately. Follows commands. EOMI. Eyes are conjugate. Continues to moves both arms anti-gravity at elbows brisky, weaker in RUE. No movement in R fingers, subtle movement in L fingers. No movement in bilateral lower extremities. Sensation intact in bilateral upper extremities proximately and distally along entire arm. Decreased sensation in LE but does have some sensation.     Labs:  Recent Labs   Lab Test 12/07/19  0330 12/06/19  1828 12/06/19  0434 12/05/19  0340    138 136 140   POTASSIUM 3.8 3.8 4.1 " 4.1   CHLORIDE 100 99 100 106   CO2 34* 34* 31 30   ANIONGAP 5 4 5 3   * 145* 154* 138*   BUN 25 20 14 19   CR 0.74 0.57* 0.44* 0.50*   FLORINDA 8.4* 8.7 8.6 8.1*   WBC 10.2  --  9.8 8.8   RBC 3.26*  --  3.54* 2.81*   HGB 9.4*  --  10.3* 8.3*     --  419 390     Imaging:   CXR: 12/8  1. Endotracheal tube tip projects 2.0 cm above the joby.  2. Improved aeration of the right lung since the prior exam, although residual diffuse opacities remain.  3. Increased streaky left hilar and basilar opacities, favored to represent atelectasis.     Assessment/Plan:  Sherwin Angel is a 64 year old with hx of recent of anterior C3-4 arthroplasty (2 weeks ago at [facility]) who was admitted 12/1/2019 after a fall at rehab, resulting in lower extremity paraplegia. Found to have cervical stenosis. Now s/p one pharyngotomy closure on 12/1, still has one pharyngotomy present. Arthroplasty implant removed with no replacement implant placed. He has been on abx for cervical tissue cultures with ID following, restarted on vanc/flagyl/cefepime with concern for ongoing spinal infection.      Neuro:  #Cervical spinal cord injury  # discitis/osteomyelitis C3-C4  - Will discuss with neurosurgery risk/benefit of further surgical intervention to clear infection   - Collar at all times, Up with assist  - Neuro checks q4h during the day   - PT/OT     #Anxiety/Pain  - Dilauded PRN  - Ativan 01 mg q 4 prn   - flexeril 7.5 mg TID prn    #Hx of Alcohol dependence  - Thiamine & folate daily    #Depression  - Continue PTA miratazepine  - restarted trazodone 100 mg at bedtime      Resp:  #Acute respiratory failure s/p re-intubation 12/6, suspect diaphragmatic weakness related to spine injury.   #Mucous plugging Rt lung, had improved then worse again 12/11  - Mucomyst & chest physio   - daily CXR  - wean ventilator support as able  - desaturation when positioned on Rt side  - ENT plans for trach once able to tolerate peep of 5      Cardio:  #Hx  of hypertension   - PTA amlodipine 10 mg   - PTA metoprolol increased to 50 mg BID      Renal:  Hypernatremia - improving   Hypophosphatemia   - Electrolyte replacement protocol   - Monitor I/O, downtrending BUN   - Continue free water flushes 75 q hr     Neurogenic urinary retention   - failed díaz wean trial 12/12    Endo:  #DM2  - Endocrine following   - Sliding scale insulin, high dose  - 38 U NPH am/pm      Heme:  #Normocytic anemia, stable  - Hg > 7.0   - Plt > 100k   - INR <1.5      GI:  #Constipation - resolved last BM 12/12  - Senna scheduled at BID  - PRN suppository/enema available     #Diet  - G-tube in place  - Tube feeds at goal      ID:   #Fever - improving   #Leukocytosis and CRP down trending (16)  #repeat CT spine with increased inflammatory changes and prevertebral soft tissue thickening   - Restarted vanc, cefepime, metronidazole regimen which patient was on prior to fever starting, concern for drug fever related to zosyn  - Drug fever related to dex could contribute   - Weekly CBC, CMP, CRP while on IV abx,  - Blood cultures (12/1, 12/3, 12/7, 12/11): NGTD  - Spinal tissue cultures (12/1): MSSA, Strep anginosus, Eikenella, and oral anaerobes   - Will page ID attending at [id number] prior to discharging pt    Previous abx    - Stopped Vancomycin, metronidazole, cefepime on 12/4, fever started 12/7  - switched Unasyn to Zosyn after ongoing fever (abx plan for 12/2-1/12)    Lines:  -ETT, PICC, PIV x 2, Díaz, PEG     FEN: TF   PPX:    DVT prophylaxis: SCDs, Heparin subcutaneous    GI:  Pantoprazole  Code Status: Full, presumed     Dispo: 4A until medically stable      The patient was seen and discussed with the attending, Dr. Figueroa.     Ladan Stevens,   Neurology PGY-2   [phone]

## 2019-12-13 NOTE — PHARMACY-VANCOMYCIN DOSING SERVICE
Pharmacy Vancomycin Note  Date of Service 2019  Patient's  1955   64 year old, male    Indication: Osteomyelitis  Goal Trough Level: 15-20 mg/L  Day of Therapy: 3  Current Vancomycin regimen:  2000 mg IV q12h    Current estimated CrCl = Estimated Creatinine Clearance: 176.2 mL/min (A) (based on SCr of 0.46 mg/dL (L)).    Creatinine for last 3 days  2019:  3:17 AM Creatinine 0.88 mg/dL  2019:  2:10 AM Creatinine 0.68 mg/dL  2019:  2:30 AM Creatinine 0.46 mg/dL    Recent Vancomycin Levels (past 3 days)  2019:  3:25 PM Vancomycin Level 19.2 mg/L    Vancomycin IV Administrations (past 72 hours)                   vancomycin (VANCOCIN) 2,000 mg in sodium chloride 0.9 % 250 mL intermittent infusion (mg) 2,000 mg New Bag 19 0309     2,000 mg New Bag 19 1617     2,000 mg New Bag  0443    vancomycin (VANCOCIN) 2,250 mg in sodium chloride 0.9 % 250 mL intermittent infusion (mg) 2,250 mg New Bag 19 1630                Nephrotoxins and other renal medications (From now, onward)    Start     Dose/Rate Route Frequency Ordered Stop    19 0400  vancomycin (VANCOCIN) 2,000 mg in sodium chloride 0.9 % 250 mL intermittent infusion      2,000 mg (central catheter)  over 60 Minutes Intravenous EVERY 12 HOURS 19 1438               Contrast Orders - past 72 hours (72h ago, onward)    Start     Dose/Rate Route Frequency Ordered Stop    12/10/19 2015  gadobutrol (GADAVIST) injection 7.5 mL      7.5 mL Intravenous ONCE 12/10/19 2000 12/10/19 2001          Interpretation of levels and current regimen:  Trough level is  Therapeutic    Has serum creatinine changed > 50% in last 72 hours: No    Urine output:  good urine output    Renal Function: Stable    Plan:  1.  Continue Current Dose  2.  Pharmacy will check trough levels as appropriate in 1-3 Days.    3. Serum creatinine levels will be ordered daily for the first week of therapy and at least twice weekly for  subsequent weeks.      Derek Srinivasan, Pharm.D.      .

## 2019-12-13 NOTE — PROGRESS NOTES
"Bronchoscopy Risk Assessment Guidelines      A. Patient symptoms to consider when assessing pulmonary TB risk are:    I. Cough greater than 3 weeks; and fever, hemoptysis, pleuritic chest    pain, weight loss greater than 10 lbs, night sweats, fatigue, infiltrates on    upper lobes or superior segments of lower lobes, cavitation on chest    x-ray.   B. Patient risk factors to consider when assessing pulmonary TB risk are:    I. Exposure to known TB case, foreign-born persons (within 5 years of    arrival to [place]), residence in a crowded setting (correctional facility,     long-term care center, etc.), persons with HIV or immunosuppression.    Patients with symptoms and risk factors should generally be considered \"suspect risk\" and bronchoscopies should be performed in airborne precautions.    This patient has NO KNOWN RISK of Tuberculosis (proceed with bronchoscopy)    Specimens sent: no  Complications: None  Scope used: #[id number] Adult  Attending Physician: Megan Figueroa, RT on 12/12/2019 at 6:32 PM  "

## 2019-12-13 NOTE — PLAN OF CARE
Discharge Planner PT / 4A  Patient plan for discharge: not discussed  Current status: Patient alert, reports pain in neck, highly motivated for therapy. Pt able to squeeze right hand today, participates in AAROM shoulder flex, elbow flex/ext. Observed slight movement in bilateral toes and plantarflexion of R ankle today. Patient observed to have impaired vision especially peripheral vision on left side, patient reports worsened vision and headache at end of session, RN made aware. Dependent transfer bed>chair via OH lift, once up in chair sats decreasing despite RN increasing FiO2 50%>60%>100% on PS therefore patient had to be lifted back to bed at end of session.   Barriers to return to prior living situation: medical needs, vent weaning, current level of function  Recommendations for discharge: rehab  Rationale for recommendations: Patient will require continued skilled therapy to increase strength and maximize functional independence.

## 2019-12-13 NOTE — PLAN OF CARE
Major Shift Events:  Continues to be anxious. Ativan 1mg q4h PRN for anxiety/agitation. Denies pain but seems uncomfortable. 1 small liquid stool. Adequate urine output. SBP goal < 160 maintained with PRN Labetolol/Hydralazine. K and Phos replaced. Eye drops given this morning for small drainage/crusting, eyes somewhat red. Team aware.   For vital signs and complete assessments, please see documentation flowsheets.

## 2019-12-13 NOTE — PLAN OF CARE
Discharge Planner PT / 4A  Patient plan for discharge: not discussed  Current status: Patient alert and motivated for therapy, continues, participates in UE AAROM, active movements shoulder, elbow, and wrist. Patient able to slightly wiggle bilateral toes to command ~50% of the time. Dependent transfer bed>chair via OH lift, tolerated well, orally intubated VC-AC FiO2 60% PEEP 5, Hypertensive -170, HR 60-70s.;  Barriers to return to prior living situation: medical needs, vent weaning, current level of function  Recommendations for discharge: rehab  Rationale for recommendations: Patient will require continued skilled therapy to increase strength and maximize functional independence.

## 2019-12-14 ENCOUNTER — APPOINTMENT (OUTPATIENT)
Dept: GENERAL RADIOLOGY | Facility: CLINIC | Age: 64
DRG: 003 | End: 2019-12-14
Attending: NURSE PRACTITIONER
Payer: COMMERCIAL

## 2019-12-14 ENCOUNTER — APPOINTMENT (OUTPATIENT)
Dept: PHYSICAL THERAPY | Facility: CLINIC | Age: 64
DRG: 003 | End: 2019-12-14
Attending: NEUROLOGICAL SURGERY
Payer: COMMERCIAL

## 2019-12-14 LAB
ANION GAP SERPL CALCULATED.3IONS-SCNC: 4 MMOL/L (ref 3–14)
BACTERIA SPEC CULT: ABNORMAL
BACTERIA SPEC CULT: NO GROWTH
BACTERIA SPEC CULT: NO GROWTH
BASE EXCESS BLDA CALC-SCNC: 2.1 MMOL/L
BASOPHILS # BLD AUTO: 0 10E9/L (ref 0–0.2)
BASOPHILS NFR BLD AUTO: 0.1 %
BUN SERPL-MCNC: 25 MG/DL (ref 7–30)
CALCIUM SERPL-MCNC: 8.2 MG/DL (ref 8.5–10.1)
CHLORIDE SERPL-SCNC: 113 MMOL/L (ref 94–109)
CO2 SERPL-SCNC: 27 MMOL/L (ref 20–32)
CREAT SERPL-MCNC: 0.48 MG/DL (ref 0.66–1.25)
CRP SERPL-MCNC: 48 MG/L (ref 0–8)
DIFFERENTIAL METHOD BLD: ABNORMAL
EOSINOPHIL # BLD AUTO: 0.2 10E9/L (ref 0–0.7)
EOSINOPHIL NFR BLD AUTO: 2.3 %
ERYTHROCYTE [DISTWIDTH] IN BLOOD BY AUTOMATED COUNT: 13.5 % (ref 10–15)
GFR SERPL CREATININE-BSD FRML MDRD: >90 ML/MIN/{1.73_M2}
GLUCOSE BLDC GLUCOMTR-MCNC: 130 MG/DL (ref 70–99)
GLUCOSE BLDC GLUCOMTR-MCNC: 191 MG/DL (ref 70–99)
GLUCOSE BLDC GLUCOMTR-MCNC: 71 MG/DL (ref 70–99)
GLUCOSE BLDC GLUCOMTR-MCNC: 76 MG/DL (ref 70–99)
GLUCOSE BLDC GLUCOMTR-MCNC: 76 MG/DL (ref 70–99)
GLUCOSE BLDC GLUCOMTR-MCNC: 84 MG/DL (ref 70–99)
GLUCOSE BLDC GLUCOMTR-MCNC: 89 MG/DL (ref 70–99)
GLUCOSE SERPL-MCNC: 135 MG/DL (ref 70–99)
HCO3 BLD-SCNC: 26 MMOL/L (ref 21–28)
HCT VFR BLD AUTO: 30.1 % (ref 40–53)
HGB BLD-MCNC: 9.2 G/DL (ref 13.3–17.7)
IMM GRANULOCYTES # BLD: 0.1 10E9/L (ref 0–0.4)
IMM GRANULOCYTES NFR BLD: 0.6 %
LYMPHOCYTES # BLD AUTO: 1.4 10E9/L (ref 0.8–5.3)
LYMPHOCYTES NFR BLD AUTO: 13.7 %
Lab: NORMAL
Lab: NORMAL
MAGNESIUM SERPL-MCNC: 2.3 MG/DL (ref 1.6–2.3)
MCH RBC QN AUTO: 28.8 PG (ref 26.5–33)
MCHC RBC AUTO-ENTMCNC: 30.6 G/DL (ref 31.5–36.5)
MCV RBC AUTO: 94 FL (ref 78–100)
MONOCYTES # BLD AUTO: 0.5 10E9/L (ref 0–1.3)
MONOCYTES NFR BLD AUTO: 4.9 %
NEUTROPHILS # BLD AUTO: 7.8 10E9/L (ref 1.6–8.3)
NEUTROPHILS NFR BLD AUTO: 78.4 %
NRBC # BLD AUTO: 0 10*3/UL
NRBC BLD AUTO-RTO: 0 /100
O2/TOTAL GAS SETTING VFR VENT: 80 %
PCO2 BLD: 37 MM HG (ref 35–45)
PH BLD: 7.46 PH (ref 7.35–7.45)
PHOSPHATE SERPL-MCNC: 2.3 MG/DL (ref 2.5–4.5)
PLATELET # BLD AUTO: 295 10E9/L (ref 150–450)
PO2 BLD: 55 MM HG (ref 80–105)
POTASSIUM SERPL-SCNC: 3.5 MMOL/L (ref 3.4–5.3)
RBC # BLD AUTO: 3.2 10E12/L (ref 4.4–5.9)
SODIUM SERPL-SCNC: 144 MMOL/L (ref 133–144)
SPECIMEN SOURCE: ABNORMAL
SPECIMEN SOURCE: NORMAL
SPECIMEN SOURCE: NORMAL
WBC # BLD AUTO: 9.9 10E9/L (ref 4–11)

## 2019-12-14 PROCEDURE — 94003 VENT MGMT INPAT SUBQ DAY: CPT

## 2019-12-14 PROCEDURE — 25000132 ZZH RX MED GY IP 250 OP 250 PS 637: Performed by: NURSE PRACTITIONER

## 2019-12-14 PROCEDURE — 0B948ZZ DRAINAGE OF RIGHT UPPER LOBE BRONCHUS, VIA NATURAL OR ARTIFICIAL OPENING ENDOSCOPIC: ICD-10-PCS | Performed by: PSYCHIATRY & NEUROLOGY

## 2019-12-14 PROCEDURE — 25000125 ZZHC RX 250: Performed by: STUDENT IN AN ORGANIZED HEALTH CARE EDUCATION/TRAINING PROGRAM

## 2019-12-14 PROCEDURE — 40000275 ZZH STATISTIC RCP TIME EA 10 MIN

## 2019-12-14 PROCEDURE — 84100 ASSAY OF PHOSPHORUS: CPT | Performed by: STUDENT IN AN ORGANIZED HEALTH CARE EDUCATION/TRAINING PROGRAM

## 2019-12-14 PROCEDURE — 25000132 ZZH RX MED GY IP 250 OP 250 PS 637: Performed by: PSYCHIATRY & NEUROLOGY

## 2019-12-14 PROCEDURE — 97112 NEUROMUSCULAR REEDUCATION: CPT | Mod: GP

## 2019-12-14 PROCEDURE — 86140 C-REACTIVE PROTEIN: CPT | Performed by: STUDENT IN AN ORGANIZED HEALTH CARE EDUCATION/TRAINING PROGRAM

## 2019-12-14 PROCEDURE — 25000131 ZZH RX MED GY IP 250 OP 636 PS 637: Performed by: NURSE PRACTITIONER

## 2019-12-14 PROCEDURE — 94668 MNPJ CHEST WALL SBSQ: CPT

## 2019-12-14 PROCEDURE — 27210437 ZZH NUTRITION PRODUCT SEMIELEM INTERMED LITER

## 2019-12-14 PROCEDURE — 25000125 ZZHC RX 250: Performed by: NURSE PRACTITIONER

## 2019-12-14 PROCEDURE — 25000128 H RX IP 250 OP 636: Performed by: NEUROLOGICAL SURGERY

## 2019-12-14 PROCEDURE — 25800030 ZZH RX IP 258 OP 636: Performed by: NEUROLOGICAL SURGERY

## 2019-12-14 PROCEDURE — 0B958ZZ DRAINAGE OF RIGHT MIDDLE LOBE BRONCHUS, VIA NATURAL OR ARTIFICIAL OPENING ENDOSCOPIC: ICD-10-PCS | Performed by: PSYCHIATRY & NEUROLOGY

## 2019-12-14 PROCEDURE — 83735 ASSAY OF MAGNESIUM: CPT | Performed by: STUDENT IN AN ORGANIZED HEALTH CARE EDUCATION/TRAINING PROGRAM

## 2019-12-14 PROCEDURE — 00000146 ZZHCL STATISTIC GLUCOSE BY METER IP

## 2019-12-14 PROCEDURE — 25000128 H RX IP 250 OP 636

## 2019-12-14 PROCEDURE — 31622 DX BRONCHOSCOPE/WASH: CPT

## 2019-12-14 PROCEDURE — 25000125 ZZHC RX 250: Performed by: CLINICAL NURSE SPECIALIST

## 2019-12-14 PROCEDURE — 25000128 H RX IP 250 OP 636: Performed by: COUNSELOR

## 2019-12-14 PROCEDURE — 80048 BASIC METABOLIC PNL TOTAL CA: CPT | Performed by: STUDENT IN AN ORGANIZED HEALTH CARE EDUCATION/TRAINING PROGRAM

## 2019-12-14 PROCEDURE — 25000125 ZZHC RX 250: Performed by: COUNSELOR

## 2019-12-14 PROCEDURE — 20000004 ZZH R&B ICU UMMC

## 2019-12-14 PROCEDURE — 25000132 ZZH RX MED GY IP 250 OP 250 PS 637: Performed by: NEUROLOGICAL SURGERY

## 2019-12-14 PROCEDURE — 94667 MNPJ CHEST WALL 1ST: CPT

## 2019-12-14 PROCEDURE — 94640 AIRWAY INHALATION TREATMENT: CPT | Mod: 76

## 2019-12-14 PROCEDURE — 97530 THERAPEUTIC ACTIVITIES: CPT | Mod: GP

## 2019-12-14 PROCEDURE — 82803 BLOOD GASES ANY COMBINATION: CPT | Performed by: NEUROLOGICAL SURGERY

## 2019-12-14 PROCEDURE — 71045 X-RAY EXAM CHEST 1 VIEW: CPT

## 2019-12-14 PROCEDURE — 36600 WITHDRAWAL OF ARTERIAL BLOOD: CPT

## 2019-12-14 PROCEDURE — 85025 COMPLETE CBC W/AUTO DIFF WBC: CPT | Performed by: STUDENT IN AN ORGANIZED HEALTH CARE EDUCATION/TRAINING PROGRAM

## 2019-12-14 PROCEDURE — 25000132 ZZH RX MED GY IP 250 OP 250 PS 637: Performed by: STUDENT IN AN ORGANIZED HEALTH CARE EDUCATION/TRAINING PROGRAM

## 2019-12-14 PROCEDURE — 25800030 ZZH RX IP 258 OP 636: Performed by: STUDENT IN AN ORGANIZED HEALTH CARE EDUCATION/TRAINING PROGRAM

## 2019-12-14 PROCEDURE — 25000132 ZZH RX MED GY IP 250 OP 250 PS 637: Performed by: COUNSELOR

## 2019-12-14 RX ORDER — AMOXICILLIN 250 MG
1 CAPSULE ORAL 2 TIMES DAILY
Status: DISCONTINUED | OUTPATIENT
Start: 2019-12-14 | End: 2019-12-20

## 2019-12-14 RX ORDER — METOPROLOL TARTRATE 25 MG/1
25 TABLET, FILM COATED ORAL ONCE
Status: COMPLETED | OUTPATIENT
Start: 2019-12-14 | End: 2019-12-14

## 2019-12-14 RX ORDER — FENTANYL CITRATE 50 UG/ML
INJECTION, SOLUTION INTRAMUSCULAR; INTRAVENOUS
Status: COMPLETED
Start: 2019-12-14 | End: 2019-12-14

## 2019-12-14 RX ORDER — FENTANYL CITRATE 50 UG/ML
100 INJECTION, SOLUTION INTRAMUSCULAR; INTRAVENOUS ONCE
Status: COMPLETED | OUTPATIENT
Start: 2019-12-14 | End: 2019-12-14

## 2019-12-14 RX ORDER — ERYTHROMYCIN 5 MG/G
OINTMENT OPHTHALMIC EVERY 6 HOURS SCHEDULED
Status: COMPLETED | OUTPATIENT
Start: 2019-12-14 | End: 2019-12-15

## 2019-12-14 RX ADMIN — CEFEPIME HYDROCHLORIDE 2 G: 2 INJECTION, POWDER, FOR SOLUTION INTRAVENOUS at 22:09

## 2019-12-14 RX ADMIN — FENTANYL CITRATE 100 MCG: 50 INJECTION, SOLUTION INTRAMUSCULAR; INTRAVENOUS at 11:30

## 2019-12-14 RX ADMIN — METRONIDAZOLE 500 MG: 500 INJECTION, SOLUTION INTRAVENOUS at 06:29

## 2019-12-14 RX ADMIN — ERYTHROMYCIN 1 G: 5 OINTMENT OPHTHALMIC at 05:50

## 2019-12-14 RX ADMIN — CEFEPIME HYDROCHLORIDE 2 G: 2 INJECTION, POWDER, FOR SOLUTION INTRAVENOUS at 14:44

## 2019-12-14 RX ADMIN — FOLIC ACID 1 MG: 1 TABLET ORAL at 07:45

## 2019-12-14 RX ADMIN — TRAZODONE HYDROCHLORIDE 100 MG: 50 TABLET ORAL at 22:09

## 2019-12-14 RX ADMIN — Medication 12.5 MG: at 20:53

## 2019-12-14 RX ADMIN — POTASSIUM PHOSPHATE, MONOBASIC AND POTASSIUM PHOSPHATE, DIBASIC 15 MMOL: 224; 236 INJECTION, SOLUTION INTRAVENOUS at 07:40

## 2019-12-14 RX ADMIN — METRONIDAZOLE 500 MG: 500 INJECTION, SOLUTION INTRAVENOUS at 14:44

## 2019-12-14 RX ADMIN — IPRATROPIUM BROMIDE AND ALBUTEROL SULFATE 3 ML: .5; 3 SOLUTION RESPIRATORY (INHALATION) at 04:56

## 2019-12-14 RX ADMIN — HEPARIN SODIUM 5000 UNITS: 5000 INJECTION, SOLUTION INTRAVENOUS; SUBCUTANEOUS at 16:37

## 2019-12-14 RX ADMIN — ALBUTEROL SULFATE 2.5 MG: 2.5 SOLUTION RESPIRATORY (INHALATION) at 12:40

## 2019-12-14 RX ADMIN — ACETAMINOPHEN 650 MG: 325 TABLET, FILM COATED ORAL at 07:44

## 2019-12-14 RX ADMIN — ALBUTEROL SULFATE 2.5 MG: 2.5 SOLUTION RESPIRATORY (INHALATION) at 15:37

## 2019-12-14 RX ADMIN — MIRTAZAPINE 30 MG: 15 TABLET, FILM COATED ORAL at 20:52

## 2019-12-14 RX ADMIN — ACETYLCYSTEINE 4 ML: 100 INHALANT RESPIRATORY (INHALATION) at 00:37

## 2019-12-14 RX ADMIN — CEFEPIME HYDROCHLORIDE 2 G: 2 INJECTION, POWDER, FOR SOLUTION INTRAVENOUS at 06:02

## 2019-12-14 RX ADMIN — VANCOMYCIN HYDROCHLORIDE 1500 MG: 10 INJECTION, POWDER, LYOPHILIZED, FOR SOLUTION INTRAVENOUS at 15:00

## 2019-12-14 RX ADMIN — Medication 12.5 MG: at 07:48

## 2019-12-14 RX ADMIN — OXYCODONE HYDROCHLORIDE 10 MG: 5 SOLUTION ORAL at 12:07

## 2019-12-14 RX ADMIN — VANCOMYCIN HYDROCHLORIDE 2000 MG: 10 INJECTION, POWDER, LYOPHILIZED, FOR SOLUTION INTRAVENOUS at 04:14

## 2019-12-14 RX ADMIN — LIDOCAINE 2 PATCH: 560 PATCH PERCUTANEOUS; TOPICAL; TRANSDERMAL at 20:51

## 2019-12-14 RX ADMIN — ACETAMINOPHEN 650 MG: 325 TABLET, FILM COATED ORAL at 20:50

## 2019-12-14 RX ADMIN — Medication 5 MG: at 00:47

## 2019-12-14 RX ADMIN — METOPROLOL TARTRATE 25 MG: 25 TABLET ORAL at 09:54

## 2019-12-14 RX ADMIN — POTASSIUM CHLORIDE 20 MEQ: 1.5 POWDER, FOR SOLUTION ORAL at 07:42

## 2019-12-14 RX ADMIN — ACETYLCYSTEINE 4 ML: 100 INHALANT RESPIRATORY (INHALATION) at 08:18

## 2019-12-14 RX ADMIN — Medication 100 MG: at 07:45

## 2019-12-14 RX ADMIN — ACETYLCYSTEINE 4 ML: 100 INHALANT RESPIRATORY (INHALATION) at 15:37

## 2019-12-14 RX ADMIN — METRONIDAZOLE 500 MG: 500 INJECTION, SOLUTION INTRAVENOUS at 22:47

## 2019-12-14 RX ADMIN — ACETYLCYSTEINE 4 ML: 100 INHALANT RESPIRATORY (INHALATION) at 19:50

## 2019-12-14 RX ADMIN — IPRATROPIUM BROMIDE AND ALBUTEROL SULFATE 3 ML: .5; 3 SOLUTION RESPIRATORY (INHALATION) at 00:37

## 2019-12-14 RX ADMIN — MIDAZOLAM HYDROCHLORIDE 2 MG: 2 INJECTION, SOLUTION INTRAMUSCULAR; INTRAVENOUS at 11:30

## 2019-12-14 RX ADMIN — FAMOTIDINE 20 MG: 40 POWDER, FOR SUSPENSION ORAL at 20:51

## 2019-12-14 RX ADMIN — OXYCODONE HYDROCHLORIDE 10 MG: 5 SOLUTION ORAL at 16:35

## 2019-12-14 RX ADMIN — ALBUTEROL SULFATE 2.5 MG: 2.5 SOLUTION RESPIRATORY (INHALATION) at 08:18

## 2019-12-14 RX ADMIN — METOPROLOL TARTRATE 50 MG: 50 TABLET, FILM COATED ORAL at 07:45

## 2019-12-14 RX ADMIN — ERYTHROMYCIN 1 G: 5 OINTMENT OPHTHALMIC at 12:07

## 2019-12-14 RX ADMIN — ALBUTEROL SULFATE 2.5 MG: 2.5 SOLUTION RESPIRATORY (INHALATION) at 19:50

## 2019-12-14 RX ADMIN — ACETYLCYSTEINE 4 ML: 100 INHALANT RESPIRATORY (INHALATION) at 04:56

## 2019-12-14 RX ADMIN — OXYCODONE HYDROCHLORIDE 10 MG: 5 SOLUTION ORAL at 07:43

## 2019-12-14 RX ADMIN — MULTIVITAMIN 15 ML: LIQUID ORAL at 20:53

## 2019-12-14 RX ADMIN — ACETAMINOPHEN 650 MG: 325 TABLET, FILM COATED ORAL at 14:08

## 2019-12-14 RX ADMIN — ERYTHROMYCIN 1 G: 5 OINTMENT OPHTHALMIC at 18:06

## 2019-12-14 RX ADMIN — OXYCODONE HYDROCHLORIDE 10 MG: 5 SOLUTION ORAL at 20:54

## 2019-12-14 RX ADMIN — AMLODIPINE BESYLATE 10 MG: 10 TABLET ORAL at 07:44

## 2019-12-14 RX ADMIN — FAMOTIDINE 20 MG: 40 POWDER, FOR SUSPENSION ORAL at 07:46

## 2019-12-14 RX ADMIN — INSULIN HUMAN 42 UNITS: 100 INJECTION, SUSPENSION SUBCUTANEOUS at 22:24

## 2019-12-14 RX ADMIN — ACETYLCYSTEINE 4 ML: 100 INHALANT RESPIRATORY (INHALATION) at 12:40

## 2019-12-14 ASSESSMENT — ACTIVITIES OF DAILY LIVING (ADL)
ADLS_ACUITY_SCORE: 27

## 2019-12-14 NOTE — PROGRESS NOTES
Neurosurgery Progress Note    Subjective:  No acute events; added scheduled seroquel for restlessness    Objective:  Intubated, partially sedated  Awake, alert, nods yes or no to questions  Follows commands  Deltoids 4/5, biceps 4/5, triceps 2/5,  0/5, lower extremities 0/5    Assessment:  64 years old gentleman status post C3-C4 artificial disc placement about 2 weeks ago presents after a fall with almost complete loss of strength in his upper extremities and paraplegia, found to have cervical stenosis. S/p OR 12/1 - Pharyngotomy closed with assistance from ENT. Arthroplasty implant removed. No replacement implant placed. S/p G-tube 12/5. Extubated 12/5. Re-Intubated 12/6 due to Mucous Plugging and Atelectasis.    Plan:  Intubated, mechanically ventilated  Nebs, chest physiotherapy; wean to extubate as able, plan for tracheostomy with ENT; bronchoscopy prn per neurocritical care  DVT: SCDs while in bed  [place] J collar  Per ID: vancomycin, cefepime, flagyl 4-6 wks for vertebral phlegmon, weekly labs; will discuss with staff regarding possible further surgery  Follow-up cultures  SubQ heparin & tube feeds; hold at midnight day prior to tracheostomy  MRI before stopping abx in 6 wks  endocrine consulted for diabetes - appreciate recs  Disposition: 4A      Lion Vargas MD  Neurosurgery Resident PGY2    Please contact neurosurgery resident on call with questions.    Dial * [phone], enter [phone] when prompted.

## 2019-12-14 NOTE — PROGRESS NOTES
Neuro: Pt able to move upper extremities to command but remains weak an uncoordinated. Able to wiggle toes bilaterally. Complains of pain from G-tube and in neck. Anxious about ETT. Medicated with oxycodone, able to rest between assessments.  CV: Intermittently hypertensive to 180's to 200's , good response to PRN labetolol. Orders to double Metoprolol dose tonight. HR stable 70's to 80's/   Pulmonary: Lungs coarse, dim in bases. Able to tolerate CPAP 6 hours with decrease in FiO2 from 60 to 40%, then failed and needed to be placed back on CMV @100% O2  before he could wean back down to 60%.  GI: Large BM this morning, held laxatives. Tolerates tube feeds at goal, continues on free water 100cc/hr for hypernatremia. Na level stable at 146.  : Good urine output via díaz. Díaz remains in place for retention. Diuresed heavly in the afternoon, still net positive 1500 for today.   Plan: holding tube feeds at midnight for anticipated tracheostomy tomorrow.

## 2019-12-14 NOTE — PROGRESS NOTES
"Neuroscience Intensive Care Progress Note  12/14/2019    Sherwin Keny Angel is a 64 year old year old male with hx HTN, DM and cervical stenosis s/p recent anterior C3-4 arthroplasty (2 weeks prior) who was admitted on 12/1/2019 with bilateral lower extremity paraplegia and near loss of upper extremity movement, after a fall at rehab. Prior to the fall, he was able to ambulate with a cane. Taken to the OR for arthroplasty implant removal on 12/1. Found to have significant inflammation and 2 pharyngotomy sites. ENT closed 1 site (unable to reach other) and drain placed for the phlegmon. Now on abx for cervical tissue + culture. S/P gtube placement 12/5 due to anticipated prolonged period of NPO. Extubated succesfully on 12/5, however, had worsening respiratory status with worsening white out of R lung concerning for mucus plugging as pt could not cough up secretions due to diaphragmatic weakness, subsequently re-intubated 12/6.     Problem List  1. Cervical stenosis s/p C3-C4 arthroplasty   2. Cervical spinal injury due to fall, with C3-C5 fracture   3. Acute respiratory failure s/p re-intubation  4. Leukocytosis  5. Vertebral phlegmon vs abscess s/p drain placement on 12/1, cultures +  6. Hx of hypertension  7. Hx of DM  8. Alcohol dependence  9. Secretions  10. Normocytic anemia  11. G-tube placement 12/5    24 hour events:  - desaturated on the vent and FIO2 increased to 100   - left eye discharge/irritation improving     24 Hour Vital Signs Summary:  BP Readings from Last 1 Encounters:   12/14/19 (!) 165/82     Pulse Readings from Last 1 Encounters:   12/14/19 105     Wt Readings from Last 1 Encounters:   12/12/19 87 kg (191 lb 12.8 oz)     Ht Readings from Last 1 Encounters:   12/01/19 1.727 m (5' 8\")     Estimated body mass index is 29.16 kg/m  as calculated from the following:    Height as of this encounter: 1.727 m (5' 8\").    Weight as of this encounter: 87 kg (191 lb 12.8 oz).    Temp Readings from Last " 1 Encounters:   12/14/19 98.3  F (36.8  C) (Axillary)         Ventilator Settings  Ventilation Mode: CMV/AC  (Continuous Mandatory Ventilation/ Assist Control)  FiO2 (%): 80 %  Rate Set (breaths/minute): 12 breaths/min  Tidal Volume Set (mL): 450 mL  PEEP (cm H2O): 8 cmH2O  Pressure Support (cm H2O): 7 cmH2O  Oxygen Concentration (%): 100 %  Resp: 25      Intake/Output      Intake/Output Summary (Last 24 hours) at 12/14/2019 0944  Last data filed at 12/14/2019 0900  Gross per 24 hour   Intake 3830 ml   Output 4850 ml   Net -1020 ml         BP - Mean:  [] 117    Current Medications:    acetaminophen  650 mg Oral or Feeding Tube Q6H     acetylcysteine  4 mL Nebulization Q4H     albuterol  2.5 mg Nebulization 4x daily     amLODIPine  10 mg Oral or Feeding Tube Daily     ceFEPIme (MAXIPIME) IV  2 g Intravenous Q8H     cyanocobalamin  1,000 mcg Intramuscular Q30 Days     dexamethasone  10 mg Intravenous Pre-Op/Pre-procedure x 1 dose     erythromycin   Both Eyes Q6H CARITO     famotidine  20 mg Per G Tube BID     folic acid  1 mg Oral or Feeding Tube Daily     heparin ANTICOAGULANT  5,000 Units Subcutaneous Q8H     heparin lock flush  5-10 mL Intracatheter Q24H     insulin aspart  2-16 Units Subcutaneous Q4H     insulin isophane human  42 Units Subcutaneous Q24H     insulin isophane human  42 Units Subcutaneous Q24H     lidocaine  2 patch Transdermal Q24h    And     lidocaine   Transdermal Q24H    And     lidocaine   Transdermal Q8H     metoprolol tartrate  25 mg Oral or Feeding Tube Once     metoprolol tartrate  75 mg Oral or Feeding Tube BID     metroNIDAZOLE  500 mg Intravenous Q8H     mirtazapine  30 mg Oral or Feeding Tube QPM     multivitamins w/minerals  15 mL Per Feeding Tube Daily     QUEtiapine  12.5 mg Oral BID     senna-docusate  1 tablet Oral or Feeding Tube BID     sodium phosphate  1 enema Rectal Daily     traZODone  100 mg Oral or Feeding Tube At Bedtime     vancomycin (VANCOCIN) IV  1,500 mg  "Intravenous Q12H     thiamine  100 mg Oral or Feeding Tube Daily       PRN Medications:  carboxymethylcellulose PF, cyclobenzaprine, IV fluid REPLACEMENT ONLY, glucose **OR** dextrose **OR** glucagon, heparin lock flush, hydrALAZINE, HYDROmorphone, ipratropium - albuterol 0.5 mg/2.5 mg/3 mL, labetalol, lidocaine 4%, lidocaine (buffered or not buffered), magnesium sulfate, magnesium sulfate, melatonin, naloxone, ondansetron **OR** ondansetron, oxyCODONE, potassium chloride, potassium chloride with lidocaine, potassium chloride, potassium chloride, potassium chloride, potassium phosphate (KPHOS) in D5W IV, potassium phosphate (KPHOS) in D5W IV, potassium phosphate (KPHOS) in D5W IV, potassium phosphate (KPHOS) in D5W IV, potassium phosphate (KPHOS) in D5W IV, prochlorperazine **OR** prochlorperazine **OR** prochlorperazine, sodium chloride (PF)    Infusions:    IV fluid REPLACEMENT ONLY 55 mL/hr at 12/13/19 2341     no pre procedure antibiotic needed         No Known Allergies    Physical Examination:   Vitals: BP (!) 165/82   Pulse 105   Temp 98.3  F (36.8  C) (Axillary)   Resp 25   Ht 1.727 m (5' 8\")   Wt 87 kg (191 lb 12.8 oz)   SpO2 97%   BMI 29.16 kg/m    General: Adult male, collar in place, appears mildly anxious   HEENT: Normocephalic.   CV: RRR  Resp: intubated  Abd: Soft, non distended  Skin: Warm, dry   Neuro: intubated, off of sedation. Awake and alert. Responds to questions with nodding appropriately. Follows commands. EOMI. Eyes are conjugate. Continues to moves both arms anti-gravity at elbows brisky, weaker in RUE. No movement in R fingers, subtle movement in L fingers. No movement in bilateral lower extremities. Sensation intact in bilateral upper extremities proximately and distally along entire arm. Decreased sensation in LE but does have some sensation.     Labs:  Recent Labs   Lab Test 12/07/19  0330 12/06/19  1828 12/06/19  0434 12/05/19  0340    138 136 140   POTASSIUM 3.8 3.8 4.1 " 4.1   CHLORIDE 100 99 100 106   CO2 34* 34* 31 30   ANIONGAP 5 4 5 3   * 145* 154* 138*   BUN 25 20 14 19   CR 0.74 0.57* 0.44* 0.50*   FLORINDA 8.4* 8.7 8.6 8.1*   WBC 10.2  --  9.8 8.8   RBC 3.26*  --  3.54* 2.81*   HGB 9.4*  --  10.3* 8.3*     --  419 390     Imaging:   CXR: 12/9  1. Probable small bilateral pleural effusions and atelectasis, right  greater than left, unchanged when accounting for rotation.  2. Support devices in stable position when accounting for patient  rotation.      Assessment/Plan:  Sherwin Angel is a 64 year old with hx of recent of anterior C3-4 arthroplasty (2 weeks prior at [facility]) who was admitted 12/1/2019 after a fall at rehab, resulting in lower extremity paraplegia. Found to have cervical stenosis. Now s/p one pharyngotomy closure on 12/1, still has one pharyngotomy present. Arthroplasty implant removed with no replacement implant placed. He has been on abx for cervical tissue cultures with ID following, restarted on vanc/flagyl/cefepime with concern for ongoing spinal infection.      Neuro:  #Cervical spinal cord injury  # discitis/osteomyelitis C3-C4  - Will discuss with neurosurgery risk/benefit of further surgical intervention to clear infection   - Collar at all times, Up with assist  - Neuro checks q4h during the day   - PT/OT     #Anxiety/Pain  - Dilauded PRN  - Ativan 01 mg q 4 prn   - flexeril 7.5 mg TID prn    #Hx of Alcohol dependence  - Thiamine & folate daily    #Depression  - Continue PTA miratazepine  - restarted trazodone 100 mg at bedtime   - Seroquel 12.5 BID      Resp:  #Acute respiratory failure s/p re-intubation 12/6, suspect diaphragmatic weakness related to spine injury.   #Mucous plugging Rt lung, had improved then worse again 12/11 and since improved.   - Mucomyst & chest physio   - daily CXR  - wean ventilator support as able  - desaturation when positioned on Rt side, try positional changes first   - ENT plans for trach once able to  tolerate peep of 5, FI02 <30     Cardio:  #Hx of hypertension   - PTA amlodipine 10 mg   - PTA metoprolol increased to 75 mg BID      Renal:  Hypernatremia - resolved   Hypophosphatemia   - Electrolyte replacement protocol   - Monitor I/O, downtrending BUN   - Reduced free water flushes 50 q hr   -goal In= outs     Neurogenic urinary retention   - failed díaz wean trial 12/12    Endo:  #DM2  - Sliding scale insulin, high dose  - 38 U NPH am/pm      Heme:  #Normocytic anemia, stable  - Hg > 7.0   - Plt > 100k   - INR <1.5      GI:  #Constipation - resolved last BM 12/14  - Senna scheduled at BID  - PRN suppository/enema available     #Diet  - G-tube in place  - Tube feeds at goal      ID:   #Discitis vs osteomyelitis   #Fever - resolved   #Leukocytosis and CRP down trending (16)  #repeat CT MRI with increased inflammatory changes and prevertebral soft tissue thickening   - Restarted vanc, cefepime, metronidazole regimen which patient was on prior to fever starting, concern for drug fever related to zosyn  - Weekly CBC, CMP, CRP while on IV abx,  - Blood cultures (12/1, 12/3, 12/7, 12/11): NGTD  - Spinal tissue cultures (12/1): MSSA, Strep anginosus, Eikenella, and oral anaerobes   - Will page ID attending at [id number] prior to discharging pt    Previous abx    - Stopped Vancomycin, metronidazole, cefepime on 12/4, fever started 12/7  - switched Unasyn to Zosyn after ongoing fever (abx plan for 12/2-1/12)    # left eye conjunctival irration with white discharge  -erythromycin ointment      Lines:  -ETT, PICC, PIV x 2, Díaz, PEG     FEN: TF   PPX:    DVT prophylaxis: SCDs, Heparin subcutaneous    GI:  Pantoprazole  Code Status: Full, presumed     Dispo: 4A until medically stable      The patient was seen and discussed with the attending, Dr. Figueroa.     Ladan Stevens,   Neurology PGY-2   [phone]

## 2019-12-14 NOTE — PLAN OF CARE
Major Shift Events:  Neuros remain unchanged. VS within pt baseline. Had increased O2 needs from 60% to % FiO2 to maintain sats > 90 overnight. RT suctioned thick secretions after lavage. Pt denies difficulty breathing. LS remains coarse and diminished on the bases. RT recommends bronchoscopy to clean out secretions. TF turned off @ 2400 for trach placement today. Had 2 loose stools. [place] with adequate UOP. Discharge noted from R eye. Oxycodone and scheduled tylenol given for neck pain with optimal relief.    Plan: Trach placement, ? Bronch, order eye oint for both eyes, continue to monitor resp status closely.  For vital signs and complete assessments, please see documentation flowsheets.

## 2019-12-14 NOTE — PROGRESS NOTES
"Bronchoscopy Risk Assessment Guidelines      A. Patient symptoms to consider when assessing pulmonary TB risk are:    I. Cough greater than 3 weeks; and fever, hemoptysis, pleuritic chest    pain, weight loss greater than 10 lbs, night sweats, fatigue, infiltrates on    upper lobes or superior segments of lower lobes, cavitation on chest    x-ray.   B. Patient risk factors to consider when assessing pulmonary TB risk are:    I. Exposure to known TB case, foreign-born persons (within 5 years of    arrival to [place]), residence in a crowded setting (correctional facility,     long-term care center, etc.), persons with HIV or immunosuppression.    Patients with symptoms and risk factors should generally be considered \"suspect risk\" and bronchoscopies should be performed in airborne precautions.    This patient has NO KNOWN RISK of Tuberculosis (proceed with bronchoscopy)    Specimens sent: no  Complications: None  Scope used: #[id number] Adult  Attending Physician: Dr. Henry Nñio, RT on 12/14/2019 at 1:07 PM  "

## 2019-12-14 NOTE — PHARMACY-VANCOMYCIN DOSING SERVICE
Pharmacy Empiric Dose Change Per Policy    Original Dose Ordered: vancomycin 2000mg IV q12h  Dose Changed To: vancomycin 1500mg IV q12h - due to concern that patient was at upper end of goal range upon level check 12/13/19 at over 12 hours post-dose and not yet quite at steady state at that time, high risk for accumulation given age and high weight based dose.     This dose change was based on the pharmacist's assessment of this patient's age, weight, concurrent drug therapy, treatment goals, whether patient's creatinine clearance adequately indicates renal function (factoring in age, muscle mass, fluid and clinical status), and, if applicable, prior pharmacokinetic data.    Creatinine Clearance=   Estimated Creatinine Clearance: 168.9 mL/min (A) (based on SCr of 0.48 mg/dL (L)).  Will continue to follow and modify dosage according to levels, organ function and clinical condition      Ladan Malave, PharmD

## 2019-12-14 NOTE — PROCEDURES
Procedure Note  12/14/2019      Procedure: Diagnostic Bronchoscopy  Indications: Mucous plugging/Desaturations      Informed consent: Obtained verbally.     Timeout was completed at the bedside.      Premedication: Lidocaine 1% 6 mL subglottic instillation, intravenous fentanyl 100 mcg. Versed 2mg.      Procedure description: The bronchoscope was advanced via the endotracheal tube. Inspection of the trachea and bronchus of the right and left bronchial tree to the sub-segmental level revealed no endobronchial lesions. Secretions were noted in the right upper and middle bronchi, which were aspirated. Airway wall was friable throughout.      The patient tolerated the procedure well mild discomfort only. No hypotension or arrhythmia. The procedure was performed in the ICU and vital sign parameters were closely monitored.     No immediate complications.      Estimated blood loss: None     Dr. Figueroa was present for the entire procedure.     Shar Ty  Vascular Neurology Fellow

## 2019-12-14 NOTE — PLAN OF CARE
PT 4A: Cancel- Per RN patient may have bronch today due to desaturations and increased O2 needs. WIll check back this afternoon as schedule allows and/or reschedule PT session.

## 2019-12-15 ENCOUNTER — APPOINTMENT (OUTPATIENT)
Dept: GENERAL RADIOLOGY | Facility: CLINIC | Age: 64
DRG: 003 | End: 2019-12-15
Attending: NURSE PRACTITIONER
Payer: COMMERCIAL

## 2019-12-15 LAB
ANION GAP SERPL CALCULATED.3IONS-SCNC: 4 MMOL/L (ref 3–14)
BASOPHILS # BLD AUTO: 0 10E9/L (ref 0–0.2)
BASOPHILS NFR BLD AUTO: 0.3 %
BUN SERPL-MCNC: 22 MG/DL (ref 7–30)
CALCIUM SERPL-MCNC: 8.3 MG/DL (ref 8.5–10.1)
CHLORIDE SERPL-SCNC: 113 MMOL/L (ref 94–109)
CO2 SERPL-SCNC: 26 MMOL/L (ref 20–32)
CREAT SERPL-MCNC: 0.49 MG/DL (ref 0.66–1.25)
DIFFERENTIAL METHOD BLD: ABNORMAL
EOSINOPHIL # BLD AUTO: 0.2 10E9/L (ref 0–0.7)
EOSINOPHIL NFR BLD AUTO: 1.4 %
ERYTHROCYTE [DISTWIDTH] IN BLOOD BY AUTOMATED COUNT: 13.7 % (ref 10–15)
GFR SERPL CREATININE-BSD FRML MDRD: >90 ML/MIN/{1.73_M2}
GLUCOSE BLDC GLUCOMTR-MCNC: 103 MG/DL (ref 70–99)
GLUCOSE BLDC GLUCOMTR-MCNC: 125 MG/DL (ref 70–99)
GLUCOSE BLDC GLUCOMTR-MCNC: 126 MG/DL (ref 70–99)
GLUCOSE BLDC GLUCOMTR-MCNC: 127 MG/DL (ref 70–99)
GLUCOSE BLDC GLUCOMTR-MCNC: 135 MG/DL (ref 70–99)
GLUCOSE BLDC GLUCOMTR-MCNC: 92 MG/DL (ref 70–99)
GLUCOSE SERPL-MCNC: 123 MG/DL (ref 70–99)
HCT VFR BLD AUTO: 32.9 % (ref 40–53)
HGB BLD-MCNC: 10.1 G/DL (ref 13.3–17.7)
IMM GRANULOCYTES # BLD: 0.1 10E9/L (ref 0–0.4)
IMM GRANULOCYTES NFR BLD: 0.7 %
LYMPHOCYTES # BLD AUTO: 1.4 10E9/L (ref 0.8–5.3)
LYMPHOCYTES NFR BLD AUTO: 12.5 %
MAGNESIUM SERPL-MCNC: 2.3 MG/DL (ref 1.6–2.3)
MCH RBC QN AUTO: 29.5 PG (ref 26.5–33)
MCHC RBC AUTO-ENTMCNC: 30.7 G/DL (ref 31.5–36.5)
MCV RBC AUTO: 96 FL (ref 78–100)
MONOCYTES # BLD AUTO: 0.6 10E9/L (ref 0–1.3)
MONOCYTES NFR BLD AUTO: 5.2 %
NEUTROPHILS # BLD AUTO: 8.7 10E9/L (ref 1.6–8.3)
NEUTROPHILS NFR BLD AUTO: 79.9 %
NRBC # BLD AUTO: 0 10*3/UL
NRBC BLD AUTO-RTO: 0 /100
PHOSPHATE SERPL-MCNC: 2.5 MG/DL (ref 2.5–4.5)
PLATELET # BLD AUTO: 283 10E9/L (ref 150–450)
POTASSIUM SERPL-SCNC: 3.6 MMOL/L (ref 3.4–5.3)
RBC # BLD AUTO: 3.42 10E12/L (ref 4.4–5.9)
SODIUM SERPL-SCNC: 142 MMOL/L (ref 133–144)
WBC # BLD AUTO: 10.9 10E9/L (ref 4–11)

## 2019-12-15 PROCEDURE — 94003 VENT MGMT INPAT SUBQ DAY: CPT

## 2019-12-15 PROCEDURE — 84100 ASSAY OF PHOSPHORUS: CPT | Performed by: STUDENT IN AN ORGANIZED HEALTH CARE EDUCATION/TRAINING PROGRAM

## 2019-12-15 PROCEDURE — 25000132 ZZH RX MED GY IP 250 OP 250 PS 637: Mod: GY | Performed by: NURSE PRACTITIONER

## 2019-12-15 PROCEDURE — 40000275 ZZH STATISTIC RCP TIME EA 10 MIN

## 2019-12-15 PROCEDURE — 25000125 ZZHC RX 250: Performed by: NURSE PRACTITIONER

## 2019-12-15 PROCEDURE — 25000132 ZZH RX MED GY IP 250 OP 250 PS 637: Performed by: COUNSELOR

## 2019-12-15 PROCEDURE — 25800030 ZZH RX IP 258 OP 636: Performed by: STUDENT IN AN ORGANIZED HEALTH CARE EDUCATION/TRAINING PROGRAM

## 2019-12-15 PROCEDURE — 25000128 H RX IP 250 OP 636: Performed by: COUNSELOR

## 2019-12-15 PROCEDURE — 20000004 ZZH R&B ICU UMMC

## 2019-12-15 PROCEDURE — 85025 COMPLETE CBC W/AUTO DIFF WBC: CPT | Performed by: STUDENT IN AN ORGANIZED HEALTH CARE EDUCATION/TRAINING PROGRAM

## 2019-12-15 PROCEDURE — 25000132 ZZH RX MED GY IP 250 OP 250 PS 637: Mod: GY | Performed by: PSYCHIATRY & NEUROLOGY

## 2019-12-15 PROCEDURE — 25000132 ZZH RX MED GY IP 250 OP 250 PS 637: Performed by: PSYCHIATRY & NEUROLOGY

## 2019-12-15 PROCEDURE — 94640 AIRWAY INHALATION TREATMENT: CPT | Mod: 76

## 2019-12-15 PROCEDURE — 25000132 ZZH RX MED GY IP 250 OP 250 PS 637: Mod: GY | Performed by: STUDENT IN AN ORGANIZED HEALTH CARE EDUCATION/TRAINING PROGRAM

## 2019-12-15 PROCEDURE — 25000132 ZZH RX MED GY IP 250 OP 250 PS 637: Performed by: NEUROLOGICAL SURGERY

## 2019-12-15 PROCEDURE — 25000125 ZZHC RX 250: Performed by: STUDENT IN AN ORGANIZED HEALTH CARE EDUCATION/TRAINING PROGRAM

## 2019-12-15 PROCEDURE — 00000146 ZZHCL STATISTIC GLUCOSE BY METER IP

## 2019-12-15 PROCEDURE — 25000125 ZZHC RX 250: Performed by: CLINICAL NURSE SPECIALIST

## 2019-12-15 PROCEDURE — 40000961 ZZH STATISTIC INTRAPULMONARY PERCUSSIVE VENT

## 2019-12-15 PROCEDURE — 25800030 ZZH RX IP 258 OP 636: Performed by: NEUROLOGICAL SURGERY

## 2019-12-15 PROCEDURE — 80048 BASIC METABOLIC PNL TOTAL CA: CPT | Performed by: STUDENT IN AN ORGANIZED HEALTH CARE EDUCATION/TRAINING PROGRAM

## 2019-12-15 PROCEDURE — 83735 ASSAY OF MAGNESIUM: CPT | Performed by: STUDENT IN AN ORGANIZED HEALTH CARE EDUCATION/TRAINING PROGRAM

## 2019-12-15 PROCEDURE — 25000128 H RX IP 250 OP 636: Performed by: NEUROLOGICAL SURGERY

## 2019-12-15 PROCEDURE — 71045 X-RAY EXAM CHEST 1 VIEW: CPT

## 2019-12-15 PROCEDURE — 27210437 ZZH NUTRITION PRODUCT SEMIELEM INTERMED LITER

## 2019-12-15 PROCEDURE — 25000125 ZZHC RX 250

## 2019-12-15 RX ORDER — METOPROLOL TARTRATE 50 MG
50 TABLET ORAL 2 TIMES DAILY
Status: DISCONTINUED | OUTPATIENT
Start: 2019-12-15 | End: 2019-12-25

## 2019-12-15 RX ORDER — ALBUTEROL SULFATE 0.83 MG/ML
2.5 SOLUTION RESPIRATORY (INHALATION)
Status: DISCONTINUED | OUTPATIENT
Start: 2019-12-15 | End: 2019-12-31 | Stop reason: HOSPADM

## 2019-12-15 RX ORDER — ACETYLCYSTEINE 100 MG/ML
2 SOLUTION ORAL; RESPIRATORY (INHALATION) EVERY 4 HOURS
Status: DISCONTINUED | OUTPATIENT
Start: 2019-12-15 | End: 2019-12-31 | Stop reason: HOSPADM

## 2019-12-15 RX ADMIN — POTASSIUM & SODIUM PHOSPHATES POWDER PACK 280-160-250 MG 1 PACKET: 280-160-250 PACK at 21:00

## 2019-12-15 RX ADMIN — HEPARIN SODIUM 5000 UNITS: 5000 INJECTION, SOLUTION INTRAVENOUS; SUBCUTANEOUS at 16:02

## 2019-12-15 RX ADMIN — HEPARIN SODIUM 5000 UNITS: 5000 INJECTION, SOLUTION INTRAVENOUS; SUBCUTANEOUS at 00:34

## 2019-12-15 RX ADMIN — ACETAMINOPHEN 650 MG: 325 TABLET, FILM COATED ORAL at 08:05

## 2019-12-15 RX ADMIN — SENNOSIDES AND DOCUSATE SODIUM 1 TABLET: 8.6; 5 TABLET ORAL at 21:02

## 2019-12-15 RX ADMIN — HEPARIN SODIUM 5000 UNITS: 5000 INJECTION, SOLUTION INTRAVENOUS; SUBCUTANEOUS at 08:06

## 2019-12-15 RX ADMIN — ACETAMINOPHEN 650 MG: 325 TABLET, FILM COATED ORAL at 14:07

## 2019-12-15 RX ADMIN — LIDOCAINE 2 PATCH: 560 PATCH PERCUTANEOUS; TOPICAL; TRANSDERMAL at 20:59

## 2019-12-15 RX ADMIN — CEFEPIME HYDROCHLORIDE 2 G: 2 INJECTION, POWDER, FOR SOLUTION INTRAVENOUS at 15:57

## 2019-12-15 RX ADMIN — CEFEPIME HYDROCHLORIDE 2 G: 2 INJECTION, POWDER, FOR SOLUTION INTRAVENOUS at 22:24

## 2019-12-15 RX ADMIN — MULTIVITAMIN 15 ML: LIQUID ORAL at 21:00

## 2019-12-15 RX ADMIN — FAMOTIDINE 20 MG: 40 POWDER, FOR SUSPENSION ORAL at 21:01

## 2019-12-15 RX ADMIN — VANCOMYCIN HYDROCHLORIDE 1500 MG: 10 INJECTION, POWDER, LYOPHILIZED, FOR SOLUTION INTRAVENOUS at 16:02

## 2019-12-15 RX ADMIN — Medication 12.5 MG: at 21:02

## 2019-12-15 RX ADMIN — MIRTAZAPINE 30 MG: 15 TABLET, FILM COATED ORAL at 21:00

## 2019-12-15 RX ADMIN — CEFEPIME HYDROCHLORIDE 2 G: 2 INJECTION, POWDER, FOR SOLUTION INTRAVENOUS at 06:28

## 2019-12-15 RX ADMIN — IPRATROPIUM BROMIDE AND ALBUTEROL SULFATE 3 ML: .5; 3 SOLUTION RESPIRATORY (INHALATION) at 05:05

## 2019-12-15 RX ADMIN — POTASSIUM PHOSPHATE, MONOBASIC AND POTASSIUM PHOSPHATE, DIBASIC 10 MMOL: 224; 236 INJECTION, SOLUTION INTRAVENOUS at 08:05

## 2019-12-15 RX ADMIN — ALBUTEROL SULFATE 2.5 MG: 2.5 SOLUTION RESPIRATORY (INHALATION) at 16:13

## 2019-12-15 RX ADMIN — ACETYLCYSTEINE 4 ML: 100 INHALANT RESPIRATORY (INHALATION) at 00:38

## 2019-12-15 RX ADMIN — ACETYLCYSTEINE 2 ML: 100 INHALANT RESPIRATORY (INHALATION) at 19:28

## 2019-12-15 RX ADMIN — FOLIC ACID 1 MG: 1 TABLET ORAL at 08:06

## 2019-12-15 RX ADMIN — METRONIDAZOLE 500 MG: 500 INJECTION, SOLUTION INTRAVENOUS at 07:09

## 2019-12-15 RX ADMIN — ALBUTEROL SULFATE 2.5 MG: 2.5 SOLUTION RESPIRATORY (INHALATION) at 23:50

## 2019-12-15 RX ADMIN — METOPROLOL TARTRATE 50 MG: 50 TABLET, FILM COATED ORAL at 09:30

## 2019-12-15 RX ADMIN — ACETYLCYSTEINE 2 ML: 100 INHALANT RESPIRATORY (INHALATION) at 16:13

## 2019-12-15 RX ADMIN — FAMOTIDINE 20 MG: 40 POWDER, FOR SUSPENSION ORAL at 08:06

## 2019-12-15 RX ADMIN — ACETYLCYSTEINE 4 ML: 100 INHALANT RESPIRATORY (INHALATION) at 12:04

## 2019-12-15 RX ADMIN — ALBUTEROL SULFATE 2.5 MG: 2.5 SOLUTION RESPIRATORY (INHALATION) at 19:28

## 2019-12-15 RX ADMIN — IPRATROPIUM BROMIDE AND ALBUTEROL SULFATE 3 ML: .5; 3 SOLUTION RESPIRATORY (INHALATION) at 00:38

## 2019-12-15 RX ADMIN — ERYTHROMYCIN 1 G: 5 OINTMENT OPHTHALMIC at 00:34

## 2019-12-15 RX ADMIN — ERYTHROMYCIN 1 G: 5 OINTMENT OPHTHALMIC at 06:28

## 2019-12-15 RX ADMIN — ACETYLCYSTEINE 2 ML: 100 INHALANT RESPIRATORY (INHALATION) at 23:50

## 2019-12-15 RX ADMIN — ACETYLCYSTEINE 4 ML: 100 INHALANT RESPIRATORY (INHALATION) at 07:38

## 2019-12-15 RX ADMIN — ALBUTEROL SULFATE 2.5 MG: 2.5 SOLUTION RESPIRATORY (INHALATION) at 12:04

## 2019-12-15 RX ADMIN — METRONIDAZOLE 500 MG: 500 INJECTION, SOLUTION INTRAVENOUS at 23:12

## 2019-12-15 RX ADMIN — POTASSIUM CHLORIDE 20 MEQ: 1.5 POWDER, FOR SOLUTION ORAL at 06:28

## 2019-12-15 RX ADMIN — OXYCODONE HYDROCHLORIDE 10 MG: 5 SOLUTION ORAL at 00:34

## 2019-12-15 RX ADMIN — ALBUTEROL SULFATE 2.5 MG: 2.5 SOLUTION RESPIRATORY (INHALATION) at 07:39

## 2019-12-15 RX ADMIN — METOPROLOL TARTRATE 50 MG: 50 TABLET, FILM COATED ORAL at 21:00

## 2019-12-15 RX ADMIN — POTASSIUM & SODIUM PHOSPHATES POWDER PACK 280-160-250 MG 1 PACKET: 280-160-250 PACK at 09:30

## 2019-12-15 RX ADMIN — AMLODIPINE BESYLATE 10 MG: 10 TABLET ORAL at 08:06

## 2019-12-15 RX ADMIN — OXYCODONE HYDROCHLORIDE 10 MG: 5 SOLUTION ORAL at 18:21

## 2019-12-15 RX ADMIN — TRAZODONE HYDROCHLORIDE 100 MG: 50 TABLET ORAL at 21:13

## 2019-12-15 RX ADMIN — VANCOMYCIN HYDROCHLORIDE 1500 MG: 10 INJECTION, POWDER, LYOPHILIZED, FOR SOLUTION INTRAVENOUS at 04:23

## 2019-12-15 RX ADMIN — INSULIN HUMAN 42 UNITS: 100 INJECTION, SUSPENSION SUBCUTANEOUS at 21:06

## 2019-12-15 RX ADMIN — CARBOXYMETHYLCELLULOSE SODIUM 1 DROP: 5 SOLUTION/ DROPS OPHTHALMIC at 16:34

## 2019-12-15 RX ADMIN — ACETYLCYSTEINE 4 ML: 100 INHALANT RESPIRATORY (INHALATION) at 05:05

## 2019-12-15 RX ADMIN — Medication 12.5 MG: at 09:33

## 2019-12-15 RX ADMIN — OXYCODONE HYDROCHLORIDE 10 MG: 5 SOLUTION ORAL at 08:05

## 2019-12-15 RX ADMIN — METRONIDAZOLE 500 MG: 500 INJECTION, SOLUTION INTRAVENOUS at 15:57

## 2019-12-15 RX ADMIN — ACETAMINOPHEN 650 MG: 325 TABLET, FILM COATED ORAL at 20:59

## 2019-12-15 RX ADMIN — Medication 100 MG: at 08:06

## 2019-12-15 RX ADMIN — SENNOSIDES AND DOCUSATE SODIUM 1 TABLET: 8.6; 5 TABLET ORAL at 08:06

## 2019-12-15 ASSESSMENT — ACTIVITIES OF DAILY LIVING (ADL)
ADLS_ACUITY_SCORE: 27

## 2019-12-15 NOTE — PROGRESS NOTES
Neuroscience Intensive Care Progress Note  12/15/2019      24 hour events: no acute events overnight. Metoprolol held. fiO2 of 60% overnight.     Current Medications:    acetaminophen  650 mg Oral or Feeding Tube Q6H     acetylcysteine  4 mL Nebulization Q4H     albuterol  2.5 mg Nebulization 4x daily     amLODIPine  10 mg Oral or Feeding Tube Daily     ceFEPIme (MAXIPIME) IV  2 g Intravenous Q8H     cyanocobalamin  1,000 mcg Intramuscular Q30 Days     dexamethasone  10 mg Intravenous Pre-Op/Pre-procedure x 1 dose     famotidine  20 mg Per G Tube BID     folic acid  1 mg Oral or Feeding Tube Daily     heparin ANTICOAGULANT  5,000 Units Subcutaneous Q8H     heparin lock flush  5-10 mL Intracatheter Q24H     insulin aspart  2-16 Units Subcutaneous Q4H     insulin isophane human  42 Units Subcutaneous Q24H     insulin isophane human  42 Units Subcutaneous Q24H     lidocaine  2 patch Transdermal Q24h    And     lidocaine   Transdermal Q24H    And     lidocaine   Transdermal Q8H     metoprolol tartrate  75 mg Oral or Feeding Tube BID     metroNIDAZOLE  500 mg Intravenous Q8H     mirtazapine  30 mg Oral or Feeding Tube QPM     multivitamins w/minerals  15 mL Per Feeding Tube Daily     QUEtiapine  12.5 mg Oral BID     senna-docusate  1 tablet Oral or Feeding Tube BID     traZODone  100 mg Oral or Feeding Tube At Bedtime     vancomycin (VANCOCIN) IV  1,500 mg Intravenous Q12H     thiamine  100 mg Oral or Feeding Tube Daily       PRN Medications:  carboxymethylcellulose PF, cyclobenzaprine, IV fluid REPLACEMENT ONLY, glucose **OR** dextrose **OR** glucagon, heparin lock flush, hydrALAZINE, HYDROmorphone, ipratropium - albuterol 0.5 mg/2.5 mg/3 mL, labetalol, lidocaine 4%, lidocaine (buffered or not buffered), magnesium sulfate, magnesium sulfate, melatonin, naloxone, ondansetron **OR** ondansetron, oxyCODONE, potassium chloride, potassium chloride with lidocaine, potassium chloride, potassium chloride, potassium chloride,  "potassium phosphate (KPHOS) in D5W IV, potassium phosphate (KPHOS) in D5W IV, potassium phosphate (KPHOS) in D5W IV, potassium phosphate (KPHOS) in D5W IV, potassium phosphate (KPHOS) in D5W IV, prochlorperazine **OR** prochlorperazine **OR** prochlorperazine, sodium chloride (PF), sodium phosphate    Infusions:    IV fluid REPLACEMENT ONLY Stopped (12/14/19 1506)     no pre procedure antibiotic needed         Objective findings:  BP (!) 146/71   Pulse 89   Temp 98.8  F (37.1  C) (Axillary)   Resp 23   Ht 1.727 m (5' 8\")   Wt 87 kg (191 lb 12.8 oz)   SpO2 99%   BMI 29.16 kg/m      Ventilator Settings:  Ventilation Mode: CMV/AC  (Continuous Mandatory Ventilation/ Assist Control)  FiO2 (%): 60 %  Rate Set (breaths/minute): 12 breaths/min  Tidal Volume Set (mL): 450 mL  PEEP (cm H2O): 8 cmH2O  Oxygen Concentration (%): 60 %  Resp: 23      Intake/Output:    Intake/Output Summary (Last 24 hours) at 12/15/2019 0846  Last data filed at 12/15/2019 0700  Gross per 24 hour   Intake 3735 ml   Output 2150 ml   Net 1585 ml       Physical Examination:   Vitals:  B/P: 146/71, T: 98.8, P: 89, R: 23  General:  Laying in bed  HEENT:  NC/AT, no icterus, op pink and moist, no ear or nose drainage.   Cardiac:  RRR, no m/r/g  Chest:  CTAB  Abdomen:  S/NT/ND  Extremities:  No LE swelling.    Skin:  No rash or lesion.      Neuro Exam:  Mental status: alert, follows all commands  Cranial nerves: mild L eye ptosis, EOMI, face symmetric  Motor: minimal movement of BL hands and wrists but no proximal movement and no movements of the legs  Sensory: did not assess with noxious stimuli    Labs/Studies:  Recent Labs   Lab Test 12/15/19  0451 12/14/19  0444 12/13/19  0230    144 146*   POTASSIUM 3.6 3.5 3.4   CHLORIDE 113* 113* 114*   CO2 26 27 29   ANIONGAP 4 4 4   * 135* 129*   BUN 22 25 32*   CR 0.49* 0.48* 0.46*   FLORINDA 8.3* 8.2* 8.1*   WBC 10.9 9.9 11.8*   RBC 3.42* 3.20* 3.32*   HGB 10.1* 9.2* 9.8*    295 301 "       Recent Labs   Lab Test 12/04/19  1509 12/01/19  0112   INR 1.30* 1.17*   PTT  --  35       Recent Labs   Lab 12/14/19  1006 12/11/19  0535   PH 7.46* 7.49*   PCO2 37 40   PO2 55* 105   HCO3 26 30*   O2PER 80 100       ==========================================================    ASSESSMENT/PLAN: Sherwin Angel is a 64 year old with hx of recent of anterior C3-4 arthroplasty ([facility] prior [facility]) who was admitted 12/1/2019 after a fall at rehab, resulting in lower extremity paraplegia. Found to have cervical stenosis. Now s/p one pharyngotomy closure on 12/1, still has one pharyngotomy present. Arthroplasty implant removed with no replacement implant placed. He has been on abx for cervical tissue cultures with ID following, restarted on vanc/flagyl/cefepime with concern for ongoing spinal infection.      Neuro:  #Cervical spinal cord injury  # discitis/osteomyelitis C3-C4  - Will discuss with neurosurgery risk/benefit of further surgical intervention to clear infection   - Collar at all times, Up with assist  - Neuro checks q4h during the day   - PT/OT     #Anxiety/Pain  - Dilauded PRN  - Ativan 01 mg q 4 prn   - flexeril 7.5 mg TID prn     #Hx of Alcohol dependence  - Thiamine & folate daily     #Depression  - Continue PTA miratazepine  - restarted trazodone 100 mg at bedtime   - Seroquel 12.5 BID      Resp:  #Acute respiratory failure s/p re-intubation 12/6, suspect diaphragmatic weakness related to spine injury.   #Mucous plugging Rt lung, had improved then worse again 12/11 and since improved.   - Mucomyst & chest physio   - daily CXR  --metanebs  --chest physio per RT  --percussive therapy  - wean ventilator support as able  - desaturation when positioned on Rt side, try positional changes first   - ENT plans for trach once able to tolerate peep of 5, FI02 <30     Cardio:  #Hx of hypertension   - PTA amlodipine 10 mg   - PTA metoprolol 50mg bid     Renal:  Hypernatremia - resolved    Hypophosphatemia   - Electrolyte replacement protocol   - Monitor I/O, downtrending BUN   -  free water flushes 100 q4h  -goal In= outs   --neutraphos     Neurogenic urinary retention   - failed díaz wean trial 12/12     Endo:  #DM2  - Sliding scale insulin, high dose  - 38 U NPH am/pm      Heme:  #Normocytic anemia, stable  - Hg > 7.0   - Plt > 100k   - INR <1.5      GI:  #Constipation - resolved last BM 12/14  - Senna scheduled at BID  - PRN suppository/enema available     #Diet  - G-tube in place  - Tube feeds at goal      ID:   #Discitis vs osteomyelitis   #Fever - resolved   #Leukocytosis and CRP down trending (16)  #repeat CT MRI with increased inflammatory changes and prevertebral soft tissue thickening   - Restarted vanc, cefepime, metronidazole regimen which patient was on prior to fever starting, concern for drug fever related to zosyn  - Weekly CBC, CMP, CRP while on IV abx,  - Blood cultures (12/1, 12/3, 12/7, 12/11): NGTD  - Spinal tissue cultures (12/1): MSSA, Strep anginosus, Eikenella, and oral anaerobes   - Will page ID attending at [id number] prior to discharging pt     Previous abx    - Stopped Vancomycin, metronidazole, cefepime on 12/4, fever started 12/7  - switched Unasyn to Zosyn after ongoing fever (abx plan for 12/2-1/12)     # left eye conjunctival irration with white discharge  -erythromycin ointment      Lines:  -ETT, PICC, PIV x 2, Díaz, PEG     FEN: TF   PPX:    DVT prophylaxis: SCDs, Heparin subcutaneous    GI: famotidine  Code Status: Full, presumed     Dispo: 4A until medically stable     =========================================================    This patient was seen & discussed with my attending, Dr. Figueroa, who agrees with my assessment and plan.     Shar Ty  Vascular Neurology Fellow

## 2019-12-15 NOTE — PLAN OF CARE
Major Shift Events:  Neuros remain unchanged. VS within pt baseline. No increased O2 needs overnight. Sating upper 90's on 60% FiO2. Denies difficulty breathing. LS remains coarse and diminished on the bases. TF @ 55 ml/hr, tolerating well. Had a small loose stool x1. [place] with adequate UOP. Prn Oxycodone and scheduled tylenol given for neck pain with optimal relief.    Plan: Awaiting trach placement, continue to monitor resp status and support POC.  For vital signs and complete assessments, please see documentation flowsheets.

## 2019-12-15 NOTE — PROGRESS NOTES
Neurosurgery Progress Note     Subjective:  Denies pain;      Objective:  Temp: 97.4  F (36.3  C) Temp src: Axillary BP: 123/71 Pulse: 102 Heart Rate: 100 Resp: 27 SpO2: (!) 89 % O2 Device: Mechanical Ventilator    Intubated, partially sedated  Awake, alert, nods yes or no to questions  Follows commands  Deltoids 4/5, biceps 4/5, triceps 2/5,  0/5, lower extremities 0/5     Assessment:  64 years old gentleman status post C3-C4 artificial disc placement about 2 weeks prior to arriaval the [facility] who presented after a fall with almost complete loss of strength in his upper extremities and paraplegia, found to have cervical stenosis. S/p OR 12/1 - Pharyngotomy closed with assistance from ENT. Arthroplasty implant removed. No replacement implant placed. S/p G-tube 12/5. Extubated 12/5. Re-Intubated 12/6 due to Mucous Plugging and Atelectasis. S/p repeated bronchoscopy, last 12/14.     Plan:  Intubated, mechanically ventilated  Nebs, chest physiotherapy; wean to extubate as able, plan for tracheostomy with ENT; bronchoscopy prn per neurocritical care  DVT: SCDs while in bed  [place] J collar  Per ID: vancomycin, cefepime, flagyl 4-6 wks for vertebral phlegmon, weekly labs; will discuss with staff regarding possible further surgery  Follow-up cultures  SubQ heparin & tube feeds  MRI before stopping abx in 6 wks  endocrine consulted for diabetes - appreciate recs  Disposition: 4A        Vinay Sharma MD  Neurosurgery Resident PGY-1    Please contact neurosurgery resident on call with questions.    Dial * * [phone], enter [phone] when prompted.

## 2019-12-15 NOTE — PLAN OF CARE
Discharge Planner PT / 4A  Patient plan for discharge: not discussed  Current status: Patient alert and motivated for therapy, participates in UE AAROM- making gradual improvements in strength, demonstrates very slight movement in toes. Dependent transfer bed>chair via OH lift, tolerated well, orally intubated VC-AC FiO2 60% PEEP 5, HR 60s.  Barriers to return to prior living situation: medical needs, vent weaning, current level of function  Recommendations for discharge: rehab  Rationale for recommendations: Patient will require continued skilled therapy to increase strength and maximize functional independence.

## 2019-12-15 NOTE — PROGRESS NOTES
Neuro; Pt remains alert and oriented to self, place and situation. BUE slightly stronger, BLE still with twinge of movement. Pain well controlled on q 4 hr oxycodone. Tolerated up in chair x 2 hrs.   CV: Increased scheduled metoprolol to 75mg BID, no PRN antihypertensives needed. HR has decreased throughout the day, HR at this time is 54.  Pulm: Pt weaned down back to 60% FiO2. Bronched for large amounts of secretions. Trach cancelled for today due to respitratory distress this morning.   GI: Tube feeds restarted this afternoon, Free water flushes halved for normal Sodium level. No BM this shift.   : Good urine output via díaz, neutral fluid balance today.   Skin: Exchanged and washed pads for C-collar today, skin intact to neck.   Plan: Continue plan of care

## 2019-12-16 ENCOUNTER — APPOINTMENT (OUTPATIENT)
Dept: GENERAL RADIOLOGY | Facility: CLINIC | Age: 64
DRG: 003 | End: 2019-12-16
Attending: NURSE PRACTITIONER
Payer: COMMERCIAL

## 2019-12-16 ENCOUNTER — APPOINTMENT (OUTPATIENT)
Dept: PHYSICAL THERAPY | Facility: CLINIC | Age: 64
DRG: 003 | End: 2019-12-16
Attending: NEUROLOGICAL SURGERY
Payer: COMMERCIAL

## 2019-12-16 LAB
ANION GAP SERPL CALCULATED.3IONS-SCNC: 3 MMOL/L (ref 3–14)
BASOPHILS # BLD AUTO: 0 10E9/L (ref 0–0.2)
BASOPHILS NFR BLD AUTO: 0.2 %
BUN SERPL-MCNC: 28 MG/DL (ref 7–30)
CALCIUM SERPL-MCNC: 8.2 MG/DL (ref 8.5–10.1)
CHLORIDE SERPL-SCNC: 114 MMOL/L (ref 94–109)
CO2 SERPL-SCNC: 26 MMOL/L (ref 20–32)
CREAT SERPL-MCNC: 0.53 MG/DL (ref 0.66–1.25)
DIFFERENTIAL METHOD BLD: ABNORMAL
EOSINOPHIL # BLD AUTO: 0.2 10E9/L (ref 0–0.7)
EOSINOPHIL NFR BLD AUTO: 1.7 %
ERYTHROCYTE [DISTWIDTH] IN BLOOD BY AUTOMATED COUNT: 14 % (ref 10–15)
GFR SERPL CREATININE-BSD FRML MDRD: >90 ML/MIN/{1.73_M2}
GLUCOSE BLDC GLUCOMTR-MCNC: 103 MG/DL (ref 70–99)
GLUCOSE BLDC GLUCOMTR-MCNC: 105 MG/DL (ref 70–99)
GLUCOSE BLDC GLUCOMTR-MCNC: 115 MG/DL (ref 70–99)
GLUCOSE BLDC GLUCOMTR-MCNC: 120 MG/DL (ref 70–99)
GLUCOSE BLDC GLUCOMTR-MCNC: 122 MG/DL (ref 70–99)
GLUCOSE BLDC GLUCOMTR-MCNC: 127 MG/DL (ref 70–99)
GLUCOSE BLDC GLUCOMTR-MCNC: 130 MG/DL (ref 70–99)
GLUCOSE BLDC GLUCOMTR-MCNC: 62 MG/DL (ref 70–99)
GLUCOSE BLDC GLUCOMTR-MCNC: 82 MG/DL (ref 70–99)
GLUCOSE SERPL-MCNC: 99 MG/DL (ref 70–99)
HCT VFR BLD AUTO: 29.9 % (ref 40–53)
HGB BLD-MCNC: 8.9 G/DL (ref 13.3–17.7)
IMM GRANULOCYTES # BLD: 0.1 10E9/L (ref 0–0.4)
IMM GRANULOCYTES NFR BLD: 0.7 %
INTERPRETATION ECG - MUSE: NORMAL
LYMPHOCYTES # BLD AUTO: 1.8 10E9/L (ref 0.8–5.3)
LYMPHOCYTES NFR BLD AUTO: 20.4 %
MAGNESIUM SERPL-MCNC: 2.4 MG/DL (ref 1.6–2.3)
MCH RBC QN AUTO: 29.5 PG (ref 26.5–33)
MCHC RBC AUTO-ENTMCNC: 29.8 G/DL (ref 31.5–36.5)
MCV RBC AUTO: 99 FL (ref 78–100)
MONOCYTES # BLD AUTO: 0.6 10E9/L (ref 0–1.3)
MONOCYTES NFR BLD AUTO: 7.1 %
NEUTROPHILS # BLD AUTO: 6.3 10E9/L (ref 1.6–8.3)
NEUTROPHILS NFR BLD AUTO: 69.9 %
NRBC # BLD AUTO: 0 10*3/UL
NRBC BLD AUTO-RTO: 0 /100
PHOSPHATE SERPL-MCNC: 3.7 MG/DL (ref 2.5–4.5)
PLATELET # BLD AUTO: 299 10E9/L (ref 150–450)
POTASSIUM SERPL-SCNC: 3.9 MMOL/L (ref 3.4–5.3)
RBC # BLD AUTO: 3.02 10E12/L (ref 4.4–5.9)
SODIUM SERPL-SCNC: 142 MMOL/L (ref 133–144)
VANCOMYCIN SERPL-MCNC: 18.6 MG/L
WBC # BLD AUTO: 9 10E9/L (ref 4–11)

## 2019-12-16 PROCEDURE — 97112 NEUROMUSCULAR REEDUCATION: CPT | Mod: GP

## 2019-12-16 PROCEDURE — 25000125 ZZHC RX 250

## 2019-12-16 PROCEDURE — 25000132 ZZH RX MED GY IP 250 OP 250 PS 637: Mod: GY | Performed by: STUDENT IN AN ORGANIZED HEALTH CARE EDUCATION/TRAINING PROGRAM

## 2019-12-16 PROCEDURE — 25000128 H RX IP 250 OP 636: Performed by: COUNSELOR

## 2019-12-16 PROCEDURE — 36415 COLL VENOUS BLD VENIPUNCTURE: CPT | Performed by: NEUROLOGICAL SURGERY

## 2019-12-16 PROCEDURE — 25000132 ZZH RX MED GY IP 250 OP 250 PS 637: Mod: GY | Performed by: PSYCHIATRY & NEUROLOGY

## 2019-12-16 PROCEDURE — 25000132 ZZH RX MED GY IP 250 OP 250 PS 637: Mod: GY | Performed by: COUNSELOR

## 2019-12-16 PROCEDURE — 25000132 ZZH RX MED GY IP 250 OP 250 PS 637: Mod: GY | Performed by: NURSE PRACTITIONER

## 2019-12-16 PROCEDURE — 71045 X-RAY EXAM CHEST 1 VIEW: CPT

## 2019-12-16 PROCEDURE — 25800030 ZZH RX IP 258 OP 636: Performed by: NEUROLOGICAL SURGERY

## 2019-12-16 PROCEDURE — 94681 O2 UPTK CO2 OUTP % O2 XTRC: CPT

## 2019-12-16 PROCEDURE — 00000146 ZZHCL STATISTIC GLUCOSE BY METER IP

## 2019-12-16 PROCEDURE — 85025 COMPLETE CBC W/AUTO DIFF WBC: CPT | Performed by: STUDENT IN AN ORGANIZED HEALTH CARE EDUCATION/TRAINING PROGRAM

## 2019-12-16 PROCEDURE — 25000128 H RX IP 250 OP 636: Performed by: NEUROLOGICAL SURGERY

## 2019-12-16 PROCEDURE — 36415 COLL VENOUS BLD VENIPUNCTURE: CPT | Performed by: STUDENT IN AN ORGANIZED HEALTH CARE EDUCATION/TRAINING PROGRAM

## 2019-12-16 PROCEDURE — 94003 VENT MGMT INPAT SUBQ DAY: CPT

## 2019-12-16 PROCEDURE — 25000132 ZZH RX MED GY IP 250 OP 250 PS 637: Mod: GY | Performed by: NEUROLOGICAL SURGERY

## 2019-12-16 PROCEDURE — 40000961 ZZH STATISTIC INTRAPULMONARY PERCUSSIVE VENT

## 2019-12-16 PROCEDURE — 40000275 ZZH STATISTIC RCP TIME EA 10 MIN

## 2019-12-16 PROCEDURE — 80202 ASSAY OF VANCOMYCIN: CPT | Performed by: NEUROLOGICAL SURGERY

## 2019-12-16 PROCEDURE — 27210437 ZZH NUTRITION PRODUCT SEMIELEM INTERMED LITER

## 2019-12-16 PROCEDURE — 20000004 ZZH R&B ICU UMMC

## 2019-12-16 PROCEDURE — 80048 BASIC METABOLIC PNL TOTAL CA: CPT | Performed by: STUDENT IN AN ORGANIZED HEALTH CARE EDUCATION/TRAINING PROGRAM

## 2019-12-16 PROCEDURE — 83735 ASSAY OF MAGNESIUM: CPT | Performed by: STUDENT IN AN ORGANIZED HEALTH CARE EDUCATION/TRAINING PROGRAM

## 2019-12-16 PROCEDURE — 25800025 ZZH RX 258: Performed by: STUDENT IN AN ORGANIZED HEALTH CARE EDUCATION/TRAINING PROGRAM

## 2019-12-16 PROCEDURE — 84100 ASSAY OF PHOSPHORUS: CPT | Performed by: STUDENT IN AN ORGANIZED HEALTH CARE EDUCATION/TRAINING PROGRAM

## 2019-12-16 PROCEDURE — 94640 AIRWAY INHALATION TREATMENT: CPT | Mod: 76

## 2019-12-16 RX ORDER — METRONIDAZOLE 500 MG/1
500 TABLET ORAL 3 TIMES DAILY
Status: DISCONTINUED | OUTPATIENT
Start: 2019-12-16 | End: 2019-12-31 | Stop reason: HOSPADM

## 2019-12-16 RX ADMIN — HEPARIN SODIUM 5000 UNITS: 5000 INJECTION, SOLUTION INTRAVENOUS; SUBCUTANEOUS at 16:46

## 2019-12-16 RX ADMIN — METOPROLOL TARTRATE 50 MG: 50 TABLET, FILM COATED ORAL at 08:47

## 2019-12-16 RX ADMIN — ALBUTEROL SULFATE 2.5 MG: 2.5 SOLUTION RESPIRATORY (INHALATION) at 15:58

## 2019-12-16 RX ADMIN — DEXTROSE 50 % IN WATER (D50W) INTRAVENOUS SYRINGE 25 ML: at 04:59

## 2019-12-16 RX ADMIN — SENNOSIDES AND DOCUSATE SODIUM 1 TABLET: 8.6; 5 TABLET ORAL at 09:10

## 2019-12-16 RX ADMIN — CEFEPIME HYDROCHLORIDE 2 G: 2 INJECTION, POWDER, FOR SOLUTION INTRAVENOUS at 14:24

## 2019-12-16 RX ADMIN — Medication 12.5 MG: at 19:57

## 2019-12-16 RX ADMIN — HEPARIN SODIUM 5000 UNITS: 5000 INJECTION, SOLUTION INTRAVENOUS; SUBCUTANEOUS at 09:04

## 2019-12-16 RX ADMIN — FOLIC ACID 1 MG: 1 TABLET ORAL at 08:47

## 2019-12-16 RX ADMIN — AMLODIPINE BESYLATE 10 MG: 10 TABLET ORAL at 08:47

## 2019-12-16 RX ADMIN — TRAZODONE HYDROCHLORIDE 100 MG: 50 TABLET ORAL at 21:14

## 2019-12-16 RX ADMIN — METRONIDAZOLE 500 MG: 500 TABLET ORAL at 08:48

## 2019-12-16 RX ADMIN — ACETYLCYSTEINE 2 ML: 100 INHALANT RESPIRATORY (INHALATION) at 07:46

## 2019-12-16 RX ADMIN — METRONIDAZOLE 500 MG: 500 TABLET ORAL at 19:55

## 2019-12-16 RX ADMIN — VANCOMYCIN HYDROCHLORIDE 1500 MG: 10 INJECTION, POWDER, LYOPHILIZED, FOR SOLUTION INTRAVENOUS at 04:52

## 2019-12-16 RX ADMIN — Medication 100 MG: at 08:47

## 2019-12-16 RX ADMIN — MULTIVITAMIN 15 ML: LIQUID ORAL at 19:55

## 2019-12-16 RX ADMIN — OXYCODONE HYDROCHLORIDE 10 MG: 5 SOLUTION ORAL at 16:46

## 2019-12-16 RX ADMIN — ACETAMINOPHEN 650 MG: 325 TABLET, FILM COATED ORAL at 08:47

## 2019-12-16 RX ADMIN — OXYCODONE HYDROCHLORIDE 10 MG: 5 SOLUTION ORAL at 12:29

## 2019-12-16 RX ADMIN — ACETYLCYSTEINE 2 ML: 100 INHALANT RESPIRATORY (INHALATION) at 04:27

## 2019-12-16 RX ADMIN — FAMOTIDINE 20 MG: 40 POWDER, FOR SUSPENSION ORAL at 08:51

## 2019-12-16 RX ADMIN — Medication 12.5 MG: at 08:51

## 2019-12-16 RX ADMIN — ACETYLCYSTEINE 2 ML: 100 INHALANT RESPIRATORY (INHALATION) at 15:59

## 2019-12-16 RX ADMIN — ACETAMINOPHEN 650 MG: 325 TABLET, FILM COATED ORAL at 01:18

## 2019-12-16 RX ADMIN — VANCOMYCIN HYDROCHLORIDE 1500 MG: 10 INJECTION, POWDER, LYOPHILIZED, FOR SOLUTION INTRAVENOUS at 17:25

## 2019-12-16 RX ADMIN — CEFEPIME HYDROCHLORIDE 2 G: 2 INJECTION, POWDER, FOR SOLUTION INTRAVENOUS at 22:23

## 2019-12-16 RX ADMIN — SENNOSIDES AND DOCUSATE SODIUM 1 TABLET: 8.6; 5 TABLET ORAL at 19:55

## 2019-12-16 RX ADMIN — ACETAMINOPHEN 650 MG: 325 TABLET, FILM COATED ORAL at 14:23

## 2019-12-16 RX ADMIN — POTASSIUM CHLORIDE 20 MEQ: 1.5 POWDER, FOR SOLUTION ORAL at 08:47

## 2019-12-16 RX ADMIN — ALBUTEROL SULFATE 2.5 MG: 2.5 SOLUTION RESPIRATORY (INHALATION) at 07:46

## 2019-12-16 RX ADMIN — HEPARIN SODIUM 5000 UNITS: 5000 INJECTION, SOLUTION INTRAVENOUS; SUBCUTANEOUS at 00:15

## 2019-12-16 RX ADMIN — ALBUTEROL SULFATE 2.5 MG: 2.5 SOLUTION RESPIRATORY (INHALATION) at 11:56

## 2019-12-16 RX ADMIN — METRONIDAZOLE 500 MG: 500 TABLET ORAL at 14:23

## 2019-12-16 RX ADMIN — METOPROLOL TARTRATE 50 MG: 50 TABLET, FILM COATED ORAL at 19:55

## 2019-12-16 RX ADMIN — CEFEPIME HYDROCHLORIDE 2 G: 2 INJECTION, POWDER, FOR SOLUTION INTRAVENOUS at 06:41

## 2019-12-16 RX ADMIN — POTASSIUM & SODIUM PHOSPHATES POWDER PACK 280-160-250 MG 1 PACKET: 280-160-250 PACK at 21:14

## 2019-12-16 RX ADMIN — ALBUTEROL SULFATE 2.5 MG: 2.5 SOLUTION RESPIRATORY (INHALATION) at 20:26

## 2019-12-16 RX ADMIN — ALBUTEROL SULFATE 2.5 MG: 2.5 SOLUTION RESPIRATORY (INHALATION) at 04:27

## 2019-12-16 RX ADMIN — ACETYLCYSTEINE 2 ML: 100 INHALANT RESPIRATORY (INHALATION) at 20:26

## 2019-12-16 RX ADMIN — CARBOXYMETHYLCELLULOSE SODIUM 1 DROP: 5 SOLUTION/ DROPS OPHTHALMIC at 17:06

## 2019-12-16 RX ADMIN — OXYCODONE HYDROCHLORIDE 10 MG: 5 SOLUTION ORAL at 01:18

## 2019-12-16 RX ADMIN — ACETYLCYSTEINE 2 ML: 100 INHALANT RESPIRATORY (INHALATION) at 11:56

## 2019-12-16 RX ADMIN — LIDOCAINE 2 PATCH: 560 PATCH PERCUTANEOUS; TOPICAL; TRANSDERMAL at 19:55

## 2019-12-16 RX ADMIN — ACETAMINOPHEN 650 MG: 325 TABLET, FILM COATED ORAL at 19:55

## 2019-12-16 RX ADMIN — FAMOTIDINE 20 MG: 40 POWDER, FOR SUSPENSION ORAL at 19:56

## 2019-12-16 RX ADMIN — POTASSIUM & SODIUM PHOSPHATES POWDER PACK 280-160-250 MG 1 PACKET: 280-160-250 PACK at 08:47

## 2019-12-16 RX ADMIN — MIRTAZAPINE 30 MG: 15 TABLET, FILM COATED ORAL at 19:55

## 2019-12-16 ASSESSMENT — ACTIVITIES OF DAILY LIVING (ADL)
ADLS_ACUITY_SCORE: 27

## 2019-12-16 NOTE — PROGRESS NOTES
Neurosurgery Progress Note     Subjective:  No acute events     Objective:  Temp: 98.6  F (37  C) Temp src: Axillary BP: 115/65 Pulse: 101 Heart Rate: 62 Resp: 22 SpO2: 98 % O2 Device: Mechanical Ventilator    Intubated, partially sedated  Awake, alert, nods yes or no to questions  Follows commands  Deltoids 4/5, biceps 4/5, triceps 1/5,  0/5, lower extremities 0/5  Triple flexion in lower extremities     Assessment:  64 years old gentleman status post C3-C4 artificial disc placement about 2 weeks prior to arriaval the [facility] who presented after a fall with almost complete loss of strength in his upper extremities and paraplegia, found to have cervical stenosis. S/p OR 12/1 - Pharyngotomy closed with assistance from ENT. Arthroplasty implant removed. No replacement implant placed. S/p G-tube 12/5. Extubated 12/5. Re-Intubated 12/6 due to Mucous Plugging and Atelectasis. S/p repeated bronchoscopy, last 12/14.     Plan:  Intubated, mechanically ventilated  Nebs, chest physiotherapy; wean to extubate as able, plan for tracheostomy with ENT; bronchoscopy prn per neurocritical care  DVT: SCDs while in bed  [place] J collar  Per ID: vancomycin, cefepime, flagyl 4-6 wks for vertebral phlegmon, weekly labs; no plan for further surgery  Follow-up cultures  SubQ heparin & tube feeds  MRI before stopping abx in 6 wks  endocrine consulted for diabetes - appreciate recs  Disposition: 4A        Lion Vargas MD  Neurosurgery Resident PGY2    Please contact neurosurgery resident on call with questions.    Dial * * [phone], enter [phone] when prompted.

## 2019-12-16 NOTE — PROGRESS NOTES
"Otolaryngology Progress Note     S: No events, vent settings have been improved      O: /63   Pulse 101   Temp 98.7  F (37.1  C) (Axillary)   Resp 24   Ht 1.727 m (5' 8\")   Wt 87 kg (191 lb 12.8 oz)   SpO2 99%   BMI 29.16 kg/m      General: intubated  HEENT: C-collar in place. Neck stable without increase in swelling  Pulmonary: ventilated     Assessment and Plan  64 year old male w/ PMH HTN, DM, cervical stenosis s/p arthroplasty about 2-3 weeks ago who fell at rehab and presented to the [place] with paraplegia. Went to OR w/ NSG (Dr. Ashton) 12/1 & encountered inflammatory tissue over the spine and so ENT was consulted for assistance with the exposure.  During the exposure we noticed that there was a pharyngotomy that was seen from the cervical exposure. This was repaired transoral and transcervical. Patient remained intubated post-op and remains in the SICU. G-tube placed 12/5 and advancing tube feeds. Extubated 12/5. Reintubated 12/6 d/t increasing oxygen requirements and CXR w/ almost complete white out of right lung, suspected mucous plugging. CXR improved on 12/7-8. Patient has had vent settings not compatible with tracheotomy and we are currently awaiting improvement in pulmonary status to reconsider.      - Anticipate prolonged duration of NPO status  - Will discuss tracheotomy when vent settings improve.   - Rest of cares per neurosurgery and ICU, page ENT on call for questions    -- Patient and above plan will be discussed with Dr. Kathy Gaytan MD   Otolaryngology-Head & Neck Surgery  Please contact ENT with questions by dialing * [phone] and entering job [phone] when prompted.  "

## 2019-12-16 NOTE — PLAN OF CARE
D/I/A/P: Patient has been turned side to side every two hours and bagged suction with physiotherapy per RT. His lungs have been more clear as the day has gone by. The free water has been changed from 50cc per hour to 100 ml every 4 hours. Plan: Continue with the current plan of care. Refer to flow sheets for further details.

## 2019-12-16 NOTE — PROGRESS NOTES
CLINICAL NUTRITION SERVICES - BRIEF NOTE   (See RD note on 12/12 for full assessment)     Reason for RD note: Following up on EN tolerance and ability to order metabolic cart.    New Findings/Chart Review:  Nutrition support: Impact Peptide 1.5 at goal 55 ml/hr (1320 ml/day) to provide 1980 kcals (28 kcal/kg/day), 124 g PRO (1.8 g/kg/day), 1016 ml free H2O, 84 g Fat (50% from MCTs), 185 g CHO and no Fiber daily. Tolerating goal rate 55 mL/hr    Enteral Access: PEG in gastric remnant    Respiratory: ENT to place tracheostomy once able to tolerate PEEP of 5, FiO2 <30 per Neuro note today.    GI: Last BM 12/14 per RN flowsheets.    Endo: Endocrinology signed off 12/13. BG  mg/dL over past 24 hrs.    Labs: Reviewed. CRP 48 (H on 12/14, uptrended since 12/11)      Meds & Vitamin/Mineral Supplementation: Reviewed, notable for: 1,000 mcg cyanocobalamin IM injection q 30 d, 1 mg folic acid, custom dose sliding scale insulin q4h, humulin daily, Certavite, Neutra-Phos BID, Thiamin 100 mg, senna-docusate BID    Metabolic Cart Study:  Obtained metabolic cart study 12/16 @ 1300 with the following results: MREE = 2266 kcals/day (equiv to 32 kcal/kg/day) with RQ = 0.77.  Pt received 1320 ml of TF (goal volume) in 24 hours preceding the study providing 1980 kcals (87% MREE).  RQ is within physiologic range; RQ is somewhat logical given provisions (slightly hypocaloric/below MREE) received prior to study.  Would aim energy intakes minimally at 80% of this MREE, 1813 kcal (equiv to 26 kcal/kg/day). EN as ordered remains appropriate to meet needs.    Updated ASSESSED NUTRITION NEEDS --> DW: 70 kg (adjusted)  Estimated Energy Needs: 6656-5779 kcals/day (% MREE, 28 - 32 kcals/kg)  Justification: MREE from 12/16; aiming lower-end for now given paraplegia  Estimated Protein Needs: 105-140 grams protein/day (1.5 - 2 grams of pro/kg)  Justification: Increased needs with new parplegia  Estimated Fluid Needs: 4662-3113 mL/day (30  - 35 mL/kg)   Justification: Maintenance needs or per team, pending fluid status     Interventions:  -Metabolic cart study d/t pt paraplegic  -Continue EN as ordered - appropriate per MREE results     Future/Additional Recommendations:  -Monitor ongoing tolerance to EN.   -Change to standard EN formula prior to discharge: Nutren 1.5 @ 55 mL/hr + 2 pkt [employer] protein daily to provide 2060 kcals (91% MREE, 29 kcal/kg/day), 112 g PRO (1.6 g/kg/day), 1003 mL H2O, 232 g CHO and no fiber daily.    Nutrition will continue to follow per protocol.    Hannah Lopez RD, LD  Pager: [phone]

## 2019-12-16 NOTE — PROGRESS NOTES
Neuroscience Intensive Care Progress Note  12/16/2019      24 hour events: sats dropped yesterday, required bagging and suction. Thick secretions, sats then improved. CXR fairly stable.     Current Medications:    acetaminophen  650 mg Oral or Feeding Tube Q6H     acetylcysteine  2 mL Nebulization Q4H     albuterol  2.5 mg Nebulization Q4H     amLODIPine  10 mg Oral or Feeding Tube Daily     ceFEPIme (MAXIPIME) IV  2 g Intravenous Q8H     cyanocobalamin  1,000 mcg Intramuscular Q30 Days     famotidine  20 mg Per G Tube BID     folic acid  1 mg Oral or Feeding Tube Daily     heparin ANTICOAGULANT  5,000 Units Subcutaneous Q8H     heparin lock flush  5-10 mL Intracatheter Q24H     insulin aspart  2-16 Units Subcutaneous Q4H     insulin isophane human  42 Units Subcutaneous Q24H     insulin isophane human  42 Units Subcutaneous Q24H     lidocaine  2 patch Transdermal Q24h    And     lidocaine   Transdermal Q24H    And     lidocaine   Transdermal Q8H     metoprolol tartrate  50 mg Oral or Feeding Tube BID     metroNIDAZOLE  500 mg Oral or Feeding Tube TID     mirtazapine  30 mg Oral or Feeding Tube QPM     multivitamins w/minerals  15 mL Per Feeding Tube Daily     potassium & sodium phosphates  1 packet Oral or Feeding Tube BID     QUEtiapine  12.5 mg Oral BID     senna-docusate  1 tablet Oral or Feeding Tube BID     traZODone  100 mg Oral or Feeding Tube At Bedtime     vancomycin (VANCOCIN) IV  1,500 mg Intravenous Q12H     thiamine  100 mg Oral or Feeding Tube Daily       PRN Medications:  carboxymethylcellulose PF, cyclobenzaprine, IV fluid REPLACEMENT ONLY, glucose **OR** dextrose **OR** glucagon, heparin lock flush, hydrALAZINE, HYDROmorphone, ipratropium - albuterol 0.5 mg/2.5 mg/3 mL, labetalol, lidocaine 4%, lidocaine (buffered or not buffered), magnesium sulfate, magnesium sulfate, melatonin, naloxone, ondansetron **OR** ondansetron, oxyCODONE, potassium chloride, potassium chloride with lidocaine, potassium  "chloride, potassium chloride, potassium chloride, potassium phosphate (KPHOS) in D5W IV, potassium phosphate (KPHOS) in D5W IV, potassium phosphate (KPHOS) in D5W IV, potassium phosphate (KPHOS) in D5W IV, potassium phosphate (KPHOS) in D5W IV, prochlorperazine **OR** prochlorperazine **OR** prochlorperazine, sodium chloride (PF), sodium phosphate    Infusions:    IV fluid REPLACEMENT ONLY Stopped (12/14/19 1506)     no pre procedure antibiotic needed         Objective findings:  /76   Pulse 101   Temp 98.7  F (37.1  C) (Axillary)   Resp 24   Ht 1.727 m (5' 8\")   Wt 87 kg (191 lb 12.8 oz)   SpO2 99%   BMI 29.16 kg/m      Ventilator Settings:  Ventilation Mode: CPAP/PS  (Continuous positive airway pressure with Pressure Support)  FiO2 (%): 50 %  Rate Set (breaths/minute): 12 breaths/min  Tidal Volume Set (mL): 450 mL  PEEP (cm H2O): 8 cmH2O  Pressure Support (cm H2O): 7 cmH2O  Oxygen Concentration (%): 50 %  Resp: 24      Intake/Output:    Intake/Output Summary (Last 24 hours) at 12/15/2019 0846  Last data filed at 12/15/2019 0700  Gross per 24 hour   Intake 3735 ml   Output 2150 ml   Net 1585 ml       Physical Examination:   Vitals:  B/P: 146/71, T: 98.8, P: 89, R: 23  General:  Laying in bed  HEENT:  NC/AT, no icterus, op pink and moist, no ear or nose drainage.   Cardiac:  RRR, no m/r/g  Chest:  CTAB  Abdomen:  S/NT/ND  Extremities:  No LE swelling.    Skin:  No rash or lesion.      Neuro Exam:  Mental status: alert, follows all commands  Cranial nerves: mild L eye ptosis, EOMI, face symmetric  Motor: Some flexion extension at elbow. Some movement left wrist and fingers, non in distal RUE. but no movements of the legs  Sensory: reports sensation in arms and legs to light touch.     Labs/Studies:  CBC RESULTS:   Recent Labs   Lab Test 12/16/19  0529   WBC 9.0   RBC 3.02*   HGB 8.9*   HCT 29.9*   MCV 99   MCH 29.5   MCHC 29.8*   RDW 14.0          Last Comprehensive Metabolic Panel:  Sodium   Date " Value Ref Range Status   12/16/2019 142 133 - 144 mmol/L Final     Potassium   Date Value Ref Range Status   12/16/2019 3.9 3.4 - 5.3 mmol/L Final     Chloride   Date Value Ref Range Status   12/16/2019 114 (H) 94 - 109 mmol/L Final     Carbon Dioxide   Date Value Ref Range Status   12/16/2019 26 20 - 32 mmol/L Final     Anion Gap   Date Value Ref Range Status   12/16/2019 3 3 - 14 mmol/L Final     Glucose   Date Value Ref Range Status   12/16/2019 99 70 - 99 mg/dL Final     Urea Nitrogen   Date Value Ref Range Status   12/16/2019 28 7 - 30 mg/dL Final     Creatinine   Date Value Ref Range Status   12/16/2019 0.53 (L) 0.66 - 1.25 mg/dL Final     GFR Estimate   Date Value Ref Range Status   12/16/2019 >90 >60 mL/min/[1.73_m2] Final     Comment:     Non  GFR Calc  Starting 12/18/2018, serum creatinine based estimated GFR (eGFR) will be   calculated using the Chronic Kidney Disease Epidemiology Collaboration   (CKD-EPI) equation.       Calcium   Date Value Ref Range Status   12/16/2019 8.2 (L) 8.5 - 10.1 mg/dL Final     ==========================================================    ASSESSMENT/PLAN: Sherwin Angel is a 64 year old with hx of recent of anterior C3-4 arthroplasty ([facility]) who was admitted 12/1/2019 after a fall at rehab, resulting in lower extremity paraplegia. Found to have cervical stenosis. Now s/p one pharyngotomy closure on 12/1, still has one pharyngotomy present. Arthroplasty implant removed with no replacement implant placed. He has been on abx for cervical tissue cultures with ID following, restarted on vanc/flagyl/cefepime with concern for ongoing spinal infection.      Neuro:  #Cervical spinal cord injury  #Discitis/osteomyelitis C3-C4  - Collar at all times, Up with assist  - Neuro checks q4h during the day   - PT/OT     #Anxiety/Pain  - Dilauded PRN  - Oxycodone 5-10 q4hrs prn   - flexeril 7.5 mg TID prn     #Hx of Alcohol dependence  - Thiamine & folate  daily     #Depression  - Continue PTA miratazepine  - restarted trazodone 100 mg at bedtime   - Seroquel 12.5 BID      Resp:  #Acute respiratory failure s/p re-intubation 12/6, suspect diaphragmatic weakness related to spine injury.   #Mucous plugging Rt lung   - Mucomyst & chest physio   - daily CXR  --metanebs  --chest physio per RT  --percussive therapy  - wean ventilator support as able  - desaturation when positioned on Rt side, try positional changes first   - ENT plans for trach once able to tolerate peep of 5, FI02 <30     Cardio:  #Hx of hypertension   - PTA amlodipine 10 mg   - PTA metoprolol 50mg bid     Renal:  Hypernatremia - resolved   Hypophosphatemia - neutraphos  - Electrolyte replacement protocol   - Monitor I/O  - free water flushes 100 q4h  - goal In= outs     Neurogenic urinary retention   - failed díaz wean trial 12/12     Endo:  #DM2  - Sliding scale insulin, high dose  - 38 U NPH am/pm      Heme:  #Normocytic anemia, stable  - Hg > 7.0   - Plt > 100k   - INR <1.5      GI:  #Constipation - resolved last BM 12/14  - Senna scheduled at BID  - PRN suppository/enema available     #Diet  - G-tube in place  - Tube feeds at goal      ID:   #Discitis vs osteomyelitis   #Fever - resolved   #Leukocytosis and CRP down trending (16)  #repeat CT MRI with increased inflammatory changes and prevertebral soft tissue thickening   - Restarted vanc, cefepime, metronidazole regimen which patient was on prior to fever starting  - Weekly CBC, CMP, CRP while on IV abx,  - Blood cultures (12/1, 12/3, 12/7, 12/11): NGTD  - Spinal tissue cultures (12/1): MSSA, Strep anginosus, Eikenella, and oral anaerobes   - Will page ID attending at [id number] prior to discharging pt     Previous abx    - Stopped Vancomycin, metronidazole, cefepime on 12/4, fever started 12/7  - switched Unasyn to Zosyn after ongoing fever (abx plan for 12/2-1/12)     # left eye conjunctival irration with white discharge  -erythromycin ointment       Lines:  -ETT, PICC, PIV x 2, Degroot, PEG     FEN: TF   PPX:    DVT prophylaxis: SCDs, Heparin subcutaneous    GI: famotidine   Code Status: Full, presumed     Dispo: 4A until medically stable     This patient was seen & discussed with my attending, Dr. Figueroa, who agrees with my assessment and plan.     Ladan Stevens,   Neurology PGY-2   [phone]

## 2019-12-16 NOTE — PROGRESS NOTES
[facility] Progress Note     Patient:  Sherwin Angel   Date of birth 1955, Medical record number [id number]  Date of Admission: 12/1/2019  Consult Requester:Fernando Ashton MD            Assessment and Recommendations:   ASSESSMENT:  1. Traumatic cervical spinal cord injury s/p C3-4 arthroplasty on 11/15, now removed  2. Cervical stenosis C3-5  3. Vertebral phlegmon vs abscess s/p drain placement on 12/1 - cultures growing MSSA, Strep anginosus, Eikenella, and oral anaerobes. Repeat MRI shows progressive epidural abscess.   4. Osteomyelitis C3-C5  5. Leukocytosis, ongoing fevers      DISCUSSION:  64-year-old man with history of htn, DM2, and recent C3-4 arthroplasty on 11/15 now admitted with new paraplegia after a fall and found to have pharyngotomy and vertebral phlegmon now s/p OR with removal of arthroplasty and placement of drain. Clinically he has stabilized with broadening of coverage, but his progressive findings on imaging makes me concerned that he may ultimately fail medical therapy    RECOMMENDATION:  - Continue vancomycin, cefepime, and flagyl  - consider repeat cervical spine MRI this week. Regardless will need to watch neuro exam closely.    Isabel Capone MD   of Medicine, Division of Infectious Diseases  [facility] [phone]        ________________________________________________________________           Overnight events:   No fever. Seen with therapy at [facility]. HAs no movement in BL legs. No acute events.         Review of Systems:   Unable to obtain, patient intubated.         Past Medical History:   Diabetes  Hypertension  C3-C4 arthroplasty on 11/15/19         Past Surgical History:     Past Surgical History:   Procedure Laterality Date     IRRIGATION AND DEBRIDEMENT NECK, COMBINED N/A 12/1/2019    Procedure: IRRIGATION AND DEBRIDEMENT OF NECK; PHARYNGOTOMY REPAIR.;  Surgeon: Fernando Ashton MD;  Location: [facility] OR     LAPAROSCOPIC  ASSISTED INSERTION TUBE GASTROTOMY N/A 12/5/2019    Procedure: laproscopic assisted gastrostomy tube placement;  Surgeon: Luis Santiago MD;  Location: [facility] OR     REMOVE HARDWARE CERVICAL ANTERIOR SPINE N/A 12/1/2019    Procedure: REMOVAL, HARDWARE, SPINE, CERVICAL, ANTERIOR;  Surgeon: Fernando Ashton MD;  Location: [facility] OR              Physical Exam:     Ranges for his vital signs:   Temp:  [97.6  F (36.4  C)-98.7  F (37.1  C)] 98.6  F (37  C)  Heart Rate:  [55-99] 62  Resp:  [18-28] 24  BP: (107-165)/(58-82) 143/75  FiO2 (%):  [40 %-50 %] 50 %  SpO2:  [88 %-100 %] 94 %    Physical Examination:  GENERAL: Intubated, sitting up in chair, appears tired but responsive to simple questions.  HEENT:  Head is normocephalic, atraumatic; ETT in place.  EYES:  Eyes have anicteric sclerae without conjunctival injection.    NECK:  Collar in place. Drain with serosanguinous output.  LUNGS:  Coarse breath sounds throughout anterior lung fields.  CARDIOVASCULAR:  Regular rate and rhythm with no murmurs, gallops or rubs.  ABDOMEN:  Normal bowel sounds, soft, nontender. No appreciable hepatosplenomegaly  SKIN:  No acute rashes.  Lines are in place without any surrounding erythema or exudate. No stigmata of endocarditis.  NEURO: No movement BL LE. HAs sensation of R but not leg leg. No sensation in distal UE. Shows some movement in his BL UE.           Laboratory Data:   Labs reviewed. Pertinent below.    Inflammatory Markers    Recent Labs   Lab Test 12/14/19  0444 12/12/19  0210 12/11/19  0317 12/08/19  0348 12/05/19  0340 12/02/19  0342 12/01/19  0112   CRP 48.0* 36.0* 16.0* 56.0* 130.0* 220.0* 170.0*       Hematology Studies    Recent Labs   Lab Test 12/16/19  0529 12/15/19  0451 12/14/19  0444 12/13/19  0230 12/12/19  0210 12/11/19  0317   WBC 9.0 10.9 9.9 11.8* 15.4* 10.0   ANEU 6.3 8.7* 7.8 9.3* 11.6* 7.0   AEOS 0.2 0.2 0.2 0.4 0.4 0.2   HGB 8.9* 10.1* 9.2* 9.8* 10.0* 9.2*   MCV 99 96 94 96 97 97    283 295 301  "359 339       Microbiology:   Tissue culture: Cervical spine vertebral phlegmon:  -- Aerobic: Staph aureus, Strep anginosus, Eikenella corrodens  -- Anaerobic: Fusobacterium nucleatum, Parvimonas micra, Prevotella     Blood culture negative  12/3 Blood culture x2 negative   Blood culture x2 NGTD     Sputum culture with only candida     UA negative           Imagin/10 MRI C-spine  Extensive prevertebral phlegmon extending from C2 through C5. Anterior  epidural abscess extending from C2/C3 through C5/C6. Resultant severe  canal stenosis at C3-C5 with cord edema. These findings have  progressed since 2019.   Abnormal C3-C4 and C4-C5 disc signal and C3-C5 vertebral marrow  signal. Findings suggest discitis/osteomyelitis, epidural abscess and  prevertebral phlegmon.     MRI  Impression: Images are mildly degraded secondary to metallic artifact  from disc prosthesis. Follow-up imaging with CT could be considered  for further evaluation.  1. Traumatic cord injury at C3 and C4-5 as a result of C3-5  compression fractures.   2. There is 4 mm of retrolisthesis of C3 on C4.  3. Traumatic herniation of C4-5 intervertebral disc with retropulsion  of disc fragment into the spinal canal.  4. Extensive prevertebral thickening up to 2 cm anteriorly and up to 9  mm within the epidural space, favored to represent prevertebral edema  in the setting of recent trauma.     Of [facility]:  [facility] C-spine w/o contrast done on 2019 --  - \"Apparent osteomyelitis involvement of the C3, C4 and C5 vertebrae.\"  - \"Suspected extension of the infectious process into the ventral aspect of the central canal suspicious for a ventral epidural phlegmon versus abscess.\"  "

## 2019-12-16 NOTE — PLAN OF CARE
Discharge Planner PT   Patient plan for discharge: not discussed   Current status: VSS on CMV, 50% FIO2, PEEP of 8.  In order to increase strength and ROM pt participates in UE AAROM, active movements including L shoulder flex/ext, B  shoulder abduction, B elbow flex, 10-12 reps with support for control against gravity.  PROM for all other joints. Pt making slow improvements in B elbow flexion and shoulder shrug/L ABD strength. Unable to perform any BLE AROM . Upon sensory light touch testing, pt sensation intact on dorsal R foot, B shoulder and superior along with superior chest.   Barriers to return to prior living situation: medical needs, vent weaning, current level of function  Recommendations for discharge: rehab  Rationale for recommendations: Patient will require continued skilled therapy to increase strength and maximize functional independence.       Entered by: Allan Holley 12/16/2019 4:10 PM     sensation intact on dorsal R foot

## 2019-12-16 NOTE — PLAN OF CARE
Major Shift Events:  Neuros remain unchanged. VS within pt baseline. Had increased O2 needs from 50 % to 100 % FiO2 @ 0500 and also c/o difficulty breathing. Was sating low 80's even on 100% FiO2. RT paged and came to bedside. Pt was bagged and suctioned by RT. Medium mucus plug suctioned out. Sats improved and pt reported breathing adequately. LS remains coarse and diminished on the bases. TF @ 55 ml/hr, tolerating well. Had a low blood sugar of 62 @ 0400. 25 ml of D50 given with improvement of blood sugar. Last . No stool overnight. Degroot with adequate UOP. Prn Oxycodone and scheduled tylenol given for neck pain with optimal relief.    Plan: Awaiting trach placement, continue to monitor resp status and blood sugar, support POC.  For vital signs and complete assessments, please see documentation flowsheets

## 2019-12-17 ENCOUNTER — APPOINTMENT (OUTPATIENT)
Dept: GENERAL RADIOLOGY | Facility: CLINIC | Age: 64
DRG: 003 | End: 2019-12-17
Attending: NURSE PRACTITIONER
Payer: COMMERCIAL

## 2019-12-17 ENCOUNTER — APPOINTMENT (OUTPATIENT)
Dept: PHYSICAL THERAPY | Facility: CLINIC | Age: 64
DRG: 003 | End: 2019-12-17
Attending: NEUROLOGICAL SURGERY
Payer: COMMERCIAL

## 2019-12-17 LAB
ANION GAP SERPL CALCULATED.3IONS-SCNC: 3 MMOL/L (ref 3–14)
BACTERIA SPEC CULT: NO GROWTH
BACTERIA SPEC CULT: NO GROWTH
BASOPHILS # BLD AUTO: 0 10E9/L (ref 0–0.2)
BASOPHILS NFR BLD AUTO: 0.3 %
BUN SERPL-MCNC: 29 MG/DL (ref 7–30)
CALCIUM SERPL-MCNC: 8.2 MG/DL (ref 8.5–10.1)
CHLORIDE SERPL-SCNC: 113 MMOL/L (ref 94–109)
CO2 SERPL-SCNC: 25 MMOL/L (ref 20–32)
CREAT SERPL-MCNC: 0.48 MG/DL (ref 0.66–1.25)
CRP SERPL-MCNC: 34 MG/L (ref 0–8)
DIFFERENTIAL METHOD BLD: ABNORMAL
EOSINOPHIL # BLD AUTO: 0.2 10E9/L (ref 0–0.7)
EOSINOPHIL NFR BLD AUTO: 1.6 %
ERYTHROCYTE [DISTWIDTH] IN BLOOD BY AUTOMATED COUNT: 14 % (ref 10–15)
GFR SERPL CREATININE-BSD FRML MDRD: >90 ML/MIN/{1.73_M2}
GLUCOSE BLDC GLUCOMTR-MCNC: 102 MG/DL (ref 70–99)
GLUCOSE BLDC GLUCOMTR-MCNC: 117 MG/DL (ref 70–99)
GLUCOSE BLDC GLUCOMTR-MCNC: 123 MG/DL (ref 70–99)
GLUCOSE BLDC GLUCOMTR-MCNC: 158 MG/DL (ref 70–99)
GLUCOSE BLDC GLUCOMTR-MCNC: 168 MG/DL (ref 70–99)
GLUCOSE SERPL-MCNC: 114 MG/DL (ref 70–99)
HCT VFR BLD AUTO: 29 % (ref 40–53)
HGB BLD-MCNC: 9 G/DL (ref 13.3–17.7)
IMM GRANULOCYTES # BLD: 0.1 10E9/L (ref 0–0.4)
IMM GRANULOCYTES NFR BLD: 1 %
LYMPHOCYTES # BLD AUTO: 1.8 10E9/L (ref 0.8–5.3)
LYMPHOCYTES NFR BLD AUTO: 19.7 %
MAGNESIUM SERPL-MCNC: 2.2 MG/DL (ref 1.6–2.3)
MCH RBC QN AUTO: 29.4 PG (ref 26.5–33)
MCHC RBC AUTO-ENTMCNC: 31 G/DL (ref 31.5–36.5)
MCV RBC AUTO: 95 FL (ref 78–100)
MONOCYTES # BLD AUTO: 0.8 10E9/L (ref 0–1.3)
MONOCYTES NFR BLD AUTO: 8.6 %
NEUTROPHILS # BLD AUTO: 6.3 10E9/L (ref 1.6–8.3)
NEUTROPHILS NFR BLD AUTO: 68.8 %
NRBC # BLD AUTO: 0 10*3/UL
NRBC BLD AUTO-RTO: 0 /100
PHOSPHATE SERPL-MCNC: 2.4 MG/DL (ref 2.5–4.5)
PLATELET # BLD AUTO: 300 10E9/L (ref 150–450)
POTASSIUM SERPL-SCNC: 4.5 MMOL/L (ref 3.4–5.3)
RBC # BLD AUTO: 3.06 10E12/L (ref 4.4–5.9)
SODIUM SERPL-SCNC: 142 MMOL/L (ref 133–144)
SPECIMEN SOURCE: NORMAL
SPECIMEN SOURCE: NORMAL
WBC # BLD AUTO: 9.2 10E9/L (ref 4–11)

## 2019-12-17 PROCEDURE — 25000132 ZZH RX MED GY IP 250 OP 250 PS 637: Mod: GY | Performed by: NURSE PRACTITIONER

## 2019-12-17 PROCEDURE — 27210437 ZZH NUTRITION PRODUCT SEMIELEM INTERMED LITER

## 2019-12-17 PROCEDURE — 84100 ASSAY OF PHOSPHORUS: CPT | Performed by: STUDENT IN AN ORGANIZED HEALTH CARE EDUCATION/TRAINING PROGRAM

## 2019-12-17 PROCEDURE — 71045 X-RAY EXAM CHEST 1 VIEW: CPT

## 2019-12-17 PROCEDURE — 83735 ASSAY OF MAGNESIUM: CPT | Performed by: STUDENT IN AN ORGANIZED HEALTH CARE EDUCATION/TRAINING PROGRAM

## 2019-12-17 PROCEDURE — 36416 COLLJ CAPILLARY BLOOD SPEC: CPT | Performed by: STUDENT IN AN ORGANIZED HEALTH CARE EDUCATION/TRAINING PROGRAM

## 2019-12-17 PROCEDURE — 25000132 ZZH RX MED GY IP 250 OP 250 PS 637: Mod: GY | Performed by: PSYCHIATRY & NEUROLOGY

## 2019-12-17 PROCEDURE — 25000132 ZZH RX MED GY IP 250 OP 250 PS 637: Mod: GY | Performed by: STUDENT IN AN ORGANIZED HEALTH CARE EDUCATION/TRAINING PROGRAM

## 2019-12-17 PROCEDURE — 00000146 ZZHCL STATISTIC GLUCOSE BY METER IP

## 2019-12-17 PROCEDURE — 97112 NEUROMUSCULAR REEDUCATION: CPT | Mod: GP

## 2019-12-17 PROCEDURE — 40000961 ZZH STATISTIC INTRAPULMONARY PERCUSSIVE VENT

## 2019-12-17 PROCEDURE — 85025 COMPLETE CBC W/AUTO DIFF WBC: CPT | Performed by: STUDENT IN AN ORGANIZED HEALTH CARE EDUCATION/TRAINING PROGRAM

## 2019-12-17 PROCEDURE — 80048 BASIC METABOLIC PNL TOTAL CA: CPT | Performed by: STUDENT IN AN ORGANIZED HEALTH CARE EDUCATION/TRAINING PROGRAM

## 2019-12-17 PROCEDURE — 25000132 ZZH RX MED GY IP 250 OP 250 PS 637: Mod: GY | Performed by: NEUROLOGICAL SURGERY

## 2019-12-17 PROCEDURE — 25000128 H RX IP 250 OP 636: Performed by: COUNSELOR

## 2019-12-17 PROCEDURE — 94003 VENT MGMT INPAT SUBQ DAY: CPT

## 2019-12-17 PROCEDURE — 25000132 ZZH RX MED GY IP 250 OP 250 PS 637: Mod: GY | Performed by: COUNSELOR

## 2019-12-17 PROCEDURE — 94640 AIRWAY INHALATION TREATMENT: CPT | Mod: 76

## 2019-12-17 PROCEDURE — 94640 AIRWAY INHALATION TREATMENT: CPT

## 2019-12-17 PROCEDURE — 25000125 ZZHC RX 250

## 2019-12-17 PROCEDURE — 25800030 ZZH RX IP 258 OP 636: Performed by: STUDENT IN AN ORGANIZED HEALTH CARE EDUCATION/TRAINING PROGRAM

## 2019-12-17 PROCEDURE — 25800030 ZZH RX IP 258 OP 636: Performed by: NEUROLOGICAL SURGERY

## 2019-12-17 PROCEDURE — 97530 THERAPEUTIC ACTIVITIES: CPT | Mod: GP

## 2019-12-17 PROCEDURE — 25000128 H RX IP 250 OP 636: Performed by: NEUROLOGICAL SURGERY

## 2019-12-17 PROCEDURE — 20000004 ZZH R&B ICU UMMC

## 2019-12-17 PROCEDURE — 25000125 ZZHC RX 250: Performed by: STUDENT IN AN ORGANIZED HEALTH CARE EDUCATION/TRAINING PROGRAM

## 2019-12-17 PROCEDURE — 86140 C-REACTIVE PROTEIN: CPT | Performed by: STUDENT IN AN ORGANIZED HEALTH CARE EDUCATION/TRAINING PROGRAM

## 2019-12-17 PROCEDURE — 40000275 ZZH STATISTIC RCP TIME EA 10 MIN

## 2019-12-17 RX ADMIN — CEFEPIME HYDROCHLORIDE 2 G: 2 INJECTION, POWDER, FOR SOLUTION INTRAVENOUS at 22:18

## 2019-12-17 RX ADMIN — AMLODIPINE BESYLATE 10 MG: 10 TABLET ORAL at 08:48

## 2019-12-17 RX ADMIN — HEPARIN SODIUM 5000 UNITS: 5000 INJECTION, SOLUTION INTRAVENOUS; SUBCUTANEOUS at 16:10

## 2019-12-17 RX ADMIN — ACETYLCYSTEINE 2 ML: 100 INHALANT RESPIRATORY (INHALATION) at 00:57

## 2019-12-17 RX ADMIN — POTASSIUM & SODIUM PHOSPHATES POWDER PACK 280-160-250 MG 1 PACKET: 280-160-250 PACK at 08:48

## 2019-12-17 RX ADMIN — SENNOSIDES AND DOCUSATE SODIUM 1 TABLET: 8.6; 5 TABLET ORAL at 08:48

## 2019-12-17 RX ADMIN — METRONIDAZOLE 500 MG: 500 TABLET ORAL at 13:57

## 2019-12-17 RX ADMIN — ACETYLCYSTEINE 2 ML: 100 INHALANT RESPIRATORY (INHALATION) at 05:10

## 2019-12-17 RX ADMIN — FOLIC ACID 1 MG: 1 TABLET ORAL at 08:48

## 2019-12-17 RX ADMIN — Medication 12.5 MG: at 19:37

## 2019-12-17 RX ADMIN — VANCOMYCIN HYDROCHLORIDE 1500 MG: 10 INJECTION, POWDER, LYOPHILIZED, FOR SOLUTION INTRAVENOUS at 17:42

## 2019-12-17 RX ADMIN — METOPROLOL TARTRATE 50 MG: 50 TABLET, FILM COATED ORAL at 19:33

## 2019-12-17 RX ADMIN — ACETYLCYSTEINE 4 ML: 100 INHALANT RESPIRATORY (INHALATION) at 23:41

## 2019-12-17 RX ADMIN — OXYCODONE HYDROCHLORIDE 5 MG: 5 SOLUTION ORAL at 17:52

## 2019-12-17 RX ADMIN — ALBUTEROL SULFATE 2.5 MG: 2.5 SOLUTION RESPIRATORY (INHALATION) at 05:10

## 2019-12-17 RX ADMIN — FAMOTIDINE 20 MG: 40 POWDER, FOR SUSPENSION ORAL at 21:29

## 2019-12-17 RX ADMIN — OXYCODONE HYDROCHLORIDE 5 MG: 5 SOLUTION ORAL at 21:31

## 2019-12-17 RX ADMIN — ALBUTEROL SULFATE 2.5 MG: 2.5 SOLUTION RESPIRATORY (INHALATION) at 19:48

## 2019-12-17 RX ADMIN — METRONIDAZOLE 500 MG: 500 TABLET ORAL at 08:48

## 2019-12-17 RX ADMIN — CEFEPIME HYDROCHLORIDE 2 G: 2 INJECTION, POWDER, FOR SOLUTION INTRAVENOUS at 16:10

## 2019-12-17 RX ADMIN — ACETYLCYSTEINE 2 ML: 100 INHALANT RESPIRATORY (INHALATION) at 12:22

## 2019-12-17 RX ADMIN — ACETYLCYSTEINE 2 ML: 100 INHALANT RESPIRATORY (INHALATION) at 15:33

## 2019-12-17 RX ADMIN — ACETYLCYSTEINE 2 ML: 100 INHALANT RESPIRATORY (INHALATION) at 09:02

## 2019-12-17 RX ADMIN — METRONIDAZOLE 500 MG: 500 TABLET ORAL at 19:33

## 2019-12-17 RX ADMIN — FAMOTIDINE 20 MG: 40 POWDER, FOR SUSPENSION ORAL at 08:50

## 2019-12-17 RX ADMIN — ACETAMINOPHEN 650 MG: 325 TABLET, FILM COATED ORAL at 08:49

## 2019-12-17 RX ADMIN — CEFEPIME HYDROCHLORIDE 2 G: 2 INJECTION, POWDER, FOR SOLUTION INTRAVENOUS at 06:48

## 2019-12-17 RX ADMIN — POTASSIUM & SODIUM PHOSPHATES POWDER PACK 280-160-250 MG 1 PACKET: 280-160-250 PACK at 19:33

## 2019-12-17 RX ADMIN — OXYCODONE HYDROCHLORIDE 5 MG: 5 SOLUTION ORAL at 13:56

## 2019-12-17 RX ADMIN — MULTIVITAMIN 15 ML: LIQUID ORAL at 19:32

## 2019-12-17 RX ADMIN — Medication 12.5 MG: at 08:50

## 2019-12-17 RX ADMIN — LIDOCAINE 2 PATCH: 560 PATCH PERCUTANEOUS; TOPICAL; TRANSDERMAL at 19:33

## 2019-12-17 RX ADMIN — ACETYLCYSTEINE 4 ML: 100 INHALANT RESPIRATORY (INHALATION) at 19:48

## 2019-12-17 RX ADMIN — VANCOMYCIN HYDROCHLORIDE 1500 MG: 10 INJECTION, POWDER, LYOPHILIZED, FOR SOLUTION INTRAVENOUS at 05:17

## 2019-12-17 RX ADMIN — ALBUTEROL SULFATE 2.5 MG: 2.5 SOLUTION RESPIRATORY (INHALATION) at 09:01

## 2019-12-17 RX ADMIN — ALBUTEROL SULFATE 2.5 MG: 2.5 SOLUTION RESPIRATORY (INHALATION) at 12:22

## 2019-12-17 RX ADMIN — SENNOSIDES AND DOCUSATE SODIUM 1 TABLET: 8.6; 5 TABLET ORAL at 19:33

## 2019-12-17 RX ADMIN — Medication 100 MG: at 08:48

## 2019-12-17 RX ADMIN — ALBUTEROL SULFATE 2.5 MG: 2.5 SOLUTION RESPIRATORY (INHALATION) at 15:33

## 2019-12-17 RX ADMIN — ALBUTEROL SULFATE 2.5 MG: 2.5 SOLUTION RESPIRATORY (INHALATION) at 23:41

## 2019-12-17 RX ADMIN — TRAZODONE HYDROCHLORIDE 100 MG: 50 TABLET ORAL at 21:29

## 2019-12-17 RX ADMIN — ALBUTEROL SULFATE 2.5 MG: 2.5 SOLUTION RESPIRATORY (INHALATION) at 00:57

## 2019-12-17 RX ADMIN — HEPARIN SODIUM 5000 UNITS: 5000 INJECTION, SOLUTION INTRAVENOUS; SUBCUTANEOUS at 00:28

## 2019-12-17 RX ADMIN — ACETAMINOPHEN 650 MG: 325 TABLET, FILM COATED ORAL at 13:57

## 2019-12-17 RX ADMIN — ACETAMINOPHEN 650 MG: 325 TABLET, FILM COATED ORAL at 19:33

## 2019-12-17 RX ADMIN — INSULIN ASPART 2 UNITS: 100 INJECTION, SOLUTION INTRAVENOUS; SUBCUTANEOUS at 12:10

## 2019-12-17 RX ADMIN — OXYCODONE HYDROCHLORIDE 10 MG: 5 SOLUTION ORAL at 08:48

## 2019-12-17 RX ADMIN — POTASSIUM PHOSPHATE, MONOBASIC AND POTASSIUM PHOSPHATE, DIBASIC 15 MMOL: 224; 236 INJECTION, SOLUTION INTRAVENOUS at 06:41

## 2019-12-17 RX ADMIN — INSULIN ASPART 2 UNITS: 100 INJECTION, SOLUTION INTRAVENOUS; SUBCUTANEOUS at 09:27

## 2019-12-17 RX ADMIN — ACETAMINOPHEN 650 MG: 325 TABLET, FILM COATED ORAL at 02:00

## 2019-12-17 RX ADMIN — METOPROLOL TARTRATE 50 MG: 50 TABLET, FILM COATED ORAL at 08:48

## 2019-12-17 RX ADMIN — HEPARIN SODIUM 5000 UNITS: 5000 INJECTION, SOLUTION INTRAVENOUS; SUBCUTANEOUS at 08:49

## 2019-12-17 RX ADMIN — MIRTAZAPINE 30 MG: 15 TABLET, FILM COATED ORAL at 19:32

## 2019-12-17 ASSESSMENT — ACTIVITIES OF DAILY LIVING (ADL)
ADLS_ACUITY_SCORE: 27

## 2019-12-17 ASSESSMENT — MIFFLIN-ST. JEOR: SCORE: 1610.92

## 2019-12-17 NOTE — PHARMACY-VANCOMYCIN DOSING SERVICE
Pharmacy Vancomycin Note  Date of Service 2019  Patient's  1955   64 year old, male    Indication: Osteomyelitis  Goal Trough Level: 15-20 mg/L  Start of Therapy:   Current Vancomycin regimen:  1500 mg IV q12h    Current estimated CrCl = Estimated Creatinine Clearance: 153 mL/min (A) (based on SCr of 0.53 mg/dL (L)).    Creatinine for last 3 days  2019:  4:44 AM Creatinine 0.48 mg/dL  12/15/2019:  4:51 AM Creatinine 0.49 mg/dL  2019:  5:29 AM Creatinine 0.53 mg/dL    Recent Vancomycin Levels (past 3 days)  2019:  5:18 PM Vancomycin Level 18.6 mg/L    Vancomycin IV Administrations (past 72 hours)                   vancomycin 1500 mg in 0.9% NaCl 250 ml intermittent infusion 1,500 mg (mg) 1,500 mg Given 19 1725     1,500 mg Given  0452     1,500 mg Given 12/15/19 1602     1,500 mg Given  0423     1,500 mg Given 19 1500                Nephrotoxins and other renal medications (From now, onward)    Start     Dose/Rate Route Frequency Ordered Stop    19 1600  vancomycin 1500 mg in 0.9% NaCl 250 ml intermittent infusion 1,500 mg      1,500 mg  over 90 Minutes Intravenous EVERY 12 HOURS 19 0703               Contrast Orders - past 72 hours (72h ago, onward)    None          Interpretation of levels and current regimen:  Trough level is  Therapeutic   -Per Epic charting, it appears the level draw after RN hung the dose but per previous pharmacist's discussion with RN, bag was scanned but lab mehul the level before the infusion was started    Has serum creatinine changed > 50% in last 72 hours: Yes    Urine output:  good urine output    Renal Function: Stable    Plan:  1.  Continue Current Dose  2.  Pharmacy will check trough levels as appropriate in 1-3 Days.    3. Serum creatinine levels will be ordered daily for the first week of therapy and at least twice weekly for subsequent weeks.      Adina Hung RPH        .

## 2019-12-17 NOTE — PLAN OF CARE
Neuro: neuros unchanged. LUE > RUE. Mikel upper extremities sensation present. Pt nods yes to feeling some sensation to mikel feet but not the legs or abdomen. Chair time x5hrs.   Pain: PRN oxycodone 10mg given twice today for neck and back pain.   CV: Afebrile. Sr. Normotensive.   Pulm: Pressure supported once today from [id number]. Pt tolerated well but per RT and Team to only pressure support for one hour today. Pt did desat once this morning an  Hour after pressure supporting. Neuro crit informed; pt rebagged and suctioned even though he was already bagged 30 minutes prior. Repositioned pt and pt's O2 sats have been stable since. Bagged/suction and turned every two hours with RT. Currently none to scant amount of secretions out. Per RT report, he was not able to even suction back the normal saline so this evening RT did not lavage but only bagged. Pt currently on same cmv vent settings and 50% fiO2. Lung sounds still coarse.   GI: NPO, TF unchanged. FWF 100ml q4hour. 1 smear of stool this afternoon. BS  +.   : Degroot with adequate urine output.   Gtts: n/a  Skin: unchange    See flow sheets for further interventions and assessments.   A: Continue to bag/suction q2hour until tomorrow morning. Will come down on peep tomorrow if pt tolerates. Stable at this time.   P: Cont to monitor, notify MD with any changes or concerns

## 2019-12-17 NOTE — PROGRESS NOTES
Neurosurgery Progress Note     Subjective:  No acute events     Objective:  Temp: 98.4  F (36.9  C) Temp src: Axillary BP: 124/68   Heart Rate: 53 Resp: 22 SpO2: 99 % O2 Device: Mechanical Ventilator    Intubated, partially sedated  Awake, alert, nods yes or no to questions  Follows commands  Deltoids 4/5, biceps 4/5, triceps 1/5,  0/5, lower extremities 0/5  Triple flexion in lower extremities     Assessment:  64 years old gentleman status post C3-C4 artificial disc placement about 2 weeks prior to arriaval the [facility] who presented after a fall with almost complete loss of strength in his upper extremities and paraplegia, found to have cervical stenosis. S/p OR 12/1 - Pharyngotomy closed with assistance from ENT. Arthroplasty implant removed. No replacement implant placed. S/p G-tube 12/5. Extubated 12/5. Re-Intubated 12/6 due to Mucous Plugging and Atelectasis. S/p repeated bronchoscopy, last 12/14.     Plan:  Intubated, mechanically ventilated  Nebs, chest physiotherapy; wean to extubate as able, plan for tracheostomy with ENT - goal PEEP 5 and FiO2 40%; bronchoscopy prn per neurocritical care  DVT: SCDs while in bed  [place] J collar  Per ID: vancomycin, cefepime, flagyl 4-6 wks for vertebral phlegmon, weekly labs; no plan for further surgery; consider repeat MRI cervical spine this week  Follow-up cultures  SubQ heparin & tube feeds  MRI before stopping abx in 6 wks  endocrine consulted for diabetes - appreciate recs  Disposition: 4A        Lion Vargas MD  Neurosurgery Resident PGY2    Please contact neurosurgery resident on call with questions.    Dial * * [phone], enter [phone] when prompted.

## 2019-12-17 NOTE — PLAN OF CARE
"Vital signs:  Temp: 97.6  F (36.4  C) Temp src: Axillary BP: 135/69   Heart Rate: 61 Resp: 20 SpO2: 99 % O2 Device: Mechanical Ventilator Oxygen Delivery: 4 LPM Height: 172.7 cm (5' 8\") Weight: 84.1 kg (185 lb 8 oz)  Estimated body mass index is 28.21 kg/m  as calculated from the following:    Height as of this encounter: 1.727 m (5' 8\").    Weight as of this encounter: 84.1 kg (185 lb 8 oz).    Major Shift Events: Neuro intact, VS WNL except HR 48- 50's times 2 with left side position/ no other s/s with bradycardic- MD notified. FiO2 50%, Ti 450, RR 12, PEEP 8- RT suction/ bagged Q2-3H, thick/ white/tan secretion & pt tolerated it well. Lungs clear/ coarse. Sats >95%. TF 55ml/hr with Q4H 50 ml flushes. - 120's. Small BM overnight, díaz with yellow clear UOP. Phos+ replaced per protocol. Lidocaine patch/ tyle for pain. Abd incision- dermabonded- jessee/cdi.   Plan: possible Trach placement, continue monitor/ POC.   For vital signs and complete assessments, please see documentation flowsheets.      "

## 2019-12-17 NOTE — PROGRESS NOTES
Neuroscience Intensive Care Progress Note  12/17/2019      24 hour events: thick tan secretions. No acute events overnight. Had one BM overnight.     Current Medications:    acetaminophen  650 mg Oral or Feeding Tube Q6H     acetylcysteine  2 mL Nebulization Q4H     albuterol  2.5 mg Nebulization Q4H     amLODIPine  10 mg Oral or Feeding Tube Daily     ceFEPIme (MAXIPIME) IV  2 g Intravenous Q8H     cyanocobalamin  1,000 mcg Intramuscular Q30 Days     famotidine  20 mg Per G Tube BID     folic acid  1 mg Oral or Feeding Tube Daily     heparin ANTICOAGULANT  5,000 Units Subcutaneous Q8H     heparin lock flush  5-10 mL Intracatheter Q24H     insulin aspart  2-16 Units Subcutaneous Q4H     insulin isophane human  38 Units Subcutaneous Q24H     insulin isophane human  38 Units Subcutaneous Q24H     lidocaine  2 patch Transdermal Q24h    And     lidocaine   Transdermal Q24H    And     lidocaine   Transdermal Q8H     metoprolol tartrate  50 mg Oral or Feeding Tube BID     metroNIDAZOLE  500 mg Oral or Feeding Tube TID     mirtazapine  30 mg Oral or Feeding Tube QPM     multivitamins w/minerals  15 mL Per Feeding Tube Daily     potassium & sodium phosphates  1 packet Oral or Feeding Tube BID     QUEtiapine  12.5 mg Oral BID     senna-docusate  1 tablet Oral or Feeding Tube BID     traZODone  100 mg Oral or Feeding Tube At Bedtime     vancomycin (VANCOCIN) IV  1,500 mg Intravenous Q12H     thiamine  100 mg Oral or Feeding Tube Daily       PRN Medications:  carboxymethylcellulose PF, cyclobenzaprine, IV fluid REPLACEMENT ONLY, glucose **OR** dextrose **OR** glucagon, heparin lock flush, hydrALAZINE, HYDROmorphone, ipratropium - albuterol 0.5 mg/2.5 mg/3 mL, labetalol, lidocaine 4%, lidocaine (buffered or not buffered), magnesium sulfate, magnesium sulfate, melatonin, naloxone, ondansetron **OR** ondansetron, oxyCODONE, potassium chloride, potassium chloride with lidocaine, potassium chloride, potassium chloride, potassium  "chloride, potassium phosphate (KPHOS) in D5W IV, potassium phosphate (KPHOS) in D5W IV, potassium phosphate (KPHOS) in D5W IV, potassium phosphate (KPHOS) in D5W IV, potassium phosphate (KPHOS) in D5W IV, prochlorperazine **OR** prochlorperazine **OR** prochlorperazine, sodium chloride (PF), sodium phosphate    Infusions:    IV fluid REPLACEMENT ONLY Stopped (12/14/19 1506)     no pre procedure antibiotic needed         Objective findings:  /62   Pulse 101   Temp 98.4  F (36.9  C) (Axillary)   Resp 28   Ht 1.727 m (5' 8\")   Wt 84.1 kg (185 lb 8 oz)   SpO2 98%   BMI 28.21 kg/m      Ventilator Settings:  Ventilation Mode: CMV/AC  (Continuous Mandatory Ventilation/ Assist Control)  FiO2 (%): 50 %  Rate Set (breaths/minute): 12 breaths/min  Tidal Volume Set (mL): 450 mL  PEEP (cm H2O): 8 cmH2O  Pressure Support (cm H2O): 7 cmH2O  Oxygen Concentration (%): 50 %  Resp: 28      Intake/Output:      Intake/Output Summary (Last 24 hours) at 12/17/2019 1155  Last data filed at 12/17/2019 1100  Gross per 24 hour   Intake 3360 ml   Output 2250 ml   Net 1110 ml     Physical Examination:   Vitals:  B/P: 146/71, T: 98.8, P: 89, R: 23  General:  Laying in bed  HEENT:  NC/AT, no icterus, op pink and moist, no ear or nose drainage.   Cardiac:  RRR, no m/r/g  Chest:  CTAB  Abdomen:  S/NT/ND  Extremities:  No LE swelling.    Skin:  No rash or lesion.      Neuro Exam:  Mental status: alert, follows all commands  Cranial nerves: mild L eye ptosis, EOMI, face symmetric  Motor: Some flexion extension at elbow. Some movement left wrist and fingers, non in distal RUE. but no movements of the legs  Sensory: reports sensation in arms and legs to light touch.     Labs/Studies:  CBC RESULTS:   Recent Labs   Lab Test 12/16/19  0529   WBC 9.0   RBC 3.02*   HGB 8.9*   HCT 29.9*   MCV 99   MCH 29.5   MCHC 29.8*   RDW 14.0          Last Comprehensive Metabolic Panel:  Sodium   Date Value Ref Range Status   12/17/2019 142 133 - 144 " mmol/L Final     Potassium   Date Value Ref Range Status   12/17/2019 4.5 3.4 - 5.3 mmol/L Final     Chloride   Date Value Ref Range Status   12/17/2019 113 (H) 94 - 109 mmol/L Final     Carbon Dioxide   Date Value Ref Range Status   12/17/2019 25 20 - 32 mmol/L Final     Anion Gap   Date Value Ref Range Status   12/17/2019 3 3 - 14 mmol/L Final     Glucose   Date Value Ref Range Status   12/17/2019 114 (H) 70 - 99 mg/dL Final     Urea Nitrogen   Date Value Ref Range Status   12/17/2019 29 7 - 30 mg/dL Final     Creatinine   Date Value Ref Range Status   12/17/2019 0.48 (L) 0.66 - 1.25 mg/dL Final     GFR Estimate   Date Value Ref Range Status   12/17/2019 >90 >60 mL/min/[1.73_m2] Final     Comment:     Non  GFR Calc  Starting 12/18/2018, serum creatinine based estimated GFR (eGFR) will be   calculated using the Chronic Kidney Disease Epidemiology Collaboration   (CKD-EPI) equation.       Calcium   Date Value Ref Range Status   12/17/2019 8.2 (L) 8.5 - 10.1 mg/dL Final     ==========================================================    ASSESSMENT/PLAN: Sherwin Angel is a 64 year old with hx of recent of anterior C3-4 arthroplasty (2 weeks prior at [facility]) who was admitted 12/1/2019 after a fall at rehab, resulting in lower extremity paraplegia. Found to have cervical stenosis. Now s/p one pharyngotomy closure on 12/1, still has one pharyngotomy present. Arthroplasty implant removed with no replacement implant placed. He has been on abx for cervical tissue cultures with ID following, restarted on vanc/flagyl/cefepime with concern for ongoing spinal infection.      Neuro:  #Cervical spinal cord injury  #Discitis/osteomyelitis C3-C4  - Collar at all times, Up with assist  - Neuro checks q4h during the day   - PT/OT     #Anxiety/Pain  - Dilauded PRN  - Oxycodone 5-10 q4hrs prn   - flexeril 7.5 mg TID prn     #Hx of Alcohol dependence  - Thiamine & folate daily     #Depression  - Continue PTA  miratazepine  - restarted trazodone 100 mg at bedtime   - Seroquel 12.5 BID      Resp:  #Acute respiratory failure s/p re-intubation 12/6, suspect diaphragmatic weakness related to spine injury.   #Mucous plugging Rt lung   - Mucomyst & chest physio   - daily CXR  --metanebs  --chest physio per RT  --percussive therapy  - wean ventilator support as able  - desaturation when positioned on Rt side, try positional changes first   - ENT plans for trach once able to tolerate peep of 5     Cardio:  #Hx of hypertension   - PTA amlodipine 10 mg   - PTA metoprolol 50mg bid     Renal:  Hypernatremia - resolved   Hypophosphatemia - neutraphos  - Electrolyte replacement protocol   - Monitor I/O  - free water flushes 100 q4h  - goal In= outs     Neurogenic urinary retention   - failed díaz wean trial 12/12     Endo:  #DM2  - Sliding scale insulin, high dose  - 38 U NPH am/pm      Heme:  #Normocytic anemia, stable  - Hg > 7.0   - Plt > 100k   - INR <1.5      GI:  #Constipation - resolved last BM 12/17  - Senna scheduled at BID  - PRN suppository/enema available     #Diet  - G-tube in place  - Tube feeds at goal      ID:   #Discitis vs osteomyelitis   #Fever - resolved   #Leukocytosis and CRP down trending   #repeat CT MRI with increased inflammatory changes and prevertebral soft tissue thickening   - Restarted vanc, cefepime, metronidazole regimen which patient was on prior to fever starting  - Weekly CBC, CMP, CRP while on IV abx,  - Blood cultures (12/1, 12/3, 12/7, 12/11): NGTD  - Spinal tissue cultures (12/1): MSSA, Strep anginosus, Eikenella, and oral anaerobes   - Will page ID attending at [id number] prior to discharging pt     Previous abx    - Stopped Vancomycin, metronidazole, cefepime on 12/4, fever started 12/7  - switched Unasyn to Zosyn after ongoing fever (abx plan for 12/2-1/12)     # left eye conjunctival irration with white discharge  -erythromycin ointment      Lines:  -ETT, PICC, PIV x 2, Díaz, PEG     FEN: TF via  PEG   PPX:    DVT prophylaxis: SCDs, Heparin subcutaneous    GI: famotidine   Code Status: Full, presumed     Dispo: 4A until medically stable     This patient was seen & discussed with my attending, Dr. Figueroa, who agrees with my assessment and plan.     Ladan Stevens,   Neurology PGY-2   [phone]

## 2019-12-18 ENCOUNTER — APPOINTMENT (OUTPATIENT)
Dept: GENERAL RADIOLOGY | Facility: CLINIC | Age: 64
DRG: 003 | End: 2019-12-18
Attending: NURSE PRACTITIONER
Payer: COMMERCIAL

## 2019-12-18 ENCOUNTER — PREP FOR PROCEDURE (OUTPATIENT)
Dept: OTOLARYNGOLOGY | Facility: CLINIC | Age: 64
End: 2019-12-18

## 2019-12-18 LAB
ANION GAP SERPL CALCULATED.3IONS-SCNC: 4 MMOL/L (ref 3–14)
BASOPHILS # BLD AUTO: 0 10E9/L (ref 0–0.2)
BASOPHILS NFR BLD AUTO: 0.1 %
BUN SERPL-MCNC: 28 MG/DL (ref 7–30)
CALCIUM SERPL-MCNC: 8.4 MG/DL (ref 8.5–10.1)
CHLORIDE SERPL-SCNC: 109 MMOL/L (ref 94–109)
CO2 SERPL-SCNC: 27 MMOL/L (ref 20–32)
CREAT SERPL-MCNC: 0.5 MG/DL (ref 0.66–1.25)
DIFFERENTIAL METHOD BLD: ABNORMAL
EOSINOPHIL # BLD AUTO: 0.1 10E9/L (ref 0–0.7)
EOSINOPHIL NFR BLD AUTO: 1.4 %
ERYTHROCYTE [DISTWIDTH] IN BLOOD BY AUTOMATED COUNT: 14.3 % (ref 10–15)
GFR SERPL CREATININE-BSD FRML MDRD: >90 ML/MIN/{1.73_M2}
GLUCOSE BLDC GLUCOMTR-MCNC: 107 MG/DL (ref 70–99)
GLUCOSE BLDC GLUCOMTR-MCNC: 113 MG/DL (ref 70–99)
GLUCOSE BLDC GLUCOMTR-MCNC: 50 MG/DL (ref 70–99)
GLUCOSE BLDC GLUCOMTR-MCNC: 52 MG/DL (ref 70–99)
GLUCOSE BLDC GLUCOMTR-MCNC: 68 MG/DL (ref 70–99)
GLUCOSE BLDC GLUCOMTR-MCNC: 74 MG/DL (ref 70–99)
GLUCOSE BLDC GLUCOMTR-MCNC: 79 MG/DL (ref 70–99)
GLUCOSE BLDC GLUCOMTR-MCNC: 82 MG/DL (ref 70–99)
GLUCOSE SERPL-MCNC: 111 MG/DL (ref 70–99)
HCT VFR BLD AUTO: 27.1 % (ref 40–53)
HGB BLD-MCNC: 8.2 G/DL (ref 13.3–17.7)
IMM GRANULOCYTES # BLD: 0.1 10E9/L (ref 0–0.4)
IMM GRANULOCYTES NFR BLD: 1.3 %
LYMPHOCYTES # BLD AUTO: 1.8 10E9/L (ref 0.8–5.3)
LYMPHOCYTES NFR BLD AUTO: 23 %
MAGNESIUM SERPL-MCNC: 2.1 MG/DL (ref 1.6–2.3)
MCH RBC QN AUTO: 29.5 PG (ref 26.5–33)
MCHC RBC AUTO-ENTMCNC: 30.3 G/DL (ref 31.5–36.5)
MCV RBC AUTO: 98 FL (ref 78–100)
MONOCYTES # BLD AUTO: 0.7 10E9/L (ref 0–1.3)
MONOCYTES NFR BLD AUTO: 9.3 %
NEUTROPHILS # BLD AUTO: 5.1 10E9/L (ref 1.6–8.3)
NEUTROPHILS NFR BLD AUTO: 64.9 %
NRBC # BLD AUTO: 0 10*3/UL
NRBC BLD AUTO-RTO: 0 /100
PHOSPHATE SERPL-MCNC: 3.2 MG/DL (ref 2.5–4.5)
PLATELET # BLD AUTO: 284 10E9/L (ref 150–450)
POTASSIUM SERPL-SCNC: 4 MMOL/L (ref 3.4–5.3)
RBC # BLD AUTO: 2.78 10E12/L (ref 4.4–5.9)
SODIUM SERPL-SCNC: 140 MMOL/L (ref 133–144)
WBC # BLD AUTO: 7.8 10E9/L (ref 4–11)

## 2019-12-18 PROCEDURE — 25800030 ZZH RX IP 258 OP 636: Performed by: NEUROLOGICAL SURGERY

## 2019-12-18 PROCEDURE — 80048 BASIC METABOLIC PNL TOTAL CA: CPT | Performed by: STUDENT IN AN ORGANIZED HEALTH CARE EDUCATION/TRAINING PROGRAM

## 2019-12-18 PROCEDURE — 25000132 ZZH RX MED GY IP 250 OP 250 PS 637: Mod: GY | Performed by: NEUROLOGICAL SURGERY

## 2019-12-18 PROCEDURE — 25000132 ZZH RX MED GY IP 250 OP 250 PS 637: Mod: GY | Performed by: PSYCHIATRY & NEUROLOGY

## 2019-12-18 PROCEDURE — 40000275 ZZH STATISTIC RCP TIME EA 10 MIN

## 2019-12-18 PROCEDURE — 36415 COLL VENOUS BLD VENIPUNCTURE: CPT | Performed by: STUDENT IN AN ORGANIZED HEALTH CARE EDUCATION/TRAINING PROGRAM

## 2019-12-18 PROCEDURE — 20000004 ZZH R&B ICU UMMC

## 2019-12-18 PROCEDURE — 71045 X-RAY EXAM CHEST 1 VIEW: CPT

## 2019-12-18 PROCEDURE — 25000132 ZZH RX MED GY IP 250 OP 250 PS 637: Mod: GY | Performed by: COUNSELOR

## 2019-12-18 PROCEDURE — 83735 ASSAY OF MAGNESIUM: CPT | Performed by: STUDENT IN AN ORGANIZED HEALTH CARE EDUCATION/TRAINING PROGRAM

## 2019-12-18 PROCEDURE — 85025 COMPLETE CBC W/AUTO DIFF WBC: CPT | Performed by: STUDENT IN AN ORGANIZED HEALTH CARE EDUCATION/TRAINING PROGRAM

## 2019-12-18 PROCEDURE — 00000146 ZZHCL STATISTIC GLUCOSE BY METER IP

## 2019-12-18 PROCEDURE — 94640 AIRWAY INHALATION TREATMENT: CPT | Mod: 76

## 2019-12-18 PROCEDURE — 25800025 ZZH RX 258: Performed by: STUDENT IN AN ORGANIZED HEALTH CARE EDUCATION/TRAINING PROGRAM

## 2019-12-18 PROCEDURE — 25000132 ZZH RX MED GY IP 250 OP 250 PS 637: Mod: GY | Performed by: STUDENT IN AN ORGANIZED HEALTH CARE EDUCATION/TRAINING PROGRAM

## 2019-12-18 PROCEDURE — 25000132 ZZH RX MED GY IP 250 OP 250 PS 637: Mod: GY | Performed by: NURSE PRACTITIONER

## 2019-12-18 PROCEDURE — 40000961 ZZH STATISTIC INTRAPULMONARY PERCUSSIVE VENT

## 2019-12-18 PROCEDURE — 94640 AIRWAY INHALATION TREATMENT: CPT

## 2019-12-18 PROCEDURE — 25000125 ZZHC RX 250: Performed by: CLINICAL NURSE SPECIALIST

## 2019-12-18 PROCEDURE — 25000125 ZZHC RX 250

## 2019-12-18 PROCEDURE — 94003 VENT MGMT INPAT SUBQ DAY: CPT

## 2019-12-18 PROCEDURE — 25000128 H RX IP 250 OP 636: Performed by: NEUROLOGICAL SURGERY

## 2019-12-18 PROCEDURE — 84100 ASSAY OF PHOSPHORUS: CPT | Performed by: STUDENT IN AN ORGANIZED HEALTH CARE EDUCATION/TRAINING PROGRAM

## 2019-12-18 PROCEDURE — 25000128 H RX IP 250 OP 636: Performed by: COUNSELOR

## 2019-12-18 RX ORDER — DEXAMETHASONE SODIUM PHOSPHATE 4 MG/ML
10 INJECTION, SOLUTION INTRA-ARTICULAR; INTRALESIONAL; INTRAMUSCULAR; INTRAVENOUS; SOFT TISSUE ONCE
Status: CANCELLED | OUTPATIENT
Start: 2019-12-18 | End: 2019-12-18

## 2019-12-18 RX ORDER — HEPARIN SODIUM 5000 [USP'U]/.5ML
5000 INJECTION, SOLUTION INTRAVENOUS; SUBCUTANEOUS EVERY 12 HOURS
Status: DISCONTINUED | OUTPATIENT
Start: 2019-12-18 | End: 2019-12-19

## 2019-12-18 RX ORDER — LIDOCAINE 4 G/G
2 PATCH TOPICAL
Status: DISCONTINUED | OUTPATIENT
Start: 2019-12-18 | End: 2019-12-31 | Stop reason: HOSPADM

## 2019-12-18 RX ADMIN — OXYCODONE HYDROCHLORIDE 5 MG: 5 SOLUTION ORAL at 01:41

## 2019-12-18 RX ADMIN — POTASSIUM CHLORIDE 20 MEQ: 1.5 POWDER, FOR SOLUTION ORAL at 08:26

## 2019-12-18 RX ADMIN — ACETYLCYSTEINE 4 ML: 100 INHALANT RESPIRATORY (INHALATION) at 23:50

## 2019-12-18 RX ADMIN — ALBUTEROL SULFATE 2.5 MG: 2.5 SOLUTION RESPIRATORY (INHALATION) at 04:21

## 2019-12-18 RX ADMIN — CEFEPIME HYDROCHLORIDE 2 G: 2 INJECTION, POWDER, FOR SOLUTION INTRAVENOUS at 06:45

## 2019-12-18 RX ADMIN — POTASSIUM & SODIUM PHOSPHATES POWDER PACK 280-160-250 MG 1 PACKET: 280-160-250 PACK at 08:26

## 2019-12-18 RX ADMIN — Medication 12.5 MG: at 08:28

## 2019-12-18 RX ADMIN — IPRATROPIUM BROMIDE AND ALBUTEROL SULFATE 3 ML: .5; 3 SOLUTION RESPIRATORY (INHALATION) at 13:06

## 2019-12-18 RX ADMIN — ALBUTEROL SULFATE 2.5 MG: 2.5 SOLUTION RESPIRATORY (INHALATION) at 23:50

## 2019-12-18 RX ADMIN — FOLIC ACID 1 MG: 1 TABLET ORAL at 08:26

## 2019-12-18 RX ADMIN — METRONIDAZOLE 500 MG: 500 TABLET ORAL at 20:11

## 2019-12-18 RX ADMIN — SENNOSIDES AND DOCUSATE SODIUM 1 TABLET: 8.6; 5 TABLET ORAL at 20:11

## 2019-12-18 RX ADMIN — DEXTROSE MONOHYDRATE: 100 INJECTION, SOLUTION INTRAVENOUS at 01:30

## 2019-12-18 RX ADMIN — HEPARIN SODIUM 5000 UNITS: 5000 INJECTION, SOLUTION INTRAVENOUS; SUBCUTANEOUS at 16:48

## 2019-12-18 RX ADMIN — ACETYLCYSTEINE 2 ML: 100 INHALANT RESPIRATORY (INHALATION) at 08:40

## 2019-12-18 RX ADMIN — POTASSIUM & SODIUM PHOSPHATES POWDER PACK 280-160-250 MG 1 PACKET: 280-160-250 PACK at 20:11

## 2019-12-18 RX ADMIN — MULTIVITAMIN 15 ML: LIQUID ORAL at 20:11

## 2019-12-18 RX ADMIN — Medication 100 MG: at 08:26

## 2019-12-18 RX ADMIN — HEPARIN SODIUM 5000 UNITS: 5000 INJECTION, SOLUTION INTRAVENOUS; SUBCUTANEOUS at 00:40

## 2019-12-18 RX ADMIN — ACETYLCYSTEINE 2 ML: 100 INHALANT RESPIRATORY (INHALATION) at 19:48

## 2019-12-18 RX ADMIN — FAMOTIDINE 20 MG: 40 POWDER, FOR SUSPENSION ORAL at 08:28

## 2019-12-18 RX ADMIN — LIDOCAINE 2 PATCH: 560 PATCH PERCUTANEOUS; TOPICAL; TRANSDERMAL at 20:11

## 2019-12-18 RX ADMIN — MIRTAZAPINE 30 MG: 15 TABLET, FILM COATED ORAL at 20:11

## 2019-12-18 RX ADMIN — ACETYLCYSTEINE 2 ML: 100 INHALANT RESPIRATORY (INHALATION) at 17:01

## 2019-12-18 RX ADMIN — METOPROLOL TARTRATE 50 MG: 50 TABLET, FILM COATED ORAL at 08:26

## 2019-12-18 RX ADMIN — CARBOXYMETHYLCELLULOSE SODIUM 1 DROP: 5 SOLUTION/ DROPS OPHTHALMIC at 16:18

## 2019-12-18 RX ADMIN — METRONIDAZOLE 500 MG: 500 TABLET ORAL at 08:26

## 2019-12-18 RX ADMIN — ACETYLCYSTEINE 4 ML: 100 INHALANT RESPIRATORY (INHALATION) at 04:21

## 2019-12-18 RX ADMIN — FAMOTIDINE 20 MG: 40 POWDER, FOR SUSPENSION ORAL at 20:12

## 2019-12-18 RX ADMIN — ACETYLCYSTEINE 2 ML: 100 INHALANT RESPIRATORY (INHALATION) at 13:07

## 2019-12-18 RX ADMIN — VANCOMYCIN HYDROCHLORIDE 1500 MG: 10 INJECTION, POWDER, LYOPHILIZED, FOR SOLUTION INTRAVENOUS at 16:38

## 2019-12-18 RX ADMIN — CARBOXYMETHYLCELLULOSE SODIUM 1 DROP: 5 SOLUTION/ DROPS OPHTHALMIC at 20:11

## 2019-12-18 RX ADMIN — AMLODIPINE BESYLATE 10 MG: 10 TABLET ORAL at 08:26

## 2019-12-18 RX ADMIN — ALBUTEROL SULFATE 2.5 MG: 2.5 SOLUTION RESPIRATORY (INHALATION) at 08:40

## 2019-12-18 RX ADMIN — ALBUTEROL SULFATE 2.5 MG: 2.5 SOLUTION RESPIRATORY (INHALATION) at 17:01

## 2019-12-18 RX ADMIN — VANCOMYCIN HYDROCHLORIDE 1500 MG: 10 INJECTION, POWDER, LYOPHILIZED, FOR SOLUTION INTRAVENOUS at 05:09

## 2019-12-18 RX ADMIN — ACETAMINOPHEN 650 MG: 325 TABLET, FILM COATED ORAL at 14:14

## 2019-12-18 RX ADMIN — ALBUTEROL SULFATE 2.5 MG: 2.5 SOLUTION RESPIRATORY (INHALATION) at 19:47

## 2019-12-18 RX ADMIN — OXYCODONE HYDROCHLORIDE 5 MG: 5 SOLUTION ORAL at 05:53

## 2019-12-18 RX ADMIN — METRONIDAZOLE 500 MG: 500 TABLET ORAL at 14:14

## 2019-12-18 RX ADMIN — ACETAMINOPHEN 650 MG: 325 TABLET, FILM COATED ORAL at 01:41

## 2019-12-18 RX ADMIN — ACETAMINOPHEN 650 MG: 325 TABLET, FILM COATED ORAL at 20:11

## 2019-12-18 RX ADMIN — TRAZODONE HYDROCHLORIDE 100 MG: 50 TABLET ORAL at 22:09

## 2019-12-18 RX ADMIN — CEFEPIME HYDROCHLORIDE 2 G: 2 INJECTION, POWDER, FOR SOLUTION INTRAVENOUS at 22:58

## 2019-12-18 RX ADMIN — METOPROLOL TARTRATE 50 MG: 50 TABLET, FILM COATED ORAL at 20:11

## 2019-12-18 RX ADMIN — ACETAMINOPHEN 650 MG: 325 TABLET, FILM COATED ORAL at 08:26

## 2019-12-18 RX ADMIN — OXYCODONE HYDROCHLORIDE 5 MG: 5 SOLUTION ORAL at 20:11

## 2019-12-18 RX ADMIN — Medication 12.5 MG: at 20:12

## 2019-12-18 RX ADMIN — CEFEPIME HYDROCHLORIDE 2 G: 2 INJECTION, POWDER, FOR SOLUTION INTRAVENOUS at 15:22

## 2019-12-18 RX ADMIN — CARBOXYMETHYLCELLULOSE SODIUM 1 DROP: 5 SOLUTION/ DROPS OPHTHALMIC at 08:54

## 2019-12-18 ASSESSMENT — ACTIVITIES OF DAILY LIVING (ADL)
ADLS_ACUITY_SCORE: 27

## 2019-12-18 ASSESSMENT — MIFFLIN-ST. JEOR: SCORE: 1625.5

## 2019-12-18 NOTE — PROGRESS NOTES
Neuroscience Intensive Care Progress Note  12/18/2019      24 hour events: tolerating peep 5 fi 02 40%.     Current Medications:    acetaminophen  650 mg Oral or Feeding Tube Q6H     acetylcysteine  2 mL Nebulization Q4H     albuterol  2.5 mg Nebulization Q4H     amLODIPine  10 mg Oral or Feeding Tube Daily     ceFEPIme (MAXIPIME) IV  2 g Intravenous Q8H     cyanocobalamin  1,000 mcg Intramuscular Q30 Days     famotidine  20 mg Per G Tube BID     folic acid  1 mg Oral or Feeding Tube Daily     heparin lock flush  5-10 mL Intracatheter Q24H     insulin aspart  2-16 Units Subcutaneous Q4H     insulin isophane human  38 Units Subcutaneous Q24H     insulin isophane human  38 Units Subcutaneous Q24H     lidocaine  2 patch Transdermal Q24h    And     lidocaine   Transdermal Q24H    And     lidocaine   Transdermal Q8H     metoprolol tartrate  50 mg Oral or Feeding Tube BID     metroNIDAZOLE  500 mg Oral or Feeding Tube TID     mirtazapine  30 mg Oral or Feeding Tube QPM     multivitamins w/minerals  15 mL Per Feeding Tube Daily     potassium & sodium phosphates  1 packet Oral or Feeding Tube BID     QUEtiapine  12.5 mg Oral BID     senna-docusate  1 tablet Oral or Feeding Tube BID     traZODone  100 mg Oral or Feeding Tube At Bedtime     vancomycin (VANCOCIN) IV  1,500 mg Intravenous Q12H     thiamine  100 mg Oral or Feeding Tube Daily       PRN Medications:  carboxymethylcellulose PF, cyclobenzaprine, IV fluid REPLACEMENT ONLY, glucose **OR** dextrose **OR** glucagon, heparin lock flush, hydrALAZINE, HYDROmorphone, ipratropium - albuterol 0.5 mg/2.5 mg/3 mL, labetalol, lidocaine 4%, lidocaine (buffered or not buffered), magnesium sulfate, magnesium sulfate, melatonin, naloxone, ondansetron **OR** ondansetron, oxyCODONE, potassium chloride, potassium chloride with lidocaine, potassium chloride, potassium chloride, potassium chloride, potassium phosphate (KPHOS) in D5W IV, potassium phosphate (KPHOS) in D5W IV, potassium  "phosphate (KPHOS) in D5W IV, potassium phosphate (KPHOS) in D5W IV, potassium phosphate (KPHOS) in D5W IV, prochlorperazine **OR** prochlorperazine **OR** prochlorperazine, sodium chloride (PF), sodium phosphate    Infusions:    IV fluid REPLACEMENT ONLY 55 mL/hr at 12/18/19 0200     no pre procedure antibiotic needed         Objective findings:  BP (!) 146/77   Pulse 101   Temp 98.6  F (37  C) (Axillary)   Resp 17   Ht 1.727 m (5' 8\")   Wt 85.6 kg (188 lb 11.4 oz)   SpO2 98%   BMI 28.69 kg/m      Ventilator Settings:  Ventilation Mode: CMV/AC  (Continuous Mandatory Ventilation/ Assist Control)  FiO2 (%): 40 %  Rate Set (breaths/minute): 12 breaths/min  Tidal Volume Set (mL): 450 mL  PEEP (cm H2O): 5 cmH2O  Pressure Support (cm H2O): 7 cmH2O  Oxygen Concentration (%): 40 %  Resp: 17      Intake/Output:    Intake/Output Summary (Last 24 hours) at 12/18/2019 0735  Last data filed at 12/18/2019 0700  Gross per 24 hour   Intake 2989.5 ml   Output 1990 ml   Net 999.5 ml         Physical Examination:   Vitals:  B/P: 146/71, T: 98.8, P: 89, R: 23  General:  Laying in bed  HEENT:  NC/AT, no icterus, op pink and moist, no ear or nose drainage.   Cardiac:  RRR, no m/r/g  Chest:  CTAB  Abdomen:  S/NT/ND  Extremities:  No LE swelling.    Skin:  No rash or lesion.      Neuro Exam:   Mental status: alert, follows all commands  Cranial nerves: mild L eye ptosis, EOMI, face symmetric  Motor: Some flexion extension at elbow bilaterally more left than right. Some movement left wrist and fingers, no thumb abduction, no movement in distal RUE. No movements of the legs  Sensory: reports sensation in arms and legs to light touch.     Labs/Studies:  CBC RESULTS:   Recent Labs   Lab Test 12/18/19  0349   WBC 7.8   RBC 2.78*   HGB 8.2*   HCT 27.1*   MCV 98   MCH 29.5   MCHC 30.3*   RDW 14.3        Last Comprehensive Metabolic Panel:  Sodium   Date Value Ref Range Status   12/18/2019 140 133 - 144 mmol/L Final     Potassium "   Date Value Ref Range Status   12/18/2019 4.0 3.4 - 5.3 mmol/L Final     Chloride   Date Value Ref Range Status   12/18/2019 109 94 - 109 mmol/L Final     Carbon Dioxide   Date Value Ref Range Status   12/18/2019 27 20 - 32 mmol/L Final     Anion Gap   Date Value Ref Range Status   12/18/2019 4 3 - 14 mmol/L Final     Glucose   Date Value Ref Range Status   12/18/2019 111 (H) 70 - 99 mg/dL Final     Urea Nitrogen   Date Value Ref Range Status   12/18/2019 28 7 - 30 mg/dL Final     Creatinine   Date Value Ref Range Status   12/18/2019 0.50 (L) 0.66 - 1.25 mg/dL Final     GFR Estimate   Date Value Ref Range Status   12/18/2019 >90 >60 mL/min/[1.73_m2] Final     Comment:     Non  GFR Calc  Starting 12/18/2018, serum creatinine based estimated GFR (eGFR) will be   calculated using the Chronic Kidney Disease Epidemiology Collaboration   (CKD-EPI) equation.       Calcium   Date Value Ref Range Status   12/18/2019 8.4 (L) 8.5 - 10.1 mg/dL Final     Neuro:  #Cervical spinal cord injury  #Discitis/osteomyelitis C3-C4  - Collar at all times, Up with assist  - Neuro checks q4h during the day   - PT/OT     #Anxiety/Pain - good control   - Dilauded PRN  - Oxycodone 5-10 q4hrs prn   - flexeril 7.5 mg TID prn     #Hx of Alcohol dependence  - Thiamine & folate daily     #Depression  - Continue PTA miratazepine  - restarted trazodone 100 mg at bedtime   - Seroquel 12.5 BID      Resp:  #Acute respiratory failure s/p re-intubation 12/6, suspect diaphragmatic weakness related to spine injury.   #Mucous plugging Rt lung   - Mucomyst & chest physio   - daily CXR  --metanebs  --chest physio per RT  --percussive therapy  - wean ventilator support as able  - desaturation when positioned on Rt side, try positional changes first   - ENT plans for trach once able to tolerate peep of 5 this has been stable for 24 hours      Cardio:  #Hx of hypertension   - PTA amlodipine 10 mg   - PTA metoprolol 50mg  bid     Renal:  Hypernatremia - resolved   Hypophosphatemia - neutraphos  - Electrolyte replacement protocol   - Monitor I/O, net + 1L   - free water flushes 100 q4h  - goal In= outs     Neurogenic urinary retention   - failed díaz wean trial 12/12     Endo:  #DM2  - Sliding scale insulin, high dose  - 38 U NPH am/pm      Heme:  #Normocytic anemia, stable  - Hg > 7.0   - Plt > 100k   - INR <1.5      GI:  #Constipation - resolved last BM 12/17  - Senna scheduled at BID  - PRN suppository/enema available     #Diet  - G-tube in place  - Tube feeds at goal      ID:   #Discitis vs osteomyelitis   #Fever - resolved   #Leukocytosis and CRP down trending   #repeat CT MRI with increased inflammatory changes and prevertebral soft tissue thickening   - Restarted vanc, cefepime, metronidazole regimen which patient was on prior to fever starting  - Weekly CBC, CMP, CRP while on IV abx,  - Blood cultures (12/1, 12/3, 12/7, 12/11): NGTD  - Spinal tissue cultures (12/1): MSSA, Strep anginosus, Eikenella, and oral anaerobes   - Will page ID attending at [id number] prior to discharging pt     Previous abx    - Stopped Vancomycin, metronidazole, cefepime on 12/4, fever started 12/7  - switched Unasyn to Zosyn after ongoing fever (abx plan for 12/2-1/12)     Lines:  -ETT, PICC, PIV x 2, Díaz, PEG     FEN: TF via PEG , held for trach   PPX:    DVT prophylaxis: SCDs, Heparin subcutaneous held for potential trach    GI: famotidine   Code Status: Full, presumed     Dispo: 4A until trach, more medically stable     This patient was seen & discussed with my attending, Dr. Figueroa, who agrees with my assessment and plan.     Ladan Stevens,   Neurology PGY-2   [phone]

## 2019-12-18 NOTE — PLAN OF CARE
Major Shift Events:  FiO2 down to 40%, PEEP down to 5. Bag suction q2. Still desats when placed on R side. Pressure supported X2 for one hour, tolerated very well. In chair for 6 hours with frequent repositions. Oxycodone given X3 for general pains  Plan: Possible trach tomorrow if sats stay adequate  For vital signs and complete assessments, please see documentation flowsheets.

## 2019-12-18 NOTE — PLAN OF CARE
Discharge Planner PT / 4A  Patient plan for discharge: not discussed   Current status:  In order to increase strength and ROM pt participates in BUE AAROM, active movements including bilateral shoulder flex/ext, abd/add and IR/ER, elbow flex, and small movements at wrist, UE strength continues to make gradual improvements. Patient noted to have flexion of right hip&knee x3 in response to therapist moving leg, also observed to have toe extension in response to to light touch in bilateral toes. VSS throughout on VC-AC FiO2 40%, PEEP 5.   Barriers to return to prior living situation: medical needs, vent weaning, current level of function  Recommendations for discharge: rehab  Rationale for recommendations: Patient will require continued skilled therapy to increase strength and maximize functional independence.

## 2019-12-18 NOTE — PROGRESS NOTES
Shift Summary 0700 to 1930  D: Pt remains on vent CMV mode. Requires no sedation. Afebrile. Bradycardic with right sided turns. BP stable.   I: Aggressive pulmonary toilet with metanebs and deep suctioning done. Kept NPO and in bed for anticipated on call to OR which did not occur.Restarted feeds at previous rate, stopped D10 gtt.  Treated hypoglycemia with 30 gm carb (apple juice) down feeding tube.   A: Pt denies pain. Requested eye drops x 2 this shift. Lung sounds remain coarse, still suctioning moderate amounts of sputum. Adequate urine outputs.   P: anticipate Trach surgery on Friday. Continue plan of care

## 2019-12-18 NOTE — PLAN OF CARE
"Vital signs:  Temp: 98.6  F (37  C) Temp src: Axillary BP: (!) 152/72   Heart Rate: 57 Resp: 18 SpO2: 98 % O2 Device: Mechanical Ventilator Oxygen Delivery: 4 LPM Height: 172.7 cm (5' 8\") Weight: 85.6 kg (188 lb 11.4 oz)  Estimated body mass index is 28.69 kg/m  as calculated from the following:    Height as of this encounter: 1.727 m (5' 8\").    Weight as of this encounter: 85.6 kg (188 lb 11.4 oz).      Major Shift Events: Sats> 95% on current setting, bagging/ suctioned per RT overnight. Lungs remains coarse/ dim. HR 50 s- 60 s. SBP< 160. TF held since 0130- rechecked with MD regarding TF hold order for Trach. MD ordered to hold TF for the procedure. D10% infusing per protocol. 's. Oxy 5 mg given X3 for neck pain.   Plan: Trach today. Will hold HumuLIN this morning.  For vital signs and complete assessments, please see documentation flowsheets.   "

## 2019-12-19 ENCOUNTER — APPOINTMENT (OUTPATIENT)
Dept: MRI IMAGING | Facility: CLINIC | Age: 64
DRG: 003 | End: 2019-12-19
Attending: STUDENT IN AN ORGANIZED HEALTH CARE EDUCATION/TRAINING PROGRAM
Payer: COMMERCIAL

## 2019-12-19 ENCOUNTER — APPOINTMENT (OUTPATIENT)
Dept: PHYSICAL THERAPY | Facility: CLINIC | Age: 64
DRG: 003 | End: 2019-12-19
Attending: NEUROLOGICAL SURGERY
Payer: COMMERCIAL

## 2019-12-19 ENCOUNTER — APPOINTMENT (OUTPATIENT)
Dept: GENERAL RADIOLOGY | Facility: CLINIC | Age: 64
DRG: 003 | End: 2019-12-19
Attending: NURSE PRACTITIONER
Payer: COMMERCIAL

## 2019-12-19 LAB
ABO + RH BLD: NORMAL
ABO + RH BLD: NORMAL
ANION GAP SERPL CALCULATED.3IONS-SCNC: 3 MMOL/L (ref 3–14)
BASOPHILS # BLD AUTO: 0 10E9/L (ref 0–0.2)
BASOPHILS NFR BLD AUTO: 0.4 %
BLD GP AB SCN SERPL QL: NORMAL
BLOOD BANK CMNT PATIENT-IMP: NORMAL
BUN SERPL-MCNC: 18 MG/DL (ref 7–30)
CALCIUM SERPL-MCNC: 8.4 MG/DL (ref 8.5–10.1)
CHLORIDE SERPL-SCNC: 107 MMOL/L (ref 94–109)
CO2 SERPL-SCNC: 29 MMOL/L (ref 20–32)
CREAT SERPL-MCNC: 0.47 MG/DL (ref 0.66–1.25)
DIFFERENTIAL METHOD BLD: ABNORMAL
EOSINOPHIL # BLD AUTO: 0.1 10E9/L (ref 0–0.7)
EOSINOPHIL NFR BLD AUTO: 1.4 %
ERYTHROCYTE [DISTWIDTH] IN BLOOD BY AUTOMATED COUNT: 14 % (ref 10–15)
GFR SERPL CREATININE-BSD FRML MDRD: >90 ML/MIN/{1.73_M2}
GLUCOSE BLDC GLUCOMTR-MCNC: 102 MG/DL (ref 70–99)
GLUCOSE BLDC GLUCOMTR-MCNC: 110 MG/DL (ref 70–99)
GLUCOSE BLDC GLUCOMTR-MCNC: 135 MG/DL (ref 70–99)
GLUCOSE BLDC GLUCOMTR-MCNC: 154 MG/DL (ref 70–99)
GLUCOSE BLDC GLUCOMTR-MCNC: 85 MG/DL (ref 70–99)
GLUCOSE SERPL-MCNC: 126 MG/DL (ref 70–99)
HCT VFR BLD AUTO: 29.3 % (ref 40–53)
HGB BLD-MCNC: 8.9 G/DL (ref 13.3–17.7)
IMM GRANULOCYTES # BLD: 0.1 10E9/L (ref 0–0.4)
IMM GRANULOCYTES NFR BLD: 1.9 %
LYMPHOCYTES # BLD AUTO: 1.6 10E9/L (ref 0.8–5.3)
LYMPHOCYTES NFR BLD AUTO: 21.5 %
MAGNESIUM SERPL-MCNC: 2 MG/DL (ref 1.6–2.3)
MCH RBC QN AUTO: 28.9 PG (ref 26.5–33)
MCHC RBC AUTO-ENTMCNC: 30.4 G/DL (ref 31.5–36.5)
MCV RBC AUTO: 95 FL (ref 78–100)
MONOCYTES # BLD AUTO: 0.7 10E9/L (ref 0–1.3)
MONOCYTES NFR BLD AUTO: 9.7 %
NEUTROPHILS # BLD AUTO: 4.7 10E9/L (ref 1.6–8.3)
NEUTROPHILS NFR BLD AUTO: 65.1 %
NRBC # BLD AUTO: 0 10*3/UL
NRBC BLD AUTO-RTO: 0 /100
PHOSPHATE SERPL-MCNC: 3.1 MG/DL (ref 2.5–4.5)
PLATELET # BLD AUTO: 278 10E9/L (ref 150–450)
PLATELET # BLD AUTO: 286 10E9/L (ref 150–450)
POTASSIUM SERPL-SCNC: 3.8 MMOL/L (ref 3.4–5.3)
RADIOLOGIST FLAGS: ABNORMAL
RBC # BLD AUTO: 3.08 10E12/L (ref 4.4–5.9)
SODIUM SERPL-SCNC: 139 MMOL/L (ref 133–144)
SPECIMEN EXP DATE BLD: NORMAL
VANCOMYCIN SERPL-MCNC: 23.4 MG/L
WBC # BLD AUTO: 7.2 10E9/L (ref 4–11)

## 2019-12-19 PROCEDURE — 25000132 ZZH RX MED GY IP 250 OP 250 PS 637: Mod: GY | Performed by: PSYCHIATRY & NEUROLOGY

## 2019-12-19 PROCEDURE — 83735 ASSAY OF MAGNESIUM: CPT | Performed by: STUDENT IN AN ORGANIZED HEALTH CARE EDUCATION/TRAINING PROGRAM

## 2019-12-19 PROCEDURE — 80202 ASSAY OF VANCOMYCIN: CPT | Performed by: COUNSELOR

## 2019-12-19 PROCEDURE — 00000146 ZZHCL STATISTIC GLUCOSE BY METER IP

## 2019-12-19 PROCEDURE — 25000132 ZZH RX MED GY IP 250 OP 250 PS 637: Mod: GY | Performed by: NEUROLOGICAL SURGERY

## 2019-12-19 PROCEDURE — 25000132 ZZH RX MED GY IP 250 OP 250 PS 637: Mod: GY | Performed by: STUDENT IN AN ORGANIZED HEALTH CARE EDUCATION/TRAINING PROGRAM

## 2019-12-19 PROCEDURE — 40000281 ZZH STATISTIC TRANSPORT TIME EA 15 MIN

## 2019-12-19 PROCEDURE — 85049 AUTOMATED PLATELET COUNT: CPT | Performed by: COUNSELOR

## 2019-12-19 PROCEDURE — 20000004 ZZH R&B ICU UMMC

## 2019-12-19 PROCEDURE — 25500064 ZZH RX 255 OP 636: Performed by: NEUROLOGICAL SURGERY

## 2019-12-19 PROCEDURE — 25000128 H RX IP 250 OP 636: Performed by: COUNSELOR

## 2019-12-19 PROCEDURE — 25000128 H RX IP 250 OP 636: Performed by: NEUROLOGICAL SURGERY

## 2019-12-19 PROCEDURE — 86901 BLOOD TYPING SEROLOGIC RH(D): CPT | Performed by: STUDENT IN AN ORGANIZED HEALTH CARE EDUCATION/TRAINING PROGRAM

## 2019-12-19 PROCEDURE — 80048 BASIC METABOLIC PNL TOTAL CA: CPT | Performed by: STUDENT IN AN ORGANIZED HEALTH CARE EDUCATION/TRAINING PROGRAM

## 2019-12-19 PROCEDURE — 40000275 ZZH STATISTIC RCP TIME EA 10 MIN

## 2019-12-19 PROCEDURE — 25000125 ZZHC RX 250

## 2019-12-19 PROCEDURE — 25000132 ZZH RX MED GY IP 250 OP 250 PS 637: Mod: GY | Performed by: NURSE PRACTITIONER

## 2019-12-19 PROCEDURE — A9585 GADOBUTROL INJECTION: HCPCS | Performed by: NEUROLOGICAL SURGERY

## 2019-12-19 PROCEDURE — 36415 COLL VENOUS BLD VENIPUNCTURE: CPT | Performed by: STUDENT IN AN ORGANIZED HEALTH CARE EDUCATION/TRAINING PROGRAM

## 2019-12-19 PROCEDURE — 86850 RBC ANTIBODY SCREEN: CPT | Performed by: STUDENT IN AN ORGANIZED HEALTH CARE EDUCATION/TRAINING PROGRAM

## 2019-12-19 PROCEDURE — 72156 MRI NECK SPINE W/O & W/DYE: CPT

## 2019-12-19 PROCEDURE — 94640 AIRWAY INHALATION TREATMENT: CPT | Mod: 76

## 2019-12-19 PROCEDURE — 25000128 H RX IP 250 OP 636: Performed by: STUDENT IN AN ORGANIZED HEALTH CARE EDUCATION/TRAINING PROGRAM

## 2019-12-19 PROCEDURE — 25000128 H RX IP 250 OP 636: Performed by: NURSE PRACTITIONER

## 2019-12-19 PROCEDURE — 27210437 ZZH NUTRITION PRODUCT SEMIELEM INTERMED LITER

## 2019-12-19 PROCEDURE — 85025 COMPLETE CBC W/AUTO DIFF WBC: CPT | Performed by: STUDENT IN AN ORGANIZED HEALTH CARE EDUCATION/TRAINING PROGRAM

## 2019-12-19 PROCEDURE — 71045 X-RAY EXAM CHEST 1 VIEW: CPT

## 2019-12-19 PROCEDURE — 84100 ASSAY OF PHOSPHORUS: CPT | Performed by: STUDENT IN AN ORGANIZED HEALTH CARE EDUCATION/TRAINING PROGRAM

## 2019-12-19 PROCEDURE — 25000132 ZZH RX MED GY IP 250 OP 250 PS 637: Mod: GY | Performed by: COUNSELOR

## 2019-12-19 PROCEDURE — 25800030 ZZH RX IP 258 OP 636: Performed by: NEUROLOGICAL SURGERY

## 2019-12-19 PROCEDURE — 94003 VENT MGMT INPAT SUBQ DAY: CPT

## 2019-12-19 PROCEDURE — 97530 THERAPEUTIC ACTIVITIES: CPT | Mod: GP | Performed by: PHYSICAL THERAPIST

## 2019-12-19 PROCEDURE — 86900 BLOOD TYPING SEROLOGIC ABO: CPT | Performed by: STUDENT IN AN ORGANIZED HEALTH CARE EDUCATION/TRAINING PROGRAM

## 2019-12-19 PROCEDURE — 40000961 ZZH STATISTIC INTRAPULMONARY PERCUSSIVE VENT

## 2019-12-19 RX ORDER — ACETAMINOPHEN 325 MG/1
650 TABLET ORAL EVERY 6 HOURS PRN
Status: DISCONTINUED | OUTPATIENT
Start: 2019-12-19 | End: 2019-12-31 | Stop reason: HOSPADM

## 2019-12-19 RX ORDER — HEPARIN SODIUM 5000 [USP'U]/.5ML
5000 INJECTION, SOLUTION INTRAVENOUS; SUBCUTANEOUS EVERY 12 HOURS
Status: COMPLETED | OUTPATIENT
Start: 2019-12-19 | End: 2019-12-19

## 2019-12-19 RX ORDER — GADOBUTROL 604.72 MG/ML
10 INJECTION INTRAVENOUS ONCE
Status: COMPLETED | OUTPATIENT
Start: 2019-12-19 | End: 2019-12-19

## 2019-12-19 RX ADMIN — TRAZODONE HYDROCHLORIDE 100 MG: 50 TABLET ORAL at 22:16

## 2019-12-19 RX ADMIN — VANCOMYCIN HYDROCHLORIDE 1500 MG: 10 INJECTION, POWDER, LYOPHILIZED, FOR SOLUTION INTRAVENOUS at 05:35

## 2019-12-19 RX ADMIN — METRONIDAZOLE 500 MG: 500 TABLET ORAL at 14:36

## 2019-12-19 RX ADMIN — ACETAMINOPHEN 650 MG: 325 TABLET, FILM COATED ORAL at 02:04

## 2019-12-19 RX ADMIN — METOPROLOL TARTRATE 50 MG: 50 TABLET, FILM COATED ORAL at 07:50

## 2019-12-19 RX ADMIN — OXYCODONE HYDROCHLORIDE 5 MG: 5 SOLUTION ORAL at 20:34

## 2019-12-19 RX ADMIN — CEFEPIME HYDROCHLORIDE 2 G: 2 INJECTION, POWDER, FOR SOLUTION INTRAVENOUS at 06:59

## 2019-12-19 RX ADMIN — ACETYLCYSTEINE 4 ML: 100 INHALANT RESPIRATORY (INHALATION) at 04:06

## 2019-12-19 RX ADMIN — GADOBUTROL 10 ML: 604.72 INJECTION INTRAVENOUS at 19:43

## 2019-12-19 RX ADMIN — HEPARIN SODIUM 5000 UNITS: 5000 INJECTION, SOLUTION INTRAVENOUS; SUBCUTANEOUS at 17:03

## 2019-12-19 RX ADMIN — Medication 100 MG: at 07:50

## 2019-12-19 RX ADMIN — ACETYLCYSTEINE 2 ML: 100 INHALANT RESPIRATORY (INHALATION) at 12:23

## 2019-12-19 RX ADMIN — CARBOXYMETHYLCELLULOSE SODIUM 1 DROP: 5 SOLUTION/ DROPS OPHTHALMIC at 20:34

## 2019-12-19 RX ADMIN — ALBUTEROL SULFATE 2.5 MG: 2.5 SOLUTION RESPIRATORY (INHALATION) at 15:28

## 2019-12-19 RX ADMIN — METOPROLOL TARTRATE 50 MG: 50 TABLET, FILM COATED ORAL at 20:34

## 2019-12-19 RX ADMIN — ACETAMINOPHEN 650 MG: 325 TABLET, FILM COATED ORAL at 07:50

## 2019-12-19 RX ADMIN — METRONIDAZOLE 500 MG: 500 TABLET ORAL at 07:50

## 2019-12-19 RX ADMIN — ALTEPLASE 2 MG: 2.2 INJECTION, POWDER, LYOPHILIZED, FOR SOLUTION INTRAVENOUS at 10:33

## 2019-12-19 RX ADMIN — METRONIDAZOLE 500 MG: 500 TABLET ORAL at 20:34

## 2019-12-19 RX ADMIN — FOLIC ACID 1 MG: 1 TABLET ORAL at 07:50

## 2019-12-19 RX ADMIN — ALBUTEROL SULFATE 2.5 MG: 2.5 SOLUTION RESPIRATORY (INHALATION) at 07:35

## 2019-12-19 RX ADMIN — MIRTAZAPINE 30 MG: 15 TABLET, FILM COATED ORAL at 20:34

## 2019-12-19 RX ADMIN — INSULIN ASPART 2 UNITS: 100 INJECTION, SOLUTION INTRAVENOUS; SUBCUTANEOUS at 11:55

## 2019-12-19 RX ADMIN — CARBOXYMETHYLCELLULOSE SODIUM 1 DROP: 5 SOLUTION/ DROPS OPHTHALMIC at 09:32

## 2019-12-19 RX ADMIN — ALTEPLASE 2 MG: 2.2 INJECTION, POWDER, LYOPHILIZED, FOR SOLUTION INTRAVENOUS at 10:25

## 2019-12-19 RX ADMIN — ACETYLCYSTEINE 2 ML: 100 INHALANT RESPIRATORY (INHALATION) at 19:37

## 2019-12-19 RX ADMIN — ACETYLCYSTEINE 2 ML: 100 INHALANT RESPIRATORY (INHALATION) at 15:28

## 2019-12-19 RX ADMIN — ACETYLCYSTEINE 2 ML: 100 INHALANT RESPIRATORY (INHALATION) at 07:35

## 2019-12-19 RX ADMIN — CEFEPIME HYDROCHLORIDE 2 G: 2 INJECTION, POWDER, FOR SOLUTION INTRAVENOUS at 22:52

## 2019-12-19 RX ADMIN — AMLODIPINE BESYLATE 10 MG: 10 TABLET ORAL at 07:50

## 2019-12-19 RX ADMIN — Medication 12.5 MG: at 11:49

## 2019-12-19 RX ADMIN — FAMOTIDINE 20 MG: 40 POWDER, FOR SUSPENSION ORAL at 20:35

## 2019-12-19 RX ADMIN — POTASSIUM & SODIUM PHOSPHATES POWDER PACK 280-160-250 MG 1 PACKET: 280-160-250 PACK at 07:50

## 2019-12-19 RX ADMIN — Medication 12.5 MG: at 20:36

## 2019-12-19 RX ADMIN — ALBUTEROL SULFATE 2.5 MG: 2.5 SOLUTION RESPIRATORY (INHALATION) at 04:06

## 2019-12-19 RX ADMIN — LIDOCAINE 2 PATCH: 560 PATCH PERCUTANEOUS; TOPICAL; TRANSDERMAL at 20:35

## 2019-12-19 RX ADMIN — ALBUTEROL SULFATE 2.5 MG: 2.5 SOLUTION RESPIRATORY (INHALATION) at 12:23

## 2019-12-19 RX ADMIN — CEFEPIME HYDROCHLORIDE 2 G: 2 INJECTION, POWDER, FOR SOLUTION INTRAVENOUS at 15:25

## 2019-12-19 RX ADMIN — FAMOTIDINE 20 MG: 40 POWDER, FOR SUSPENSION ORAL at 07:52

## 2019-12-19 RX ADMIN — CARBOXYMETHYLCELLULOSE SODIUM 1 DROP: 5 SOLUTION/ DROPS OPHTHALMIC at 05:34

## 2019-12-19 RX ADMIN — HEPARIN SODIUM 5000 UNITS: 5000 INJECTION, SOLUTION INTRAVENOUS; SUBCUTANEOUS at 05:32

## 2019-12-19 RX ADMIN — ALBUTEROL SULFATE 2.5 MG: 2.5 SOLUTION RESPIRATORY (INHALATION) at 19:36

## 2019-12-19 RX ADMIN — MULTIVITAMIN 15 ML: LIQUID ORAL at 20:34

## 2019-12-19 RX ADMIN — POTASSIUM & SODIUM PHOSPHATES POWDER PACK 280-160-250 MG 1 PACKET: 280-160-250 PACK at 20:34

## 2019-12-19 RX ADMIN — VANCOMYCIN HYDROCHLORIDE 1500 MG: 10 INJECTION, POWDER, LYOPHILIZED, FOR SOLUTION INTRAVENOUS at 17:02

## 2019-12-19 ASSESSMENT — ACTIVITIES OF DAILY LIVING (ADL)
ADLS_ACUITY_SCORE: 27

## 2019-12-19 NOTE — PHARMACY-VANCOMYCIN DOSING SERVICE
Pharmacy Vancomycin Note  Date of Service 2019  Patient's  1955   64 year old, male    Indication: Osteomyelitis  Goal Trough Level: 15-20 mg/L  Start of Therapy:   Current Vancomycin regimen:  1500 mg IV q12h    Current estimated CrCl = Estimated Creatinine Clearance: 171.3 mL/min (A) (based on SCr of 0.47 mg/dL (L)).    Creatinine for last 3 days  2019:  3:51 AM Creatinine 0.48 mg/dL  2019:  3:49 AM Creatinine 0.50 mg/dL  2019:  7:16 AM Creatinine 0.47 mg/dL    Recent Vancomycin Levels (past 3 days)  2019:  4:00 PM Vancomycin Level 23.4 mg/L    Vancomycin IV Administrations (past 72 hours)                   vancomycin 1500 mg in 0.9% NaCl 250 ml intermittent infusion 1,500 mg (mg) 1,500 mg Given 19 1702     1,500 mg Given  0535     1,500 mg Given 19 1638     1,500 mg Given  0509     1,500 mg Given 19 1742     1,500 mg Given  0517                Nephrotoxins and other renal medications (From now, onward)    Start     Dose/Rate Route Frequency Ordered Stop    19 0800  vancomycin 1250 mg in 0.9% NaCl 250 mL intermittent infusion 1,250 mg      1,250 mg  over 90 Minutes Intravenous EVERY 12 HOURS 19 1757               Contrast Orders - past 72 hours (72h ago, onward)    None          Interpretation of levels and current regimen:  Trough level is  Supratherapeutic    Has serum creatinine changed > 50% in last 72 hours: No    Urine output:  good urine output    Renal Function: Stable    Plan:  1.  Decrease Dose to 1250 mg IV Q12H  2.  Pharmacy will check trough levels as appropriate in 1-3 Days.    3. Serum creatinine levels will be ordered daily for the first week of therapy and at least twice weekly for subsequent weeks.      Adina Hung RPH        .

## 2019-12-19 NOTE — PROGRESS NOTES
Neurosurgery Progress Note     Subjective:  meeting trach goals of FiO2 40%, PEEP 5; plan for trach Friday 12/20 per ENT     Objective:  Temp: 98.8  F (37.1  C) Temp src: Axillary BP: 107/63   Heart Rate: 51 Resp: 28 SpO2: 100 % O2 Device: Mechanical Ventilator    Intubated, partially sedated  Awake, alert, nods yes or no to questions  Follows commands  Deltoids 4/5, biceps 4/5, triceps 1/5,  0/5, lower extremities 0/5  Triple flexion in lower extremities     Assessment:  64 years old gentleman status post C3-C4 artificial disc placement about 2 weeks prior to arriaval the [facility] who presented after a fall with almost complete loss of strength in his upper extremities and paraplegia, found to have cervical stenosis. S/p OR 12/1 - Pharyngotomy closed with assistance from ENT. Arthroplasty implant removed. No replacement implant placed. S/p G-tube 12/5. Extubated 12/5. Re-Intubated 12/6 due to Mucous Plugging and Atelectasis. S/p repeated bronchoscopy, last 12/14.     Plan:  Intubated, mechanically ventilated  Nebs, chest physiotherapy; wean to extubate as able, plan for tracheostomy with ENT - goal PEEP 5 and FiO2 40%; bronchoscopy prn per neurocritical care  DVT: SCDs while in bed  [place] collar  Per ID: vancomycin, cefepime, flagyl 4-6 wks for vertebral phlegmon, weekly labs; no plan for further surgery; consider repeat MRI cervical spine this week  Follow-up cultures  Hold SubQ heparin & tube feeds day before possible trach  MRI before stopping abx in 6 wks  endocrine consulted for diabetes - appreciate recs  Disposition: 4A        Lion Vargas MD  Neurosurgery Resident PGY2    Please contact neurosurgery resident on call with questions.    Dial * * [phone], enter [phone] when prompted.

## 2019-12-19 NOTE — PROGRESS NOTES
CLINICAL NUTRITION SERVICES - REASSESSMENT NOTE   Nutrition Prescription    RECOMMENDATIONS FOR MDs/PROVIDERS TO ORDER:  Minimize interruptions to EN infusions as able.    Malnutrition Status:    Non-severe malnutrition in the context of acute illness    Recommendations already ordered by Registered Dietitian (RD):  -Continue TF as ordered - immune modulating formula appears to remains beneficial given CRP trends.    Future/Additional Recommendations:  -Monitor appropriateness to transition to standard TF formula regimen prior to discharge:  Rec: Nutren 1.5 @ 55 mL/hr + 2 pkt protein to provide 2060 kcals (29 kcal/kg/day), 112 g PRO (1.6 g/kg/day), 1003 mL H2O, 232 g CHO and no fiber daily.    -Monitor need for repeat metabolic cart study       EVALUATION OF THE PROGRESS TOWARD GOALS   Diet: NPO    Enteral Access: PEG    Nutrition Support: EN  Impact Peptide 1.5 at goal 55 ml/hr (1320 ml/day) to provide 1980 kcals (28 kcal/kg/day, 87% MREE from 12/16), 124 g PRO (1.8 g/kg/day), 1016 ml free H2O, 84 g Fat (50% from MCTs), 185 g CHO and no Fiber daily    Enteral Intake: Pt denied any nausea or abdominal pain or intolerance to TF (awake/alert, intubated). 7-day average enteral nutrition infusions: 1080 mL TF = 1620 kcals (23 kcal/kg, or 71% MREE from 12/16) and 102 g protein (1.5 g protein/kg, meets minimum assessed protein needs).     NEW FINDINGS     -Wt trends: Some wt fluctuations noted over admission, suspect d/t fluid shifts. Most recent wt similar -3 lbs compare to admit wt. Dosing wt 70 kg (adjusted) remains appropriate.  12/18/19 0500 85.6 kg (188 lb 11.4 oz)   12/17/19 0400 84.1 kg (185 lb 8 oz) --> New lowest wt   12/12/19 0400 87 kg (191 lb 12.8 oz)   12/11/19 0400 85.1 kg (187 lb 9.8 oz)   12/10/19 0000 85.2 kg (187 lb 13.3 oz)   12/09/19 0200 85.8 kg (189 lb 2.5 oz)   12/08/19 0400 89.2 kg (196 lb 10.4 oz)   12/07/19 0300 87.5 kg (192 lb 14.4 oz)   12/06/19 0200 90.4 kg (199 lb 4.7 oz)   12/05/19 0400 93  kg (205 lb 0.4 oz)   12/03/19 0600 90.2 kg (198 lb 13.7 oz)   12/02/19 0000 88.8 kg (195 lb 12.8 oz)   12/01/19 0055 86.7 kg (191 lb 3.2 oz)      -Labs: Reviewed, notable for:  (H) on 12/1-->34 (H) on 12/17. There appears to be benefit to immune-modulating formula; will continue.    -GI: Up to 3 BMs daily over past week per RN flowsheets  -- bowel meds held d/t loose stools per RN chart note today.    -Respiratory: Extubated 12/5 and reintubated 12/6. Mechanically ventilated. Possible tracheostomy tomorrow.    -Skin:  Jerald score 13 (Nutrition adequate). No PIs per RN flowsheets.    -Meds: Reviewed, notable for: Certavite, custom dosing sliding scale insulin q4h, insulin daily, folvite, cyanocobalamin 1,000 mg q30d, neutra-phos 2 pkt daily, bowel regimen, thiamine 100 mg    -Endocrine: BG 50s-160s over past 24 hrs (hypoglycemia noted late last night)    Metabolic Cart Study:  Obtained metabolic cart study 12/16 @ 1300 with the following results: MREE = 2266 kcals/day (equiv to 32 kcal/kg/day) with RQ = 0.77.  Pt received 1320 ml of TF (goal volume) in 24 hours preceding the study providing 1980 kcals (87% MREE).  RQ is within physiologic range; RQ is somewhat logical given provisions (slightly hypocaloric/below MREE) received prior to study.  Would aim energy intakes minimally at 80% of this MREE, 1813 kcal (equiv to 26 kcal/kg/day). EN as ordered remains appropriate to meet needs.    MALNUTRITION  % Intake: Decreased intake does not meet criteria  % Weight Loss: Unable/difficult to assess with fluid status  Subcutaneous Fat Loss: Upper arm and Lower arm: Mild  Muscle Loss: Upper leg (quadricep, hamstring), Patellar region and Posterior calf: Mild - pt newly paraplegic, suspect part of normal process  Fluid Accumulation/Edema: Mild (edema noted on bilateral hands)   Malnutrition Diagnosis: Non-severe malnutrition in the context of acute illness    Previous Goals   Total avg nutritional intake to meet a  minimum of 25 kcal/kg and 1.5 g PRO/kg daily (per dosing wt 70 kg).  Evaluation: Protein - Met; Kcal - Not Met    Previous Nutrition Diagnosis  Inadequate protein-energy intake related to disruptions to EN infusions as evidenced by pt not meeting goal EN provisions with 7-day averages meeting 17 kcals/kg and 1.1 gm PRO/kg dosing weight     Evaluation: Improving    CURRENT NUTRITION DIAGNOSIS  Inadequate energy intake related to interruptions to EN infusions as evidenced by 7-day average enteral nutrition infusions showing 1080 mL TF = 1620 kcals (23 kcal/kg, or 71% MREE from 12/16).     INTERVENTIONS  Implementation  -Enteral Nutrition - Continue  -Nutrition education: Provided education on RD role and role of NFPE. Nutrition POC to continue TF.    Goals  Total avg nutritional intake to meet a minimum of 28 kcal/kg (80% MREE on 12/16) and 1.5 g PRO/kg daily (per dosing wt 70 kg).    Monitoring/Evaluation  Progress toward goals will be monitored and evaluated per protocol.     Hannah Lopez RD, LD  Pager: [phone]

## 2019-12-19 NOTE — PLAN OF CARE
CV: NSR to sinus [place]. BP stable. 120's-150's systolic. Afebrile.  Neuro: Q4hr checks. LUE +3 RUE +2. BLE +1. Can wiggle toes on command. A&Ox4. Able to mouth needs.  Resp: Aggressive pulmonary toileting continued. Q2 hrs Baggging with deep suctioning. Neds continued. LS coarse. Vent settings unchanged,  GI: Loose stoolsx2. Incont. Hold bowel meds. TF goal rate 55ml/hr 100ml flush q4hr's.  : díaz in place. Adequate urine output cont.  Skin: IAD in groin. MEpilex on coccyx. C-collar skin area intact.    Continue to monitor respiratory status and compliance with treatments. Continue to encourage pulmonary toileting and moving secretions inn preparation for Trach placement 12/20.

## 2019-12-19 NOTE — PROGRESS NOTES
Social Work Brief Note:  SW received a call from Aisha with [facility] and [facility] (P:  [phone]) asking for a call back to discuss open adult protection case involving pt.  PATY called back and left a vm.    DEA Garcia, [facility]  ICU   [facility]  Phone:  [phone]  Pager:  [phone]

## 2019-12-19 NOTE — PROGRESS NOTES
Neuroscience Intensive Care Progress Note  12/19/2019      24 hour events: tolerating peep 5 fi 02 40%. No acute events, pain well controlled, anxiety well controlled.     Current Medications:    acetylcysteine  2 mL Nebulization Q4H     albuterol  2.5 mg Nebulization Q4H     alteplase  2 mg Intravenous Q2H     amLODIPine  10 mg Oral or Feeding Tube Daily     ceFEPIme (MAXIPIME) IV  2 g Intravenous Q8H     cyanocobalamin  1,000 mcg Intramuscular Q30 Days     famotidine  20 mg Per G Tube BID     folic acid  1 mg Oral or Feeding Tube Daily     heparin ANTICOAGULANT  5,000 Units Subcutaneous Q12H     heparin lock flush  5-10 mL Intracatheter Q24H     insulin aspart  2-16 Units Subcutaneous Q4H     insulin isophane human  38 Units Subcutaneous Q24H     insulin isophane human  38 Units Subcutaneous Q24H     lidocaine  2 patch Transdermal Q24h    And     lidocaine   Transdermal Q8H     metoprolol tartrate  50 mg Oral or Feeding Tube BID     metroNIDAZOLE  500 mg Oral or Feeding Tube TID     mirtazapine  30 mg Oral or Feeding Tube QPM     multivitamins w/minerals  15 mL Per Feeding Tube Daily     potassium & sodium phosphates  1 packet Oral or Feeding Tube BID     QUEtiapine  12.5 mg Oral BID     senna-docusate  1 tablet Oral or Feeding Tube BID     traZODone  100 mg Oral or Feeding Tube At Bedtime     vancomycin (VANCOCIN) IV  1,500 mg Intravenous Q12H     thiamine  100 mg Oral or Feeding Tube Daily       PRN Medications:  acetaminophen, carboxymethylcellulose PF, cyclobenzaprine, IV fluid REPLACEMENT ONLY, glucose **OR** dextrose **OR** glucagon, heparin lock flush, hydrALAZINE, HYDROmorphone, ipratropium - albuterol 0.5 mg/2.5 mg/3 mL, labetalol, lidocaine 4%, lidocaine (buffered or not buffered), magnesium sulfate, magnesium sulfate, melatonin, naloxone, ondansetron **OR** ondansetron, oxyCODONE, potassium chloride, potassium chloride with lidocaine, potassium chloride, potassium chloride, potassium chloride, potassium  "phosphate (KPHOS) in D5W IV, potassium phosphate (KPHOS) in D5W IV, potassium phosphate (KPHOS) in D5W IV, potassium phosphate (KPHOS) in D5W IV, potassium phosphate (KPHOS) in D5W IV, prochlorperazine **OR** prochlorperazine **OR** prochlorperazine, sodium chloride (PF), sodium phosphate    Infusions:    IV fluid REPLACEMENT ONLY 55 mL/hr at 12/18/19 0200     no pre procedure antibiotic needed         Objective findings:  BP (!) 154/76   Pulse 101   Temp 98.6  F (37  C) (Axillary)   Resp 22   Ht 1.727 m (5' 8\")   Wt 85.6 kg (188 lb 11.4 oz)   SpO2 99%   BMI 28.69 kg/m      Ventilator Settings:  Ventilation Mode: CMV/AC  (Continuous Mandatory Ventilation/ Assist Control)  FiO2 (%): 40 %  Rate Set (breaths/minute): 12 breaths/min  Tidal Volume Set (mL): 450 mL  PEEP (cm H2O): 5 cmH2O  Oxygen Concentration (%): 40 %  Resp: 22      Intake/Output:    Intake/Output Summary (Last 24 hours) at 12/18/2019 0735  Last data filed at 12/18/2019 0700  Gross per 24 hour   Intake 2989.5 ml   Output 1990 ml   Net 999.5 ml         Physical Examination:   Vitals:  B/P: 146/71, T: 98.8, P: 89, R: 23  General:  Laying in bed  HEENT:  NC/AT, no icterus, op pink and moist, no ear or nose drainage.   Cardiac:  RRR, no m/r/g  Chest:  CTAB  Abdomen:  S/NT/ND  Extremities:  No LE swelling.    Skin:  No rash or lesion.      Neuro Exam:   Mental status: alert, follows all commands  Cranial nerves: mild L eye ptosis, EOMI, face symmetric  Motor: Some flexion extension at elbow bilaterally more left than right. Some movement left wrist and fingers, no thumb abduction, no movement in distal RUE. No movements of the legs  Sensory: reports sensation in arms and legs to light touch.     Labs/Studies:  CBC RESULTS:   Recent Labs   Lab Test 12/18/19  0349   WBC 7.8   RBC 2.78*   HGB 8.2*   HCT 27.1*   MCV 98   MCH 29.5   MCHC 30.3*   RDW 14.3        Last Comprehensive Metabolic Panel:  Sodium   Date Value Ref Range Status   12/19/2019 " 139 133 - 144 mmol/L Final     Potassium   Date Value Ref Range Status   12/19/2019 3.8 3.4 - 5.3 mmol/L Final     Chloride   Date Value Ref Range Status   12/19/2019 107 94 - 109 mmol/L Final     Carbon Dioxide   Date Value Ref Range Status   12/19/2019 29 20 - 32 mmol/L Final     Anion Gap   Date Value Ref Range Status   12/19/2019 3 3 - 14 mmol/L Final     Glucose   Date Value Ref Range Status   12/19/2019 126 (H) 70 - 99 mg/dL Final     Urea Nitrogen   Date Value Ref Range Status   12/19/2019 18 7 - 30 mg/dL Final     Creatinine   Date Value Ref Range Status   12/19/2019 0.47 (L) 0.66 - 1.25 mg/dL Final     GFR Estimate   Date Value Ref Range Status   12/19/2019 >90 >60 mL/min/[1.73_m2] Final     Comment:     Non  GFR Calc  Starting 12/18/2018, serum creatinine based estimated GFR (eGFR) will be   calculated using the Chronic Kidney Disease Epidemiology Collaboration   (CKD-EPI) equation.       Calcium   Date Value Ref Range Status   12/19/2019 8.4 (L) 8.5 - 10.1 mg/dL Final     Neuro:  #Cervical spinal cord injury  #Discitis/osteomyelitis C3-C4  - Collar at all times, Up with assist  - Neuro checks q4h during the day   - PT/OT     #Anxiety/Pain - good control   - Dilauded PRN  - Oxycodone 5-10 q4hrs prn   - flexeril 7.5 mg TID prn     #Hx of Alcohol dependence  - Thiamine & folate daily     #Depression  - Continue PTA miratazepine  - restarted trazodone 100 mg at bedtime   - Seroquel 12.5 BID      Resp:  #Acute respiratory failure s/p re-intubation 12/6, suspect diaphragmatic weakness related to spine injury.   #Mucous plugging Rt lung   - Mucomyst & chest physio   - daily CXR  --metanebs  --chest physio per RT  --percussive therapy  - wean ventilator support as able  - desaturation when positioned on Rt side, try positional changes first   - ENT plans for trach 12/20      Cardio:  #Hx of hypertension   - PTA amlodipine 10 mg   - PTA metoprolol 50mg bid     Renal:  Hypernatremia - resolved    Hypophosphatemia - neutraphos  - Electrolyte replacement protocol   - Monitor I/O, net + 1L   - free water flushes 30 q4h  - goal In= outs     Neurogenic urinary retention   - failed díaz wean trial 12/12     Endo:  #DM2  - Sliding scale insulin, high dose  - 38 U NPH am/pm      Heme:  #Normocytic anemia, stable  - Hg > 7.0   - Plt > 100k   - INR <1.5      GI:  #Constipation - resolved last BM 12/19  - Senna scheduled at BID  - PRN suppository/enema available     #Diet  - G-tube in place  - Tube feeds at goal      ID:   #Discitis vs osteomyelitis   #Fever - resolved   #Leukocytosis and CRP down trending   #repeat CT MRI with increased inflammatory changes and prevertebral soft tissue thickening   - Vanc, cefepime, metronidazole regimen which patient was on prior to fever starting  - Weekly CBC, CMP, CRP while on IV abx,  - Blood cultures (12/1, 12/3, 12/7, 12/11): NGTD  - Spinal tissue cultures (12/1): MSSA, Strep anginosus, Eikenella, and oral anaerobes   - Will page ID attending at [id number] prior to discharging pt     Previous abx    - Stopped Vancomycin, metronidazole, cefepime on 12/4, fever started 12/7  - switched Unasyn to Zosyn after ongoing fever (abx plan for 12/2-1/12)     Lines:  -ETT, PICC, PIV x 2, Díaz, PEG     FEN: TF via PEG , held for trach   PPX:    DVT prophylaxis: SCDs, Heparin subcutaneous held for potential trach    GI: famotidine   Code Status: Full, presumed     Dispo: 4A until trach, more medically stable     This patient was seen & discussed with my attending, Dr. Figueroa, who agrees with my assessment and plan.     Ladan Stevens,   Neurology PGY-2   [phone]

## 2019-12-19 NOTE — PROGRESS NOTES
Care Coordinator - Discharge Planning    Admission Date/Time:  12/1/2019  Attending MD:  Fernando Ashton MD     Data  Chart reviewed, discussed with interdisciplinary team.   Patient was admitted for:   1. Quadriplegia (H)    2. Acute respiratory failure with hypoxia (H)    3. Acute respiratory failure with hypoxia (H)         Assessment   Full assessment completed in previous note     Per morning report and chart review, plan to have trach placement done tomorrow, 12/120 by ENT team.  Pt will need LTAC placement for vent weaning.    Coordination of Care and Referrals: Provided patient/family with options for LTACH.    RNCC called pt sister, Carlota # [phone] and discussed about LTAC.  Carlota stated the team have been updating her well about the plan of care and she has been informed about LTAC needs.  RNCC discussed about LTAC and referral process. Carlota agreed referral to be send for both [facility] and [facility].    Per discussion with Carlota, [facility] has [facility] coverage and [facility] and [facility] at [facility] when he had his fall.   Carlota asked if VA is covering his LTAC.  RNCC informed Carlota that Medicare is listed as primary and once I refer him to LTAC they will let us know if his Medicare covers his LTAC or not.  RNCC informed Carlota that I will contact [facility] and will check if [facility] will be able [facility] and [facility] at there spinal cord [facility] Rehab ( they take pt with vent).  Carlota agreed with both plans.  RNCC sent referral for both [facility] and [facility].  RNCC shared the above discussion and plan with Neuro ICU and Neurosurgery team.      RNCC called [facility] referral specialist Angie # [phone] and asked if pt can transfer to their spinal cord injury program after he receives his trach.  Angie stated they can review the request tomorrow after he receives his trach and request to call her back tomorrow, 12/20 ( need to evaluate the level of care pt requires after he receives  trach).  Angie request H&P, progress notes, MAR, PT notes faxed to her today to fax # [phone].  RNCC faxed the request info.      Plan  Anticipated Discharge Date:  TBD.  Anticipated Discharge Plan:   LTAC vs [facility] to [facility].  RNCC will cont to follow plan of care.      Edilson Xavier RN, PHN, BSN  4A and 4E/ ICU  Care Coordinator  Phone: [phone]  Pager: [phone]

## 2019-12-19 NOTE — PLAN OF CARE
PT 4A    Discharge Planner PT   Patient plan for discharge: not discussed today  Current status: no active movement in LE noted.  Supine > EOB via lift and assist x 2.  At EOB with sling support, good midline orientation noted in sitting needed sling support to prevent posterior lean.  Bed > chair via lift and assist x 2.  VSS on AC 40% FiO2 peep 5.   Barriers to return to prior living situation: assistance needed for basic mobility.   Recommendations for discharge: anticipate need for rehab stay.   Rationale for recommendations: dependence with functional mobility.        Entered by: Chidi Hitchcock 12/19/2019 2:53 PM

## 2019-12-20 ENCOUNTER — ANESTHESIA (OUTPATIENT)
Dept: SURGERY | Facility: CLINIC | Age: 64
DRG: 003 | End: 2019-12-20
Payer: COMMERCIAL

## 2019-12-20 ENCOUNTER — ANESTHESIA EVENT (OUTPATIENT)
Dept: SURGERY | Facility: CLINIC | Age: 64
DRG: 003 | End: 2019-12-20
Payer: COMMERCIAL

## 2019-12-20 LAB
ANION GAP SERPL CALCULATED.3IONS-SCNC: 3 MMOL/L (ref 3–14)
BASOPHILS # BLD AUTO: 0 10E9/L (ref 0–0.2)
BASOPHILS NFR BLD AUTO: 0.3 %
BUN SERPL-MCNC: 22 MG/DL (ref 7–30)
CALCIUM SERPL-MCNC: 8.4 MG/DL (ref 8.5–10.1)
CHLORIDE SERPL-SCNC: 111 MMOL/L (ref 94–109)
CO2 SERPL-SCNC: 29 MMOL/L (ref 20–32)
CREAT SERPL-MCNC: 0.49 MG/DL (ref 0.66–1.25)
CRP SERPL-MCNC: 14 MG/L (ref 0–8)
DIFFERENTIAL METHOD BLD: ABNORMAL
EOSINOPHIL # BLD AUTO: 0.1 10E9/L (ref 0–0.7)
EOSINOPHIL NFR BLD AUTO: 1.5 %
ERYTHROCYTE [DISTWIDTH] IN BLOOD BY AUTOMATED COUNT: 14.5 % (ref 10–15)
GFR SERPL CREATININE-BSD FRML MDRD: >90 ML/MIN/{1.73_M2}
GLUCOSE BLDC GLUCOMTR-MCNC: 104 MG/DL (ref 70–99)
GLUCOSE BLDC GLUCOMTR-MCNC: 109 MG/DL (ref 70–99)
GLUCOSE BLDC GLUCOMTR-MCNC: 80 MG/DL (ref 70–99)
GLUCOSE BLDC GLUCOMTR-MCNC: 83 MG/DL (ref 70–99)
GLUCOSE BLDC GLUCOMTR-MCNC: 85 MG/DL (ref 70–99)
GLUCOSE BLDC GLUCOMTR-MCNC: 91 MG/DL (ref 70–99)
GLUCOSE BLDC GLUCOMTR-MCNC: 99 MG/DL (ref 70–99)
GLUCOSE SERPL-MCNC: 95 MG/DL (ref 70–99)
HCT VFR BLD AUTO: 29.2 % (ref 40–53)
HGB BLD-MCNC: 8.9 G/DL (ref 13.3–17.7)
IMM GRANULOCYTES # BLD: 0.1 10E9/L (ref 0–0.4)
IMM GRANULOCYTES NFR BLD: 1.5 %
LYMPHOCYTES # BLD AUTO: 1.6 10E9/L (ref 0.8–5.3)
LYMPHOCYTES NFR BLD AUTO: 22.5 %
MAGNESIUM SERPL-MCNC: 2 MG/DL (ref 1.6–2.3)
MCH RBC QN AUTO: 28.9 PG (ref 26.5–33)
MCHC RBC AUTO-ENTMCNC: 30.5 G/DL (ref 31.5–36.5)
MCV RBC AUTO: 95 FL (ref 78–100)
MONOCYTES # BLD AUTO: 0.7 10E9/L (ref 0–1.3)
MONOCYTES NFR BLD AUTO: 9.1 %
NEUTROPHILS # BLD AUTO: 4.6 10E9/L (ref 1.6–8.3)
NEUTROPHILS NFR BLD AUTO: 65.1 %
NRBC # BLD AUTO: 0 10*3/UL
NRBC BLD AUTO-RTO: 0 /100
PHOSPHATE SERPL-MCNC: 2.8 MG/DL (ref 2.5–4.5)
PLATELET # BLD AUTO: 281 10E9/L (ref 150–450)
POTASSIUM SERPL-SCNC: 3.5 MMOL/L (ref 3.4–5.3)
RBC # BLD AUTO: 3.08 10E12/L (ref 4.4–5.9)
SODIUM SERPL-SCNC: 143 MMOL/L (ref 133–144)
WBC # BLD AUTO: 7.1 10E9/L (ref 4–11)

## 2019-12-20 PROCEDURE — 25000132 ZZH RX MED GY IP 250 OP 250 PS 637: Mod: GY | Performed by: NEUROLOGICAL SURGERY

## 2019-12-20 PROCEDURE — 27210437 ZZH NUTRITION PRODUCT SEMIELEM INTERMED LITER

## 2019-12-20 PROCEDURE — 25800030 ZZH RX IP 258 OP 636: Performed by: NURSE ANESTHETIST, CERTIFIED REGISTERED

## 2019-12-20 PROCEDURE — 84100 ASSAY OF PHOSPHORUS: CPT | Performed by: STUDENT IN AN ORGANIZED HEALTH CARE EDUCATION/TRAINING PROGRAM

## 2019-12-20 PROCEDURE — 25000132 ZZH RX MED GY IP 250 OP 250 PS 637: Mod: GY | Performed by: PSYCHIATRY & NEUROLOGY

## 2019-12-20 PROCEDURE — 36000051 ZZH SURGERY LEVEL 2 1ST 30 MIN - UMMC: Performed by: OTOLARYNGOLOGY

## 2019-12-20 PROCEDURE — 25800030 ZZH RX IP 258 OP 636: Performed by: NEUROLOGICAL SURGERY

## 2019-12-20 PROCEDURE — 94003 VENT MGMT INPAT SUBQ DAY: CPT

## 2019-12-20 PROCEDURE — 25000125 ZZHC RX 250: Performed by: NURSE ANESTHETIST, CERTIFIED REGISTERED

## 2019-12-20 PROCEDURE — 25000128 H RX IP 250 OP 636: Performed by: COUNSELOR

## 2019-12-20 PROCEDURE — 40000961 ZZH STATISTIC INTRAPULMONARY PERCUSSIVE VENT

## 2019-12-20 PROCEDURE — 25000128 H RX IP 250 OP 636: Performed by: STUDENT IN AN ORGANIZED HEALTH CARE EDUCATION/TRAINING PROGRAM

## 2019-12-20 PROCEDURE — 25000125 ZZHC RX 250: Performed by: STUDENT IN AN ORGANIZED HEALTH CARE EDUCATION/TRAINING PROGRAM

## 2019-12-20 PROCEDURE — 83735 ASSAY OF MAGNESIUM: CPT | Performed by: STUDENT IN AN ORGANIZED HEALTH CARE EDUCATION/TRAINING PROGRAM

## 2019-12-20 PROCEDURE — 25000565 ZZH ISOFLURANE, EA 15 MIN: Performed by: OTOLARYNGOLOGY

## 2019-12-20 PROCEDURE — 94640 AIRWAY INHALATION TREATMENT: CPT

## 2019-12-20 PROCEDURE — 86140 C-REACTIVE PROTEIN: CPT | Performed by: STUDENT IN AN ORGANIZED HEALTH CARE EDUCATION/TRAINING PROGRAM

## 2019-12-20 PROCEDURE — 37000009 ZZH ANESTHESIA TECHNICAL FEE, EACH ADDTL 15 MIN: Performed by: OTOLARYNGOLOGY

## 2019-12-20 PROCEDURE — 20000004 ZZH R&B ICU UMMC

## 2019-12-20 PROCEDURE — 27210794 ZZH OR GENERAL SUPPLY STERILE: Performed by: OTOLARYNGOLOGY

## 2019-12-20 PROCEDURE — 40000275 ZZH STATISTIC RCP TIME EA 10 MIN

## 2019-12-20 PROCEDURE — 36000053 ZZH SURGERY LEVEL 2 EA 15 ADDTL MIN - UMMC: Performed by: OTOLARYNGOLOGY

## 2019-12-20 PROCEDURE — 0B110F4 BYPASS TRACHEA TO CUTANEOUS WITH TRACHEOSTOMY DEVICE, OPEN APPROACH: ICD-10-PCS | Performed by: OTOLARYNGOLOGY

## 2019-12-20 PROCEDURE — 25000132 ZZH RX MED GY IP 250 OP 250 PS 637: Mod: GY | Performed by: NURSE PRACTITIONER

## 2019-12-20 PROCEDURE — 00000146 ZZHCL STATISTIC GLUCOSE BY METER IP

## 2019-12-20 PROCEDURE — 25000125 ZZHC RX 250: Performed by: CLINICAL NURSE SPECIALIST

## 2019-12-20 PROCEDURE — 25000125 ZZHC RX 250

## 2019-12-20 PROCEDURE — 25000132 ZZH RX MED GY IP 250 OP 250 PS 637: Mod: GY | Performed by: COUNSELOR

## 2019-12-20 PROCEDURE — 25000128 H RX IP 250 OP 636: Performed by: NURSE ANESTHETIST, CERTIFIED REGISTERED

## 2019-12-20 PROCEDURE — 25000128 H RX IP 250 OP 636: Performed by: NEUROLOGICAL SURGERY

## 2019-12-20 PROCEDURE — 25000125 ZZHC RX 250: Performed by: OTOLARYNGOLOGY

## 2019-12-20 PROCEDURE — 25000132 ZZH RX MED GY IP 250 OP 250 PS 637: Mod: GY | Performed by: STUDENT IN AN ORGANIZED HEALTH CARE EDUCATION/TRAINING PROGRAM

## 2019-12-20 PROCEDURE — 94640 AIRWAY INHALATION TREATMENT: CPT | Mod: 76

## 2019-12-20 PROCEDURE — 80048 BASIC METABOLIC PNL TOTAL CA: CPT | Performed by: STUDENT IN AN ORGANIZED HEALTH CARE EDUCATION/TRAINING PROGRAM

## 2019-12-20 PROCEDURE — 37000008 ZZH ANESTHESIA TECHNICAL FEE, 1ST 30 MIN: Performed by: OTOLARYNGOLOGY

## 2019-12-20 PROCEDURE — 85025 COMPLETE CBC W/AUTO DIFF WBC: CPT | Performed by: STUDENT IN AN ORGANIZED HEALTH CARE EDUCATION/TRAINING PROGRAM

## 2019-12-20 RX ORDER — AMOXICILLIN 250 MG
1 CAPSULE ORAL EVERY EVENING
Status: DISCONTINUED | OUTPATIENT
Start: 2019-12-20 | End: 2019-12-25

## 2019-12-20 RX ORDER — FENTANYL CITRATE 50 UG/ML
INJECTION, SOLUTION INTRAMUSCULAR; INTRAVENOUS PRN
Status: DISCONTINUED | OUTPATIENT
Start: 2019-12-20 | End: 2019-12-20

## 2019-12-20 RX ORDER — PROPOFOL 10 MG/ML
INJECTION, EMULSION INTRAVENOUS PRN
Status: DISCONTINUED | OUTPATIENT
Start: 2019-12-20 | End: 2019-12-20

## 2019-12-20 RX ORDER — SODIUM CHLORIDE, SODIUM LACTATE, POTASSIUM CHLORIDE, CALCIUM CHLORIDE 600; 310; 30; 20 MG/100ML; MG/100ML; MG/100ML; MG/100ML
INJECTION, SOLUTION INTRAVENOUS CONTINUOUS PRN
Status: DISCONTINUED | OUTPATIENT
Start: 2019-12-20 | End: 2019-12-20

## 2019-12-20 RX ORDER — LIDOCAINE HYDROCHLORIDE AND EPINEPHRINE 10; 10 MG/ML; UG/ML
INJECTION, SOLUTION INFILTRATION; PERINEURAL PRN
Status: DISCONTINUED | OUTPATIENT
Start: 2019-12-20 | End: 2019-12-20 | Stop reason: HOSPADM

## 2019-12-20 RX ADMIN — Medication 2 G: at 13:02

## 2019-12-20 RX ADMIN — FAMOTIDINE 20 MG: 40 POWDER, FOR SUSPENSION ORAL at 08:10

## 2019-12-20 RX ADMIN — CEFEPIME HYDROCHLORIDE 2 G: 2 INJECTION, POWDER, FOR SOLUTION INTRAVENOUS at 06:48

## 2019-12-20 RX ADMIN — ROCURONIUM BROMIDE 50 MG: 10 INJECTION INTRAVENOUS at 15:10

## 2019-12-20 RX ADMIN — AMLODIPINE BESYLATE 10 MG: 10 TABLET ORAL at 08:10

## 2019-12-20 RX ADMIN — TRAZODONE HYDROCHLORIDE 100 MG: 50 TABLET ORAL at 21:18

## 2019-12-20 RX ADMIN — FENTANYL CITRATE 50 MCG: 50 INJECTION, SOLUTION INTRAMUSCULAR; INTRAVENOUS at 16:06

## 2019-12-20 RX ADMIN — FENTANYL CITRATE 75 MCG: 50 INJECTION, SOLUTION INTRAMUSCULAR; INTRAVENOUS at 15:10

## 2019-12-20 RX ADMIN — MIDAZOLAM 2 MG: 1 INJECTION INTRAMUSCULAR; INTRAVENOUS at 14:50

## 2019-12-20 RX ADMIN — Medication 100 MG: at 08:09

## 2019-12-20 RX ADMIN — ALBUTEROL SULFATE 2.5 MG: 2.5 SOLUTION RESPIRATORY (INHALATION) at 07:51

## 2019-12-20 RX ADMIN — SENNOSIDES AND DOCUSATE SODIUM 1 TABLET: 8.6; 5 TABLET ORAL at 20:38

## 2019-12-20 RX ADMIN — CEFEPIME HYDROCHLORIDE 2 G: 2 INJECTION, POWDER, FOR SOLUTION INTRAVENOUS at 23:39

## 2019-12-20 RX ADMIN — ACETYLCYSTEINE 2 ML: 100 INHALANT RESPIRATORY (INHALATION) at 16:43

## 2019-12-20 RX ADMIN — VANCOMYCIN HYDROCHLORIDE 1250 MG: 10 INJECTION, POWDER, LYOPHILIZED, FOR SOLUTION INTRAVENOUS at 09:17

## 2019-12-20 RX ADMIN — PROPOFOL 30 MG: 10 INJECTION, EMULSION INTRAVENOUS at 15:10

## 2019-12-20 RX ADMIN — ONDANSETRON 4 MG: 2 INJECTION INTRAMUSCULAR; INTRAVENOUS at 15:10

## 2019-12-20 RX ADMIN — ACETYLCYSTEINE 2 ML: 100 INHALANT RESPIRATORY (INHALATION) at 20:19

## 2019-12-20 RX ADMIN — VANCOMYCIN HYDROCHLORIDE 1250 MG: 10 INJECTION, POWDER, LYOPHILIZED, FOR SOLUTION INTRAVENOUS at 20:39

## 2019-12-20 RX ADMIN — ACETYLCYSTEINE 2 ML: 100 INHALANT RESPIRATORY (INHALATION) at 01:05

## 2019-12-20 RX ADMIN — POTASSIUM & SODIUM PHOSPHATES POWDER PACK 280-160-250 MG 1 PACKET: 280-160-250 PACK at 08:09

## 2019-12-20 RX ADMIN — SODIUM CHLORIDE, POTASSIUM CHLORIDE, SODIUM LACTATE AND CALCIUM CHLORIDE: 600; 310; 30; 20 INJECTION, SOLUTION INTRAVENOUS at 14:50

## 2019-12-20 RX ADMIN — ACETYLCYSTEINE 2 ML: 100 INHALANT RESPIRATORY (INHALATION) at 03:48

## 2019-12-20 RX ADMIN — SUGAMMADEX 200 MG: 100 INJECTION, SOLUTION INTRAVENOUS at 16:10

## 2019-12-20 RX ADMIN — METRONIDAZOLE 500 MG: 500 TABLET ORAL at 20:38

## 2019-12-20 RX ADMIN — POTASSIUM CHLORIDE 20 MEQ: 29.8 INJECTION, SOLUTION INTRAVENOUS at 05:43

## 2019-12-20 RX ADMIN — IPRATROPIUM BROMIDE AND ALBUTEROL SULFATE 3 ML: .5; 3 SOLUTION RESPIRATORY (INHALATION) at 11:54

## 2019-12-20 RX ADMIN — ALBUTEROL SULFATE 2.5 MG: 2.5 SOLUTION RESPIRATORY (INHALATION) at 20:18

## 2019-12-20 RX ADMIN — FAMOTIDINE 20 MG: 40 POWDER, FOR SUSPENSION ORAL at 20:39

## 2019-12-20 RX ADMIN — ACETYLCYSTEINE 2 ML: 100 INHALANT RESPIRATORY (INHALATION) at 07:51

## 2019-12-20 RX ADMIN — ALBUTEROL SULFATE 2.5 MG: 2.5 SOLUTION RESPIRATORY (INHALATION) at 01:04

## 2019-12-20 RX ADMIN — MIRTAZAPINE 30 MG: 15 TABLET, FILM COATED ORAL at 20:39

## 2019-12-20 RX ADMIN — FOLIC ACID 1 MG: 1 TABLET ORAL at 08:09

## 2019-12-20 RX ADMIN — POTASSIUM & SODIUM PHOSPHATES POWDER PACK 280-160-250 MG 1 PACKET: 280-160-250 PACK at 20:39

## 2019-12-20 RX ADMIN — ALBUTEROL SULFATE 2.5 MG: 2.5 SOLUTION RESPIRATORY (INHALATION) at 03:47

## 2019-12-20 RX ADMIN — OXYCODONE HYDROCHLORIDE 10 MG: 5 SOLUTION ORAL at 17:47

## 2019-12-20 RX ADMIN — Medication 12.5 MG: at 08:10

## 2019-12-20 RX ADMIN — ACETYLCYSTEINE 2 ML: 100 INHALANT RESPIRATORY (INHALATION) at 11:54

## 2019-12-20 RX ADMIN — MULTIVITAMIN 15 ML: LIQUID ORAL at 20:38

## 2019-12-20 RX ADMIN — FENTANYL CITRATE 25 MCG: 50 INJECTION, SOLUTION INTRAMUSCULAR; INTRAVENOUS at 14:50

## 2019-12-20 RX ADMIN — METOPROLOL TARTRATE 50 MG: 50 TABLET, FILM COATED ORAL at 08:09

## 2019-12-20 RX ADMIN — FENTANYL CITRATE 100 MCG: 50 INJECTION, SOLUTION INTRAMUSCULAR; INTRAVENOUS at 15:36

## 2019-12-20 RX ADMIN — ALBUTEROL SULFATE 2.5 MG: 2.5 SOLUTION RESPIRATORY (INHALATION) at 16:43

## 2019-12-20 RX ADMIN — Medication 12.5 MG: at 23:39

## 2019-12-20 RX ADMIN — METRONIDAZOLE 500 MG: 500 TABLET ORAL at 08:09

## 2019-12-20 RX ADMIN — OXYCODONE HYDROCHLORIDE 10 MG: 5 SOLUTION ORAL at 08:09

## 2019-12-20 ASSESSMENT — ACTIVITIES OF DAILY LIVING (ADL)
ADLS_ACUITY_SCORE: 27

## 2019-12-20 NOTE — PROGRESS NOTES
[facility]    I reviewed the chart of Sherwin Angel at the request of Edilson Xavier RN CC, for possible admission to [facility] for LTACH services pending clinical readiness. I will continue to follow.       Maribell Betts RN  Referral Specialist    [place] [id number]  [email or web address]  Office: [phone]  Fax: [phone]

## 2019-12-20 NOTE — PROGRESS NOTES
Patient seen at bedside prior to procedure, planned tracheotomy.   Procedure performed without complications.   Will follow and manage trach cares.

## 2019-12-20 NOTE — ANESTHESIA CARE TRANSFER NOTE
Patient: Sherwin Covarrubiasolya    Procedure(s):  CREATION, TRACHEOSTOMY    Diagnosis: Acute respiratory failure with hypoxia (H) [J96.01]  Diagnosis Additional Information: No value filed.    Anesthesia Type:   General     Note:  Airway :Tracheostomy  Patient transferred to:ICU  Comments: Pt transferred to ICU by CRNA x 2, ENT fellow and ENT Resident. Pt VSS upon arrival to the ICU. Pt care report given to receiving RN> ICU Handoff: Call for PAUSE to initiate/utilize ICU HANDOFF, Identified Patient, Identified Responsible Provider, Reviewed the Pertinent Medical History, Discussed Surgical Course, Reviewed Intra-OP Anesthesia Management and Issues during Anesthesia, Set Expectations for Post Procedure Period and Allowed Opportunity for Questions and Acknowledgement of Understanding      Vitals: (Last set prior to Anesthesia Care Transfer)    CRNA VITALS  12/20/2019 1547 - 12/20/2019 1629      12/20/2019             Resp Rate (observed):  12    Resp Rate (set):  12                Electronically Signed By: JORGE L Greco CRNA  December 20, 2019  4:29 PM

## 2019-12-20 NOTE — ANESTHESIA POSTPROCEDURE EVALUATION
Anesthesia POST Procedure Evaluation    Patient: Sherwin Angel   MRN:     [id number] Gender:   male   Age:    64 year old :      1955        Preoperative Diagnosis: Acute respiratory failure with hypoxia (H) [J96.01]   Procedure(s):  CREATION, TRACHEOSTOMY   Postop Comments: No value filed.       Anesthesia Type:  Not documented  General    Reportable Event: NO     PAIN:        Neuro/Psych:   Sign Out Status: Planned Postop Sedation     Airway/Resp.:   Sign Out Status: Airway Device resent     Airway Device: Tracheostomy (NEW)     CV:   Sign Out status: CV Support within EXPECTED Parameters     Disposition:   Sign Out in:  ICU  Disposition:  ICU  Recovery Course: Uneventful           Last Anesthesia Record Vitals:  CRNA VITALS  2019 1547 - 2019 1647      2019             Resp Rate (observed):  12    Resp Rate (set):  12          Last PACU Vitals:  Vitals Value Taken Time   /77 2019  4:45 PM   Temp     Pulse     Resp     SpO2 100 % 2019  4:48 PM   Temp src     NIBP     Pulse     SpO2     Resp     Temp     Ht Rate     Temp 2     Vitals shown include unvalidated device data.      Electronically Signed By: Yani Bajwa MD, 2019, 4:48 PM

## 2019-12-20 NOTE — PLAN OF CARE
CV: Bradycardia. 40's-50's. BP stable.  Afebrile. Pulses intact.  Neuro: BUE moderate strength. BLE +1 strength. PERR. Follows commands. Mouths or motions to make needs known.  Resp: CMV overnight. Q2hr resp bagging and deep suctioning. Q4hr nebs. LS coarse. Minimal to moderate secretions thorough ETT.   GI: TF held 0000 for planned trach placement in OR. Scheduled for 1340 12/20. Loose stools continued. Hold bowel meds.   : díaz in place. Adequate urine output.  Skin: Intact. Mepilex on coccyx. Sheep fur boots with heels elevated. IAD on scrotum. Apply cream.    Continue to monitor pt.'s resp status. Continue resp treatments and encourage pulm toilet up until surgery this afternoon.

## 2019-12-20 NOTE — DISCHARGE SUMMARY
[facility]  Neurosurgery DISCHARGE SUMMARY    Patient Name:  Sherwin Angel  MRN:  [id number]      :  1955      Date of Admission:  2019  Date of Discharge:  2019  Admitting Physician:  Fernando Ashton MD  Discharge Physician:    Primary Care Provider:   [facility]  Discharge Disposition:   Discharged to the [facility]     Admission Diagnoses:  Traumatic cervical spinal cord injury.     Discharge Diagnoses:    Traumatic cervical spinal cord injury.   Chronic ventilatory dependence.   Brief History of Illness:   Sherwin Angel is a 64 year old male with pmh HTN, DM, alcohol use admitted 2019 with bilateral lower extremity plegia and loss of upper extremity movement after a possible fall at rehab. Prior to fall he had been ambulatory with cane. He had an anterior C3-C4 arthroplasty approximately 2 weeks prior to admission.     Hospital Course:  Patient went to OR for irrigation and debridement and removal of spinal hardware and anterior fusion via anterior approach and pharyngotomy repair per ENT, Steve drain placed in nack. There was a second pharyngotomy site which was unable to be reached. Initial MRI c-spine showed artifact from disc prosthesis but showed cord injury to C3 and C4-5 as result of C3-C5 compression fractures as well as retrolisthesis of C3 on C4 and traumatic herniation of C4-C5 intervertebral disc and extensive prevertebral thickening. Placed in cervical collar at all times. Pain treated with dilauded prn, oxycodone prn, and flexeril prn. Depression anxiety treated with home mirtazapine, trazodone, and we added Seroquel. Worked with physical and occupational therapy.      ID  Patient had low grade fever and was placed of cefepime, metronidazole, and vancomycin with spinal tissue cultures which eventually grew MSSA, Strep anginosus, Eikenella, and oral anaerobes. He was switched to unasyn and zosyn   and then became febrile again 12/7. Switched back to Vancomycin, cefepime, and metronidazole and fever improved. Repeat MRI on 12/10 showed preverteberal phlegmon C2-C5 and anterior epidural abscess C2/C3 through C5/C6 resulting in canal stenosis and chord edema. Broad coverage antibiotics were continued. Repeat MRI 12/19 showed persistent discitis/osteomyleitis C3-C5 and stable/slightly improved phlegmon. Abscess decreased and ongoing hypopharynx fistulous communication.     Respiratory   On admission he also developed acute respiratory failure. Initial concern for pneumonia. He was pressure supported and extubated but unfortunately re intubated 12/6. Concern for cervical cord damage causing diaphragmatic weakness. He ultimately had a tracheostomy placed with ENT 12/20.    -Intensive work with RT with mucomyst, chest physio, metanebs, percussive therapy.  -mucous plugging on CXR, received multiple bronchs  -Suspect worse weakness on right diaphragm (based on CXR and worse weakness in RUE compared to LUE) if placed on right side would often desat which would improved with repositioning when placed on left side.     Cardio   treated with pta amlodipine 10 mg, metoprolol 50 mg BID      Endo  Typically required NPH 38 U BID, endocrine consulted.     GI   Neurogenic constipation and on opiate bowel regimen  -scheduled senna daily with prns   -laparoscopic G tube placed with bariatric surgery as patient has history of lawson en Y gastric bypass.      Pertinent Investigations:  MRI cervical spines 12/27:  Impression:   1. Persistent but minimally decreased thickness of epidural  phlegmon/abscess along the anterior thecal sac and prevertebral soft  tissue since 12/10/2019. Unchanged severe canal stenosis.  2. Slightly increased erosion with loss of cortical definition of the  C3-4 and C4-5 endplates which is resulting in minimally increased  kyphotic curvature at C3-4 since prior.  3. Unchanged abnormal myelopathic signal  extending from C3 to C6.       Chest X ray:  Impression:   1. Stable support devices.  2. Slightly increased right and stable left basilar  atelectasis/consolidation.  3. Stable small pleural effusions.    Consultations:    Endocrine  Bariatric surgery   Infectious disease   Otolaryngology   OT  PT           Neuro:  #Cervical spinal cord injury  #Discitis/osteomyelitis C3-C4  # Vertebral phlegmon and Abscess, minimally reduced in thickness  - continue wearing the [place] Collar at all times per neuro surgery, Up with assist  - Neuro checks q4h during the day   - continue PT/OT     #Anxiety/Pain   - good control   - Oxycodone 5-10 q4hrs prn   - flexeril 10 mg TID      #Hx of Alcohol dependence  - Thiamine and folate     #Depression  - stop buspar 10 mg BID on 12/31  - Stop PTA mirtazepine per psych  - Continue trazodone 100 mg at bedtime  - Start Lexapro 10 mg daily   - In 2 or 3 weeks, one could consider Wellbutrin augmentation per psych     Resp:  #Acute respiratory failure s/p re-intubation 12/6, suspect diaphragmatic weakness related to spine injury  #S/P trach 12/20   - Mucomyst   - albuterol nebs  - Chest physio   - Pressure support trial TID  - Weaning off vent as tolerated  - repeat CXR today stable  - Lasix 20 mg IV once to see if able to trach dome after     Cardio:  # Hypertension  - PTA amlodipine 10 mg   - Continue metoprolol to 100 mg bid  - PTA Bumex 1 mg bid  - Lasix 20 mg IV once to see if able to trach dome after     #Bradycardia, resolved  - HR of 40s overnight on 12/31  - Now stable     Renal:  #Hypophosphatemia, resolved  #Hypernatremia, resolved  - Electrolyte replacement protocol   - Monitor I/O, goal In= outs   - free water flushes 60 q4h, decrease to 30 q4h given improvement in Na     #Neurogenic urinary retention   - failed díaz wean trial 12/12 caused retention   - treat UTI as below      Endo:  #DM2   - Hgb A1C 6.1 (12/31)  - Continued elevated BG's despite increasing lantus, however pt was  given D10 overnight as TFs not given, will monitor now that TF resumed  - lantus 20 units increased 12/29, will continue for now  - High sliding scale insulin     Heme:  #Normocytic anemia, stable  - Hg > 7.0      # Leukocytosis, resolved  Likely due to UTI. Blood culture negative and UC positive for VRE and candida glabrata. B2 Glucan positive 129  - treat UTI as below in ID      GI:  #Constipation, resolved  - Senna scheduled bid  - PRN suppository/enema   - Miralax bid  - KUB with some dilated loops of bowel, some air? Unclear if from PEG, he does not have acute abdomen on exam  - simethacone, and probiotics for gas      #Diet   - G-tube in place  - Tube feeds at goal      ID:   #Vertebral phlegmon and Abscess s/p drain placement 12/1  #Osteomyelitis C3-5  #Fever - restarted 12/25  #Leukocytosis, improving   #repeat MRI 12/10 and 12/19 shows progressive epidural abscess.   # Repeat MRI 12/27 showed minimally decrease abscess thickness and increase the vertebral erosion   - Vanc, cefepime, metronidazole regimen per ID (through 1/22)  - Weekly CBC, CMP, CRP while on IV abx,  - Spinal tissue cultures (12/1): MSSA, Strep anginosus, Eikenella, and oral anaerobes   - Per ID, will need at least 6wks of above abx from day 1 (12/11), earliest stop date of 1/22/20     #VRE and candida glabrata UTI  ID does not think the new fever and elevated CRP is due to UTI for they feel he is colonized. They think possibly spinal abscess. Repeat cervical spines MRI did not show increase of the abscess size but showed more vertebral erosions  - blood culture negative  - Repeat blood culture negative  -  B 2 Glucan positive   - if spikes fever > 102, start Fluconazole 400 mg daily per ID  - Trend CRP q3h days   - díaz removed     Lines:  - Trach, PICC , PIV x 2, PEG     FEN: TF via PEG   PPX:    DVT prophylaxis: SCDs, Heparin subcutaneous    GI: famotidine   Code Status: Full, presumed     [facility]     Recommendations and  "Follow-up:  - follow up in the ENT clinic 1 month after discharge   - MRI cervical spine in 2 weeks per ID (around 1/2)  - continue PT /OT  - Infectious Disease recommends repeat Urinalysis on 1/2/2020 and that he be swabbed for thrush due to patchy area on tongue     Discharge physical examination:   /60   Pulse 71   Temp 98.2  F (36.8  C) (Oral)   Resp (!) 31   Ht 1.727 m (5' 8\")   Wt 88.9 kg (195 lb 15.8 oz)   SpO2 96%   BMI 29.80 kg/m    General:  sitting on a chair in no acute distress  HEENT:  NC/AT, cervical collar in place. no icterus, op pink and moist, no ear or nose drainage.   Cardiac:  RRR, no m/r/g  Chest:  insbiratory wheezes  Abdomen:  S/NT/ND  Extremities:  No LE swelling.    Skin:  No rash or lesion.       Neuro Exam:   Mental status: alert, follows all commands  Cranial nerves: PERRL, EOMI, face symmetric  Motor: Some flexion extension at elbow bilaterally more left (3/5) than right (2/5). Some movement left wrist (3/5 ext) and fingers, no thumb abduction, no movement in distal RUE. No movements of the legs  Sensory: reports sensation in arms and upper chest.     Discharge Medications:  Current Discharge Medication List      START taking these medications    Details   acetylcysteine (MUCOMYST) 10 % nebulizer solution Inhale 2 mLs into the lungs every 4 hours    Associated Diagnoses: Tracheostomy care (H)      albuterol (PROVENTIL) (2.5 MG/3ML) 0.083% neb solution Take 1 vial (2.5 mg) by nebulization every 4 hours    Associated Diagnoses: Tracheostomy care (H)      artificial saliva (BIOTENE MT) SOLN solution Swish and spit 2 mLs (2 sprays) in mouth 4 times daily      carboxymethylcellulose PF (REFRESH PLUS) 0.5 % ophthalmic solution Place 1 drop into both eyes 3 times daily as needed for dry eyes    Associated Diagnoses: Dry eyes      ceFEPIme (MAXIPIME) 2 G vial Inject 2 g into the vein every 8 hours    Comments: Through 1/22  Associated Diagnoses: Paraplegia (H)      cyanocobalamin " (CYANOCOBALAMIN) 1000 MCG/ML injection Inject 1 mL (1,000 mcg) into the muscle every 30 days    Associated Diagnoses: Alcohol abuse      cyclobenzaprine (FLEXERIL) 10 MG tablet Take 1 tablet (10 mg) by mouth 3 times daily      escitalopram (LEXAPRO) 10 MG tablet Take 1 tablet (10 mg) by mouth daily    Comments: depression      famotidine (PEPCID) 40 MG/5ML suspension 2.5 mLs (20 mg) by Per G Tube route 2 times daily    Associated Diagnoses: Gastritis without bleeding, unspecified chronicity, unspecified gastritis type      folic acid (FOLVITE) 1 MG tablet 1 tablet (1 mg) by Oral or Feeding Tube route daily    Associated Diagnoses: Alcohol abuse      Heparin Sodium, Porcine, (HEPARIN ANTICOAGULANT) 5000 UNIT/0.5ML injection Inject 0.5 mLs (5,000 Units) Subcutaneous every 12 hours    Comments: DVT ppx      hypromellose-dextran (ARTIFICAL TEARS) 0.1-0.3 % ophthalmic solution Place 1 drop into both eyes every hour as needed for dry eyes      insulin aspart (NOVOLOG PEN) 100 UNIT/ML pen Inject 2-16 Units Subcutaneous every 4 hours      insulin glargine (LANTUS PEN) 100 UNIT/ML pen Inject 20 Units Subcutaneous every morning (before breakfast)    Comments: If Lantus is not covered by insurance, may substitute Basaglar at same dose and frequency.        ipratropium - albuterol 0.5 mg/2.5 mg/3 mL (DUONEB) 0.5-2.5 (3) MG/3ML neb solution Take 1 vial (3 mLs) by nebulization every 4 hours as needed for wheezing    Associated Diagnoses: Tracheostomy care (H)      lactobacillus rhamnosus, GG, (CULTURELL) capsule Take 1 capsule by mouth daily      melatonin 5 MG tablet 1 tablet (5 mg) by Oral or Feeding Tube route nightly as needed for sleep    Associated Diagnoses: Depression, unspecified depression type      metroNIDAZOLE (FLAGYL) 500 MG tablet 1 tablet (500 mg) by Oral or Feeding Tube route 3 times daily    Comments: Through 1/22  Associated Diagnoses: Paraplegia (H)      miconazole (MICATIN) 2 % external cream Apply topically  2 times daily      multivitamins w/minerals (CERTAVITE) liquid 15 mLs by Per Feeding Tube route daily    Associated Diagnoses: Alcohol abuse      oxyCODONE (ROXICODONE) 5 MG/5ML solution 5-10 mLs (5-10 mg) by Oral or Feeding Tube route every 4 hours as needed for severe pain or breakthrough pain  Refills: 0    Associated Diagnoses: Pain      polyethylene glycol (MIRALAX/GLYCOLAX) packet Take 17 g by mouth 2 times daily      protein modular (PROSOURCE TF) LIQD 1 packet by Per Feeding Tube route 3 times daily      senna-docusate (SENOKOT-S/PERICOLACE) 8.6-50 MG tablet 1 tablet by Oral or Feeding Tube route 2 times daily      simethicone (MYLICON) 80 MG chewable tablet Take 1 tablet (80 mg) by mouth 2 times daily      vancomycin 1,250 mg Inject 1,250 mg into the vein every 12 hours      vitamin B1 (THIAMINE) 100 MG tablet 1 tablet (100 mg) by Oral or Feeding Tube route daily    Associated Diagnoses: Alcohol abuse         CONTINUE these medications which have CHANGED    Details   acetaminophen (TYLENOL) 500 MG tablet Take 2 tablets (1,000 mg) by mouth 3 times daily as needed for mild pain    Associated Diagnoses: Pain      amLODIPine (NORVASC) 10 MG tablet Take 1 tablet (10 mg) by mouth daily    Associated Diagnoses: Benign essential hypertension      atorvastatin (LIPITOR) 80 MG tablet Take 0.5 tablets (40 mg) by mouth every evening    Associated Diagnoses: Paraplegia (H)      bumetanide (BUMEX) 1 MG tablet Take 1 tablet (1 mg) by mouth 2 times daily    Associated Diagnoses: Acute respiratory failure with hypoxia (H)      metoprolol tartrate (LOPRESSOR) 50 MG tablet Take 1.5 tablets (75 mg) by mouth 2 times daily    Associated Diagnoses: Benign essential hypertension      mirtazapine (REMERON) 30 MG tablet Take 1 tablet (30 mg) by mouth At Bedtime    Associated Diagnoses: Depression, unspecified depression type      traZODone (DESYREL) 50 MG tablet Take 3 tablets (150 mg) by mouth At Bedtime    Associated Diagnoses:  Depression, unspecified depression type         STOP taking these medications       calcium-vitamin D-vitamin K (VIACTIV) 500-500-40 MG-UNT-MCG CHEW Comments:   Reason for Stopping:         lidocaine (LIDODERM) 5 % patch Comments:   Reason for Stopping:         multivitamin, therapeutic (THERA-VIT) TABS tablet Comments:   Reason for Stopping:               Discharge follow up and instructions:     UA reflex to Microscopic and Culture    To be performed on 1/2/2020     Otolaryngology Referral      Infectious Disease Referral      General info for SNF    Length of Stay Estimate: Long Term Care  Condition at Discharge: Stable  Level of care:skilled   Rehabilitation Potential: Fair  Admission H&P remains valid and up-to-date: Yes  Recent Chemotherapy: N/A  Use Nursing Home Standing Orders: N/A     Mantoux instructions    Give two-step Mantoux (PPD) Per Facility Policy Yes     Reason for your hospital stay    Admitted with bilateral LEs and UEs weakness and respiratory failure after a fall. Was found to have compression fracture in the C3-5 vertebrae with C2-6 abscess/phlegamon and C3-5 osteomyelitis. He is s/p intubation then Trach and PEG placed     Intake and output    Every shift     Glucose monitor nursing POCT    Before meals and at bedtime     Bladder scan    X 2 for post void residual     Activity - Up ad kong     Follow Up ([facility])    Follow up with Dr. Santiago , at (location with clinic name or city) [facility], within 1 month  to evaluate after surgery. No follow up labs or test are needed.    [facility], will need Cervical head CT at 3 months.      Appointments on [place] and/or [facility] (with [facility] or [facility] provider or service). Call [phone] if you haven't heard regarding these appointments within 7 days of discharge.     Glucose monitor nursing POCT    Before meals and at bedtime     Activity - Up with assistive device      Tracheostomy care by nursing    Standard Cares     Additional Discharge Instructions    Cervical collar to be worn at all times until cleared by  Neurosurgery.  Please assess skin 3 times daily in order to prevent skin breakdown.     Bladder scan    X 2 for post void residual     Follow Up and recommended labs and tests    - Follow up in the ENT clinic in a month  - Infectious Disease recommends repeat MRI cervical spines around 1/2     Full Code     Physical Therapy Adult Consult    Evaluate and treat as clinically indicated.    Reason:  Quadriparesis     Ventilator    Current Vent settings:   Peep 5  Pressure support 7  Fi02 40%  CPAP     Fall precautions     Fall precautions     Advance Diet as Tolerated    Follow this diet upon discharge: Orders Placed This Encounter      Adult Formula Drip Feeding: Continuous Impact Peptide 1.5; Gastrostomy; Goal Rate: 55; mL/hr; Medication - Feeding Tube Flush Frequency: At least 15-30 mL water before and after medication administration and with tube clogging; Aspiration precauti...     Advance Diet as Tolerated    Follow this diet upon discharge: Orders Placed This Encounter      Adult Formula Drip Feeding: Continuous Nutren 1.5; Gastrostomy; Goal Rate: 55; mL/hr; Medication - Feeding Tube Flush Frequency: At least 15-30 mL water before and after medication administration and with tube clogging; Amount to Send (Nutrition u...      Advance Diet as Tolerated       JORGE L Jauregui, CNP  Department of Neurosurgery  Pager: [phone]

## 2019-12-20 NOTE — PROGRESS NOTES
Neuroscience Intensive Care Progress Note  12/20/2019      24 hour events: tolerating peep 5 fi 02 40%. No acute events, reports mild/moderate neck pain.    Current Medications:    acetylcysteine  2 mL Nebulization Q4H     albuterol  2.5 mg Nebulization Q4H     amLODIPine  10 mg Oral or Feeding Tube Daily     ceFEPIme (MAXIPIME) IV  2 g Intravenous Q8H     cyanocobalamin  1,000 mcg Intramuscular Q30 Days     famotidine  20 mg Per G Tube BID     folic acid  1 mg Oral or Feeding Tube Daily     heparin lock flush  5-10 mL Intracatheter Q24H     insulin aspart  2-16 Units Subcutaneous Q4H     lidocaine  2 patch Transdermal Q24h    And     lidocaine   Transdermal Q8H     metoprolol tartrate  50 mg Oral or Feeding Tube BID     metroNIDAZOLE  500 mg Oral or Feeding Tube TID     mirtazapine  30 mg Oral or Feeding Tube QPM     multivitamins w/minerals  15 mL Per Feeding Tube Daily     potassium & sodium phosphates  1 packet Oral or Feeding Tube BID     QUEtiapine  12.5 mg Oral BID     senna-docusate  1 tablet Oral or Feeding Tube BID     traZODone  100 mg Oral or Feeding Tube At Bedtime     vancomycin (VANCOCIN) IV  1,250 mg Intravenous Q12H     thiamine  100 mg Oral or Feeding Tube Daily       PRN Medications:  acetaminophen, carboxymethylcellulose PF, cyclobenzaprine, IV fluid REPLACEMENT ONLY, glucose **OR** dextrose **OR** glucagon, heparin lock flush, hydrALAZINE, HYDROmorphone, ipratropium - albuterol 0.5 mg/2.5 mg/3 mL, labetalol, lidocaine 4%, lidocaine (buffered or not buffered), magnesium sulfate, magnesium sulfate, melatonin, naloxone, ondansetron **OR** ondansetron, oxyCODONE, potassium chloride, potassium chloride with lidocaine, potassium chloride, potassium chloride, potassium chloride, potassium phosphate (KPHOS) in D5W IV, potassium phosphate (KPHOS) in D5W IV, potassium phosphate (KPHOS) in D5W IV, potassium phosphate (KPHOS) in D5W IV, potassium phosphate (KPHOS) in D5W IV, prochlorperazine **OR**  "prochlorperazine **OR** prochlorperazine, sodium chloride (PF), sodium phosphate    Infusions:    IV fluid REPLACEMENT ONLY 55 mL/hr at 12/18/19 0200     no pre procedure antibiotic needed         Objective findings:  BP (!) 151/73   Pulse 101   Temp 98.6  F (37  C)   Resp 20   Ht 1.727 m (5' 8\")   Wt 85.6 kg (188 lb 11.4 oz)   SpO2 97%   BMI 28.69 kg/m      Ventilator Settings:  Ventilation Mode: CMV/AC  (Continuous Mandatory Ventilation/ Assist Control)  FiO2 (%): 40 %  Rate Set (breaths/minute): 12 breaths/min  Tidal Volume Set (mL): 450 mL  PEEP (cm H2O): 5 cmH2O  Pressure Support (cm H2O): 7 cmH2O  Oxygen Concentration (%): 40 %  Resp: 20      Intake/Output:      Intake/Output Summary (Last 24 hours) at 12/20/2019 0830  Last data filed at 12/20/2019 0600  Gross per 24 hour   Intake 1460 ml   Output 3100 ml   Net -1640 ml       Physical Examination:   Vitals:  B/P: 146/71, T: 98.8, P: 89, R: 23  General:  Laying in bed  HEENT:  NC/AT, cervical collar in place. no icterus, op pink and moist, no ear or nose drainage.   Cardiac:  RRR, no m/r/g  Chest:  CTAB  Abdomen:  S/NT/ND  Extremities:  No LE swelling.    Skin:  No rash or lesion.      Neuro Exam:   Mental status: alert, follows all commands  Cranial nerves: PERRL, EOMI, face symmetric  Motor: Some flexion extension at elbow bilaterally more left than right. Some movement left wrist and fingers, no thumb abduction, no movement in distal RUE. No movements of the legs  Sensory: reports sensation in arms and upper chest.     Labs/Studies:  CBC RESULTS:   Recent Labs   Lab Test 12/20/19  0337   WBC 7.1   RBC 3.08*   HGB 8.9*   HCT 29.2*   MCV 95   MCH 28.9   MCHC 30.5*   RDW 14.5        Last Comprehensive Metabolic Panel:  Sodium   Date Value Ref Range Status   12/20/2019 143 133 - 144 mmol/L Final     Potassium   Date Value Ref Range Status   12/20/2019 3.5 3.4 - 5.3 mmol/L Final     Chloride   Date Value Ref Range Status   12/20/2019 111 (H) 94 - " 109 mmol/L Final     Carbon Dioxide   Date Value Ref Range Status   12/20/2019 29 20 - 32 mmol/L Final     Anion Gap   Date Value Ref Range Status   12/20/2019 3 3 - 14 mmol/L Final     Glucose   Date Value Ref Range Status   12/20/2019 95 70 - 99 mg/dL Final     Urea Nitrogen   Date Value Ref Range Status   12/20/2019 22 7 - 30 mg/dL Final     Creatinine   Date Value Ref Range Status   12/20/2019 0.49 (L) 0.66 - 1.25 mg/dL Final     GFR Estimate   Date Value Ref Range Status   12/20/2019 >90 >60 mL/min/[1.73_m2] Final     Comment:     Non  GFR Calc  Starting 12/18/2018, serum creatinine based estimated GFR (eGFR) will be   calculated using the Chronic Kidney Disease Epidemiology Collaboration   (CKD-EPI) equation.       Calcium   Date Value Ref Range Status   12/20/2019 8.4 (L) 8.5 - 10.1 mg/dL Final     Neuro:  #Cervical spinal cord injury  #Discitis/osteomyelitis C3-C4  - Collar at all times, Up with assist  - Neuro checks q4h during the day   - PT/OT     #Anxiety/Pain - good control   - Dilauded PRN  - Oxycodone 5-10 q4hrs prn   - flexeril 7.5 mg TID prn     #Hx of Alcohol dependence  - Thiamine & folate daily     #Depression  - Continue PTA miratazepine  - restarted trazodone 100 mg at bedtime   - Seroquel 12.5 BID      Resp:  #Acute respiratory failure s/p re-intubation 12/6, suspect diaphragmatic weakness related to spine injury  - Mucomyst & chest physio   - daily CXR  --metanebs  --chest physio per RT  --percussive therapy  - desaturation when positioned on Rt side, try positional changes first   - ENT plans for trach 12/20      Cardio:  #Hx of hypertension - well controlled  - PTA amlodipine 10 mg   - PTA metoprolol 50mg bid     Renal:  Hypophosphatemia - neutraphos  - Electrolyte replacement protocol   - Monitor I/O, net negative 1.5L   - free water flushes 30 q4h  - goal In= outs     Neurogenic urinary retention   - failed díaz wean trial 12/12     Endo:  #DM2  - Sliding scale insulin,  NPH held while TF held      Heme:  #Normocytic anemia, stable  - Hg > 7.0     GI:  #Constipation - resolved last BM 12/20  - Senna scheduled daily  - PRN suppository/enema available     #Diet  - G-tube in place  - Tube feeds held for trach      ID:   #Discitis vs osteomyelitis   #Fever - resolved   #Leukocytosis and CRP down trending   #repeat CT MRI with mild edema, abscess from C2-C6 with some improvement possible due drainage via pharyngotomy/antibiotic coverage.   - Vanc, cefepime, metronidazole regimen   - Weekly CBC, CMP, CRP while on IV abx,  - Spinal tissue cultures (12/1): MSSA, Strep anginosus, Eikenella, and oral anaerobes   - Will page ID attending at [id number] prior to discharging pt     Lines:  -ETT, PICC, PIV x 2, Degroot, PEG     FEN: TF via PEG , held for trach   PPX:    DVT prophylaxis: SCDs, Heparin subcutaneous held for trach    GI: famotidine   Code Status: Full, presumed     Dispo: 4A until trach, more medically stable     This patient was seen & discussed with my attending, Dr. Figueroa, who agrees with my assessment and plan.     Ladan Stevens DO  Neurology PGY-2   [phone]

## 2019-12-20 NOTE — ANESTHESIA PREPROCEDURE EVALUATION
Anesthesia Pre-Procedure Evaluation    Patient: Sherwin Angel   MRN:     [id number] Gender:   male   Age:    64 year old :      1955        Preoperative Diagnosis: Acute respiratory failure with hypoxia (H) [J96.01]   Procedure(s):  CREATION, TRACHEOSTOMY     History reviewed. No pertinent past medical history.   Past Surgical History:   Procedure Laterality Date     IRRIGATION AND DEBRIDEMENT NECK, COMBINED N/A 2019    Procedure: IRRIGATION AND DEBRIDEMENT OF NECK; PHARYNGOTOMY REPAIR.;  Surgeon: Fernando Ashton MD;  Location: [facility] OR     LAPAROSCOPIC ASSISTED INSERTION TUBE GASTROTOMY N/A 2019    Procedure: laproscopic assisted gastrostomy tube placement;  Surgeon: Luis Santiago MD;  Location: [facility] OR     REMOVE HARDWARE CERVICAL ANTERIOR SPINE N/A 2019    Procedure: REMOVAL, HARDWARE, SPINE, CERVICAL, ANTERIOR;  Surgeon: Fernando Ashton MD;  Location: [facility] OR          Anesthesia Evaluation     . Pt has had prior anesthetic.     No history of anesthetic complications          ROS/MED HX    ENT/Pulmonary:     (+), . Other pulmonary disease Acute respiratory failure.    Neurologic:     (+)Spinal cord injury year sustained:  level of injury: cervical without autonomic hyperflexia symptoms,     Cardiovascular:  - neg cardiovascular ROS   (+) hypertension----. : . . . :. . No previous cardiac testing       METS/Exercise Tolerance:     Hematologic:  - neg hematologic  ROS       Musculoskeletal:         GI/Hepatic:  - neg GI/hepatic ROS       Renal/Genitourinary:  - ROS Renal section negative       Endo:     (+) type II DM .   (-) Type I DM   Psychiatric:         Infectious Disease:   (+) Other Infectious Disease cervical abcess      Malignancy:         Other:    - neg other ROS                     PHYSICAL EXAM:   Mental Status/Neuro:    Airway: Facies: Feasible  Mallampati: Not Assessed  Mouth/Opening: Not Assessed  TM distance: Not Assessed  Neck ROM: Not  "Assessed  Airway Device: ETT   Respiratory:    CV:    Comments:                      LABS:  CBC:   Lab Results   Component Value Date    WBC 7.1 12/20/2019    WBC 7.2 12/19/2019    HGB 8.9 (L) 12/20/2019    HGB 8.9 (L) 12/19/2019    HCT 29.2 (L) 12/20/2019    HCT 29.3 (L) 12/19/2019     12/20/2019     12/19/2019     BMP:   Lab Results   Component Value Date     12/20/2019     12/19/2019    POTASSIUM 3.5 12/20/2019    POTASSIUM 3.8 12/19/2019    CHLORIDE 111 (H) 12/20/2019    CHLORIDE 107 12/19/2019    CO2 29 12/20/2019    CO2 29 12/19/2019    BUN 22 12/20/2019    BUN 18 12/19/2019    CR 0.49 (L) 12/20/2019    CR 0.47 (L) 12/19/2019    GLC 95 12/20/2019     (H) 12/19/2019     COAGS:   Lab Results   Component Value Date    PTT 35 12/01/2019    INR 1.30 (H) 12/04/2019     POC:   Lab Results   Component Value Date     (H) 12/20/2019     OTHER:   Lab Results   Component Value Date    PH 7.46 (H) 12/14/2019    LACT 1.2 12/01/2019    A1C 6.5 (H) 12/01/2019    FLORINDA 8.4 (L) 12/20/2019    PHOS 2.8 12/20/2019    MAG 2.0 12/20/2019    ALBUMIN 2.1 (L) 12/01/2019    PROTTOTAL 7.4 12/01/2019    ALT 32 12/01/2019    AST 17 12/01/2019    ALKPHOS 103 12/01/2019    BILITOTAL 0.6 12/01/2019    CRP 14.0 (H) 12/20/2019        Preop Vitals    BP Readings from Last 3 Encounters:   12/20/19 124/65    Pulse Readings from Last 3 Encounters:   12/15/19 101      Resp Readings from Last 3 Encounters:   12/20/19 20    SpO2 Readings from Last 3 Encounters:   12/20/19 99%      Temp Readings from Last 1 Encounters:   12/20/19 36.7  C (98.1  F) (Axillary)    Ht Readings from Last 1 Encounters:   12/01/19 1.727 m (5' 8\")      Wt Readings from Last 1 Encounters:   12/18/19 85.6 kg (188 lb 11.4 oz)    Estimated body mass index is 28.69 kg/m  as calculated from the following:    Height as of this encounter: 1.727 m (5' 8\").    Weight as of this encounter: 85.6 kg (188 lb 11.4 oz).     LDA:  Peripheral IV 12/01/19 " Right Upper arm (Active)   Site Assessment WDL 12/20/2019 12:00 PM   Line Status Saline locked;Checked every 1-2 hour 12/20/2019 12:00 PM   Phlebitis Scale 0-->no symptoms 12/20/2019 12:00 PM   Infiltration Scale 0 12/20/2019 12:00 PM   Infiltration Site Treatment Method  None 12/20/2019  4:00 AM   Extravasation? No 12/20/2019 12:00 PM   Number of days: 19       Peripheral IV 12/01/19 Left Lower forearm (Active)   Site Assessment WDL 12/20/2019 12:00 PM   Line Status Saline locked;Checked every 1-2 hour 12/20/2019 12:00 PM   Phlebitis Scale 0-->no symptoms 12/20/2019 12:00 PM   Infiltration Scale 0 12/20/2019 12:00 PM   Infiltration Site Treatment Method  None 12/20/2019  4:00 AM   Extravasation? No 12/20/2019 12:00 PM   Dressing Intervention New dressing  12/2/2019  4:00 PM   Number of days: 19       PICC Triple Lumen 12/02/19 Right Basilic Access. RN notified PICC was ready to use. (Active)   Site Assessment [facility] 12/20/2019 12:00 PM   External Cath Length (cm) 3 cm 12/2/2019 11:00 AM   Extremity Circumference (cm) 33 cm 12/2/2019 11:00 AM   Dressing Intervention Other (Comment) 12/20/2019 12:00 PM   Dressing Change Due 12/22/19 12/20/2019 12:00 PM   PICC Comment CDI 12/20/2019 12:00 PM   Lumen A - Color PURPLE 12/20/2019 12:00 PM   Lumen A - Status infusing 12/20/2019 12:00 PM   Lumen A - Cap Change Due 12/20/19 12/20/2019 12:00 PM   Lumen B - Color GRAY 12/20/2019 12:00 PM   Lumen B - Status saline locked 12/20/2019 12:00 PM   Lumen B - Cap Change Due 12/20/19 12/20/2019 12:00 PM   Lumen C - Color RED 12/20/2019 12:00 PM   Lumen C - Status saline locked 12/20/2019 12:00 PM   Lumen C - Cap Change Due 12/20/19 12/20/2019 12:00 PM   Extravasation? No 12/20/2019 12:00 PM   Line Necessity Yes, meets criteria 12/20/2019 12:00 PM   Number of days: 18       ETT (adult) 8 (Active)   Secured By Commercial tube godinez 12/20/2019 11:54 AM   Site Appearance Clean and dry 12/20/2019 11:54 AM   Secured at (cm) to lip 25 cm  12/20/2019 11:54 AM   Site of ET Tube Securement At lip 12/20/2019 11:54 AM   Cuff Pressure - Type minimal leak technique 12/20/2019 11:54 AM   Tube Care/Reposition repositioned tube center of mouth 12/18/2019  4:00 AM   Bite Block Secure and Patent 12/20/2019 11:54 AM   Safety Measures manual resuscitator at bedside 12/20/2019 11:54 AM   ETT Cuff Deflation Leak Test Leak 12/18/2019 12:00 AM   Number of days: 19       Gastrostomy/Enterostomy Gastrostomy  18 fr  (Active)   Site Description WDL 12/20/2019 12:00 PM   Site care cleansed with soap and water 12/18/2019  8:00 PM   Drainage Appearance Bloody/Bright Red 12/18/2019  8:00 PM   Status - Gastrostomy Clamped 12/20/2019 12:00 PM   Status - Jejunostomy Continuous Enteral Feedings 12/9/2019  4:00 AM   Dressing Status Normal: Clean, Dry & Intact 12/20/2019 12:00 PM   Dressing Change Due 12/20/19 12/20/2019 12:00 AM   Intake (ml) 60 ml 12/20/2019  8:00 AM   Residual (mL) 50 mL 12/19/2019  8:00 PM   Output (ml) 20 ml 12/6/2019  6:00 AM   Number of days: 15       Urethral Catheter 16 fr (Active)   Tube Description Positional 12/20/2019 12:00 AM   Catheter Care Done 12/20/2019 12:00 PM   Collection Container Standard 12/20/2019 12:00 PM   Securement Method Securing device (Describe) 12/20/2019 12:00 PM   Rationale for Continued Use Strict 1-2 Hour I&O 12/20/2019 12:00 PM   Urine Output 35 mL 12/20/2019  1:00 PM   Number of days: 8        Assessment:   ASA SCORE: 3    H&P: History and physical reviewed and following examination; no interval change.   Smoking Status:  Non-Smoker/Unknown   NPO Status: NPO Appropriate     Plan:   Anes. Type:  General   Pre-Medication: None   Induction:  IV (Standard)   Airway: ETT; Oral   Access/Monitoring: PIV   Maintenance: Balanced     Postop Plan:   Postop Pain: Opioids  Postop Sedation/Airway: Not planned     PONV Management:   Adult Risk Factors:, Non-Smoker, Postop Opioids     CONSENT: Telephone   Plan and risks discussed with: Other    Blood Products: Consent Deferred (Minimal Blood Loss)       Comments for Plan/Consent:  Discussed with sister                 Ced Link MD

## 2019-12-20 NOTE — PROGRESS NOTES
Neurosurgery Progress Note     Subjective:  meeting trach goals of FiO2 40%, PEEP 5; plan for trach today with ENT     Objective:  Temp: 98.6  F (37  C) Temp src: Axillary BP: (!) 165/79   Heart Rate: 77 Resp: 20 SpO2: 100 % O2 Device: Mechanical Ventilator    Intubated, partially sedated  Awake, alert, nods yes or no to questions  Follows commands  Deltoids 4/5, biceps 4/5, triceps 1/5,  0/5, lower extremities 0/5  Triple flexion in lower extremities     Assessment:  64 years old gentleman status post C3-C4 artificial disc placement about 2 weeks prior to arriaval the [facility] who presented after a fall with almost complete loss of strength in his upper extremities and paraplegia, found to have cervical stenosis. S/p OR 12/1 - Pharyngotomy closed with assistance from ENT. Arthroplasty implant removed. No replacement implant placed. S/p G-tube 12/5. Extubated 12/5. Re-Intubated 12/6 due to Mucous Plugging and Atelectasis. S/p repeated bronchoscopy, last 12/14.     Plan:  Intubated, mechanically ventilated  Nebs, chest physiotherapy; wean to extubate as able, plan for tracheostomy with ENT today 12/20 - goal PEEP 5 and FiO2 40%; bronchoscopy prn per neurocritical care  DVT: SCDs while in bed  [place] J collar  Per ID: vancomycin, cefepime, flagyl 4-6 wks for vertebral phlegmon, weekly labs; no plan for further surgery  Follow-up cultures  Hold SubQ heparin & tube feeds day before possible trach  MRI cervical spine before stopping abx in 6 wks  endocrine consulted for diabetes - appreciate recs  Disposition: 4A        Lion Vargas MD  Neurosurgery Resident PGY2    Please contact neurosurgery resident on call with questions.    Dial * [phone], enter [phone] when prompted.

## 2019-12-20 NOTE — PROGRESS NOTES
"Otolaryngology Progress Note     S: POD1 trach. No events overnight.     O: /77   Pulse 101   Temp 98.5  F (36.9  C) (Axillary)   Resp 12   Ht 1.727 m (5' 8\")   Wt 88.1 kg (194 lb 3.6 oz)   SpO2 98%   BMI 29.53 kg/m    General: intubated  HEENT: C-collar in place. Trach with vent going through c-collar and is without bleeding. Secured in a good position. Neck stable without swelling.   Pulmonary: ventilated     Assessment and Plan  64 year old male w/ PMH HTN, DM, cervical stenosis s/p arthroplasty about 2-3 weeks ago who fell at rehab and presented to the [facility] with paraplegia. Went to OR w/ NSG (Dr. Ashton) 12/1 & encountered inflammatory tissue over the spine and so ENT was consulted for assistance with the exposure.  During the exposure we noticed that there was a pharyngotomy that was seen from the cervical exposure. This was repaired transoral and transcervical. Patient remained intubated post-op. G-tube placed 12/5. Extubated 12/5. Reintubated 12/6 d/t increasing oxygen requirements and CXR w/ almost complete white out of right lung, suspected mucous plugging. Tracheotomy on 12/20.      - Anticipate prolonged duration of NPO status  - No issues with trach site, continue trach cares  - Okay for DVT prophylaxis  - Rest of cares per neurosurgery and ICU, page ENT on call for questions     -- Patient and above plan will be discussed with Dr. Brian.     Kemar Akins MD   Otolaryngology-Head & Neck Surgery  Please contact ENT with questions by dialing * [phone] and entering job [phone] when prompted.  "

## 2019-12-21 LAB
ANION GAP SERPL CALCULATED.3IONS-SCNC: 4 MMOL/L (ref 3–14)
BASOPHILS # BLD AUTO: 0 10E9/L (ref 0–0.2)
BASOPHILS NFR BLD AUTO: 0.3 %
BUN SERPL-MCNC: 15 MG/DL (ref 7–30)
CALCIUM SERPL-MCNC: 8.4 MG/DL (ref 8.5–10.1)
CHLORIDE SERPL-SCNC: 109 MMOL/L (ref 94–109)
CO2 SERPL-SCNC: 28 MMOL/L (ref 20–32)
CREAT SERPL-MCNC: 0.48 MG/DL (ref 0.66–1.25)
DIFFERENTIAL METHOD BLD: ABNORMAL
EOSINOPHIL # BLD AUTO: 0.1 10E9/L (ref 0–0.7)
EOSINOPHIL NFR BLD AUTO: 1.4 %
ERYTHROCYTE [DISTWIDTH] IN BLOOD BY AUTOMATED COUNT: 14.6 % (ref 10–15)
GFR SERPL CREATININE-BSD FRML MDRD: >90 ML/MIN/{1.73_M2}
GLUCOSE BLDC GLUCOMTR-MCNC: 107 MG/DL (ref 70–99)
GLUCOSE BLDC GLUCOMTR-MCNC: 117 MG/DL (ref 70–99)
GLUCOSE BLDC GLUCOMTR-MCNC: 138 MG/DL (ref 70–99)
GLUCOSE BLDC GLUCOMTR-MCNC: 140 MG/DL (ref 70–99)
GLUCOSE BLDC GLUCOMTR-MCNC: 179 MG/DL (ref 70–99)
GLUCOSE SERPL-MCNC: 105 MG/DL (ref 70–99)
HCT VFR BLD AUTO: 27.7 % (ref 40–53)
HGB BLD-MCNC: 8.5 G/DL (ref 13.3–17.7)
IMM GRANULOCYTES # BLD: 0.1 10E9/L (ref 0–0.4)
IMM GRANULOCYTES NFR BLD: 1 %
LYMPHOCYTES # BLD AUTO: 1.3 10E9/L (ref 0.8–5.3)
LYMPHOCYTES NFR BLD AUTO: 17.9 %
MAGNESIUM SERPL-MCNC: 2.2 MG/DL (ref 1.6–2.3)
MCH RBC QN AUTO: 29 PG (ref 26.5–33)
MCHC RBC AUTO-ENTMCNC: 30.7 G/DL (ref 31.5–36.5)
MCV RBC AUTO: 95 FL (ref 78–100)
MONOCYTES # BLD AUTO: 0.6 10E9/L (ref 0–1.3)
MONOCYTES NFR BLD AUTO: 8.3 %
NEUTROPHILS # BLD AUTO: 5.2 10E9/L (ref 1.6–8.3)
NEUTROPHILS NFR BLD AUTO: 71.1 %
NRBC # BLD AUTO: 0 10*3/UL
NRBC BLD AUTO-RTO: 0 /100
PHOSPHATE SERPL-MCNC: 3.7 MG/DL (ref 2.5–4.5)
PLATELET # BLD AUTO: 246 10E9/L (ref 150–450)
POTASSIUM SERPL-SCNC: 3.8 MMOL/L (ref 3.4–5.3)
RBC # BLD AUTO: 2.93 10E12/L (ref 4.4–5.9)
SODIUM SERPL-SCNC: 140 MMOL/L (ref 133–144)
WBC # BLD AUTO: 7.3 10E9/L (ref 4–11)

## 2019-12-21 PROCEDURE — 25000132 ZZH RX MED GY IP 250 OP 250 PS 637: Mod: GY | Performed by: STUDENT IN AN ORGANIZED HEALTH CARE EDUCATION/TRAINING PROGRAM

## 2019-12-21 PROCEDURE — 85025 COMPLETE CBC W/AUTO DIFF WBC: CPT | Performed by: STUDENT IN AN ORGANIZED HEALTH CARE EDUCATION/TRAINING PROGRAM

## 2019-12-21 PROCEDURE — 94640 AIRWAY INHALATION TREATMENT: CPT | Mod: 76

## 2019-12-21 PROCEDURE — 40000275 ZZH STATISTIC RCP TIME EA 10 MIN

## 2019-12-21 PROCEDURE — 25000132 ZZH RX MED GY IP 250 OP 250 PS 637: Mod: GY | Performed by: COUNSELOR

## 2019-12-21 PROCEDURE — 27210436 ZZH NUTRITION PRODUCT SEMIELEM INTERMED CAN

## 2019-12-21 PROCEDURE — 80048 BASIC METABOLIC PNL TOTAL CA: CPT | Performed by: STUDENT IN AN ORGANIZED HEALTH CARE EDUCATION/TRAINING PROGRAM

## 2019-12-21 PROCEDURE — 94003 VENT MGMT INPAT SUBQ DAY: CPT

## 2019-12-21 PROCEDURE — 00000146 ZZHCL STATISTIC GLUCOSE BY METER IP

## 2019-12-21 PROCEDURE — 25000128 H RX IP 250 OP 636: Performed by: STUDENT IN AN ORGANIZED HEALTH CARE EDUCATION/TRAINING PROGRAM

## 2019-12-21 PROCEDURE — 25000132 ZZH RX MED GY IP 250 OP 250 PS 637: Mod: GY | Performed by: PSYCHIATRY & NEUROLOGY

## 2019-12-21 PROCEDURE — 25000128 H RX IP 250 OP 636: Performed by: NEUROLOGICAL SURGERY

## 2019-12-21 PROCEDURE — 20000004 ZZH R&B ICU UMMC

## 2019-12-21 PROCEDURE — 94640 AIRWAY INHALATION TREATMENT: CPT

## 2019-12-21 PROCEDURE — 25000132 ZZH RX MED GY IP 250 OP 250 PS 637: Mod: GY | Performed by: NEUROLOGICAL SURGERY

## 2019-12-21 PROCEDURE — 83735 ASSAY OF MAGNESIUM: CPT | Performed by: STUDENT IN AN ORGANIZED HEALTH CARE EDUCATION/TRAINING PROGRAM

## 2019-12-21 PROCEDURE — 84100 ASSAY OF PHOSPHORUS: CPT | Performed by: STUDENT IN AN ORGANIZED HEALTH CARE EDUCATION/TRAINING PROGRAM

## 2019-12-21 PROCEDURE — 25000132 ZZH RX MED GY IP 250 OP 250 PS 637: Mod: GY | Performed by: NURSE PRACTITIONER

## 2019-12-21 PROCEDURE — 40000961 ZZH STATISTIC INTRAPULMONARY PERCUSSIVE VENT

## 2019-12-21 PROCEDURE — 25800030 ZZH RX IP 258 OP 636: Performed by: NEUROLOGICAL SURGERY

## 2019-12-21 PROCEDURE — 25000125 ZZHC RX 250

## 2019-12-21 PROCEDURE — 25000128 H RX IP 250 OP 636: Performed by: COUNSELOR

## 2019-12-21 RX ORDER — HEPARIN SODIUM 5000 [USP'U]/.5ML
5000 INJECTION, SOLUTION INTRAVENOUS; SUBCUTANEOUS EVERY 12 HOURS
Status: DISCONTINUED | OUTPATIENT
Start: 2019-12-22 | End: 2019-12-31 | Stop reason: HOSPADM

## 2019-12-21 RX ADMIN — CEFEPIME HYDROCHLORIDE 2 G: 2 INJECTION, POWDER, FOR SOLUTION INTRAVENOUS at 23:37

## 2019-12-21 RX ADMIN — OXYCODONE HYDROCHLORIDE 5 MG: 5 SOLUTION ORAL at 08:01

## 2019-12-21 RX ADMIN — MULTIVITAMIN 15 ML: LIQUID ORAL at 20:16

## 2019-12-21 RX ADMIN — TRAZODONE HYDROCHLORIDE 100 MG: 50 TABLET ORAL at 21:36

## 2019-12-21 RX ADMIN — SENNOSIDES AND DOCUSATE SODIUM 1 TABLET: 8.6; 5 TABLET ORAL at 20:16

## 2019-12-21 RX ADMIN — Medication 12.5 MG: at 08:02

## 2019-12-21 RX ADMIN — ALBUTEROL SULFATE 2.5 MG: 2.5 SOLUTION RESPIRATORY (INHALATION) at 07:45

## 2019-12-21 RX ADMIN — METOPROLOL TARTRATE 50 MG: 50 TABLET, FILM COATED ORAL at 08:01

## 2019-12-21 RX ADMIN — INSULIN ASPART 4 UNITS: 100 INJECTION, SOLUTION INTRAVENOUS; SUBCUTANEOUS at 16:18

## 2019-12-21 RX ADMIN — ACETYLCYSTEINE 2 ML: 100 INHALANT RESPIRATORY (INHALATION) at 05:01

## 2019-12-21 RX ADMIN — METRONIDAZOLE 500 MG: 500 TABLET ORAL at 08:01

## 2019-12-21 RX ADMIN — ALBUTEROL SULFATE 2.5 MG: 2.5 SOLUTION RESPIRATORY (INHALATION) at 20:34

## 2019-12-21 RX ADMIN — VANCOMYCIN HYDROCHLORIDE 1250 MG: 10 INJECTION, POWDER, LYOPHILIZED, FOR SOLUTION INTRAVENOUS at 08:11

## 2019-12-21 RX ADMIN — FOLIC ACID 1 MG: 1 TABLET ORAL at 08:01

## 2019-12-21 RX ADMIN — CEFEPIME HYDROCHLORIDE 2 G: 2 INJECTION, POWDER, FOR SOLUTION INTRAVENOUS at 15:14

## 2019-12-21 RX ADMIN — ACETYLCYSTEINE 2 ML: 100 INHALANT RESPIRATORY (INHALATION) at 11:49

## 2019-12-21 RX ADMIN — MIRTAZAPINE 30 MG: 15 TABLET, FILM COATED ORAL at 20:16

## 2019-12-21 RX ADMIN — ALBUTEROL SULFATE 2.5 MG: 2.5 SOLUTION RESPIRATORY (INHALATION) at 05:01

## 2019-12-21 RX ADMIN — ALBUTEROL SULFATE 2.5 MG: 2.5 SOLUTION RESPIRATORY (INHALATION) at 00:31

## 2019-12-21 RX ADMIN — ACETYLCYSTEINE 2 ML: 100 INHALANT RESPIRATORY (INHALATION) at 15:37

## 2019-12-21 RX ADMIN — AMLODIPINE BESYLATE 10 MG: 10 TABLET ORAL at 08:01

## 2019-12-21 RX ADMIN — CEFEPIME HYDROCHLORIDE 2 G: 2 INJECTION, POWDER, FOR SOLUTION INTRAVENOUS at 06:29

## 2019-12-21 RX ADMIN — ACETYLCYSTEINE 2 ML: 100 INHALANT RESPIRATORY (INHALATION) at 07:45

## 2019-12-21 RX ADMIN — FAMOTIDINE 20 MG: 40 POWDER, FOR SUSPENSION ORAL at 08:02

## 2019-12-21 RX ADMIN — ALBUTEROL SULFATE 2.5 MG: 2.5 SOLUTION RESPIRATORY (INHALATION) at 11:49

## 2019-12-21 RX ADMIN — Medication 12.5 MG: at 20:19

## 2019-12-21 RX ADMIN — METOPROLOL TARTRATE 50 MG: 50 TABLET, FILM COATED ORAL at 20:16

## 2019-12-21 RX ADMIN — METRONIDAZOLE 500 MG: 500 TABLET ORAL at 20:16

## 2019-12-21 RX ADMIN — POTASSIUM CHLORIDE 20 MEQ: 29.8 INJECTION, SOLUTION INTRAVENOUS at 05:19

## 2019-12-21 RX ADMIN — OXYCODONE HYDROCHLORIDE 5 MG: 5 SOLUTION ORAL at 21:45

## 2019-12-21 RX ADMIN — ALBUTEROL SULFATE 2.5 MG: 2.5 SOLUTION RESPIRATORY (INHALATION) at 15:37

## 2019-12-21 RX ADMIN — METRONIDAZOLE 500 MG: 500 TABLET ORAL at 15:14

## 2019-12-21 RX ADMIN — ACETYLCYSTEINE 2 ML: 100 INHALANT RESPIRATORY (INHALATION) at 00:31

## 2019-12-21 RX ADMIN — FAMOTIDINE 20 MG: 40 POWDER, FOR SUSPENSION ORAL at 20:16

## 2019-12-21 RX ADMIN — ACETAMINOPHEN 650 MG: 325 TABLET, FILM COATED ORAL at 21:45

## 2019-12-21 RX ADMIN — ACETYLCYSTEINE 2 ML: 100 INHALANT RESPIRATORY (INHALATION) at 20:34

## 2019-12-21 RX ADMIN — VANCOMYCIN HYDROCHLORIDE 1250 MG: 10 INJECTION, POWDER, LYOPHILIZED, FOR SOLUTION INTRAVENOUS at 20:16

## 2019-12-21 RX ADMIN — Medication 100 MG: at 08:01

## 2019-12-21 ASSESSMENT — ACTIVITIES OF DAILY LIVING (ADL)
ADLS_ACUITY_SCORE: 27

## 2019-12-21 ASSESSMENT — MIFFLIN-ST. JEOR: SCORE: 1650.5

## 2019-12-21 NOTE — PLAN OF CARE
Neuro: no changes. PERRL. Moves LUE against gravity and very slight contraction in RUE. No movement in BLE. No sensation of pain in any extremity.    Cardiac: Afebrile. Normotensive without intervention. SR/SB.    Resp: Continues on same CMV settings. Coarse/dim lung sounds. Able to couch of small amount of red-streaked secretions.    GI/: Large loose/soft BMs x2. TF now @20 mL/hr. Degroot with adequate UOP.

## 2019-12-21 NOTE — PROGRESS NOTES
Neuroscience Intensive Care Progress Note  12/21/2019      24 hour events: Trach placed 12/20 w/o significant complication. Restarted tube feeds at goal. No other acute changes.     Current Medications:    acetylcysteine  2 mL Nebulization Q4H     albuterol  2.5 mg Nebulization Q4H     amLODIPine  10 mg Oral or Feeding Tube Daily     ceFEPIme (MAXIPIME) IV  2 g Intravenous Q8H     cyanocobalamin  1,000 mcg Intramuscular Q30 Days     famotidine  20 mg Per G Tube BID     folic acid  1 mg Oral or Feeding Tube Daily     heparin lock flush  5-10 mL Intracatheter Q24H     insulin aspart  2-16 Units Subcutaneous Q4H     lidocaine  2 patch Transdermal Q24h    And     lidocaine   Transdermal Q8H     metoprolol tartrate  50 mg Oral or Feeding Tube BID     metroNIDAZOLE  500 mg Oral or Feeding Tube TID     mirtazapine  30 mg Oral or Feeding Tube QPM     multivitamins w/minerals  15 mL Per Feeding Tube Daily     potassium & sodium phosphates  1 packet Oral or Feeding Tube BID     QUEtiapine  12.5 mg Oral BID     senna-docusate  1 tablet Oral or Feeding Tube QPM     traZODone  100 mg Oral or Feeding Tube At Bedtime     vancomycin (VANCOCIN) IV  1,250 mg Intravenous Q12H     thiamine  100 mg Oral or Feeding Tube Daily       PRN Medications:  acetaminophen, carboxymethylcellulose PF, cyclobenzaprine, IV fluid REPLACEMENT ONLY, glucose **OR** dextrose **OR** glucagon, heparin lock flush, hydrALAZINE, HYDROmorphone, ipratropium - albuterol 0.5 mg/2.5 mg/3 mL, labetalol, lidocaine 4%, lidocaine (buffered or not buffered), magnesium sulfate, magnesium sulfate, melatonin, naloxone, ondansetron **OR** ondansetron, oxyCODONE, potassium chloride, potassium chloride with lidocaine, potassium chloride, potassium chloride, potassium chloride, potassium phosphate (KPHOS) in D5W IV, potassium phosphate (KPHOS) in D5W IV, potassium phosphate (KPHOS) in D5W IV, potassium phosphate (KPHOS) in D5W IV, potassium phosphate (KPHOS) in D5W IV,  "prochlorperazine **OR** prochlorperazine **OR** prochlorperazine, sodium chloride (PF), sodium phosphate    Infusions:    IV fluid REPLACEMENT ONLY 55 mL/hr at 12/18/19 0200       Objective findings:  /75 (BP Location: Left arm)   Pulse 101   Temp 98.8  F (37.1  C) (Axillary)   Resp 17   Ht 1.727 m (5' 8\")   Wt 88.1 kg (194 lb 3.6 oz)   SpO2 96%   BMI 29.53 kg/m      Ventilator Settings:  Ventilation Mode: CMV/AC  (Continuous Mandatory Ventilation/ Assist Control)  FiO2 (%): 40 %  Rate Set (breaths/minute): 12 breaths/min  Tidal Volume Set (mL): 450 mL  PEEP (cm H2O): 5 cmH2O  Oxygen Concentration (%): 40 %  Peak Inspiratory Pressure (cm H2O) (Drager Eliana): 20  Resp: 17      Intake/Output:      Intake/Output Summary (Last 24 hours) at 12/20/2019 0830  Last data filed at 12/20/2019 0600  Gross per 24 hour   Intake 1460 ml   Output 3100 ml   Net -1640 ml       Physical Examination:   Vitals:  B/P: 146/71, T: 98.8, P: 89, R: 23  General:  Laying in bed  HEENT:  NC/AT, cervical collar in place. no icterus, op pink and moist, no ear or nose drainage.   Cardiac:  RRR, no m/r/g  Chest:  CTAB  Abdomen:  S/NT/ND  Extremities:  No LE swelling.    Skin:  No rash or lesion.      Neuro Exam:   Mental status: alert, follows all commands  Cranial nerves: PERRL, EOMI, face symmetric  Motor: Some flexion extension at elbow bilaterally more left (3/5) than right (2/5). Some movement left wrist (3/5 ext) and fingers, no thumb abduction, no movement in distal RUE. No movements of the legs  Sensory: reports sensation in arms and upper chest.     Labs/Studies:  CBC RESULTS:   Recent Labs   Lab Test 12/20/19  0337   WBC 7.1   RBC 3.08*   HGB 8.9*   HCT 29.2*   MCV 95   MCH 28.9   MCHC 30.5*   RDW 14.5        Last Comprehensive Metabolic Panel:  Sodium   Date Value Ref Range Status   12/21/2019 140 133 - 144 mmol/L Final     Potassium   Date Value Ref Range Status   12/21/2019 3.8 3.4 - 5.3 mmol/L Final     Chloride "   Date Value Ref Range Status   12/21/2019 109 94 - 109 mmol/L Final     Carbon Dioxide   Date Value Ref Range Status   12/21/2019 PENDING 20 - 32 mmol/L Incomplete     Anion Gap   Date Value Ref Range Status   12/21/2019 PENDING 3 - 14 mmol/L Incomplete     Glucose   Date Value Ref Range Status   12/21/2019 PENDING 70 - 99 mg/dL Incomplete     Urea Nitrogen   Date Value Ref Range Status   12/21/2019 PENDING 7 - 30 mg/dL Incomplete     Creatinine   Date Value Ref Range Status   12/21/2019 PENDING 0.66 - 1.25 mg/dL Incomplete     GFR Estimate   Date Value Ref Range Status   12/21/2019 PENDING >60 mL/min/[1.73_m2] Incomplete     Calcium   Date Value Ref Range Status   12/21/2019 PENDING 8.5 - 10.1 mg/dL Incomplete     Neuro:  #Cervical spinal cord injury  #Discitis/osteomyelitis C3-C4  - Collar at all times, Up with assist  - Neuro checks q4h during the day   - PT/OT     #Anxiety/Pain - good control   - Dilauded PRN  - Oxycodone 5-10 q4hrs prn   - flexeril 7.5 mg TID prn     #Hx of Alcohol dependence  - Thiamine & folate daily     #Depression  - Continue PTA miratazepine  - restarted trazodone 100 mg at bedtime   - Seroquel 12.5 BID      Resp:  #Acute respiratory failure s/p re-intubation 12/6, suspect diaphragmatic weakness related to spine injury  - Mucomyst & chest physio held for 24 hours after trach   - daily CXR  --metanebs   --chest physio per RT  --percussive therapy  - desaturation when positioned on Rt side, try positional changes first   - S/P trach 12/20      Cardio:  #Hx of hypertension - well controlled  - PTA amlodipine 10 mg   - PTA metoprolol 50mg bid     Renal:  Hypophosphatemia - resolved  - Electrolyte replacement protocol   - Monitor I/O goal In= outs   - free water flushes 30 q4h    Neurogenic urinary retention   - failed díaz wean trial 12/12 caused retention      Endo:  #DM2  - Sliding scale insulin, NPH held while TF held  - previously on 38 U NPH am/pm with high dose sliding scale insulin       Heme:  #Normocytic anemia, stable  - Hg > 7.0     GI:  #Constipation - resolved last BM 12/21  - Senna scheduled daily  - PRN suppository/enema available     #Diet  - G-tube in place  - Tube feeds at goal      ID:   #Vertebral phlegmon and Abscess s/p drain placement 12/1  #Osteomyelitis C3-5  #Fever - resolved   #Leukocytosis and CRP resolved   #repeat MRI 12/10 and 12/19 shows progressive epidural abscess.   - Vanc, cefepime, metronidazole regimen per ID  - Weekly CBC, CMP, CRP while on IV abx,  - Spinal tissue cultures (12/1): MSSA, Strep anginosus, Eikenella, and oral anaerobes   - Per ID:  - will need at least 6wks of above abx from day 1 (12/11), earliest stop date of 1/22/20   - repeat MRI in 2wks (~1/2)  - Will page ID attending at [id number] prior to discharging pt     Lines:  - Trach, PICC, PIV x 2, Degroot, PEG     FEN: TF via PEG   PPX:    DVT prophylaxis: SCDs, Heparin subcutaneous restart 11/22    GI: famotidine   Code Status: Full, presumed     Dispo: 4A with trach, plan for LTAC     This patient was seen & discussed with my attending, Dr. Figueroa, who agrees with my assessment and plan.     Ladan Stevens,   Neurology PGY-2   [phone]

## 2019-12-21 NOTE — PROGRESS NOTES
[facility] Progress Note     Patient:  Sherwin Angel   Date of birth 1955, Medical record number [id number]  Date of Admission: 12/1/2019  Consult Requester:Fernando Ashton MD            Assessment and Recommendations:   ASSESSMENT:  1. Traumatic cervical spinal cord injury s/p C3-4 arthroplasty on 11/15, now removed  2. Cervical stenosis C3-5  3. Vertebral phlegmon vs abscess s/p drain placement on 12/1 - cultures growing MSSA, Strep anginosus, Eikenella, and oral anaerobes. Repeat MRI 12/10 and 12/19 shows progressive epidural abscess.   4. Osteomyelitis C3-C5      DISCUSSION:  64-year-old man with history of htn, DM2, and recent C3-4 arthroplasty on 11/15 now admitted with new paraplegia after a fall and found to have pharyngotomy and vertebral phlegmon now s/p OR with removal of arthroplasty and placement of drain. Clinically he has stabilized with broadening of coverage and his inflammatory markers are normalizing (CRP 14 on 12/20), and while his euro exam is unchanged he has significant deficits that correlate with substantial abnormalities on imaging, suggesting that he would benefit from additional source control    RECOMMENDATION:  - Continue vancomycin, cefepime, and flagyl. He will need at least 6 weeks from day1=12/11, which translates to an earliest stop date of 1/22/20  - plan for MRI again in 2 weeks (around 1/2)    ID will follow. Dr. Mary Grace Stevenson will cover 12/21-22 and Dr. Daisy Mcclain will cover 12/23-25; I return next week on 12/26.      Isabel Capone MD   of Medicine, Division of Infectious Diseases  [facility] [phone]        ________________________________________________________________           Overnight events:   S/p trach. Having expected degree of neck discomfort. Remains with essentially no movement in legs, very little in arms. No acute events. Participation in ROS limited by ventilated status.         Review of  Systems:   Unable to obtain.         Past Medical History:   Diabetes  Hypertension  C3-C4 arthroplasty on 11/15/19         Past Surgical History:     Past Surgical History:   Procedure Laterality Date     IRRIGATION AND DEBRIDEMENT NECK, COMBINED N/A 12/1/2019    Procedure: IRRIGATION AND DEBRIDEMENT OF NECK; PHARYNGOTOMY REPAIR.;  Surgeon: Fernando Ashton MD;  Location: [facility] OR     LAPAROSCOPIC ASSISTED INSERTION TUBE GASTROTOMY N/A 12/5/2019    Procedure: laproscopic assisted gastrostomy tube placement;  Surgeon: Luis Santiago MD;  Location: [facility] OR     REMOVE HARDWARE CERVICAL ANTERIOR SPINE N/A 12/1/2019    Procedure: REMOVAL, HARDWARE, SPINE, CERVICAL, ANTERIOR;  Surgeon: Fernando Ashton MD;  Location: [facility] OR              Physical Exam:     Ranges for his vital signs:   Temp:  [97.7  F (36.5  C)-99.1  F (37.3  C)] 97.7  F (36.5  C)  Heart Rate:  [50-77] 57  Resp:  [12-20] 14  BP: ()/(60-90) 139/75  FiO2 (%):  [40 %-50 %] 40 %  SpO2:  [93 %-100 %] 100 %    Physical Examination:  GENERAL: lying in bed  HEENT:  Head is normocephalic, atraumatic; trach in place  EYES:  Eyes have anicteric sclerae   NECK:  Collar in place.   LUNGS:  Coarse breath sounds throughout anterior lung fields.  CARDIOVASCULAR:  Regular rate   ABDOMEN:  Soft  NEURO: No movement BL LE.. Shows very little movement in his BL UE, cannot resist gravity.           Laboratory Data:   Labs reviewed. Pertinent below.    Inflammatory Markers    Recent Labs   Lab Test 12/20/19  0337 12/17/19  0351 12/14/19  0444 12/12/19  0210 12/11/19  0317 12/08/19  0348 12/05/19  0340 12/02/19  0342   CRP 14.0* 34.0* 48.0* 36.0* 16.0* 56.0* 130.0* 220.0*       Hematology Studies    Recent Labs   Lab Test 12/20/19  0337 12/19/19  1600 12/19/19  0716 12/18/19  0349 12/17/19  0351 12/16/19  0529 12/15/19  0451   WBC 7.1  --  7.2 7.8 9.2 9.0 10.9   ANEU 4.6  --  4.7 5.1 6.3 6.3 8.7*   AEOS 0.1  --  0.1 0.1 0.2 0.2 0.2   HGB 8.9*  --  8.9* 8.2* 9.0*  8.9* 10.1*   MCV 95  --  95 98 95 99 96    286 278 284 300 299 283       Microbiology:  12/1 Tissue culture: Cervical spine vertebral phlegmon:  -- Aerobic: Staph aureus, Strep anginosus, Eikenella corrodens  -- Anaerobic: Fusobacterium nucleatum, Parvimonas micra, Prevotella    12/1 Blood culture negative  12/3 Blood culture x2 negative  12/7 Blood culture x2 NGTD    12/7 Sputum culture with only candida    12/7 UA negative           Imaging:     MR CERVICAL SPINE W/O & W CONTRAST 12/19/2019 7:44 PM     Provided History: follow-up on epidural abscess     Comparison: 12/10/2019, 12/1/2019 MRI     Technique: Sagittal T1-weighted, sagittal T2-weighted, sagittal  diffusion weighted, axial T2-weighted, and axial T2* gradient echo  images of the cervical spine were obtained without intravenous  contrast. Following intravenous administration of gadolinium, axial  and sagittal T1-weighted images with fat saturation were also  obtained.     Contrast: 10mL Gadavist     Findings:  Straightening of the normal cervical lordotic curvature. Unchanged  retrolisthesis of C3 on C4. Stable trace anterolisthesis of C5 on C6.  Stable/slightly increased abnormal T2 hyperintense signal with  associated contrast enhancement involving the C3-C5 vertebral bodies  and C3-4 and C4-5 intervertebral discs.      Increased erosion with loss of cortical definition of the endplates of  C4-5.      No significant change in Epidural phlegmon/abscess along the anterior  thecal sac extends along posterior C2-C6 and measures 8.0 cm in 7.5 mm  in maximum thickness.      Heterogeneous prevertebral phlegmon/abscess with surrounding  inflammatory changes extending from C2 to C6. Severe spinal canal  stenosis C2-C5 decreased fluid pockets. Heterogeneous T2 hyperintense  signal within the cord extending from C3 through C6, slightly  progressed. Possible developing fistulous communication between the  prevertebral abscess/phlegmon and the hypopharynx  (series 10 image 9).     The findings on a level by level basis are as follows:     C2-3: Bilateral severe neuroforaminal stenosis and moderate spinal  canal stenosis. No cord edema at this level     C3-4:  Bilateral severe neural foraminal stenosis, severe spinal canal  stenosis. Severe cord compression and moderate spinal cord edema.  Anterior epidural abscess.     C4-5:  Severe spinal canal stenosis, severe bilateral neural foraminal  stenosis. Severe cord compression and moderate spinal cord edema.  Anterior epidural abscess.     C5-6:  Bilateral moderate neural foraminal narrowing. Moderate spinal  canal stenosis. Anterior epidural abscess. Mild cord edema     C6-7:  Bilateral facet arthropathy, mild neural foraminal stenosis. No  spinal canal stenosis. Central disc protrusion.     C7-T1:  Left subarticular disc protrusion. Bilateral uncinate  hypertrophy. Moderate to severe left neural foraminal stenosis. Mild  right neural foraminal stenosis. No spinal canal stenosis.     No abnormality of the paraspinous soft tissues.                                                                      Impression:   1.  No significant change in anterior epidural phlegmon/abscess  extending from C2 down to C6. Stable/slightly increased spinal cord  edema.  2.  Persistent discitis osteomyelitis findings C3-C5.  3.  Stable/slightly progressed extensive prevertebral phlegmon  extending from C2 through C5. Decreased abscess in the prevertebral  phlegmon likely due to drainage into the hypopharynx through a  fistulous communication.     [Urgent Result: Progressing cord compression, cord edema,  discitis/osteomyelitis]     Finding was identified on 12/19/2019 8:16 PM.      Dr. Vargas was contacted by Dr. Vernon Zarate at 12/19/2019 9:00 PM and  verbalized understanding of the urgent finding.      I have personally reviewed the examination and initial interpretation  and I agree with the findings.     FLAVIA COATES MD

## 2019-12-21 NOTE — PROGRESS NOTES
Neurosurgery Progress Note     Subjective:  Tracheostomy with ENT yesterday     Objective:  Temp: 98.5  F (36.9  C) Temp src: Axillary BP: 127/79   Heart Rate: 58 Resp: 17 SpO2: 99 % O2 Device: Mechanical Ventilator    Trached  Awake, alert, nods yes or no to questions  Follows commands  Deltoids 4/5, biceps 4/5, triceps 1/5,  0/5, lower extremities 0/5  Triple flexion in lower extremities     Assessment:  64 years old gentleman status post C3-C4 artificial disc placement about 2 weeks prior to arriaval the [facility] who presented after a fall with almost complete loss of strength in his upper extremities and paraplegia, found to have cervical stenosis. S/p OR 12/1 - Pharyngotomy closed with assistance from ENT. Arthroplasty implant removed. No replacement implant placed. S/p G-tube 12/5. Extubated 12/5. Re-Intubated 12/6 due to Mucous Plugging and Atelectasis. S/p repeated bronchoscopy. S/p tracheostomy with ENT 12/20.     Plan:  Trached, mechanically ventilated  Nebs, chest physiotherapy; DVT: SCDs while in bed  [place] J collar  Per ID: vancomycin, cefepime, flagyl 4-6 wks for vertebral phlegmon, weekly labs; no plan for further surgery  Follow-up cultures  Tube feeds  Discuss when to resume subQ heparin  MRI cervical spine before stopping abx in 6 wks  Per ID: MRI cervical spine in 2 weeks  endocrine consulted for diabetes - appreciate recs  Disposition: 4A        Lion Vargas MD  Neurosurgery Resident PGY2    Please contact neurosurgery resident on call with questions.    Dial * [phone], enter [phone] when prompted.

## 2019-12-21 NOTE — PLAN OF CARE
D/I: Pt back from OR around 16:30 s/p tracheostomy placement (#6 Denny)  Upon arrival,  VSS, sbp 130-140s, SB/SR. FiO2 was at 50%, but now at 40%, O2 saturating %.  Pt c/o back neck  pain, medicated with PRN Oxycodone 10mg  with good relief.   A: Pt alert currently.. no changes in neuro assessments. Pt able to move left arm against gravity and right arm slightly (pt unsure if he's feeling pain sensation).. pt has no movement or any pain sensation from lower extremities.   P: Continue to monitor VS/pain/trach.. keep pt comfortable and checking frequently for any needs. See flow sheet for details.

## 2019-12-22 ENCOUNTER — APPOINTMENT (OUTPATIENT)
Dept: GENERAL RADIOLOGY | Facility: CLINIC | Age: 64
DRG: 003 | End: 2019-12-22
Attending: NEUROLOGICAL SURGERY
Payer: COMMERCIAL

## 2019-12-22 LAB
ABO + RH BLD: NORMAL
ABO + RH BLD: NORMAL
ANION GAP SERPL CALCULATED.3IONS-SCNC: 2 MMOL/L (ref 3–14)
BASOPHILS # BLD AUTO: 0 10E9/L (ref 0–0.2)
BASOPHILS NFR BLD AUTO: 0.1 %
BLD GP AB SCN SERPL QL: NORMAL
BLOOD BANK CMNT PATIENT-IMP: NORMAL
BUN SERPL-MCNC: 22 MG/DL (ref 7–30)
CALCIUM SERPL-MCNC: 8.4 MG/DL (ref 8.5–10.1)
CHLORIDE SERPL-SCNC: 108 MMOL/L (ref 94–109)
CO2 SERPL-SCNC: 28 MMOL/L (ref 20–32)
CREAT SERPL-MCNC: 0.52 MG/DL (ref 0.66–1.25)
DIFFERENTIAL METHOD BLD: ABNORMAL
EOSINOPHIL # BLD AUTO: 0.1 10E9/L (ref 0–0.7)
EOSINOPHIL NFR BLD AUTO: 1.9 %
ERYTHROCYTE [DISTWIDTH] IN BLOOD BY AUTOMATED COUNT: 15 % (ref 10–15)
GFR SERPL CREATININE-BSD FRML MDRD: >90 ML/MIN/{1.73_M2}
GLUCOSE BLDC GLUCOMTR-MCNC: 121 MG/DL (ref 70–99)
GLUCOSE BLDC GLUCOMTR-MCNC: 123 MG/DL (ref 70–99)
GLUCOSE BLDC GLUCOMTR-MCNC: 124 MG/DL (ref 70–99)
GLUCOSE BLDC GLUCOMTR-MCNC: 135 MG/DL (ref 70–99)
GLUCOSE BLDC GLUCOMTR-MCNC: 160 MG/DL (ref 70–99)
GLUCOSE BLDC GLUCOMTR-MCNC: 160 MG/DL (ref 70–99)
GLUCOSE SERPL-MCNC: 131 MG/DL (ref 70–99)
HCT VFR BLD AUTO: 29.1 % (ref 40–53)
HGB BLD-MCNC: 9 G/DL (ref 13.3–17.7)
IMM GRANULOCYTES # BLD: 0.1 10E9/L (ref 0–0.4)
IMM GRANULOCYTES NFR BLD: 0.8 %
LYMPHOCYTES # BLD AUTO: 1.6 10E9/L (ref 0.8–5.3)
LYMPHOCYTES NFR BLD AUTO: 21.4 %
MAGNESIUM SERPL-MCNC: 2.1 MG/DL (ref 1.6–2.3)
MCH RBC QN AUTO: 29.8 PG (ref 26.5–33)
MCHC RBC AUTO-ENTMCNC: 30.9 G/DL (ref 31.5–36.5)
MCV RBC AUTO: 96 FL (ref 78–100)
MONOCYTES # BLD AUTO: 0.6 10E9/L (ref 0–1.3)
MONOCYTES NFR BLD AUTO: 7.7 %
NEUTROPHILS # BLD AUTO: 5.1 10E9/L (ref 1.6–8.3)
NEUTROPHILS NFR BLD AUTO: 68.1 %
NRBC # BLD AUTO: 0 10*3/UL
NRBC BLD AUTO-RTO: 0 /100
PHOSPHATE SERPL-MCNC: 2.8 MG/DL (ref 2.5–4.5)
PLATELET # BLD AUTO: 254 10E9/L (ref 150–450)
POTASSIUM SERPL-SCNC: 3.7 MMOL/L (ref 3.4–5.3)
RBC # BLD AUTO: 3.02 10E12/L (ref 4.4–5.9)
SODIUM SERPL-SCNC: 139 MMOL/L (ref 133–144)
SPECIMEN EXP DATE BLD: NORMAL
VANCOMYCIN SERPL-MCNC: 18.5 MG/L
WBC # BLD AUTO: 7.4 10E9/L (ref 4–11)

## 2019-12-22 PROCEDURE — 27210436 ZZH NUTRITION PRODUCT SEMIELEM INTERMED CAN

## 2019-12-22 PROCEDURE — 80202 ASSAY OF VANCOMYCIN: CPT | Performed by: STUDENT IN AN ORGANIZED HEALTH CARE EDUCATION/TRAINING PROGRAM

## 2019-12-22 PROCEDURE — 36592 COLLECT BLOOD FROM PICC: CPT | Performed by: STUDENT IN AN ORGANIZED HEALTH CARE EDUCATION/TRAINING PROGRAM

## 2019-12-22 PROCEDURE — 94640 AIRWAY INHALATION TREATMENT: CPT

## 2019-12-22 PROCEDURE — 25000132 ZZH RX MED GY IP 250 OP 250 PS 637: Mod: GY | Performed by: NEUROLOGICAL SURGERY

## 2019-12-22 PROCEDURE — 85025 COMPLETE CBC W/AUTO DIFF WBC: CPT | Performed by: STUDENT IN AN ORGANIZED HEALTH CARE EDUCATION/TRAINING PROGRAM

## 2019-12-22 PROCEDURE — 84100 ASSAY OF PHOSPHORUS: CPT | Performed by: STUDENT IN AN ORGANIZED HEALTH CARE EDUCATION/TRAINING PROGRAM

## 2019-12-22 PROCEDURE — 80048 BASIC METABOLIC PNL TOTAL CA: CPT | Performed by: STUDENT IN AN ORGANIZED HEALTH CARE EDUCATION/TRAINING PROGRAM

## 2019-12-22 PROCEDURE — 86900 BLOOD TYPING SEROLOGIC ABO: CPT | Performed by: STUDENT IN AN ORGANIZED HEALTH CARE EDUCATION/TRAINING PROGRAM

## 2019-12-22 PROCEDURE — 83735 ASSAY OF MAGNESIUM: CPT | Performed by: STUDENT IN AN ORGANIZED HEALTH CARE EDUCATION/TRAINING PROGRAM

## 2019-12-22 PROCEDURE — 25000132 ZZH RX MED GY IP 250 OP 250 PS 637: Mod: GY | Performed by: PSYCHIATRY & NEUROLOGY

## 2019-12-22 PROCEDURE — 40000275 ZZH STATISTIC RCP TIME EA 10 MIN

## 2019-12-22 PROCEDURE — 25000128 H RX IP 250 OP 636: Performed by: COUNSELOR

## 2019-12-22 PROCEDURE — 25000132 ZZH RX MED GY IP 250 OP 250 PS 637: Mod: GY | Performed by: COUNSELOR

## 2019-12-22 PROCEDURE — 71045 X-RAY EXAM CHEST 1 VIEW: CPT

## 2019-12-22 PROCEDURE — 25800030 ZZH RX IP 258 OP 636: Performed by: NEUROLOGICAL SURGERY

## 2019-12-22 PROCEDURE — 25000132 ZZH RX MED GY IP 250 OP 250 PS 637: Mod: GY | Performed by: STUDENT IN AN ORGANIZED HEALTH CARE EDUCATION/TRAINING PROGRAM

## 2019-12-22 PROCEDURE — 25000128 H RX IP 250 OP 636: Performed by: STUDENT IN AN ORGANIZED HEALTH CARE EDUCATION/TRAINING PROGRAM

## 2019-12-22 PROCEDURE — 25000132 ZZH RX MED GY IP 250 OP 250 PS 637: Mod: GY | Performed by: NURSE PRACTITIONER

## 2019-12-22 PROCEDURE — 86850 RBC ANTIBODY SCREEN: CPT | Performed by: STUDENT IN AN ORGANIZED HEALTH CARE EDUCATION/TRAINING PROGRAM

## 2019-12-22 PROCEDURE — 86901 BLOOD TYPING SEROLOGIC RH(D): CPT | Performed by: STUDENT IN AN ORGANIZED HEALTH CARE EDUCATION/TRAINING PROGRAM

## 2019-12-22 PROCEDURE — 25000125 ZZHC RX 250

## 2019-12-22 PROCEDURE — 25000128 H RX IP 250 OP 636: Performed by: NEUROLOGICAL SURGERY

## 2019-12-22 PROCEDURE — 40000961 ZZH STATISTIC INTRAPULMONARY PERCUSSIVE VENT

## 2019-12-22 PROCEDURE — 94640 AIRWAY INHALATION TREATMENT: CPT | Mod: 76

## 2019-12-22 PROCEDURE — 20000004 ZZH R&B ICU UMMC

## 2019-12-22 PROCEDURE — 00000146 ZZHCL STATISTIC GLUCOSE BY METER IP

## 2019-12-22 PROCEDURE — 36415 COLL VENOUS BLD VENIPUNCTURE: CPT | Performed by: STUDENT IN AN ORGANIZED HEALTH CARE EDUCATION/TRAINING PROGRAM

## 2019-12-22 RX ADMIN — TRAZODONE HYDROCHLORIDE 100 MG: 50 TABLET ORAL at 21:33

## 2019-12-22 RX ADMIN — VANCOMYCIN HYDROCHLORIDE 1250 MG: 10 INJECTION, POWDER, LYOPHILIZED, FOR SOLUTION INTRAVENOUS at 20:44

## 2019-12-22 RX ADMIN — ALBUTEROL SULFATE 2.5 MG: 2.5 SOLUTION RESPIRATORY (INHALATION) at 04:54

## 2019-12-22 RX ADMIN — Medication 100 MG: at 07:59

## 2019-12-22 RX ADMIN — INSULIN ASPART 2 UNITS: 100 INJECTION, SOLUTION INTRAVENOUS; SUBCUTANEOUS at 08:19

## 2019-12-22 RX ADMIN — ALTEPLASE 2 MG: 2.2 INJECTION, POWDER, LYOPHILIZED, FOR SOLUTION INTRAVENOUS at 20:30

## 2019-12-22 RX ADMIN — ALBUTEROL SULFATE 2.5 MG: 2.5 SOLUTION RESPIRATORY (INHALATION) at 21:01

## 2019-12-22 RX ADMIN — FAMOTIDINE 20 MG: 40 POWDER, FOR SUSPENSION ORAL at 20:15

## 2019-12-22 RX ADMIN — Medication 12.5 MG: at 08:04

## 2019-12-22 RX ADMIN — ACETYLCYSTEINE 2 ML: 100 INHALANT RESPIRATORY (INHALATION) at 04:55

## 2019-12-22 RX ADMIN — INSULIN ASPART 2 UNITS: 100 INJECTION, SOLUTION INTRAVENOUS; SUBCUTANEOUS at 23:31

## 2019-12-22 RX ADMIN — HEPARIN SODIUM 5000 UNITS: 5000 INJECTION, SOLUTION INTRAVENOUS; SUBCUTANEOUS at 07:58

## 2019-12-22 RX ADMIN — ALBUTEROL SULFATE 2.5 MG: 2.5 SOLUTION RESPIRATORY (INHALATION) at 00:32

## 2019-12-22 RX ADMIN — OXYCODONE HYDROCHLORIDE 5 MG: 5 SOLUTION ORAL at 07:58

## 2019-12-22 RX ADMIN — MULTIVITAMIN 15 ML: LIQUID ORAL at 20:15

## 2019-12-22 RX ADMIN — POTASSIUM CHLORIDE 20 MEQ: 29.8 INJECTION, SOLUTION INTRAVENOUS at 14:25

## 2019-12-22 RX ADMIN — MIRTAZAPINE 30 MG: 15 TABLET, FILM COATED ORAL at 20:15

## 2019-12-22 RX ADMIN — AMLODIPINE BESYLATE 10 MG: 10 TABLET ORAL at 07:59

## 2019-12-22 RX ADMIN — ACETYLCYSTEINE 2 ML: 100 INHALANT RESPIRATORY (INHALATION) at 00:32

## 2019-12-22 RX ADMIN — CEFEPIME HYDROCHLORIDE 2 G: 2 INJECTION, POWDER, FOR SOLUTION INTRAVENOUS at 23:27

## 2019-12-22 RX ADMIN — ACETAMINOPHEN 650 MG: 325 TABLET, FILM COATED ORAL at 07:58

## 2019-12-22 RX ADMIN — METRONIDAZOLE 500 MG: 500 TABLET ORAL at 14:25

## 2019-12-22 RX ADMIN — ONDANSETRON 8 MG: 2 INJECTION INTRAMUSCULAR; INTRAVENOUS at 20:36

## 2019-12-22 RX ADMIN — ALBUTEROL SULFATE 2.5 MG: 2.5 SOLUTION RESPIRATORY (INHALATION) at 11:05

## 2019-12-22 RX ADMIN — Medication 12.5 MG: at 20:16

## 2019-12-22 RX ADMIN — HEPARIN SODIUM 5000 UNITS: 5000 INJECTION, SOLUTION INTRAVENOUS; SUBCUTANEOUS at 20:15

## 2019-12-22 RX ADMIN — ACETYLCYSTEINE 2 ML: 100 INHALANT RESPIRATORY (INHALATION) at 11:05

## 2019-12-22 RX ADMIN — ALTEPLASE 2 MG: 2.2 INJECTION, POWDER, LYOPHILIZED, FOR SOLUTION INTRAVENOUS at 21:41

## 2019-12-22 RX ADMIN — VANCOMYCIN HYDROCHLORIDE 1250 MG: 10 INJECTION, POWDER, LYOPHILIZED, FOR SOLUTION INTRAVENOUS at 08:35

## 2019-12-22 RX ADMIN — ACETYLCYSTEINE 2 ML: 100 INHALANT RESPIRATORY (INHALATION) at 21:01

## 2019-12-22 RX ADMIN — ALBUTEROL SULFATE 2.5 MG: 2.5 SOLUTION RESPIRATORY (INHALATION) at 07:21

## 2019-12-22 RX ADMIN — FAMOTIDINE 20 MG: 40 POWDER, FOR SUSPENSION ORAL at 08:04

## 2019-12-22 RX ADMIN — ACETYLCYSTEINE 2 ML: 100 INHALANT RESPIRATORY (INHALATION) at 07:22

## 2019-12-22 RX ADMIN — CEFEPIME HYDROCHLORIDE 2 G: 2 INJECTION, POWDER, FOR SOLUTION INTRAVENOUS at 06:16

## 2019-12-22 RX ADMIN — SENNOSIDES AND DOCUSATE SODIUM 1 TABLET: 8.6; 5 TABLET ORAL at 20:15

## 2019-12-22 RX ADMIN — FOLIC ACID 1 MG: 1 TABLET ORAL at 08:03

## 2019-12-22 RX ADMIN — CEFEPIME HYDROCHLORIDE 2 G: 2 INJECTION, POWDER, FOR SOLUTION INTRAVENOUS at 15:19

## 2019-12-22 RX ADMIN — ALTEPLASE 2 MG: 2.2 INJECTION, POWDER, LYOPHILIZED, FOR SOLUTION INTRAVENOUS at 21:40

## 2019-12-22 RX ADMIN — ALBUTEROL SULFATE 2.5 MG: 2.5 SOLUTION RESPIRATORY (INHALATION) at 16:00

## 2019-12-22 RX ADMIN — METOPROLOL TARTRATE 50 MG: 50 TABLET, FILM COATED ORAL at 20:15

## 2019-12-22 RX ADMIN — METOPROLOL TARTRATE 50 MG: 50 TABLET, FILM COATED ORAL at 07:59

## 2019-12-22 RX ADMIN — METRONIDAZOLE 500 MG: 500 TABLET ORAL at 20:14

## 2019-12-22 RX ADMIN — ACETYLCYSTEINE 2 ML: 100 INHALANT RESPIRATORY (INHALATION) at 16:00

## 2019-12-22 RX ADMIN — METRONIDAZOLE 500 MG: 500 TABLET ORAL at 07:59

## 2019-12-22 ASSESSMENT — ACTIVITIES OF DAILY LIVING (ADL)
ADLS_ACUITY_SCORE: 27

## 2019-12-22 NOTE — PROGRESS NOTES
[facility], [place]   Neurosurgery Progress Note:    Assessment: 64 years old gentleman status post C3-C4 artificial disc placement about 2 weeks prior to arriaval the [facility] who presented after a fall with almost complete loss of strength in his upper extremities and paraplegia, found to have cervical stenosis. S/p OR 12/1 - Pharyngotomy closed with assistance from ENT. Arthroplasty implant removed. No replacement implant placed. S/p G-tube 12/5. Extubated 12/5. Re-Intubated 12/6 due to Mucous Plugging and Atelectasis. S/p repeated bronchoscopy. S/p tracheostomy with ENT 12/20.    Plan:  - Nebs, chest physiotherapy  - DVT: SCDs while in bed  - [place] J collar  - ID: vancomycin, cefepime, flagyl 4-6 wks for vertebral phlegmon, weekly labs; f/u clx  - no plan for another surgery  - GI/nutrition: tube feeds  - MRI cervical spine before stopping abx in 6 wks  - Endo: endocrine cslt; insulin sliding scale  - Disposition: 4A  -----------------------------------  Vinay Sharma MD  Neurosurgery PGY-1    Subjective: No acute events overnight.    Objective:   Temp:  [98.3  F (36.8  C)-99.1  F (37.3  C)] 98.3  F (36.8  C)  Heart Rate:  [52-98] 75  Resp:  [18-24] 24  BP: ()/(58-86) 150/79  FiO2 (%):  [40 %] 40 %  SpO2:  [94 %-100 %] 97 %  I/O last 3 completed shifts:  In: 2350 [I.V.:705; NG/GT:380]  Out: 1680 [Urine:1680]    Gen: Appears comfortable, NAD  Wound: Incision, clean, dry, intact  Pulm: tracheostomy, mechanical ventilator  Neuro: Awake, alert, nods yes or no to questions  Follows commands  Deltoids 4/5, biceps 4/5, triceps 1/5,  0/5, lower extremities 0/5  Triple flexion in lower extremities  No sensation to light touch below mammillae b/l    LABS  Recent Labs   Lab Test 12/22/19 0430 12/21/19  0359 12/20/19  0337   WBC 7.4 7.3 7.1   HGB 9.0* 8.5* 8.9*   MCV 96 95 95    246 281       Recent Labs   Lab Test 12/22/19 0430 12/21/19  0352  12/20/19  0337    140 143   POTASSIUM 3.7 3.8 3.5   CHLORIDE 108 109 111*   CO2 28 28 29   BUN 22 15 22   CR 0.52* 0.48* 0.49*   ANIONGAP 2* 4 3   FLORINDA 8.4* 8.4* 8.4*   * 105* 95       IMAGING  none  I have seen this patient with the resident and formulated a plan and agree with this note.  AMP

## 2019-12-22 NOTE — PROGRESS NOTES
Neuroscience Intensive Care Progress Note  12/22/2019      24 hour events: Trach placed 12/20 w/o significant complication. Tube feeds at goal. No other acute overnight changes.     Current Medications:    acetylcysteine  2 mL Nebulization Q4H     albuterol  2.5 mg Nebulization Q4H     amLODIPine  10 mg Oral or Feeding Tube Daily     ceFEPIme (MAXIPIME) IV  2 g Intravenous Q8H     cyanocobalamin  1,000 mcg Intramuscular Q30 Days     famotidine  20 mg Per G Tube BID     folic acid  1 mg Oral or Feeding Tube Daily     heparin ANTICOAGULANT  5,000 Units Subcutaneous Q12H     heparin lock flush  5-10 mL Intracatheter Q24H     insulin aspart  2-16 Units Subcutaneous Q4H     lidocaine  2 patch Transdermal Q24h    And     lidocaine   Transdermal Q8H     metoprolol tartrate  50 mg Oral or Feeding Tube BID     metroNIDAZOLE  500 mg Oral or Feeding Tube TID     mirtazapine  30 mg Oral or Feeding Tube QPM     multivitamins w/minerals  15 mL Per Feeding Tube Daily     QUEtiapine  12.5 mg Oral BID     senna-docusate  1 tablet Oral or Feeding Tube QPM     traZODone  100 mg Oral or Feeding Tube At Bedtime     vancomycin (VANCOCIN) IV  1,250 mg Intravenous Q12H     thiamine  100 mg Oral or Feeding Tube Daily       PRN Medications:  acetaminophen, carboxymethylcellulose PF, cyclobenzaprine, IV fluid REPLACEMENT ONLY, glucose **OR** dextrose **OR** glucagon, heparin lock flush, hydrALAZINE, HYDROmorphone, ipratropium - albuterol 0.5 mg/2.5 mg/3 mL, labetalol, lidocaine 4%, lidocaine (buffered or not buffered), magnesium sulfate, magnesium sulfate, melatonin, naloxone, ondansetron **OR** ondansetron, oxyCODONE, potassium chloride, potassium chloride with lidocaine, potassium chloride, potassium chloride, potassium chloride, potassium phosphate (KPHOS) in D5W IV, potassium phosphate (KPHOS) in D5W IV, potassium phosphate (KPHOS) in D5W IV, potassium phosphate (KPHOS) in D5W IV, potassium phosphate (KPHOS) in D5W IV, prochlorperazine  "**OR** prochlorperazine **OR** prochlorperazine, sodium chloride (PF), sodium phosphate    Infusions:    IV fluid REPLACEMENT ONLY 55 mL/hr at 12/18/19 0200       Objective findings:  /73   Pulse 101   Temp 98.3  F (36.8  C) (Oral)   Resp 22   Ht 1.727 m (5' 8\")   Wt 88.1 kg (194 lb 3.6 oz)   SpO2 99%   BMI 29.53 kg/m      Ventilator Settings:  Ventilation Mode: CPAP/PS  (Continuous positive airway pressure with Pressure Support)  FiO2 (%): 40 %  Rate Set (breaths/minute): 12 breaths/min  Tidal Volume Set (mL): 450 mL  PEEP (cm H2O): 5 cmH2O  Pressure Support (cm H2O): 7 cmH2O  Oxygen Concentration (%): 40 %  Resp: 22      Intake/Output:      Intake/Output Summary (Last 24 hours) at 12/22/2019 1157  Last data filed at 12/22/2019 1100  Gross per 24 hour   Intake 2350 ml   Output 1580 ml   Net 770 ml         Physical Examination:   Vitals:  B/P: 146/71, T: 98.8, P: 89, R: 23  General:  Laying in bed  HEENT:  NC/AT, cervical collar in place. no icterus, op pink and moist, no ear or nose drainage.   Cardiac:  RRR, no m/r/g  Chest:  CTAB  Abdomen:  S/NT/ND  Extremities:  No LE swelling.    Skin:  No rash or lesion.      Neuro Exam:   Mental status: alert, follows all commands  Cranial nerves: PERRL, EOMI, face symmetric  Motor: Some flexion extension at elbow bilaterally more left (3/5) than right (2/5). Some movement left wrist (3/5 ext) and fingers, no thumb abduction, no movement in distal RUE. No movements of the legs  Sensory: reports sensation in arms and upper chest.     Labs/Studies:  CBC RESULTS:   Recent Labs   Lab Test 12/20/19  0337   WBC 7.1   RBC 3.08*   HGB 8.9*   HCT 29.2*   MCV 95   MCH 28.9   MCHC 30.5*   RDW 14.5        Last Comprehensive Metabolic Panel:  Sodium   Date Value Ref Range Status   12/22/2019 139 133 - 144 mmol/L Final     Potassium   Date Value Ref Range Status   12/22/2019 3.7 3.4 - 5.3 mmol/L Final     Chloride   Date Value Ref Range Status   12/22/2019 108 94 - 109 " mmol/L Final     Carbon Dioxide   Date Value Ref Range Status   12/22/2019 28 20 - 32 mmol/L Final     Anion Gap   Date Value Ref Range Status   12/22/2019 2 (L) 3 - 14 mmol/L Final     Glucose   Date Value Ref Range Status   12/22/2019 131 (H) 70 - 99 mg/dL Final     Urea Nitrogen   Date Value Ref Range Status   12/22/2019 22 7 - 30 mg/dL Final     Creatinine   Date Value Ref Range Status   12/22/2019 0.52 (L) 0.66 - 1.25 mg/dL Final     GFR Estimate   Date Value Ref Range Status   12/22/2019 >90 >60 mL/min/[1.73_m2] Final     Comment:     Non  GFR Calc  Starting 12/18/2018, serum creatinine based estimated GFR (eGFR) will be   calculated using the Chronic Kidney Disease Epidemiology Collaboration   (CKD-EPI) equation.       Calcium   Date Value Ref Range Status   12/22/2019 8.4 (L) 8.5 - 10.1 mg/dL Final     Neuro:  #Cervical spinal cord injury  #Discitis/osteomyelitis C3-C4  - Collar at all times, Up with assist  - Neuro checks q4h during the day   - PT/OT     #Anxiety/Pain - good control   - Dilauded PRN  - Oxycodone 5-10 q4hrs prn   - flexeril 7.5 mg TID prn     #Hx of Alcohol dependence  - Thiamine & folate daily     #Depression  - Continue PTA miratazepine  - restarted trazodone 100 mg at bedtime   - Seroquel 12.5 BID      Resp:  #Acute respiratory failure s/p re-intubation 12/6, suspect diaphragmatic weakness related to spine injury  - Mucomyst & chest physio held for 24 hours after trach   - daily CXR  --metanebs   --chest physio per RT  --percussive therapy  - desaturation when positioned on Rt side, try positional changes first   - S/P trach 12/20      Cardio:  #Hx of hypertension - well controlled  - PTA amlodipine 10 mg   - PTA metoprolol 50mg bid     Renal:  Hypophosphatemia - resolved  - Electrolyte replacement protocol   - Monitor I/O goal In= outs   - free water flushes 30 q4h    Neurogenic urinary retention   - failed díaz wean trial 12/12 caused retention      Endo:  #DM2  -  "Sliding scale insulin, NPH held while TF held  - previously on 38 U NPH am/pm with high dose sliding scale insulin      Heme:  #Normocytic anemia, stable  - Hg > 7.0     GI:  #Constipation - resolved last BM 12/21  - Senna scheduled daily  - PRN suppository/enema available     #Diet  - G-tube in place  - Tube feeds at goal      ID:   #Vertebral phlegmon and Abscess s/p drain placement 12/1  #Osteomyelitis C3-5  #Fever - resolved   #Leukocytosis and CRP resolved   #repeat MRI 12/10 and 12/19 shows progressive epidural abscess.   - Vanc, cefepime, metronidazole regimen per ID  - Weekly CBC, CMP, CRP while on IV abx,  - Spinal tissue cultures (12/1): MSSA, Strep anginosus, Eikenella, and oral anaerobes   - Per ID:  - will need at least 6wks of above abx from day 1 (12/11), earliest stop date of 1/22/20   - repeat MRI in 2wks (~1/2)  - Will page ID attending at [id number] prior to discharging pt     Lines:  - Trach, PICC, PIV x 2, Degroot, PEG     FEN: TF via PEG   PPX:    DVT prophylaxis: SCDs, Heparin subcutaneous restart 11/22    GI: famotidine   Code Status: Full, presumed     Dispo: 4A with trach, plan for LTAC     This patient was seen & discussed with my attending, Dr. Figueroa, who agrees with my assessment and plan.     Chalino Hargrove MD  Fellow, Vascular Neurology  Text Page    To page stroke neurology after hours through [facility], click here: [facility]COM  Choose \"On Call\" tab at top, then search dropdown box for \"Neurology Adult\"        "

## 2019-12-22 NOTE — PROGRESS NOTES
"Otolaryngology Progress Note     S: POD2 trach. 40% FiO2 and 5 PEEP. 99.1 Tmax. No events overnight.     O: BP (!) 158/77   Pulse 101   Temp 98.6  F (37  C) (Oral)   Resp 18   Ht 1.727 m (5' 8\")   Wt 88.1 kg (194 lb 3.6 oz)   SpO2 97%   BMI 29.53 kg/m     General: intubated  HEENT: C-collar in place. Trach with vent going through c-collar and is without bleeding. Secured in a good position. Neck stable without swelling.   Pulmonary: ventilated     Assessment and Plan  64 year old male w/ PMH HTN, DM, cervical stenosis s/p arthroplasty about 2-3 weeks ago who fell at rehab and presented to the [facility] with paraplegia. Went to OR w/ NSG (Dr. Ashton) 12/1 & encountered inflammatory tissue over the spine and so ENT was consulted for assistance with the exposure.  During the exposure we noticed that there was a pharyngotomy that was seen from the cervical exposure. This was repaired transoral and transcervical. Patient remained intubated post-op. G-tube placed 12/5. Extubated 12/5. Reintubated 12/6 d/t increasing oxygen requirements and CXR w/ almost complete white out of right lung, suspected mucous plugging. Tracheotomy on 12/20.      - Anticipate prolonged duration of NPO status  - No issues with trach site, continue trach cares  - Okay for DVT prophylaxis  - Antibiotics per ID  - Rest of cares per neurosurgery and ICU, page ENT on call for questions     -- Patient and above plan will be discussed with Dr. Brian.     Kemar Akins MD   Otolaryngology-Head & Neck Surgery  Please contact ENT with questions by dialing [phone] and entering job code [phone] when prompted.  "

## 2019-12-22 NOTE — PLAN OF CARE
D/I: PS trial 3x today (7/5, FiO2 40%) Pt did relatively well, RR 20s, TV 400s   Posterior neck pain managed by PRN oxycodone with good relief. Pt up in chair for few hours. No changes in neuro status. Loose BM x 1 today.   A: -130s  NSR 60s.. Suctioning Q1-2hrs via trach (small blood tinged)  Trach dressing changed per ENT this am. C-collar on at all times.   P: Continue to monitor pain & Check on pt frequently with turns and other needs.

## 2019-12-22 NOTE — PLAN OF CARE
END OF SHIFT NOTE TEMPLATE:   Major Shift Events: No major changes this shift. Bmx1. Tmax 99.1, [place] J Collar on at all times, suctioned every 2 hours- thick tan secretions, refused bed bath with olvin wipes, repositioned every 2  hours, moisturized face for complaints of itchiness.   Plan: Continue to monitor and notify team of any changes. Will need K+ replacement today.   For vital signs and complete assessments, please see documentation flowsheets.

## 2019-12-23 ENCOUNTER — APPOINTMENT (OUTPATIENT)
Dept: GENERAL RADIOLOGY | Facility: CLINIC | Age: 64
DRG: 003 | End: 2019-12-23
Attending: NEUROLOGICAL SURGERY
Payer: COMMERCIAL

## 2019-12-23 ENCOUNTER — APPOINTMENT (OUTPATIENT)
Dept: PHYSICAL THERAPY | Facility: CLINIC | Age: 64
DRG: 003 | End: 2019-12-23
Attending: NEUROLOGICAL SURGERY
Payer: COMMERCIAL

## 2019-12-23 PROBLEM — I10 ESSENTIAL HYPERTENSION, BENIGN: Status: ACTIVE | Noted: 2019-12-23

## 2019-12-23 PROBLEM — F10.10 ALCOHOL ABUSE: Status: ACTIVE | Noted: 2017-02-16

## 2019-12-23 PROBLEM — F33.41 RECURRENT MAJOR DEPRESSIVE DISORDER, IN PARTIAL REMISSION (H): Status: ACTIVE | Noted: 2017-02-16

## 2019-12-23 PROBLEM — K59.00 CONSTIPATION: Status: ACTIVE | Noted: 2019-12-23

## 2019-12-23 PROBLEM — N31.9 NEUROGENIC BLADDER: Status: ACTIVE | Noted: 2019-12-23

## 2019-12-23 LAB
ANION GAP SERPL CALCULATED.3IONS-SCNC: 4 MMOL/L (ref 3–14)
BASOPHILS # BLD AUTO: 0 10E9/L (ref 0–0.2)
BASOPHILS NFR BLD AUTO: 0.1 %
BUN SERPL-MCNC: 28 MG/DL (ref 7–30)
CALCIUM SERPL-MCNC: 8.8 MG/DL (ref 8.5–10.1)
CHLORIDE SERPL-SCNC: 110 MMOL/L (ref 94–109)
CO2 SERPL-SCNC: 25 MMOL/L (ref 20–32)
CREAT SERPL-MCNC: 0.51 MG/DL (ref 0.66–1.25)
CRP SERPL-MCNC: 24 MG/L (ref 0–8)
DIFFERENTIAL METHOD BLD: ABNORMAL
EOSINOPHIL # BLD AUTO: 0.1 10E9/L (ref 0–0.7)
EOSINOPHIL NFR BLD AUTO: 1.1 %
ERYTHROCYTE [DISTWIDTH] IN BLOOD BY AUTOMATED COUNT: 14.7 % (ref 10–15)
GFR SERPL CREATININE-BSD FRML MDRD: >90 ML/MIN/{1.73_M2}
GLUCOSE BLDC GLUCOMTR-MCNC: 142 MG/DL (ref 70–99)
GLUCOSE BLDC GLUCOMTR-MCNC: 171 MG/DL (ref 70–99)
GLUCOSE BLDC GLUCOMTR-MCNC: 176 MG/DL (ref 70–99)
GLUCOSE BLDC GLUCOMTR-MCNC: 182 MG/DL (ref 70–99)
GLUCOSE BLDC GLUCOMTR-MCNC: 187 MG/DL (ref 70–99)
GLUCOSE BLDC GLUCOMTR-MCNC: 99 MG/DL (ref 70–99)
GLUCOSE SERPL-MCNC: 190 MG/DL (ref 70–99)
HCT VFR BLD AUTO: 32.9 % (ref 40–53)
HGB BLD-MCNC: 10.2 G/DL (ref 13.3–17.7)
IMM GRANULOCYTES # BLD: 0.1 10E9/L (ref 0–0.4)
IMM GRANULOCYTES NFR BLD: 0.7 %
LYMPHOCYTES # BLD AUTO: 1 10E9/L (ref 0.8–5.3)
LYMPHOCYTES NFR BLD AUTO: 12.2 %
MAGNESIUM SERPL-MCNC: 2 MG/DL (ref 1.6–2.3)
MCH RBC QN AUTO: 29.4 PG (ref 26.5–33)
MCHC RBC AUTO-ENTMCNC: 31 G/DL (ref 31.5–36.5)
MCV RBC AUTO: 95 FL (ref 78–100)
MONOCYTES # BLD AUTO: 0.4 10E9/L (ref 0–1.3)
MONOCYTES NFR BLD AUTO: 5 %
NEUTROPHILS # BLD AUTO: 6.7 10E9/L (ref 1.6–8.3)
NEUTROPHILS NFR BLD AUTO: 80.9 %
NRBC # BLD AUTO: 0 10*3/UL
NRBC BLD AUTO-RTO: 0 /100
PHOSPHATE SERPL-MCNC: 2.6 MG/DL (ref 2.5–4.5)
PLATELET # BLD AUTO: 261 10E9/L (ref 150–450)
POTASSIUM SERPL-SCNC: 3.9 MMOL/L (ref 3.4–5.3)
RBC # BLD AUTO: 3.47 10E12/L (ref 4.4–5.9)
SODIUM SERPL-SCNC: 139 MMOL/L (ref 133–144)
WBC # BLD AUTO: 8.2 10E9/L (ref 4–11)

## 2019-12-23 PROCEDURE — 25000128 H RX IP 250 OP 636: Performed by: STUDENT IN AN ORGANIZED HEALTH CARE EDUCATION/TRAINING PROGRAM

## 2019-12-23 PROCEDURE — 94640 AIRWAY INHALATION TREATMENT: CPT

## 2019-12-23 PROCEDURE — 40000014 ZZH STATISTIC ARTERIAL MONITORING DAILY

## 2019-12-23 PROCEDURE — 84100 ASSAY OF PHOSPHORUS: CPT | Performed by: STUDENT IN AN ORGANIZED HEALTH CARE EDUCATION/TRAINING PROGRAM

## 2019-12-23 PROCEDURE — 36415 COLL VENOUS BLD VENIPUNCTURE: CPT | Performed by: STUDENT IN AN ORGANIZED HEALTH CARE EDUCATION/TRAINING PROGRAM

## 2019-12-23 PROCEDURE — 94640 AIRWAY INHALATION TREATMENT: CPT | Mod: 76

## 2019-12-23 PROCEDURE — 25000132 ZZH RX MED GY IP 250 OP 250 PS 637: Performed by: STUDENT IN AN ORGANIZED HEALTH CARE EDUCATION/TRAINING PROGRAM

## 2019-12-23 PROCEDURE — 20000004 ZZH R&B ICU UMMC

## 2019-12-23 PROCEDURE — 97530 THERAPEUTIC ACTIVITIES: CPT | Mod: GP

## 2019-12-23 PROCEDURE — 27210436 ZZH NUTRITION PRODUCT SEMIELEM INTERMED CAN

## 2019-12-23 PROCEDURE — 40000275 ZZH STATISTIC RCP TIME EA 10 MIN

## 2019-12-23 PROCEDURE — 86140 C-REACTIVE PROTEIN: CPT | Performed by: STUDENT IN AN ORGANIZED HEALTH CARE EDUCATION/TRAINING PROGRAM

## 2019-12-23 PROCEDURE — 71045 X-RAY EXAM CHEST 1 VIEW: CPT

## 2019-12-23 PROCEDURE — 25000132 ZZH RX MED GY IP 250 OP 250 PS 637: Performed by: NEUROLOGICAL SURGERY

## 2019-12-23 PROCEDURE — 25000125 ZZHC RX 250: Performed by: STUDENT IN AN ORGANIZED HEALTH CARE EDUCATION/TRAINING PROGRAM

## 2019-12-23 PROCEDURE — 25000125 ZZHC RX 250

## 2019-12-23 PROCEDURE — 25800030 ZZH RX IP 258 OP 636: Performed by: NEUROLOGICAL SURGERY

## 2019-12-23 PROCEDURE — 83735 ASSAY OF MAGNESIUM: CPT | Performed by: STUDENT IN AN ORGANIZED HEALTH CARE EDUCATION/TRAINING PROGRAM

## 2019-12-23 PROCEDURE — 25000132 ZZH RX MED GY IP 250 OP 250 PS 637: Performed by: PSYCHIATRY & NEUROLOGY

## 2019-12-23 PROCEDURE — 25000132 ZZH RX MED GY IP 250 OP 250 PS 637: Performed by: COUNSELOR

## 2019-12-23 PROCEDURE — 94003 VENT MGMT INPAT SUBQ DAY: CPT

## 2019-12-23 PROCEDURE — 25800030 ZZH RX IP 258 OP 636: Performed by: STUDENT IN AN ORGANIZED HEALTH CARE EDUCATION/TRAINING PROGRAM

## 2019-12-23 PROCEDURE — 85025 COMPLETE CBC W/AUTO DIFF WBC: CPT | Performed by: STUDENT IN AN ORGANIZED HEALTH CARE EDUCATION/TRAINING PROGRAM

## 2019-12-23 PROCEDURE — 80048 BASIC METABOLIC PNL TOTAL CA: CPT | Performed by: STUDENT IN AN ORGANIZED HEALTH CARE EDUCATION/TRAINING PROGRAM

## 2019-12-23 PROCEDURE — 25000132 ZZH RX MED GY IP 250 OP 250 PS 637: Performed by: NURSE PRACTITIONER

## 2019-12-23 PROCEDURE — 25000128 H RX IP 250 OP 636: Performed by: NEUROLOGICAL SURGERY

## 2019-12-23 PROCEDURE — 40000961 ZZH STATISTIC INTRAPULMONARY PERCUSSIVE VENT

## 2019-12-23 PROCEDURE — 25000128 H RX IP 250 OP 636: Performed by: COUNSELOR

## 2019-12-23 PROCEDURE — 00000146 ZZHCL STATISTIC GLUCOSE BY METER IP

## 2019-12-23 RX ORDER — AMLODIPINE BESYLATE 10 MG/1
10 TABLET ORAL DAILY
DISCHARGE
Start: 2019-12-23

## 2019-12-23 RX ORDER — BUMETANIDE 1 MG/1
1 TABLET ORAL 2 TIMES DAILY
DISCHARGE
Start: 2019-12-23

## 2019-12-23 RX ORDER — METRONIDAZOLE 500 MG/1
500 TABLET ORAL 3 TIMES DAILY
DISCHARGE
Start: 2019-12-23

## 2019-12-23 RX ORDER — CYANOCOBALAMIN 1000 UG/ML
1000 INJECTION, SOLUTION INTRAMUSCULAR; SUBCUTANEOUS
DISCHARGE
Start: 2020-01-05

## 2019-12-23 RX ORDER — ACETYLCYSTEINE 100 MG/ML
2 SOLUTION ORAL; RESPIRATORY (INHALATION) EVERY 4 HOURS
DISCHARGE
Start: 2019-12-23

## 2019-12-23 RX ORDER — OXYCODONE HCL 5 MG/5 ML
5-10 SOLUTION, ORAL ORAL EVERY 4 HOURS PRN
Refills: 0 | Status: SHIPPED | DISCHARGE
Start: 2019-12-23

## 2019-12-23 RX ORDER — FUROSEMIDE 10 MG/ML
20 INJECTION INTRAMUSCULAR; INTRAVENOUS ONCE
Status: COMPLETED | OUTPATIENT
Start: 2019-12-23 | End: 2019-12-23

## 2019-12-23 RX ORDER — FUROSEMIDE 10 MG/ML
20 INJECTION INTRAMUSCULAR; INTRAVENOUS ONCE
Status: DISCONTINUED | OUTPATIENT
Start: 2019-12-23 | End: 2019-12-23

## 2019-12-23 RX ORDER — ATORVASTATIN CALCIUM 80 MG/1
40 TABLET, FILM COATED ORAL EVERY EVENING
DISCHARGE
Start: 2019-12-23

## 2019-12-23 RX ORDER — LANOLIN ALCOHOL/MO/W.PET/CERES
100 CREAM (GRAM) TOPICAL DAILY
DISCHARGE
Start: 2019-12-24

## 2019-12-23 RX ORDER — ACETAMINOPHEN 500 MG
1000 TABLET ORAL 3 TIMES DAILY PRN
DISCHARGE
Start: 2019-12-23

## 2019-12-23 RX ORDER — TRAZODONE HYDROCHLORIDE 50 MG/1
150 TABLET, FILM COATED ORAL AT BEDTIME
DISCHARGE
Start: 2019-12-23

## 2019-12-23 RX ORDER — POLYETHYLENE GLYCOL 3350 17 G/17G
17 POWDER, FOR SOLUTION ORAL DAILY
Status: DISCONTINUED | OUTPATIENT
Start: 2019-12-23 | End: 2019-12-25

## 2019-12-23 RX ORDER — IPRATROPIUM BROMIDE AND ALBUTEROL SULFATE 2.5; .5 MG/3ML; MG/3ML
3 SOLUTION RESPIRATORY (INHALATION) EVERY 4 HOURS PRN
DISCHARGE
Start: 2019-12-23

## 2019-12-23 RX ORDER — POLYETHYLENE GLYCOL 3350 17 G/17G
238 POWDER, FOR SOLUTION ORAL ONCE
Status: DISCONTINUED | OUTPATIENT
Start: 2019-12-23 | End: 2019-12-23

## 2019-12-23 RX ORDER — CARBOXYMETHYLCELLULOSE SODIUM 5 MG/ML
1 SOLUTION/ DROPS OPHTHALMIC 3 TIMES DAILY PRN
DISCHARGE
Start: 2019-12-23

## 2019-12-23 RX ORDER — AMOXICILLIN 250 MG
1 CAPSULE ORAL EVERY EVENING
DISCHARGE
Start: 2019-12-23 | End: 2019-12-31

## 2019-12-23 RX ORDER — ALBUTEROL SULFATE 0.83 MG/ML
2.5 SOLUTION RESPIRATORY (INHALATION) EVERY 4 HOURS
DISCHARGE
Start: 2019-12-23

## 2019-12-23 RX ORDER — METOPROLOL TARTRATE 50 MG
75 TABLET ORAL 2 TIMES DAILY
DISCHARGE
Start: 2019-12-23

## 2019-12-23 RX ORDER — QUETIAPINE FUMARATE 25 MG/1
12.5 TABLET, FILM COATED ORAL 2 TIMES DAILY
DISCHARGE
Start: 2019-12-23 | End: 2019-12-31

## 2019-12-23 RX ORDER — CYCLOBENZAPRINE HYDROCHLORIDE 7.5 MG/1
7.5 TABLET, FILM COATED ORAL 3 TIMES DAILY
DISCHARGE
Start: 2019-12-23 | End: 2019-12-31

## 2019-12-23 RX ORDER — FAMOTIDINE 40 MG/5ML
20 POWDER, FOR SUSPENSION ORAL 2 TIMES DAILY
DISCHARGE
Start: 2019-12-23

## 2019-12-23 RX ORDER — FOLIC ACID 1 MG/1
1 TABLET ORAL DAILY
DISCHARGE
Start: 2019-12-24

## 2019-12-23 RX ORDER — MIRTAZAPINE 30 MG/1
30 TABLET, FILM COATED ORAL AT BEDTIME
DISCHARGE
Start: 2019-12-23

## 2019-12-23 RX ADMIN — HEPARIN SODIUM 5000 UNITS: 5000 INJECTION, SOLUTION INTRAVENOUS; SUBCUTANEOUS at 19:27

## 2019-12-23 RX ADMIN — ACETYLCYSTEINE 2 ML: 100 INHALANT RESPIRATORY (INHALATION) at 00:29

## 2019-12-23 RX ADMIN — INSULIN ASPART 4 UNITS: 100 INJECTION, SOLUTION INTRAVENOUS; SUBCUTANEOUS at 07:55

## 2019-12-23 RX ADMIN — VANCOMYCIN HYDROCHLORIDE 1250 MG: 10 INJECTION, POWDER, LYOPHILIZED, FOR SOLUTION INTRAVENOUS at 08:41

## 2019-12-23 RX ADMIN — ALBUTEROL SULFATE 2.5 MG: 2.5 SOLUTION RESPIRATORY (INHALATION) at 05:00

## 2019-12-23 RX ADMIN — Medication 2 G: at 06:49

## 2019-12-23 RX ADMIN — ONDANSETRON 8 MG: 2 INJECTION INTRAMUSCULAR; INTRAVENOUS at 15:25

## 2019-12-23 RX ADMIN — SENNOSIDES AND DOCUSATE SODIUM 1 TABLET: 8.6; 5 TABLET ORAL at 19:27

## 2019-12-23 RX ADMIN — Medication 7.5 MG: at 14:30

## 2019-12-23 RX ADMIN — ACETYLCYSTEINE 2 ML: 100 INHALANT RESPIRATORY (INHALATION) at 07:38

## 2019-12-23 RX ADMIN — ACETAMINOPHEN 650 MG: 325 TABLET, FILM COATED ORAL at 19:27

## 2019-12-23 RX ADMIN — ALBUTEROL SULFATE 2.5 MG: 2.5 SOLUTION RESPIRATORY (INHALATION) at 12:01

## 2019-12-23 RX ADMIN — POLYETHYLENE GLYCOL (3350) 17 G: 17 POWDER, FOR SOLUTION ORAL at 09:19

## 2019-12-23 RX ADMIN — METRONIDAZOLE 500 MG: 500 TABLET ORAL at 14:30

## 2019-12-23 RX ADMIN — TRAZODONE HYDROCHLORIDE 100 MG: 50 TABLET ORAL at 21:58

## 2019-12-23 RX ADMIN — INSULIN ASPART 4 UNITS: 100 INJECTION, SOLUTION INTRAVENOUS; SUBCUTANEOUS at 11:58

## 2019-12-23 RX ADMIN — ACETYLCYSTEINE 2 ML: 100 INHALANT RESPIRATORY (INHALATION) at 15:41

## 2019-12-23 RX ADMIN — Medication 12.5 MG: at 19:30

## 2019-12-23 RX ADMIN — CEFEPIME HYDROCHLORIDE 2 G: 2 INJECTION, POWDER, FOR SOLUTION INTRAVENOUS at 22:07

## 2019-12-23 RX ADMIN — ACETYLCYSTEINE 2 ML: 100 INHALANT RESPIRATORY (INHALATION) at 19:56

## 2019-12-23 RX ADMIN — OXYCODONE HYDROCHLORIDE 5 MG: 5 SOLUTION ORAL at 19:27

## 2019-12-23 RX ADMIN — LIDOCAINE 2 PATCH: 560 PATCH PERCUTANEOUS; TOPICAL; TRANSDERMAL at 19:28

## 2019-12-23 RX ADMIN — INSULIN ASPART 4 UNITS: 100 INJECTION, SOLUTION INTRAVENOUS; SUBCUTANEOUS at 19:42

## 2019-12-23 RX ADMIN — FOLIC ACID 1 MG: 1 TABLET ORAL at 07:46

## 2019-12-23 RX ADMIN — INSULIN ASPART 4 UNITS: 100 INJECTION, SOLUTION INTRAVENOUS; SUBCUTANEOUS at 04:04

## 2019-12-23 RX ADMIN — ACETYLCYSTEINE 2 ML: 100 INHALANT RESPIRATORY (INHALATION) at 12:02

## 2019-12-23 RX ADMIN — INSULIN ASPART 2 UNITS: 100 INJECTION, SOLUTION INTRAVENOUS; SUBCUTANEOUS at 15:30

## 2019-12-23 RX ADMIN — CEFEPIME HYDROCHLORIDE 2 G: 2 INJECTION, POWDER, FOR SOLUTION INTRAVENOUS at 14:31

## 2019-12-23 RX ADMIN — MULTIVITAMIN 15 ML: LIQUID ORAL at 19:27

## 2019-12-23 RX ADMIN — METOPROLOL TARTRATE 50 MG: 50 TABLET, FILM COATED ORAL at 07:46

## 2019-12-23 RX ADMIN — ALBUTEROL SULFATE 2.5 MG: 2.5 SOLUTION RESPIRATORY (INHALATION) at 15:41

## 2019-12-23 RX ADMIN — ALBUTEROL SULFATE 2.5 MG: 2.5 SOLUTION RESPIRATORY (INHALATION) at 00:29

## 2019-12-23 RX ADMIN — POTASSIUM CHLORIDE 20 MEQ: 1.5 POWDER, FOR SOLUTION ORAL at 06:55

## 2019-12-23 RX ADMIN — ACETYLCYSTEINE 2 ML: 100 INHALANT RESPIRATORY (INHALATION) at 04:59

## 2019-12-23 RX ADMIN — CEFEPIME HYDROCHLORIDE 2 G: 2 INJECTION, POWDER, FOR SOLUTION INTRAVENOUS at 06:23

## 2019-12-23 RX ADMIN — AMLODIPINE BESYLATE 10 MG: 10 TABLET ORAL at 07:46

## 2019-12-23 RX ADMIN — ALBUTEROL SULFATE 2.5 MG: 2.5 SOLUTION RESPIRATORY (INHALATION) at 19:56

## 2019-12-23 RX ADMIN — MIRTAZAPINE 30 MG: 15 TABLET, FILM COATED ORAL at 19:27

## 2019-12-23 RX ADMIN — VANCOMYCIN HYDROCHLORIDE 1250 MG: 10 INJECTION, POWDER, LYOPHILIZED, FOR SOLUTION INTRAVENOUS at 19:27

## 2019-12-23 RX ADMIN — Medication 100 MG: at 07:46

## 2019-12-23 RX ADMIN — Medication 7.5 MG: at 19:29

## 2019-12-23 RX ADMIN — ALBUTEROL SULFATE 2.5 MG: 2.5 SOLUTION RESPIRATORY (INHALATION) at 07:38

## 2019-12-23 RX ADMIN — HEPARIN SODIUM 5000 UNITS: 5000 INJECTION, SOLUTION INTRAVENOUS; SUBCUTANEOUS at 07:46

## 2019-12-23 RX ADMIN — METRONIDAZOLE 500 MG: 500 TABLET ORAL at 07:46

## 2019-12-23 RX ADMIN — FUROSEMIDE 20 MG: 10 INJECTION, SOLUTION INTRAVENOUS at 13:15

## 2019-12-23 RX ADMIN — POTASSIUM PHOSPHATE, MONOBASIC AND POTASSIUM PHOSPHATE, DIBASIC 10 MMOL: 224; 236 INJECTION, SOLUTION INTRAVENOUS at 09:12

## 2019-12-23 RX ADMIN — OXYCODONE HYDROCHLORIDE 5 MG: 5 SOLUTION ORAL at 11:55

## 2019-12-23 RX ADMIN — METRONIDAZOLE 500 MG: 500 TABLET ORAL at 19:27

## 2019-12-23 RX ADMIN — Medication 12.5 MG: at 07:47

## 2019-12-23 RX ADMIN — CARBOXYMETHYLCELLULOSE SODIUM 1 DROP: 5 SOLUTION/ DROPS OPHTHALMIC at 11:58

## 2019-12-23 RX ADMIN — FAMOTIDINE 20 MG: 40 POWDER, FOR SUSPENSION ORAL at 19:29

## 2019-12-23 RX ADMIN — Medication 7.5 MG: at 09:19

## 2019-12-23 RX ADMIN — METOPROLOL TARTRATE 50 MG: 50 TABLET, FILM COATED ORAL at 19:27

## 2019-12-23 ASSESSMENT — ACTIVITIES OF DAILY LIVING (ADL)
ADLS_ACUITY_SCORE: 27

## 2019-12-23 NOTE — PLAN OF CARE
No major shift events.   Neuro: Patient remains unchanged neurologically. Moved L lower extremity x1. Patient denies sensation in lower extremities. Denies active movement. Slight movement in upper extremities as well as sensation. Pupils equal and reactive. Facial sensation intact. [place] J collar remains on at all times.  Cards: TMAX 99.5, currently afebrile. VSS, sinus tach at times currently with a HR of 90s.   Respiratory: On FIO2 of 50%, on pressure support majority of day. Patient tolerating well. Lung sounds clear to coarse, minimal suctioning. Changed inner cannula x2 and trach cares.  GI/: Degroot in place, UO adequate. 1x dose of Furosemide mid-day. 1 medium BM and 1 small BM. Bowel sounds remain active, however patient's abdomen is distended, non-tender. TF remain at goal.   Activity: Up in chair for 6 hours, tolerated well.   Continue to monitor, notify MD with any concerns.

## 2019-12-23 NOTE — PROGRESS NOTES
[facility] PROGRESS NOTE     Patient:  Sherwin Angel   YOB: 1955, MRN: [id number]  Date of Visit: 12/23/2019  Date of Admission: 12/1/2019  Consult Requester: Fernando Ashton MD          Assessment and Recommendations:   Recommendations:  - Continue vancomycin, cefepime, and flagyl. He will need at least 6 weeks from day 1 = 12/11, which translates to an earliest stop date of 1/22/20  - Continue to follow CRP every 72 hours  - Plan for MRI again in 2 weeks (around 1/2)    Assessment:  Sherwin Angel is a 64-year-old man with history of hypertension and diabetes who had a recent [facility] and now admitted with new paraplegia after a fall and found to have pharyngotomy and vertebral phlegmon now s/p OR with removal of arthroplasty and placement of drain. He subsequently had G tube placed on 12/5 and ENT preformed tracheostomy on 12/20.    # Traumatic cervical spinal cord injury s/p C3-4 arthroplasty on 11/15, now removed  # Cervical stenosis C3-5  # Osteomyelitis C3-C5  # Vertebral phlegmon vs abscess s/p drain placement on 12/1   Cultures growing MSSA, Strep anginosus, Eikenella, and oral anaerobes. Clinically he has stabilized with broadening of coverage and his inflammatory markers are normalizing (CRP 14 on 12/20), and while his neuro exam is unchanged he has significant deficits that correlate with substantial abnormalities on imaging, suggesting that he would benefit from additional source control.  - Repeat MRI 12/10 and 12/19 shows progressive epidural abscess.   - Slight increase in CRP (14 --> 24)    Thank you for the consult. ID will continue to follow with you.    Eileen Catherine PA-C   Pager: [phone]  12/23/2019         Interval History and Events:   Afebrile overnight and no acute events. Slight increase in CRP from 14 on 12/20 to 24 today. Sherwin indicates he is ok, not feeling the best overall today. Denies new  symptoms. Reports slight difficulty with breathing through trach. Denies abdominal pain, n/v. No issues with TFs reported.          HPI:   Adopted from Consult note 12/02:  Sherwin Angel is a 64-year-old man with history of hypertension and diabetes who had a recent C3-C4 arthro[facility] and is now readmitted on 12/1 after a fall at his rehab facility that resulted in near paraplegia.     He was taken to the OR by neurosurgery on 12/1, and had the arthroplasty implant removed. He was found to have significant inflammation in the area and 2 pharyngotomy sites, so ENT was consulted intra-operatively and closed one pharyngotomy (the other was unable to be reached) and placed a drain for the phlegmon. He was put on broad-spectrum antibiotics with cefepime, metronidazole, and vancomycin, and ID is consulted for assistance with antibiotic management.     The patient had a temperature to 100.6 overnight after the surgery, but has been afebrile throughout the day today. He is awake and able to nod yes/no to questions, and follow basic commands.           Physical Examination:   Temp:  [97.6  F (36.4  C)-99.5  F (37.5  C)] 98.6  F (37  C)  Heart Rate:  [] 83  Resp:  [16-24] 16  BP: (109-172)/(68-93) 164/85  FiO2 (%):  [40 %-70 %] 50 %  SpO2:  [92 %-100 %] 100 %    I/O last 3 completed shifts:  In: 2235 [I.V.:600; NG/GT:370]  Out: 1485 [Urine:1485]    Vitals:    12/11/19 0400 12/12/19 0400 12/17/19 0400 12/18/19 0500   Weight: 85.1 kg (187 lb 9.8 oz) 87 kg (191 lb 12.8 oz) 84.1 kg (185 lb 8 oz) 85.6 kg (188 lb 11.4 oz)    12/21/19 0400   Weight: 88.1 kg (194 lb 3.6 oz)       Constitutional: Adult male seen sitting in recliner, inAD. Awake, alert, interactive.  HEENT: NC/AT, EOMI, sclera clear, conjunctiva normal, tracheostomy with no apparent drainage  Respiratory: No increased work of breathing, slightly course and diminished lung sounds.  Cardiovascular: RRR, no murmur noted. B/l UE edema, trace LE  edema visible above boots.  GI: Normal bowel sounds, soft, non-distended and non-tender. G tube in place with no surrounding erythema or drainage present  Skin: Warm, dry, well-perfused. No bruising, bleeding, rashes, or lesions on limited exam.  Neurologic: A&O. Able to mouth words or indicate yes/no.  Neuropsychiatric: Calm. Affect appropriate to situation.  Vascular access:  PIV on LUE CDI, non-tender, no surrounding erythema.         Medications:       acetylcysteine  2 mL Nebulization Q4H     albuterol  2.5 mg Nebulization Q4H     amLODIPine  10 mg Oral or Feeding Tube Daily     ceFEPIme (MAXIPIME) IV  2 g Intravenous Q8H     cyanocobalamin  1,000 mcg Intramuscular Q30 Days     cyclobenzaprine  7.5 mg Oral TID     famotidine  20 mg Per G Tube BID     folic acid  1 mg Oral or Feeding Tube Daily     heparin ANTICOAGULANT  5,000 Units Subcutaneous Q12H     heparin lock flush  5-10 mL Intracatheter Q24H     insulin aspart  2-16 Units Subcutaneous Q4H     lidocaine  2 patch Transdermal Q24h    And     lidocaine   Transdermal Q8H     metoprolol tartrate  50 mg Oral or Feeding Tube BID     metroNIDAZOLE  500 mg Oral or Feeding Tube TID     mirtazapine  30 mg Oral or Feeding Tube QPM     multivitamins w/minerals  15 mL Per Feeding Tube Daily     polyethylene glycol  17 g Oral Daily     QUEtiapine  12.5 mg Oral BID     senna-docusate  1 tablet Oral or Feeding Tube QPM     traZODone  100 mg Oral or Feeding Tube At Bedtime     vancomycin (VANCOCIN) IV  1,250 mg Intravenous Q12H     thiamine  100 mg Oral or Feeding Tube Daily       Antiinfectives:  Anti-infectives (From now, onward)    Start     Dose/Rate Route Frequency Ordered Stop    12/20/19 0800  vancomycin 1250 mg in 0.9% NaCl 250 mL intermittent infusion 1,250 mg      1,250 mg  over 90 Minutes Intravenous EVERY 12 HOURS 12/19/19 1757      12/16/19 0800  metroNIDAZOLE (FLAGYL) tablet 500 mg      500 mg Oral or Feeding Tube 3 TIMES DAILY 12/16/19 6966      12/11/19  1500  ceFEPIme (MAXIPIME) 2 g vial to attach to  ml bag for ADULTS or 50 ml bag for PEDS      2 g  over 30 Minutes Intravenous EVERY 8 HOURS 12/11/19 1420            Infusions/Drips:    IV fluid REPLACEMENT ONLY 55 mL/hr at 12/18/19 0200            Laboratory Data:     Microbiology:  Culture Micro   Date Value Ref Range Status   12/12/2019 (A)  Final    Light growth  Candida albicans / dubliniensis  Candida albicans and Candida dubliniensis are not routinely speciated  Susceptibility testing not routinely done     12/11/2019 (A)  Final    Moderate growth  Candida albicans / dubliniensis  Candida albicans and Candida dubliniensis are not routinely speciated  Susceptibility testing not routinely done     12/11/2019 No growth  Final   12/11/2019 No growth  Final   12/07/2019 (A)  Final    Light growth  Candida albicans / dubliniensis  Candida albicans and Candida dubliniensis are not routinely speciated  Susceptibility testing not routinely done     12/07/2019 No growth  Final   12/07/2019 No growth  Final   12/03/2019 No growth  Final   12/03/2019 No growth  Final   12/01/2019 No growth  Final   12/01/2019 (A)  Final    Moderate growth  Fusobacterium nucleatum  Susceptibility testing not routinely done     12/01/2019 (A)  Final    Moderate growth  Parvimonas micra  Susceptibility testing not routinely done     12/01/2019 (A)  Final    Light growth  Prevotella species  Susceptibility testing not routinely done     12/01/2019 (A)  Final    Light growth  Strain 2  Prevotella species  Identification obtained by MALDI-TOF mass spectrometry research use only database. Test   characteristics determined and verified by the [facility]   ([facility]) [place], [place].  Susceptibility testing not routinely done     12/01/2019 (A)  Final    Moderate growth  Strain 3  Prevotella species  Identification obtained by MALDI-TOF mass spectrometry research use only database. Test   characteristics  determined and verified by the [facility]   ([facility]) [place], [place].  Beta lactamase positive  Susceptibility testing not routinely done     12/01/2019 Culture negative after 15 days  Preliminary   12/01/2019 Single colony  Staphylococcus aureus   (A)  Final   12/01/2019 Light growth  Streptococcus anginosus   (A)  Final   12/01/2019 Light growth  Eikenella corrodens   (A)  Final       Inflammatory Markers    Recent Labs   Lab Test 12/23/19  0506 12/20/19  0337 12/17/19  0351 12/14/19  0444 12/12/19  0210 12/11/19  0317 12/08/19  0348 12/05/19  0340   CRP 24.0* 14.0* 34.0* 48.0* 36.0* 16.0* 56.0* 130.0*       Metabolic Studies       Recent Labs   Lab Test 12/23/19  0506 12/22/19  0430 12/21/19  0359 12/20/19  0337 12/19/19  0716 12/18/19  0349  12/01/19  1057 12/01/19  0112    139 140 143 139 140   < > 135 133   POTASSIUM 3.9 3.7 3.8 3.5 3.8 4.0   < > 4.6 4.4   CHLORIDE 110* 108 109 111* 107 109   < >  --  94   CO2 25 28 28 29 29 27   < >  --  31   ANIONGAP 4 2* 4 3 3 4   < >  --  7   BUN 28 22 15 22 18 28   < >  --  27   CR 0.51* 0.52* 0.48* 0.49* 0.47* 0.50*   < >  --  0.75   GFRESTIMATED >90 >90 >90 >90 >90 >90   < >  --  >90   * 131* 105* 95 126* 111*   < > 195* 230*   A1C  --   --   --   --   --   --   --   --  6.5*   FLORINDA 8.8 8.4* 8.4* 8.4* 8.4* 8.4*   < >  --  8.7   PHOS 2.6 2.8 3.7 2.8 3.1 3.2   < >  --   --    MAG 2.0 2.1 2.2 2.0 2.0 2.1   < >  --   --    LACT  --   --   --   --   --   --   --  1.2  --     < > = values in this interval not displayed.       Hematology Studies      Recent Labs   Lab Test 12/23/19  0506 12/22/19  0430 12/21/19  0359 12/20/19  0337  12/19/19  0716 12/18/19  0349   WBC 8.2 7.4 7.3 7.1  --  7.2 7.8   ANEU 6.7 5.1 5.2 4.6  --  4.7 5.1   ALYM 1.0 1.6 1.3 1.6  --  1.6 1.8   JAMES 0.4 0.6 0.6 0.7  --  0.7 0.7   AEOS 0.1 0.1 0.1 0.1  --  0.1 0.1   HGB 10.2* 9.0* 8.5* 8.9*  --  8.9* 8.2*   HCT 32.9* 29.1* 27.7* 29.2*  --  29.3* 27.1*     254 246 281   < > 278 284    < > = values in this interval not displayed.       Arterial Blood Gas Testing    Recent Labs   Lab Test 12/14/19  1006 12/11/19  0535 12/05/19  1945 12/02/19  0545 12/02/19  0200   PH 7.46* 7.49* 7.43 7.42 7.43   PCO2 37 40 46* 43 42   PO2 55* 105 87 130* 122*   HCO3 26 30* 30* 28 28   O2PER 80 100 4L 60.0 50.0        Urine Studies     Recent Labs   Lab Test 12/11/19  1141 12/07/19  2310 12/01/19  1824   URINEPH 5.5 5.5 6.0   NITRITE Negative Negative Negative   LEUKEST Negative Negative Negative   WBCU 2 2 2       Vancomycin Levels     Recent Labs   Lab Test 12/22/19  2035 12/19/19  1600 12/16/19  1718 12/13/19  1525 12/03/19  1548   VANCOMYCIN 18.5 23.4 18.6 19.2 14.3            Imaging:   CXR 12/23   Impression:   1. Stable support devices.  2. Slightly increased right and stable left basilar  atelectasis/consolidation.  3. Stable small pleural effusions.    CXR 12/22  Impression:   1. Interval decrease in small right pleural effusion and associated  bibasilar opacities. New airspace opacity.  2. Interval placement of tracheostomy tube, with tip in the mid  thoracic trachea. Additional support devices appear stable.    MR Cervical Spine wwo contrast 12/19  Impression:   1.  No significant change in anterior epidural phlegmon/abscess  extending from C2 down to C6. Stable/slightly increased spinal cord  edema.  2.  Persistent discitis osteomyelitis findings C3-C5.  3.  Stable/slightly progressed extensive prevertebral phlegmon  extending from C2 through C5. Decreased abscess in the prevertebral  phlegmon likely due to drainage into the hypopharynx through a  fistulous communication.

## 2019-12-23 NOTE — PLAN OF CARE
D/I: Pt on PS 7/5 all day with FiO2 at 40%, O2 saturating mid to high 90s. Suctioning small amt creamy/pale yellow/small blood tinged sputum Q1-2hrs.  Pt received oxycodone 5mg x 1 this am for posterior neck pain with good relief.   A: pt up in chair currently and appears comfortable. No BM today. VSS  P: Continue to monitor pt's pain/comfort and assist with any needs. See flow sheet for details.

## 2019-12-23 NOTE — OP NOTE
Procedure Date: 12/20/2019      SURGEON:  Hannah Brian MD      RESIDENT SURGEONS:   1.  Ijeoma Echeverria MD   2.  Lamar Gaytan MD      PREOPERATIVE DIAGNOSIS:  Chronic ventilatory dependence.      POSTOPERATIVE DIAGNOSIS:  Chronic ventilatory dependence.      PROCEDURE PERFORMED:  Tracheostomy.      ANESTHESIA:  General.      ESTIMATED BLOOD LOSS:  Minimal.      INDICATIONS:  Mr. Sherwin Angel is a 64-year-old male with a history of cervical stenosis, status post arthroplasty who subsequently developed paraplegia.  He went to the operating room 12/01 with Neurosurgery and was found to have significant inflammatory changes around the previous neurosurgical operative site with an associated pharyngotomy.  This was repaired via transoral and transcervical approaches.  Since that time he has been dependent on the ventilator and has failed extubation, thus a tracheostomy was required.  The above-stated procedure was indicated.  Informed consent was obtained.      FINDINGS:     1.  A 6-0 cuffed Shiley was sutured in place and secured with Velcro ties.     2.  Slight modifications to the anterior aspect of the C-spine collar was performed to allow for the vent to be attached to the patient's tracheostomy tube.      DESCRIPTION OF PROCEDURE:  The patient was brought to the operating room from the surgical intensive care unit.  General anesthesia was induced.  The patient was ventilated through the current endotracheal tube.  Next, a timeout was performed, correctly identifying the patient and procedure.  The patient's forehead was taped and the C-spine was secured using foam tape.  The anterior aspect of the [place] J collar was then removed.  The patient was prepped and draped in normal sterile fashion.  Timeout was performed to correctly identify the patient, procedure.        Next, incision was made approximately 2 fingerbreadths above the level of the sternal notch at the level of the cricoid cartilage.  Incision  was made through the skin down to the level of platysma.  Next, we then identified the strap muscles and the medial raphe, and this was divided using monopolar cautery.  The thyroid isthmus was identified and the cricoid was identified, cricoid skeletonized and the plane was injected between the thyroid gland and the tracheal cartilage.  The thyroid isthmus was divided using monopolar cautery and hemostasis obtained.  Once we identified the tracheal rings, we discussed with Anesthesia that they should deflated the cuff and advanced the endotracheal tube approximately 1 cm.  Once this was done, we then made an incision between the first and second tracheal rings using a #15 blade and the heavy scissors to complete the door flap in the trachea.  Anesthesia then deflated the cuff and slowly removed the endotracheal tube.  Once the distal tip of the endotracheal tube visualized through the tracheotomy, we then placed the 6-0 cuffed trach tube in the airway.  The cuff was inflated.  The inner cannula was placed and the vent tubing was connected.  Once end tidal CO2 was confirmed, the endotracheal tube was then fully removed.  The tracheostomy tube was then sutured into place and secured with Velcro ties.        This concluded the procedure.  The patient tolerated the procedure well.  The patient was then handed over to Anesthesia for emergence from anesthesia.  The patient was then transported back to the surgical intensive care unit in stable condition.      Dr. Shelia Brian was present for the entire procedure.         SHELIA BIRAN MD       As dictated by LORRAINE GARCIA MD      I was present with the resident during the entire procedure and participated in the critical aspects.  Shelia brian MD      D: 2019   T: 2019   MT: CECE      Name:     IVONE HUITRON   MRN:      [id number]        Account:        [id number]   :      1955           Procedure Date: 2019      Document: [id number]

## 2019-12-23 NOTE — PLAN OF CARE
Discharge Planner PT   Patient plan for discharge: not discussed   Current status:  Intubated via trach, 50% FIO2, PEEP of 5, CPAP.  In order to increase strength and ROM pt participates in UE AAROM, active movements including L shoulder flex/ext, B  shoulder abduction, B elbow flex, 10-12 reps with support for control against gravity, AAROM ~50% in B knee/hip flexion.      Barriers to return to prior living situation: medial status, intubation, weakness   Recommendations for discharge: anticipate need for rehab stay.   Rationale for recommendations: dependence with functional mobility       Entered by: Allan Holley 12/23/2019 12:38 PM
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
## 2019-12-23 NOTE — PLAN OF CARE
Major Shift Events: No major changes this shift. [place] J Collar on at all times, suctioned every 2 hours- thick tan secretions, refused bed bath with olvin wipes, repositioned every 2  hours. K+ and Mg replaced per protocol. No blood return via PICC despite alteplase admin x2.   Plan: Continue to monitor and notify team of any changes. Will need phos replacement per protocol. PICC replacement today d/t no blood return/occluded.   For vital signs and complete assessments, please see documentation flowsheets.

## 2019-12-23 NOTE — PROGRESS NOTES
Notified Neurosurgery team of inability to get blood return from PICC. Order for alteplase placed.

## 2019-12-23 NOTE — PROGRESS NOTES
Care Coordinator - Discharge Planning    Admission Date/Time:  12/1/2019  Attending MD:  Fernando Ashton MD     Data  Chart reviewed, discussed with interdisciplinary team.   Patient was admitted for:   1. Quadriplegia (H)    2. Acute respiratory failure with hypoxia (H)    3. Acute respiratory failure with hypoxia (H)         Assessment   Full assessment completed in previous note    Coordination of Care  RNCC called [facility], Antonmeg # [phone] to follow up regarding [facility] and to their spinal cord injury program.  Angie requested recent progress note, lab, and v/s faxed to her to fax # [phone].  RNCC faxed the request info.      Sandip did assess pt and reported, pt medicare will cover his LTAC 80 % and he will be responsible for the 20% coverage ( [facility] doesn't have contract with Sandip and pt VA will not cover).  RNCC will cont to follow plan of care.    Plan  Anticipated Discharge Date:  TBD.  Anticipated Discharge Plan:   [facility] back to [facility] vs [facility].  Awaiting a call back from [facility] referral specialist regarding transfer [facility] back to [facility].  RNCC will cont to follow plan of care.    Addendum:   Received a call back from Angie [facility] referral [facility].  Mario stated [facility] team informed her that [facility] doesn't need to [facility] to [facility].  He can transfer to spinal cord injury program once he is stable.  Angie stated the spinal cord injury program is a consult service and she has to consult them prior accepting him.  The above info have been shared with Neurosurgery and Neuro ICU team.  Angie stated the spinal cord injury program can accept pt with vent if pt is stable.      Edilson Xavier RN, PHN, BSN  4A and 4E/ ICU  Care Coordinator  Phone: [phone]  Pager: [phone]

## 2019-12-23 NOTE — PROGRESS NOTES
"Otolaryngology Progress Note  December 23, 2019       S: POD3 trach. No events overnight. Tmax 98.6 overnight. Pressure-supported for many hours during the day yesterday, now back on vent with minimal settings.      O: BP (!) 162/89   Pulse 101   Temp 98.2  F (36.8  C) (Axillary)   Resp 18   Ht 1.727 m (5' 8\")   Wt 88.1 kg (194 lb 3.6 oz)   SpO2 96%   BMI 29.53 kg/m     General: intubated  HEENT: C-collar in place. Trach with vent going through c-collar and is without bleeding, dried secretions. Secured in a good position. Neck stable without swelling.   Pulmonary: ventilated     Assessment and Plan  64 year old male w/ PMH HTN, DM, cervical stenosis s/p arthroplasty about 2-3 weeks ago who fell at rehab and presented to the [facility] with paraplegia. Went to OR w/ NSG (Dr. Ashton) 12/1 & encountered inflammatory tissue over the spine and so ENT was consulted for assistance with the exposure.  During the exposure we noticed that there was a pharyngotomy that was seen from the cervical exposure. This was repaired transoral and transcervical. Patient remained intubated post-op. G-tube placed 12/5. Extubated 12/5. Reintubated 12/6 d/t increasing oxygen requirements and CXR w/ almost complete white out of right lung, suspected mucous plugging. Tracheotomy on 12/20 with 6 cuffed Shiley placed.     - Anticipate prolonged duration of NPO status  - No issues with trach site, continue trach cares - trach sutures removed today  - Respiratory therapy can determine if and when trach can be exchanged  - Please arrange follow-up with ENT one month after discharge from hospital  - Okay for DVT prophylaxis  - Antibiotics per ID  - Rest of cares per neurosurgery and ICU, page ENT on call for questions     -- Patient and above plan will be discussed with Dr. Brian.     Lamar Gaytan MD  Otolaryngology-Head & Neck Surgery PGY1  Please contact ENT with questions by dialing [phone] and entering job [phone] when prompted.  "

## 2019-12-23 NOTE — PROGRESS NOTES
Neuroscience Intensive Care Progress Note  12/23/2019      24 hour events: Patient has desaturation overnight and Fio2 increased up to 70% but now is on 50%. He has Large BM this morning but his abdomen is largely distended.  Trach placed 12/20 w/o significant complication. Tube feeds at goal.      Current Medications:    acetylcysteine  2 mL Nebulization Q4H     albuterol  2.5 mg Nebulization Q4H     amLODIPine  10 mg Oral or Feeding Tube Daily     ceFEPIme (MAXIPIME) IV  2 g Intravenous Q8H     cyanocobalamin  1,000 mcg Intramuscular Q30 Days     cyclobenzaprine  7.5 mg Oral TID     famotidine  20 mg Per G Tube BID     folic acid  1 mg Oral or Feeding Tube Daily     heparin ANTICOAGULANT  5,000 Units Subcutaneous Q12H     heparin lock flush  5-10 mL Intracatheter Q24H     insulin aspart  2-16 Units Subcutaneous Q4H     lidocaine  2 patch Transdermal Q24h    And     lidocaine   Transdermal Q8H     metoprolol tartrate  50 mg Oral or Feeding Tube BID     metroNIDAZOLE  500 mg Oral or Feeding Tube TID     mirtazapine  30 mg Oral or Feeding Tube QPM     multivitamins w/minerals  15 mL Per Feeding Tube Daily     polyethylene glycol  17 g Oral Daily     QUEtiapine  12.5 mg Oral BID     senna-docusate  1 tablet Oral or Feeding Tube QPM     traZODone  100 mg Oral or Feeding Tube At Bedtime     vancomycin (VANCOCIN) IV  1,250 mg Intravenous Q12H     thiamine  100 mg Oral or Feeding Tube Daily       PRN Medications:  acetaminophen, carboxymethylcellulose PF, IV fluid REPLACEMENT ONLY, glucose **OR** dextrose **OR** glucagon, heparin lock flush, hydrALAZINE, ipratropium - albuterol 0.5 mg/2.5 mg/3 mL, labetalol, lidocaine 4%, lidocaine (buffered or not buffered), magnesium sulfate, magnesium sulfate, melatonin, naloxone, ondansetron **OR** ondansetron, oxyCODONE, potassium chloride, potassium chloride with lidocaine, potassium chloride, potassium chloride, potassium chloride, potassium phosphate (KPHOS) in D5W IV, potassium  "phosphate (KPHOS) in D5W IV, potassium phosphate (KPHOS) in D5W IV, potassium phosphate (KPHOS) in D5W IV, potassium phosphate (KPHOS) in D5W IV, prochlorperazine **OR** prochlorperazine **OR** prochlorperazine, sodium chloride (PF), sodium phosphate    Infusions:    IV fluid REPLACEMENT ONLY 55 mL/hr at 12/18/19 0200       Objective findings:  BP (!) 164/85   Pulse 101   Temp 98.6  F (37  C)   Resp 16   Ht 1.727 m (5' 8\")   Wt 88.1 kg (194 lb 3.6 oz)   SpO2 100%   BMI 29.53 kg/m      Ventilator Settings:  Ventilation Mode: CPAP/PS  (Continuous positive airway pressure with Pressure Support)  FiO2 (%): 50 %  Rate Set (breaths/minute): 12 breaths/min  Tidal Volume Set (mL): 450 mL  PEEP (cm H2O): 5 cmH2O  Pressure Support (cm H2O): 7 cmH2O  Oxygen Concentration (%): 50 %  Resp: 16      Intake/Output:      Intake/Output Summary (Last 24 hours) at 12/22/2019 1157  Last data filed at 12/23/2019   Gross per 24 hour   Intake 1460 ml   Output 1460 ml   Net  +675 ml         Physical Examination:   Vitals:  B/P: 162/89, T: 98.2, P: 109, R: 18  General:  Laying in bed  HEENT:  NC/AT, cervical collar in place. no icterus, op pink and moist, no ear or nose drainage.   Cardiac:  RRR, no m/r/g  Chest:  insbiratory wheazes  Abdomen:  S/NT/ND  Extremities:  No LE swelling.    Skin:  No rash or lesion.      Neuro Exam:   Mental status: alert, follows all commands  Cranial nerves: PERRL, EOMI, face symmetric  Motor: Some flexion extension at elbow bilaterally more left (3/5) than right (2/5). Some movement left wrist (3/5 ext) and fingers, no thumb abduction, no movement in distal RUE. No movements of the legs  Sensory: reports sensation in arms and upper chest.     Labs/Studies:  CBC RESULTS:     Ref Range & Units 5:06 AM 1d ago 2d ago 3d ago 4d ago    WBC 4.0 - 11.0 10e9/L 8.2  7.4  7.3  7.1      RBC Count 4.4 - 5.9 10e12/L 3.47Low   3.02Low   2.93Low   3.08Low       Hemoglobin 13.3 - 17.7 g/dL 10.2Low   9.0Low   8.5Low   " 8.9Low       Hematocrit 40.0 - 53.0 % 32.9Low   29.1Low   27.7Low   29.2Low       MCV 78 - 100 fl 95  96  95  95      MCH 26.5 - 33.0 pg 29.4  29.8  29.0  28.9      MCHC 31.5 - 36.5 g/dL 31.0Low   30.9Low   30.7Low   30.5Low       RDW 10.0 - 15.0 % 14.7  15.0  14.6  14.5      Platelet Count 150 - 450 10e9/L 261  254  246  281  286        Last Comprehensive Metabolic Panel:  Sodium   Date Value Ref Range Status   12/23/2019 139 133 - 144 mmol/L Final     Potassium   Date Value Ref Range Status   12/23/2019 3.9 3.4 - 5.3 mmol/L Final     Chloride   Date Value Ref Range Status   12/23/2019 110 (H) 94 - 109 mmol/L Final     Carbon Dioxide   Date Value Ref Range Status   12/23/2019 25 20 - 32 mmol/L Final     Anion Gap   Date Value Ref Range Status   12/23/2019 4 3 - 14 mmol/L Final     Glucose   Date Value Ref Range Status   12/23/2019 190 (H) 70 - 99 mg/dL Final     Urea Nitrogen   Date Value Ref Range Status   12/23/2019 28 7 - 30 mg/dL Final     Creatinine   Date Value Ref Range Status   12/23/2019 0.51 (L) 0.66 - 1.25 mg/dL Final     GFR Estimate   Date Value Ref Range Status   12/23/2019 >90 >60 mL/min/[1.73_m2] Final     Comment:     Non  GFR Calc  Starting 12/18/2018, serum creatinine based estimated GFR (eGFR) will be   calculated using the Chronic Kidney Disease Epidemiology Collaboration   (CKD-EPI) equation.       Calcium   Date Value Ref Range Status   12/23/2019 8.8 8.5 - 10.1 mg/dL Final   Mg 2  Phosph:2.5  CRP: high  24    Neuro:  #Cervical spinal cord injury  #Discitis/osteomyelitis C3-C4  - [place] Collar at all times, Up with assist  - Neuro checks q4h during the day   - continue PT/OT     #Anxiety/Pain - good control   - discontune Dilauded PRN  - Oxycodone 5-10 q4hrs prn   - schedule flexeril 7.5 mg TID can increase to 10 mg TID if needed     #Hx of Alcohol dependence  - Thiamine daily     #Depression  - Continue PTA miratazepine  - continue trazodone 100 mg at bedtime  - Seroquel 12.5  BID      Resp:  #Acute respiratory failure s/p re-intubation 12/6, suspect diaphragmatic weakness related to spine injury  - Mucomyst & chest physio   - daily CXR   --metanebs   --chest physio per RT  --percussive therapy  - desaturation when positioned on Rt side, try positional changes first   - S/P trach 12/20      Cardio:  #Hx of hypertension - well controlled  - PTA amlodipine 10 mg   - PTA metoprolol 50mg bid     Renal:  Hypophosphatemia - resolved  - Electrolyte replacement protocol   - Monitor I/O goal In= outs (today +35)  - free water flushes 30 q4h  - minimal diuresis Lasix 20 mg once ( total I/Os since admission is +9,410) and CXR with slight increase pleural effusion and basal opacities    Neurogenic urinary retention   - failed díaz wean trial 12/12 caused retention      Endo:  #DM2  - Sliding scale insulin, NPH held while TF held  - previously on 38 U NPH am/pm with high dose sliding scale insulin      Heme:  #Normocytic anemia, stable  - Hg > 7.0     GI:  #Constipation - resolved last BM 12/21  - Senna scheduled daily  - PRN suppository/enema available  - hold off Miralax for now (had large BM today)     #Diet   - G-tube in place  - Tube feeds at goal      ID:   #Vertebral phlegmon and Abscess s/p drain placement 12/1  #Osteomyelitis C3-5  #Fever - resolved   #Leukocytosis and CRP resolved   #repeat MRI 12/10 and 12/19 shows progressive epidural abscess.   - Vanc, cefepime, metronidazole regimen per ID (through 1/22)  - Weekly CBC, CMP, CRP while on IV abx,  - Spinal tissue cultures (12/1): MSSA, Strep anginosus, Eikenella, and oral anaerobes   - Per ID:  - will need at least 6wks of above abx from day 1 (12/11), earliest stop date of 1/22/20   - repeat MRI in 2wks (~1/2)  - Will page ID attending at [id number] prior to discharging pt     Lines:  - Trach, PICC, PIV x 2, Díaz, PEG     FEN: TF via PEG   PPX:    DVT prophylaxis: SCDs, Heparin subcutaneous restart 11/22    GI: famotidine   Code Status:  Full, presumed     [facility]    This patient was seen & discussed with my attending, Dr. Eduardo MD, who agrees with my assessment and plan.       Hermilo Schwartz MD  PGY2  [phone]

## 2019-12-23 NOTE — PROGRESS NOTES
[facility], [place]   Neurosurgery Progress Note:    Assessment: 64 years old gentleman status post C3-C4 artificial disc placement about 2 weeks prior to arriaval the [facility] who presented after a fall with almost complete loss of strength in his upper extremities and paraplegia, found to have cervical stenosis. S/p OR 12/1 - Pharyngotomy closed with assistance from ENT. Arthroplasty implant removed. No replacement implant placed. S/p G-tube 12/5. Extubated 12/5. Re-Intubated 12/6 due to Mucous Plugging and Atelectasis. S/p repeated bronchoscopy. S/p tracheostomy with ENT 12/20.    Plan:  - Nebs, chest physiotherapy  - DVT: SCDs while in bed  - [place] J collar  - ID: vancomycin, cefepime, flagyl 4-6 wks for vertebral phlegmon, weekly labs; f/u clx; MRI cervical spine 2 weeks per ID  - no plan for another surgery  - GI/nutrition: tube feeds  - MRI cervical spine before stopping abx in 6 wks  - Endo: endocrine cslt; insulin sliding scale  - replace PICC  - [facility] 4A; [facility]  -----------------------------------  Lion Vargas MD  Neurosurgery Resident PGY2    Please contact neurosurgery resident on call with questions.    Dial * [phone], enter [phone] when prompted.     Subjective: PICC stopped working despite tPA, pressure supported for hours    Objective:   Temp:  [97.6  F (36.4  C)-99.1  F (37.3  C)] 97.6  F (36.4  C)  Heart Rate:  [56-98] 64  Resp:  [18-24] 20  BP: (109-172)/(64-93) 134/76  FiO2 (%):  [40 %] 40 %  SpO2:  [92 %-100 %] 96 %  I/O last 3 completed shifts:  In: 2135 [I.V.:500; NG/GT:370]  Out: 1460 [Urine:1460]    Gen: Appears comfortable, NAD  Wound: Incision, clean, dry, intact  Pulm: tracheostomy, mechanical ventilator  Neuro: Awake, alert, nods yes or no to questions  Follows commands  Deltoids 4/5, biceps 4/5, triceps 1/5,  0/5, lower extremities 0/5  Triple flexion in lower extremities  Sensation to light touch in arms and  upper chest    LABS  Recent Labs   Lab Test 12/22/19  0430 12/21/19  0359 12/20/19  0337   WBC 7.4 7.3 7.1   HGB 9.0* 8.5* 8.9*   MCV 96 95 95    246 281       Recent Labs   Lab Test 12/22/19  0430 12/21/19  0359 12/20/19  0337    140 143   POTASSIUM 3.7 3.8 3.5   CHLORIDE 108 109 111*   CO2 28 28 29   BUN 22 15 22   CR 0.52* 0.48* 0.49*   ANIONGAP 2* 4 3   FLORINDA 8.4* 8.4* 8.4*   * 105* 95

## 2019-12-23 NOTE — PHARMACY-VANCOMYCIN DOSING SERVICE
Pharmacy Vancomycin Note  Date of Service 2019  Patient's  1955   64 year old, male    Indication: Osteomyelitis and vertebral phlegmon  Goal Trough Level: 15-20 mg/L  Day of Therapy: 12  Current Vancomycin regimen: 1250 mg IV q12h    Current estimated CrCl = Estimated Creatinine Clearance: 156.9 mL/min (A) (based on SCr of 0.52 mg/dL (L)).    Creatinine for last 3 days  2019:  3:37 AM Creatinine 0.49 mg/dL  2019:  3:59 AM Creatinine 0.48 mg/dL  2019:  4:30 AM Creatinine 0.52 mg/dL    Recent Vancomycin Levels (past 3 days)  2019:  8:35 PM Vancomycin Level 18.5 mg/L    Vancomycin IV Administrations (past 72 hours)                   vancomycin 1250 mg in 0.9% NaCl 250 mL intermittent infusion 1,250 mg (mg) 1,250 mg Given 19     1,250 mg Given  0835     1,250 mg Given 19     1,250 mg Given  0811     1,250 mg Given 19     1,250 mg Given  0917                Nephrotoxins and other renal medications (From now, onward)    Start     Dose/Rate Route Frequency Ordered Stop    19 0800  vancomycin 1250 mg in 0.9% NaCl 250 mL intermittent infusion 1,250 mg      1,250 mg  over 90 Minutes Intravenous EVERY 12 HOURS 19 175               Contrast Orders - past 72 hours (72h ago, onward)    None          Interpretation of levels and current regimen:  Trough level is  Therapeutic at 18.5 mg/L approximately 12 hours after the last dose of 1,250 mg (the 6th dose of this regimen).    Has serum creatinine changed > 50% in last 72 hours: No    Urine output:  good urine output    Renal Function: Stable    Plan:  1.  Continue Current Dose  2.  Pharmacy will check trough levels as appropriate in 3-5 Days.    3. Serum creatinine levels will be ordered a minimum of twice weekly.      Roman Lopez [facility]

## 2019-12-24 ENCOUNTER — APPOINTMENT (OUTPATIENT)
Dept: GENERAL RADIOLOGY | Facility: CLINIC | Age: 64
DRG: 003 | End: 2019-12-24
Attending: NEUROLOGICAL SURGERY
Payer: COMMERCIAL

## 2019-12-24 LAB
ALBUMIN UR-MCNC: 30 MG/DL
ANION GAP SERPL CALCULATED.3IONS-SCNC: 6 MMOL/L (ref 3–14)
APPEARANCE UR: ABNORMAL
BASOPHILS # BLD AUTO: 0 10E9/L (ref 0–0.2)
BASOPHILS NFR BLD AUTO: 0.2 %
BILIRUB UR QL STRIP: NEGATIVE
BUN SERPL-MCNC: 36 MG/DL (ref 7–30)
CALCIUM SERPL-MCNC: 8.9 MG/DL (ref 8.5–10.1)
CHLORIDE SERPL-SCNC: 110 MMOL/L (ref 94–109)
CO2 SERPL-SCNC: 25 MMOL/L (ref 20–32)
COLOR UR AUTO: YELLOW
CREAT SERPL-MCNC: 0.58 MG/DL (ref 0.66–1.25)
DIFFERENTIAL METHOD BLD: ABNORMAL
EOSINOPHIL # BLD AUTO: 0 10E9/L (ref 0–0.7)
EOSINOPHIL NFR BLD AUTO: 0.1 %
ERYTHROCYTE [DISTWIDTH] IN BLOOD BY AUTOMATED COUNT: 15.1 % (ref 10–15)
ERYTHROCYTE [DISTWIDTH] IN BLOOD BY AUTOMATED COUNT: 15.1 % (ref 10–15)
GFR SERPL CREATININE-BSD FRML MDRD: >90 ML/MIN/{1.73_M2}
GLUCOSE BLDC GLUCOMTR-MCNC: 151 MG/DL (ref 70–99)
GLUCOSE BLDC GLUCOMTR-MCNC: 155 MG/DL (ref 70–99)
GLUCOSE BLDC GLUCOMTR-MCNC: 166 MG/DL (ref 70–99)
GLUCOSE BLDC GLUCOMTR-MCNC: 179 MG/DL (ref 70–99)
GLUCOSE BLDC GLUCOMTR-MCNC: 197 MG/DL (ref 70–99)
GLUCOSE BLDC GLUCOMTR-MCNC: 199 MG/DL (ref 70–99)
GLUCOSE SERPL-MCNC: 180 MG/DL (ref 70–99)
GLUCOSE UR STRIP-MCNC: NEGATIVE MG/DL
HCT VFR BLD AUTO: 36 % (ref 40–53)
HCT VFR BLD AUTO: 36.9 % (ref 40–53)
HGB BLD-MCNC: 11.1 G/DL (ref 13.3–17.7)
HGB BLD-MCNC: 11.4 G/DL (ref 13.3–17.7)
HGB UR QL STRIP: ABNORMAL
IMM GRANULOCYTES # BLD: 0.1 10E9/L (ref 0–0.4)
IMM GRANULOCYTES NFR BLD: 0.7 %
KETONES UR STRIP-MCNC: NEGATIVE MG/DL
LEUKOCYTE ESTERASE UR QL STRIP: ABNORMAL
LYMPHOCYTES # BLD AUTO: 1 10E9/L (ref 0.8–5.3)
LYMPHOCYTES NFR BLD AUTO: 5.6 %
MAGNESIUM SERPL-MCNC: 2.3 MG/DL (ref 1.6–2.3)
MCH RBC QN AUTO: 29.5 PG (ref 26.5–33)
MCH RBC QN AUTO: 29.9 PG (ref 26.5–33)
MCHC RBC AUTO-ENTMCNC: 30.8 G/DL (ref 31.5–36.5)
MCHC RBC AUTO-ENTMCNC: 30.9 G/DL (ref 31.5–36.5)
MCV RBC AUTO: 96 FL (ref 78–100)
MCV RBC AUTO: 97 FL (ref 78–100)
MONOCYTES # BLD AUTO: 0.6 10E9/L (ref 0–1.3)
MONOCYTES NFR BLD AUTO: 3.3 %
MUCOUS THREADS #/AREA URNS LPF: PRESENT /LPF
NEUTROPHILS # BLD AUTO: 16.5 10E9/L (ref 1.6–8.3)
NEUTROPHILS NFR BLD AUTO: 90.1 %
NITRATE UR QL: NEGATIVE
NRBC # BLD AUTO: 0 10*3/UL
NRBC BLD AUTO-RTO: 0 /100
PH UR STRIP: 5 PH (ref 5–7)
PHOSPHATE SERPL-MCNC: 3.1 MG/DL (ref 2.5–4.5)
PLATELET # BLD AUTO: 276 10E9/L (ref 150–450)
PLATELET # BLD AUTO: 291 10E9/L (ref 150–450)
POTASSIUM SERPL-SCNC: 4.4 MMOL/L (ref 3.4–5.3)
RBC # BLD AUTO: 3.71 10E12/L (ref 4.4–5.9)
RBC # BLD AUTO: 3.86 10E12/L (ref 4.4–5.9)
RBC #/AREA URNS AUTO: 13 /HPF (ref 0–2)
SODIUM SERPL-SCNC: 141 MMOL/L (ref 133–144)
SOURCE: ABNORMAL
SP GR UR STRIP: 1.01 (ref 1–1.03)
SQUAMOUS #/AREA URNS AUTO: 2 /HPF (ref 0–1)
TRANS CELLS #/AREA URNS HPF: <1 /HPF (ref 0–1)
UROBILINOGEN UR STRIP-MCNC: NORMAL MG/DL (ref 0–2)
WBC # BLD AUTO: 18.1 10E9/L (ref 4–11)
WBC # BLD AUTO: 18.3 10E9/L (ref 4–11)
WBC #/AREA URNS AUTO: 51 /HPF (ref 0–5)
YEAST #/AREA URNS HPF: ABNORMAL /HPF

## 2019-12-24 PROCEDURE — 25000132 ZZH RX MED GY IP 250 OP 250 PS 637: Performed by: STUDENT IN AN ORGANIZED HEALTH CARE EDUCATION/TRAINING PROGRAM

## 2019-12-24 PROCEDURE — 80048 BASIC METABOLIC PNL TOTAL CA: CPT | Performed by: STUDENT IN AN ORGANIZED HEALTH CARE EDUCATION/TRAINING PROGRAM

## 2019-12-24 PROCEDURE — 87181 SC STD AGAR DILUTION PER AGT: CPT | Performed by: STUDENT IN AN ORGANIZED HEALTH CARE EDUCATION/TRAINING PROGRAM

## 2019-12-24 PROCEDURE — 94003 VENT MGMT INPAT SUBQ DAY: CPT

## 2019-12-24 PROCEDURE — 87086 URINE CULTURE/COLONY COUNT: CPT | Performed by: STUDENT IN AN ORGANIZED HEALTH CARE EDUCATION/TRAINING PROGRAM

## 2019-12-24 PROCEDURE — 25000132 ZZH RX MED GY IP 250 OP 250 PS 637: Performed by: PSYCHIATRY & NEUROLOGY

## 2019-12-24 PROCEDURE — 40000961 ZZH STATISTIC INTRAPULMONARY PERCUSSIVE VENT

## 2019-12-24 PROCEDURE — 84100 ASSAY OF PHOSPHORUS: CPT | Performed by: STUDENT IN AN ORGANIZED HEALTH CARE EDUCATION/TRAINING PROGRAM

## 2019-12-24 PROCEDURE — 25000132 ZZH RX MED GY IP 250 OP 250 PS 637: Performed by: NEUROLOGICAL SURGERY

## 2019-12-24 PROCEDURE — 71045 X-RAY EXAM CHEST 1 VIEW: CPT

## 2019-12-24 PROCEDURE — 83735 ASSAY OF MAGNESIUM: CPT | Performed by: STUDENT IN AN ORGANIZED HEALTH CARE EDUCATION/TRAINING PROGRAM

## 2019-12-24 PROCEDURE — 87106 FUNGI IDENTIFICATION YEAST: CPT | Performed by: STUDENT IN AN ORGANIZED HEALTH CARE EDUCATION/TRAINING PROGRAM

## 2019-12-24 PROCEDURE — 25000125 ZZHC RX 250

## 2019-12-24 PROCEDURE — 36415 COLL VENOUS BLD VENIPUNCTURE: CPT | Performed by: STUDENT IN AN ORGANIZED HEALTH CARE EDUCATION/TRAINING PROGRAM

## 2019-12-24 PROCEDURE — 25000128 H RX IP 250 OP 636: Performed by: STUDENT IN AN ORGANIZED HEALTH CARE EDUCATION/TRAINING PROGRAM

## 2019-12-24 PROCEDURE — 85027 COMPLETE CBC AUTOMATED: CPT | Performed by: STUDENT IN AN ORGANIZED HEALTH CARE EDUCATION/TRAINING PROGRAM

## 2019-12-24 PROCEDURE — 20000004 ZZH R&B ICU UMMC

## 2019-12-24 PROCEDURE — 25800030 ZZH RX IP 258 OP 636: Performed by: NEUROLOGICAL SURGERY

## 2019-12-24 PROCEDURE — 25000132 ZZH RX MED GY IP 250 OP 250 PS 637: Performed by: NURSE PRACTITIONER

## 2019-12-24 PROCEDURE — 25000128 H RX IP 250 OP 636: Performed by: COUNSELOR

## 2019-12-24 PROCEDURE — 25000128 H RX IP 250 OP 636: Performed by: NEUROLOGICAL SURGERY

## 2019-12-24 PROCEDURE — 87088 URINE BACTERIA CULTURE: CPT | Performed by: STUDENT IN AN ORGANIZED HEALTH CARE EDUCATION/TRAINING PROGRAM

## 2019-12-24 PROCEDURE — 94640 AIRWAY INHALATION TREATMENT: CPT

## 2019-12-24 PROCEDURE — 81001 URINALYSIS AUTO W/SCOPE: CPT | Performed by: STUDENT IN AN ORGANIZED HEALTH CARE EDUCATION/TRAINING PROGRAM

## 2019-12-24 PROCEDURE — 85049 AUTOMATED PLATELET COUNT: CPT | Performed by: COUNSELOR

## 2019-12-24 PROCEDURE — 27210437 ZZH NUTRITION PRODUCT SEMIELEM INTERMED LITER

## 2019-12-24 PROCEDURE — 87040 BLOOD CULTURE FOR BACTERIA: CPT | Performed by: STUDENT IN AN ORGANIZED HEALTH CARE EDUCATION/TRAINING PROGRAM

## 2019-12-24 PROCEDURE — 94640 AIRWAY INHALATION TREATMENT: CPT | Mod: 76

## 2019-12-24 PROCEDURE — 25000132 ZZH RX MED GY IP 250 OP 250 PS 637: Performed by: COUNSELOR

## 2019-12-24 PROCEDURE — 85025 COMPLETE CBC W/AUTO DIFF WBC: CPT | Performed by: STUDENT IN AN ORGANIZED HEALTH CARE EDUCATION/TRAINING PROGRAM

## 2019-12-24 PROCEDURE — 40000275 ZZH STATISTIC RCP TIME EA 10 MIN

## 2019-12-24 PROCEDURE — 00000146 ZZHCL STATISTIC GLUCOSE BY METER IP

## 2019-12-24 PROCEDURE — 87186 SC STD MICRODIL/AGAR DIL: CPT | Performed by: STUDENT IN AN ORGANIZED HEALTH CARE EDUCATION/TRAINING PROGRAM

## 2019-12-24 RX ORDER — BUMETANIDE 2 MG/1
2 TABLET ORAL 2 TIMES DAILY
Status: DISCONTINUED | OUTPATIENT
Start: 2019-12-24 | End: 2019-12-24

## 2019-12-24 RX ORDER — BUMETANIDE 1 MG/1
1 TABLET ORAL 2 TIMES DAILY
Status: DISCONTINUED | OUTPATIENT
Start: 2019-12-24 | End: 2019-12-31 | Stop reason: HOSPADM

## 2019-12-24 RX ORDER — POLYETHYLENE GLYCOL 3350 17 G/17G
17 POWDER, FOR SOLUTION ORAL DAILY
Status: DISCONTINUED | OUTPATIENT
Start: 2019-12-24 | End: 2019-12-24

## 2019-12-24 RX ADMIN — ACETAMINOPHEN 650 MG: 325 TABLET, FILM COATED ORAL at 02:46

## 2019-12-24 RX ADMIN — TRAZODONE HYDROCHLORIDE 100 MG: 50 TABLET ORAL at 22:07

## 2019-12-24 RX ADMIN — INSULIN ASPART 2 UNITS: 100 INJECTION, SOLUTION INTRAVENOUS; SUBCUTANEOUS at 04:12

## 2019-12-24 RX ADMIN — ACETYLCYSTEINE 2 ML: 100 INHALANT RESPIRATORY (INHALATION) at 15:29

## 2019-12-24 RX ADMIN — AMLODIPINE BESYLATE 10 MG: 10 TABLET ORAL at 08:31

## 2019-12-24 RX ADMIN — FAMOTIDINE 20 MG: 40 POWDER, FOR SUSPENSION ORAL at 08:32

## 2019-12-24 RX ADMIN — INSULIN ASPART 4 UNITS: 100 INJECTION, SOLUTION INTRAVENOUS; SUBCUTANEOUS at 08:39

## 2019-12-24 RX ADMIN — ACETYLCYSTEINE 2 ML: 100 INHALANT RESPIRATORY (INHALATION) at 04:36

## 2019-12-24 RX ADMIN — METOPROLOL TARTRATE 50 MG: 50 TABLET, FILM COATED ORAL at 08:30

## 2019-12-24 RX ADMIN — SENNOSIDES AND DOCUSATE SODIUM 1 TABLET: 8.6; 5 TABLET ORAL at 20:37

## 2019-12-24 RX ADMIN — METRONIDAZOLE 500 MG: 500 TABLET ORAL at 20:34

## 2019-12-24 RX ADMIN — BUMETANIDE 1 MG: 1 TABLET ORAL at 20:32

## 2019-12-24 RX ADMIN — CEFEPIME HYDROCHLORIDE 2 G: 2 INJECTION, POWDER, FOR SOLUTION INTRAVENOUS at 22:07

## 2019-12-24 RX ADMIN — ACETYLCYSTEINE 2 ML: 100 INHALANT RESPIRATORY (INHALATION) at 00:16

## 2019-12-24 RX ADMIN — SODIUM CHLORIDE, PRESERVATIVE FREE 5 ML: 5 INJECTION INTRAVENOUS at 14:18

## 2019-12-24 RX ADMIN — FAMOTIDINE 20 MG: 40 POWDER, FOR SUSPENSION ORAL at 20:33

## 2019-12-24 RX ADMIN — METOPROLOL TARTRATE 50 MG: 50 TABLET, FILM COATED ORAL at 20:33

## 2019-12-24 RX ADMIN — VANCOMYCIN HYDROCHLORIDE 1250 MG: 10 INJECTION, POWDER, LYOPHILIZED, FOR SOLUTION INTRAVENOUS at 09:00

## 2019-12-24 RX ADMIN — ACETYLCYSTEINE 2 ML: 100 INHALANT RESPIRATORY (INHALATION) at 11:53

## 2019-12-24 RX ADMIN — Medication 7.5 MG: at 08:31

## 2019-12-24 RX ADMIN — ALBUTEROL SULFATE 2.5 MG: 2.5 SOLUTION RESPIRATORY (INHALATION) at 11:52

## 2019-12-24 RX ADMIN — MIRTAZAPINE 30 MG: 15 TABLET, FILM COATED ORAL at 20:34

## 2019-12-24 RX ADMIN — Medication 7.5 MG: at 20:32

## 2019-12-24 RX ADMIN — Medication 7.5 MG: at 14:33

## 2019-12-24 RX ADMIN — ACETYLCYSTEINE 2 ML: 100 INHALANT RESPIRATORY (INHALATION) at 07:46

## 2019-12-24 RX ADMIN — ALBUTEROL SULFATE 2.5 MG: 2.5 SOLUTION RESPIRATORY (INHALATION) at 00:16

## 2019-12-24 RX ADMIN — OXYCODONE HYDROCHLORIDE 10 MG: 5 SOLUTION ORAL at 14:33

## 2019-12-24 RX ADMIN — METRONIDAZOLE 500 MG: 500 TABLET ORAL at 14:33

## 2019-12-24 RX ADMIN — OXYCODONE HYDROCHLORIDE 5 MG: 5 SOLUTION ORAL at 02:46

## 2019-12-24 RX ADMIN — CEFEPIME HYDROCHLORIDE 2 G: 2 INJECTION, POWDER, FOR SOLUTION INTRAVENOUS at 06:09

## 2019-12-24 RX ADMIN — ALBUTEROL SULFATE 2.5 MG: 2.5 SOLUTION RESPIRATORY (INHALATION) at 19:24

## 2019-12-24 RX ADMIN — INSULIN ASPART 4 UNITS: 100 INJECTION, SOLUTION INTRAVENOUS; SUBCUTANEOUS at 20:18

## 2019-12-24 RX ADMIN — BUMETANIDE 2 MG: 2 TABLET ORAL at 10:18

## 2019-12-24 RX ADMIN — METRONIDAZOLE 500 MG: 500 TABLET ORAL at 08:31

## 2019-12-24 RX ADMIN — CEFEPIME HYDROCHLORIDE 2 G: 2 INJECTION, POWDER, FOR SOLUTION INTRAVENOUS at 17:04

## 2019-12-24 RX ADMIN — Medication 12.5 MG: at 20:34

## 2019-12-24 RX ADMIN — ACETAMINOPHEN 650 MG: 325 TABLET, FILM COATED ORAL at 06:51

## 2019-12-24 RX ADMIN — INSULIN ASPART 2 UNITS: 100 INJECTION, SOLUTION INTRAVENOUS; SUBCUTANEOUS at 12:37

## 2019-12-24 RX ADMIN — ALBUTEROL SULFATE 2.5 MG: 2.5 SOLUTION RESPIRATORY (INHALATION) at 15:29

## 2019-12-24 RX ADMIN — HEPARIN SODIUM 5000 UNITS: 5000 INJECTION, SOLUTION INTRAVENOUS; SUBCUTANEOUS at 20:33

## 2019-12-24 RX ADMIN — Medication 12.5 MG: at 08:33

## 2019-12-24 RX ADMIN — FOLIC ACID 1 MG: 1 TABLET ORAL at 08:31

## 2019-12-24 RX ADMIN — INSULIN ASPART 2 UNITS: 100 INJECTION, SOLUTION INTRAVENOUS; SUBCUTANEOUS at 00:44

## 2019-12-24 RX ADMIN — CARBOXYMETHYLCELLULOSE SODIUM 1 DROP: 5 SOLUTION/ DROPS OPHTHALMIC at 08:29

## 2019-12-24 RX ADMIN — LIDOCAINE 2 PATCH: 560 PATCH PERCUTANEOUS; TOPICAL; TRANSDERMAL at 20:21

## 2019-12-24 RX ADMIN — POLYETHYLENE GLYCOL (3350) 17 G: 17 POWDER, FOR SOLUTION ORAL at 08:33

## 2019-12-24 RX ADMIN — OXYCODONE HYDROCHLORIDE 5 MG: 5 SOLUTION ORAL at 06:51

## 2019-12-24 RX ADMIN — CARBOXYMETHYLCELLULOSE SODIUM 1 DROP: 5 SOLUTION/ DROPS OPHTHALMIC at 14:18

## 2019-12-24 RX ADMIN — MULTIVITAMIN 15 ML: LIQUID ORAL at 20:34

## 2019-12-24 RX ADMIN — HEPARIN SODIUM 5000 UNITS: 5000 INJECTION, SOLUTION INTRAVENOUS; SUBCUTANEOUS at 08:33

## 2019-12-24 RX ADMIN — Medication 100 MG: at 08:31

## 2019-12-24 RX ADMIN — OXYCODONE HYDROCHLORIDE 10 MG: 5 SOLUTION ORAL at 10:18

## 2019-12-24 RX ADMIN — ALBUTEROL SULFATE 2.5 MG: 2.5 SOLUTION RESPIRATORY (INHALATION) at 04:36

## 2019-12-24 RX ADMIN — ALBUTEROL SULFATE 2.5 MG: 2.5 SOLUTION RESPIRATORY (INHALATION) at 07:46

## 2019-12-24 RX ADMIN — INSULIN ASPART 4 UNITS: 100 INJECTION, SOLUTION INTRAVENOUS; SUBCUTANEOUS at 17:33

## 2019-12-24 RX ADMIN — VANCOMYCIN HYDROCHLORIDE 1250 MG: 10 INJECTION, POWDER, LYOPHILIZED, FOR SOLUTION INTRAVENOUS at 20:25

## 2019-12-24 RX ADMIN — ACETYLCYSTEINE 2 ML: 100 INHALANT RESPIRATORY (INHALATION) at 19:24

## 2019-12-24 ASSESSMENT — MIFFLIN-ST. JEOR: SCORE: 1659.5

## 2019-12-24 ASSESSMENT — ACTIVITIES OF DAILY LIVING (ADL)
ADLS_ACUITY_SCORE: 27
ADLS_ACUITY_SCORE: 26
ADLS_ACUITY_SCORE: 27
ADLS_ACUITY_SCORE: 26

## 2019-12-24 NOTE — PROVIDER NOTIFICATION
D: Upon taking over care of pt at 16:00, pt on trach dome @ 50% FIO2 with a saturation drop down to 86%.  I: RN paged RT to see if we we could give a lavage suction, RN suctioned pt and had RT switch pt over to ventilator settings.  A:  NEURO: Pt attempts to squeeze hands   CV: Pt is sinus rhythm with no ectopy. Blood pressure is 140/89  RESPIRATORY: Inner trach was changed, and site care done for pt around his trach site. Pt placed on ventilator settings at 17:00 due to decreased oxygenation at start of shift.Lungs coarse and diminished at the bases. Pt with a fair cough.  No other desaturation episodes occurred during the shift.  GI: Tube feedings running @ goal rate of 55cc/hour with 30 ml of free water q 4 hours. Pt had a bowel movement when lifting pt back into bed.  : Díaz in place, but team cam by at 18:30 asking for the díaz to be removed and replaced. RN changed out the díaz per orders.   SKIN: skin at tracheostomy site tends to be moist and so care done to prevent skin breakdown.  PAIN: Pt c/o having pain in his upper shoulders. Pt repositioned every two hours during the shift.  P: Continue with current plan of care

## 2019-12-24 NOTE — PROGRESS NOTES
[facility] PROGRESS NOTE     Patient:  Sherwin Angel   YOB: 1955, MRN: [id number]  Date of Visit: 12/24/2019  Date of Admission: 12/1/2019  Consult Requester: Fernando Ashton MD          Assessment and Recommendations:   Recommendations:  - Repeat BC today given rise in WBC  - Recheck CBC today to make certain this is a true rise in WBC  - Continue vancomycin, cefepime, and flagyl. He will need at least 6 weeks from day 1 = 12/11, which translates to an earliest stop date of 1/22/20  - Concern for worsening infection despite broad spectrum antibiotics. Currently we are not covering for fungal agents so if decompensates would recommend adding micafungin  - Discussed with ENT, pharyngotomy unlikely to be source of infection mainly due to timing (now 3 weeks from procedure), however, if no other source of infection is identified ENT could perform flex laryngoscopy to evaluate  - Continue to follow CRP every 72 hours  - Plan for MRI again in 2 weeks (around 1/2)    Assessment:  Sherwin Angel is a 64-year-old man with history of hypertension and diabetes who [facility] and now admitted with new paraplegia after a fall and found to have pharyngotomy and vertebral phlegmon now s/p OR with removal of arthroplasty and placement of drain. He subsequently had G tube placed on 12/5 and ENT preformed tracheostomy on 12/20.    # Traumatic cervical spinal cord injury s/p C3-4 arthroplasty on 11/15, now removed  # Cervical stenosis C3-5  # Osteomyelitis C3-C5  # Vertebral phlegmon vs abscess s/p drain placement on 12/1   Cultures growing MSSA, Strep anginosus, Eikenella, and oral anaerobes. Clinically he has stabilized with broadening of coverage and his inflammatory markers are normalizing (CRP 14 on 12/20), and while his neuro exam is unchanged he has significant deficits that correlate with substantial abnormalities on imaging,  suggesting that he would benefit from additional source control.  - Repeat MRI 12/10 and 12/19 shows progressive epidural abscess.   - Slight increase in CRP (14 --> 24) on 12/23, now with increase in WBC from 8 to 18 on 12/24    # Leukocytosis  Possibly related to infectious issues detailed above, however, patient has been on broad spectrum antibiotics for 2 weeks. CXR from this morning shows improvement in right lung opacities and stable left basilar opacities. Discussed pharyngotomy with ENT as 1 of the 2 locations was unable to be closed, however, given it has been ~3 weeks since procedure unlikely to be a source of infection at this time. He had a díaz catheter in place, so would likely grow something on culture, but it is relatively unlikely to be true pathogen in the absence of other urinary infection signs. He has one bowel movement recorded per day, so unlikely to be C diff.   - Continue to monitor  - Recheck blood cultures  - Add micafungin if decompensates to cover fungus    Thank you for the consult. ID will continue to follow with you.    Eileen Catherine PA-C   Pager: [phone]  12/24/2019         Interval History and Events:   Afebrile overnight and no acute events. WBC elevated to 18 today. Sherwin indicates he is ok, eyes are very dry. Reports shoulder and neck are more sore today. Breathing feels mildly more difficult. Denies abdominal pain, n/v.          HPI:   Adopted from Consult note 12/02:  Sherwin Angel is a 64-year-old man with history of hypertension and diabetes who had a recent [facility] and is now readmitted on 12/1 after a fall at his rehab facility that resulted in near paraplegia.     He was taken to the OR by neurosurgery on 12/1, and had the arthroplasty implant removed. He was found to have significant inflammation in the area and 2 pharyngotomy sites, so ENT was consulted intra-operatively and closed one pharyngotomy (the other was unable to be  reached) and placed a drain for the phlegmon. He was put on broad-spectrum antibiotics with cefepime, metronidazole, and vancomycin, and ID is consulted for assistance with antibiotic management.     The patient had a temperature to 100.6 overnight after the surgery, but has been afebrile throughout the day today. He is awake and able to nod yes/no to questions, and follow basic commands.           Physical Examination:   Temp:  [98.4  F (36.9  C)-99  F (37.2  C)] (P) 98.8  F (37.1  C)  Heart Rate:  [] 84  Resp:  [16-29] 26  BP: (118-183)/(73-99) 142/84  FiO2 (%):  [50 %] 50 %  SpO2:  [90 %-100 %] 97 %    I/O last 3 completed shifts:  In: 2885 [I.V.:720; NG/GT:900]  Out: 1820 [Urine:1820]    Vitals:    12/12/19 0400 12/17/19 0400 12/18/19 0500 12/21/19 0400   Weight: 87 kg (191 lb 12.8 oz) 84.1 kg (185 lb 8 oz) 85.6 kg (188 lb 11.4 oz) 88.1 kg (194 lb 3.6 oz)    12/24/19 0400   Weight: 89 kg (196 lb 3.4 oz)       Constitutional: Adult male seen sitting in recliner, in NAD. Awake, alert, interactive.  HEENT: NC/AT, EOMI, sclera clear, conjunctiva normal, tracheostomy with no apparent drainage  Respiratory: No increased work of breathing, slightly course and diminished lung sounds.  Cardiovascular: RRR, no murmur noted. B/l UE edema, trace LE edema visible above boots.  GI: Normal bowel sounds, soft, non-distended and non-tender.   Skin: Warm, dry, well-perfused. No bruising, bleeding, rashes, or lesions on limited exam.  Neurologic: A&O. Able to mouth words or indicate yes/no.  Neuropsychiatric: Calm. Affect appropriate to situation.  Vascular access:  PIV on LUE CDI, non-tender, no surrounding erythema.         Medications:       acetylcysteine  2 mL Nebulization Q4H     albuterol  2.5 mg Nebulization Q4H     amLODIPine  10 mg Oral or Feeding Tube Daily     bumetanide  1 mg Oral or Feeding Tube BID     ceFEPIme (MAXIPIME) IV  2 g Intravenous Q8H     cyanocobalamin  1,000 mcg Intramuscular Q30 Days      cyclobenzaprine  7.5 mg Oral TID     famotidine  20 mg Per G Tube BID     folic acid  1 mg Oral or Feeding Tube Daily     heparin ANTICOAGULANT  5,000 Units Subcutaneous Q12H     heparin lock flush  5-10 mL Intracatheter Q24H     insulin aspart  2-16 Units Subcutaneous Q4H     lidocaine  2 patch Transdermal Q24h    And     lidocaine   Transdermal Q8H     metoprolol tartrate  50 mg Oral or Feeding Tube BID     metroNIDAZOLE  500 mg Oral or Feeding Tube TID     mirtazapine  30 mg Oral or Feeding Tube QPM     multivitamins w/minerals  15 mL Per Feeding Tube Daily     polyethylene glycol  17 g Oral Daily     QUEtiapine  12.5 mg Oral BID     senna-docusate  1 tablet Oral or Feeding Tube QPM     traZODone  100 mg Oral or Feeding Tube At Bedtime     vancomycin (VANCOCIN) IV  1,250 mg Intravenous Q12H     thiamine  100 mg Oral or Feeding Tube Daily       Antiinfectives:  Anti-infectives (From now, onward)    Start     Dose/Rate Route Frequency Ordered Stop    12/23/19 0000  metroNIDAZOLE (FLAGYL) 500 MG tablet     Note to Pharmacy:  Through 1/22    500 mg Oral or Feeding Tube 3 TIMES DAILY 12/23/19 1252      12/23/19 0000  ceFEPIme (MAXIPIME) 2 G vial     Note to Pharmacy:  Through 1/22    2 g  over 30 Minutes Intravenous EVERY 8 HOURS 12/23/19 1252      12/20/19 0800  vancomycin 1250 mg in 0.9% NaCl 250 mL intermittent infusion 1,250 mg      1,250 mg  over 90 Minutes Intravenous EVERY 12 HOURS 12/19/19 1757      12/16/19 0800  metroNIDAZOLE (FLAGYL) tablet 500 mg      500 mg Oral or Feeding Tube 3 TIMES DAILY 12/16/19 0759      12/11/19 1500  ceFEPIme (MAXIPIME) 2 g vial to attach to  ml bag for ADULTS or 50 ml bag for PEDS      2 g  over 30 Minutes Intravenous EVERY 8 HOURS 12/11/19 1420            Infusions/Drips:    IV fluid REPLACEMENT ONLY 55 mL/hr at 12/18/19 0200            Laboratory Data:     Microbiology:  Culture Micro   Date Value Ref Range Status   12/12/2019 (A)  Final    Light growth  Candida albicans  / dubliniensis  Candida albicans and Candida dubliniensis are not routinely speciated  Susceptibility testing not routinely done     12/11/2019 (A)  Final    Moderate growth  Candida albicans / dubliniensis  Candida albicans and Candida dubliniensis are not routinely speciated  Susceptibility testing not routinely done     12/11/2019 No growth  Final   12/11/2019 No growth  Final   12/07/2019 (A)  Final    Light growth  Candida albicans / dubliniensis  Candida albicans and Candida dubliniensis are not routinely speciated  Susceptibility testing not routinely done     12/07/2019 No growth  Final   12/07/2019 No growth  Final   12/03/2019 No growth  Final   12/03/2019 No growth  Final   12/01/2019 No growth  Final   12/01/2019 (A)  Final    Moderate growth  Fusobacterium nucleatum  Susceptibility testing not routinely done     12/01/2019 (A)  Final    Moderate growth  Parvimonas micra  Susceptibility testing not routinely done     12/01/2019 (A)  Final    Light growth  Prevotella species  Susceptibility testing not routinely done     12/01/2019 (A)  Final    Light growth  Strain 2  Prevotella species  Identification obtained by MALDI-TOF mass spectrometry research use only database. Test   characteristics determined and verified by the [facility]   ([facility]) [place], [place].  Susceptibility testing not routinely done     12/01/2019 (A)  Final    Moderate growth  Strain 3  Prevotella species  Identification obtained by MALDI-TOF mass spectrometry research use only database. Test   characteristics determined and verified by the [facility]   ([facility]) [place], [place].  Beta lactamase positive  Susceptibility testing not routinely done     12/01/2019 Culture negative after 22 days  Preliminary   12/01/2019 Single colony  Staphylococcus aureus   (A)  Final   12/01/2019 Light growth  Streptococcus anginosus   (A)  Final   12/01/2019 Light growth  Eikenella corrodens    (A)  Final       Inflammatory Markers    Recent Labs   Lab Test 12/23/19  0506 12/20/19  0337 12/17/19  0351 12/14/19  0444 12/12/19  0210 12/11/19  0317 12/08/19  0348 12/05/19  0340   CRP 24.0* 14.0* 34.0* 48.0* 36.0* 16.0* 56.0* 130.0*       Metabolic Studies       Recent Labs   Lab Test 12/24/19  0500 12/23/19  0506 12/22/19  0430 12/21/19  0359 12/20/19  0337 12/19/19  0716  12/01/19  1057 12/01/19  0112    139 139 140 143 139   < > 135 133   POTASSIUM 4.4 3.9 3.7 3.8 3.5 3.8   < > 4.6 4.4   CHLORIDE 110* 110* 108 109 111* 107   < >  --  94   CO2 25 25 28 28 29 29   < >  --  31   ANIONGAP 6 4 2* 4 3 3   < >  --  7   BUN 36* 28 22 15 22 18   < >  --  27   CR 0.58* 0.51* 0.52* 0.48* 0.49* 0.47*   < >  --  0.75   GFRESTIMATED >90 >90 >90 >90 >90 >90   < >  --  >90   * 190* 131* 105* 95 126*   < > 195* 230*   A1C  --   --   --   --   --   --   --   --  6.5*   FLORINDA 8.9 8.8 8.4* 8.4* 8.4* 8.4*   < >  --  8.7   PHOS 3.1 2.6 2.8 3.7 2.8 3.1   < >  --   --    MAG 2.3 2.0 2.1 2.2 2.0 2.0   < >  --   --    LACT  --   --   --   --   --   --   --  1.2  --     < > = values in this interval not displayed.       Hematology Studies      Recent Labs   Lab Test 12/24/19  0500 12/23/19  0506 12/22/19  0430 12/21/19  0359 12/20/19  0337  12/19/19  0716   WBC 18.3* 8.2 7.4 7.3 7.1  --  7.2   ANEU 16.5* 6.7 5.1 5.2 4.6  --  4.7   ALYM 1.0 1.0 1.6 1.3 1.6  --  1.6   JAMES 0.6 0.4 0.6 0.6 0.7  --  0.7   AEOS 0.0 0.1 0.1 0.1 0.1  --  0.1   HGB 11.4* 10.2* 9.0* 8.5* 8.9*  --  8.9*   HCT 36.9* 32.9* 29.1* 27.7* 29.2*  --  29.3*    261 254 246 281   < > 278    < > = values in this interval not displayed.       Arterial Blood Gas Testing    Recent Labs   Lab Test 12/14/19  1006 12/11/19  0535 12/05/19  1945 12/02/19  0545 12/02/19  0200   PH 7.46* 7.49* 7.43 7.42 7.43   PCO2 37 40 46* 43 42   PO2 55* 105 87 130* 122*   HCO3 26 30* 30* 28 28   O2PER 80 100 4L 60.0 50.0        Urine Studies     Recent Labs   Lab Test  12/11/19  1141 12/07/19  2310 12/01/19  1824   URINEPH 5.5 5.5 6.0   NITRITE Negative Negative Negative   LEUKEST Negative Negative Negative   WBCU 2 2 2       Vancomycin Levels     Recent Labs   Lab Test 12/22/19  2035 12/19/19  1600 12/16/19  1718 12/13/19  1525 12/03/19  1548   VANCOMYCIN 18.5 23.4 18.6 19.2 14.3            Imaging:   CXR 12/23   Impression:   1. Stable support devices.  2. Slightly increased right and stable left basilar  atelectasis/consolidation.  3. Stable small pleural effusions.    CXR 12/22  Impression:   1. Interval decrease in small right pleural effusion and associated  bibasilar opacities. New airspace opacity.  2. Interval placement of tracheostomy tube, with tip in the mid  thoracic trachea. Additional support devices appear stable.    MR Cervical Spine wwo contrast 12/19  Impression:   1.  No significant change in anterior epidural phlegmon/abscess  extending from C2 down to C6. Stable/slightly increased spinal cord  edema.  2.  Persistent discitis osteomyelitis findings C3-C5.  3.  Stable/slightly progressed extensive prevertebral phlegmon  extending from C2 through C5. Decreased abscess in the prevertebral  phlegmon likely due to drainage into the hypopharynx through a  fistulous communication.

## 2019-12-24 NOTE — PLAN OF CARE
Neuro: Oriented, nods appropriately, able to mouth words. PERRLA. Patient remains unchanged neurologically. Moved BLE but not to command, has spasms. Sensation changes but mostly none in lower extremities. Unable to actively move. Slight movement in upper extremities as well as sensation LUE 2/5, RUE 1/5. BLE 0/5. Facial sensation intact. [place] collar remains on at all times. Up to chair Ax2, lift. PRN Oxy and Tylenol given for pain.   Cv: Afebrile. VSS, NSR to ST, no ectopy noted. Keep SBP <160, PRN's available, no issues overnight. +CMS, warm extremities.  Pulm: On FIO2 of 50%, on pressure support 7/5 yesterday, placed back on CMV overnight.  Lung sounds clear/dim to coarse, minimal suctioning. Changed inner cannula x1 and trach cares.  GI/: Degroot in place, UO adequate. LBM 12/23. Bowel sounds remain hypoactive to active, pt's abd is distended and taut, non-tender. TF remain at goal of 55ml/hr with standard FWF via PEG.  Skin: Mostly intact, neck and abd incision healed. Gen bruising and scabs. Trach. C-collar on at all times pink and moist underneath, washed and pads changed. Lidocaine patched on bilat shoulders.  Lines/gtt: R TL PICC, tko for abx. L PIV SL.  Labs: WNL ex WBC increased to 18.3 from 8.2.    Plan: Order more BM meds? Eventual LTACH. Continue to monitor, notify MD with any concerns.

## 2019-12-24 NOTE — PROGRESS NOTES
Pt complains of generalized pain throughout the day, resolves with oxycodone and repositioning. Up to chair x2 tolerating well. On trach dome, 50%, tolerating ok with RR 22-26.

## 2019-12-24 NOTE — PROGRESS NOTES
Neuroscience Intensive Care Progress Note  12/24/2019      24 hour events: Patient on pressure support throughout the day without desaturation yesterday, placed back to [facility] overnight.  He has Large BM yesterday but the BS hypoactive and abdomen is largely distended. Tube feeds at goal.  WBCs increased to 18 this AM. CXR improving with no fever.    Current Medications:    acetylcysteine  2 mL Nebulization Q4H     albuterol  2.5 mg Nebulization Q4H     amLODIPine  10 mg Oral or Feeding Tube Daily     ceFEPIme (MAXIPIME) IV  2 g Intravenous Q8H     cyanocobalamin  1,000 mcg Intramuscular Q30 Days     cyclobenzaprine  7.5 mg Oral TID     famotidine  20 mg Per G Tube BID     folic acid  1 mg Oral or Feeding Tube Daily     heparin ANTICOAGULANT  5,000 Units Subcutaneous Q12H     heparin lock flush  5-10 mL Intracatheter Q24H     insulin aspart  2-16 Units Subcutaneous Q4H     lidocaine  2 patch Transdermal Q24h    And     lidocaine   Transdermal Q8H     metoprolol tartrate  50 mg Oral or Feeding Tube BID     metroNIDAZOLE  500 mg Oral or Feeding Tube TID     mirtazapine  30 mg Oral or Feeding Tube QPM     multivitamins w/minerals  15 mL Per Feeding Tube Daily     polyethylene glycol  17 g Oral Daily     QUEtiapine  12.5 mg Oral BID     senna-docusate  1 tablet Oral or Feeding Tube QPM     traZODone  100 mg Oral or Feeding Tube At Bedtime     vancomycin (VANCOCIN) IV  1,250 mg Intravenous Q12H     thiamine  100 mg Oral or Feeding Tube Daily       PRN Medications:  acetaminophen, carboxymethylcellulose PF, IV fluid REPLACEMENT ONLY, glucose **OR** dextrose **OR** glucagon, heparin lock flush, hydrALAZINE, ipratropium - albuterol 0.5 mg/2.5 mg/3 mL, labetalol, lidocaine 4%, lidocaine (buffered or not buffered), magnesium sulfate, magnesium sulfate, melatonin, naloxone, ondansetron **OR** ondansetron, oxyCODONE, potassium chloride, potassium chloride with lidocaine, potassium chloride, potassium chloride, potassium chloride,  "potassium phosphate (KPHOS) in D5W IV, potassium phosphate (KPHOS) in D5W IV, potassium phosphate (KPHOS) in D5W IV, potassium phosphate (KPHOS) in D5W IV, potassium phosphate (KPHOS) in D5W IV, prochlorperazine **OR** prochlorperazine **OR** prochlorperazine, sodium chloride (PF), sodium phosphate    Objective findings:  BP (!) 151/83   Pulse 101   Temp 98.6  F (37  C) (Axillary)   Resp 26   Ht 1.727 m (5' 8\")   Wt 89 kg (196 lb 3.4 oz)   SpO2 94%   BMI 29.83 kg/m      Ventilator Settings:  Ventilation Mode: CMV/AC  (Continuous Mandatory Ventilation/ Assist Control)  FiO2 (%): 50 %  Rate Set (breaths/minute): 12 breaths/min  Tidal Volume Set (mL): 450 mL  PEEP (cm H2O): 5 cmH2O  Pressure Support (cm H2O): 7 cmH2O  Oxygen Concentration (%): 50 %  Resp: 26      Intake/Output:      Intake/Output Summary (Last 24 hours) at 12/22/2019 1157  Last data filed at 12/23/2019   Gross per 24 hour   Intake 1460 ml   Output 1460 ml   Net  +675 ml         Physical Examination:   Vitals:  B/P: 162/89, T: 98.2, P: 109, R: 18  General:  Laying in bed  HEENT:  NC/AT, cervical collar in place. no icterus, op pink and moist, no ear or nose drainage.   Cardiac:  RRR, no m/r/g  Chest:  Minimal insbiratory wheezes  Abdomen:  Distended with reduced BS  Extremities:  No LE swelling.    Skin:  No rash or lesion.      Neuro Exam:   Mental status: alert, follows all commands  Cranial nerves: PERRL, EOMI, face symmetric  Motor: Some flexion extension at elbow bilaterally more left (3/5) than right (2/5). Some movement left wrist (3/5 ext) and fingers, no thumb abduction, no movement in distal RUE. No movements of the legs  Sensory: reports sensation in arms, legs and upper chest.     Labs/Studies:  CBC RESULTS:     12/24 5:06 AM 1d ago 2d ago 3d ago 4d ago    WBC 18 8.2  7.4  7.3  7.1      RBC Count  3.47Low   3.02Low   2.93Low   3.08Low       Hemoglobin 11.2 10.2Low   9.0Low   8.5Low   8.9Low       Hematocrit  32.9Low   29.1Low   " 27.7Low   29.2Low       MCV  95  96  95  95      MCH 26.5 - 33.0 pg 29.4  29.8  29.0  28.9      MCHC 31.5 - 36.5 g/dL 31.0Low   30.9Low   30.7Low   30.5Low       RDW 10.0 - 15.0 % 14.7  15.0  14.6  14.5      Platelet Count  276 261  254  246  281  286        Last Comprehensive Metabolic Panel:  Sodium   Date Value Ref Range Status   12/24/2019 141 133 - 144 mmol/L Final     Potassium   Date Value Ref Range Status   12/24/2019 4.4 3.4 - 5.3 mmol/L Final     Chloride   Date Value Ref Range Status   12/24/2019 110 (H) 94 - 109 mmol/L Final     Carbon Dioxide   Date Value Ref Range Status   12/24/2019 25 20 - 32 mmol/L Final     Anion Gap   Date Value Ref Range Status   12/24/2019 6 3 - 14 mmol/L Final     Glucose   Date Value Ref Range Status   12/24/2019 180 (H) 70 - 99 mg/dL Final     Urea Nitrogen   Date Value Ref Range Status   12/24/2019 36 (H) 7 - 30 mg/dL Final     Creatinine   Date Value Ref Range Status   12/24/2019 0.58 (L) 0.66 - 1.25 mg/dL Final     GFR Estimate   Date Value Ref Range Status   12/24/2019 >90 >60 mL/min/[1.73_m2] Final     Comment:     Non  GFR Calc  Starting 12/18/2018, serum creatinine based estimated GFR (eGFR) will be   calculated using the Chronic Kidney Disease Epidemiology Collaboration   (CKD-EPI) equation.       Calcium   Date Value Ref Range Status   12/24/2019 8.9 8.5 - 10.1 mg/dL Final   Mg 2.3  Phosph:3.1  CRP: high  24    Neuro:  #Cervical spinal cord injury  #Discitis/osteomyelitis C3-C4  - [place] Collar at all times, Up with assist  - Neuro checks q4h during the day   - continue PT/OT     #Anxiety/Pain - good control   - discontune Dilauded PRN  - Oxycodone 5-10 q4hrs prn   - schedule flexeril 7.5 mg TID can increase to 10 mg TID if needed     #Hx of Alcohol dependence  - Thiamine and folate     #Depression  - Continue PTA miratazepine  - continue trazodone 100 mg at bedtime  - Seroquel 12.5 BID      Resp:  #Acute respiratory failure s/p re-intubation 12/6,  suspect diaphragmatic weakness related to spine injury  - Mucomyst & chest physio   - PRN Trach dom   --metanebs   --chest physio per RT  --percussive therapy  - desaturation when positioned on Rt side, try positional changes first   - S/P trach 12/20      Cardio:  # Hx of hypertension - well controlled  - PTA amlodipine 10 mg   - PTA metoprolol 50mg bid  - restart PTA Bumex 1 mg bid     Renal:  Hypophosphatemia - resolved  - Electrolyte replacement protocol   - Monitor I/O goal In= outs   - free water flushes 30 q4h  - minimal diuresis (I/O +780)    Neurogenic urinary retention   - failed díaz wean trial 12/12 caused retention   - consider UA if the patient spiked fever     Endo:  #DM2  - Sliding scale insulin, NPH held while TF held  - previously on 38 U NPH am/pm with high dose sliding scale insulin      Heme:  #Normocytic anemia, stable  - Hg > 7.0     GI:  #Constipation - resolved last BM 12/21  - Senna scheduled daily  - PRN suppository/enema available  - start Miralax for now      #Diet   - G-tube in place  - Tube feeds at goal      ID:   #Vertebral phlegmon and Abscess s/p drain placement 12/1  #Osteomyelitis C3-5  #Fever - resolved   #Leukocytosis and CRP resolved   #repeat MRI 12/10 and 12/19 shows progressive epidural abscess.   - Vanc, cefepime, metronidazole regimen per ID (through 1/22)  - Weekly CBC, CMP, CRP while on IV abx,  - Spinal tissue cultures (12/1): MSSA, Strep anginosus, Eikenella, and oral anaerobes   - Per ID:  - will need at least 6wks of above abx from day 1 (12/11), earliest stop date of 1/22/20   - repeat MRI in 2wks (~1/2)  - Will page ID attending at [id number] prior to discharging pt  - follow up ID recs for rising WBCs and CRP    Lines:  - Trach, PICC, PIV x 2, Díaz, PEG     FEN: TF via PEG   PPX:    DVT prophylaxis: SCDs, Heparin subcutaneous restart 11/22    GI: famotidine   Code Status: Full, presumed     [facility]    This patient was seen & discussed with my  attending, Dr. Eduardo MD, who agrees with my assessment and plan.       Hermilo Schwartz MD  PGY2  [phone]

## 2019-12-25 LAB
ABO + RH BLD: NORMAL
ABO + RH BLD: NORMAL
ANION GAP SERPL CALCULATED.3IONS-SCNC: 5 MMOL/L (ref 3–14)
BASOPHILS # BLD AUTO: 0 10E9/L (ref 0–0.2)
BASOPHILS NFR BLD AUTO: 0.3 %
BLD GP AB SCN SERPL QL: NORMAL
BLOOD BANK CMNT PATIENT-IMP: NORMAL
BUN SERPL-MCNC: 45 MG/DL (ref 7–30)
CALCIUM SERPL-MCNC: 8.9 MG/DL (ref 8.5–10.1)
CHLORIDE SERPL-SCNC: 113 MMOL/L (ref 94–109)
CO2 SERPL-SCNC: 23 MMOL/L (ref 20–32)
CREAT SERPL-MCNC: 0.57 MG/DL (ref 0.66–1.25)
CRP SERPL-MCNC: 120 MG/L (ref 0–8)
DIFFERENTIAL METHOD BLD: ABNORMAL
EOSINOPHIL # BLD AUTO: 0.1 10E9/L (ref 0–0.7)
EOSINOPHIL NFR BLD AUTO: 0.6 %
ERYTHROCYTE [DISTWIDTH] IN BLOOD BY AUTOMATED COUNT: 15.1 % (ref 10–15)
GFR SERPL CREATININE-BSD FRML MDRD: >90 ML/MIN/{1.73_M2}
GLUCOSE BLDC GLUCOMTR-MCNC: 149 MG/DL (ref 70–99)
GLUCOSE BLDC GLUCOMTR-MCNC: 160 MG/DL (ref 70–99)
GLUCOSE BLDC GLUCOMTR-MCNC: 173 MG/DL (ref 70–99)
GLUCOSE BLDC GLUCOMTR-MCNC: 184 MG/DL (ref 70–99)
GLUCOSE BLDC GLUCOMTR-MCNC: 197 MG/DL (ref 70–99)
GLUCOSE BLDC GLUCOMTR-MCNC: 208 MG/DL (ref 70–99)
GLUCOSE SERPL-MCNC: 165 MG/DL (ref 70–99)
HCT VFR BLD AUTO: 34.5 % (ref 40–53)
HGB BLD-MCNC: 10.6 G/DL (ref 13.3–17.7)
IMM GRANULOCYTES # BLD: 0.1 10E9/L (ref 0–0.4)
IMM GRANULOCYTES NFR BLD: 0.6 %
LYMPHOCYTES # BLD AUTO: 0.9 10E9/L (ref 0.8–5.3)
LYMPHOCYTES NFR BLD AUTO: 5.9 %
MAGNESIUM SERPL-MCNC: 2.1 MG/DL (ref 1.6–2.3)
MCH RBC QN AUTO: 29.8 PG (ref 26.5–33)
MCHC RBC AUTO-ENTMCNC: 30.7 G/DL (ref 31.5–36.5)
MCV RBC AUTO: 97 FL (ref 78–100)
MONOCYTES # BLD AUTO: 0.6 10E9/L (ref 0–1.3)
MONOCYTES NFR BLD AUTO: 3.8 %
NEUTROPHILS # BLD AUTO: 12.9 10E9/L (ref 1.6–8.3)
NEUTROPHILS NFR BLD AUTO: 88.8 %
NRBC # BLD AUTO: 0 10*3/UL
NRBC BLD AUTO-RTO: 0 /100
PHOSPHATE SERPL-MCNC: 2.5 MG/DL (ref 2.5–4.5)
PLATELET # BLD AUTO: 248 10E9/L (ref 150–450)
POTASSIUM SERPL-SCNC: 4.2 MMOL/L (ref 3.4–5.3)
RBC # BLD AUTO: 3.56 10E12/L (ref 4.4–5.9)
SODIUM SERPL-SCNC: 142 MMOL/L (ref 133–144)
SPECIMEN EXP DATE BLD: NORMAL
WBC # BLD AUTO: 14.5 10E9/L (ref 4–11)

## 2019-12-25 PROCEDURE — 25000132 ZZH RX MED GY IP 250 OP 250 PS 637: Performed by: STUDENT IN AN ORGANIZED HEALTH CARE EDUCATION/TRAINING PROGRAM

## 2019-12-25 PROCEDURE — 27210437 ZZH NUTRITION PRODUCT SEMIELEM INTERMED LITER

## 2019-12-25 PROCEDURE — 25000132 ZZH RX MED GY IP 250 OP 250 PS 637: Performed by: PSYCHIATRY & NEUROLOGY

## 2019-12-25 PROCEDURE — 25000125 ZZHC RX 250

## 2019-12-25 PROCEDURE — 83735 ASSAY OF MAGNESIUM: CPT | Performed by: STUDENT IN AN ORGANIZED HEALTH CARE EDUCATION/TRAINING PROGRAM

## 2019-12-25 PROCEDURE — 25000132 ZZH RX MED GY IP 250 OP 250 PS 637: Performed by: NURSE PRACTITIONER

## 2019-12-25 PROCEDURE — 25000128 H RX IP 250 OP 636: Performed by: COUNSELOR

## 2019-12-25 PROCEDURE — 84100 ASSAY OF PHOSPHORUS: CPT | Performed by: STUDENT IN AN ORGANIZED HEALTH CARE EDUCATION/TRAINING PROGRAM

## 2019-12-25 PROCEDURE — 36415 COLL VENOUS BLD VENIPUNCTURE: CPT | Performed by: INTERNAL MEDICINE

## 2019-12-25 PROCEDURE — 25000132 ZZH RX MED GY IP 250 OP 250 PS 637: Performed by: COUNSELOR

## 2019-12-25 PROCEDURE — 87449 NOS EACH ORGANISM AG IA: CPT | Performed by: INTERNAL MEDICINE

## 2019-12-25 PROCEDURE — 40000275 ZZH STATISTIC RCP TIME EA 10 MIN

## 2019-12-25 PROCEDURE — 25000132 ZZH RX MED GY IP 250 OP 250 PS 637: Performed by: NEUROLOGICAL SURGERY

## 2019-12-25 PROCEDURE — 86850 RBC ANTIBODY SCREEN: CPT | Performed by: STUDENT IN AN ORGANIZED HEALTH CARE EDUCATION/TRAINING PROGRAM

## 2019-12-25 PROCEDURE — 94640 AIRWAY INHALATION TREATMENT: CPT | Mod: 76

## 2019-12-25 PROCEDURE — 94640 AIRWAY INHALATION TREATMENT: CPT

## 2019-12-25 PROCEDURE — 86900 BLOOD TYPING SEROLOGIC ABO: CPT | Performed by: STUDENT IN AN ORGANIZED HEALTH CARE EDUCATION/TRAINING PROGRAM

## 2019-12-25 PROCEDURE — 25000128 H RX IP 250 OP 636: Performed by: NEUROLOGICAL SURGERY

## 2019-12-25 PROCEDURE — 25000128 H RX IP 250 OP 636: Performed by: STUDENT IN AN ORGANIZED HEALTH CARE EDUCATION/TRAINING PROGRAM

## 2019-12-25 PROCEDURE — 00000146 ZZHCL STATISTIC GLUCOSE BY METER IP

## 2019-12-25 PROCEDURE — 25800030 ZZH RX IP 258 OP 636: Performed by: STUDENT IN AN ORGANIZED HEALTH CARE EDUCATION/TRAINING PROGRAM

## 2019-12-25 PROCEDURE — 40000961 ZZH STATISTIC INTRAPULMONARY PERCUSSIVE VENT

## 2019-12-25 PROCEDURE — 85025 COMPLETE CBC W/AUTO DIFF WBC: CPT | Performed by: STUDENT IN AN ORGANIZED HEALTH CARE EDUCATION/TRAINING PROGRAM

## 2019-12-25 PROCEDURE — 20000004 ZZH R&B ICU UMMC

## 2019-12-25 PROCEDURE — 36415 COLL VENOUS BLD VENIPUNCTURE: CPT | Performed by: STUDENT IN AN ORGANIZED HEALTH CARE EDUCATION/TRAINING PROGRAM

## 2019-12-25 PROCEDURE — 80048 BASIC METABOLIC PNL TOTAL CA: CPT | Performed by: STUDENT IN AN ORGANIZED HEALTH CARE EDUCATION/TRAINING PROGRAM

## 2019-12-25 PROCEDURE — 86140 C-REACTIVE PROTEIN: CPT | Performed by: STUDENT IN AN ORGANIZED HEALTH CARE EDUCATION/TRAINING PROGRAM

## 2019-12-25 PROCEDURE — 25000125 ZZHC RX 250: Performed by: STUDENT IN AN ORGANIZED HEALTH CARE EDUCATION/TRAINING PROGRAM

## 2019-12-25 PROCEDURE — 94003 VENT MGMT INPAT SUBQ DAY: CPT

## 2019-12-25 PROCEDURE — 86901 BLOOD TYPING SEROLOGIC RH(D): CPT | Performed by: STUDENT IN AN ORGANIZED HEALTH CARE EDUCATION/TRAINING PROGRAM

## 2019-12-25 PROCEDURE — 25800030 ZZH RX IP 258 OP 636: Performed by: NEUROLOGICAL SURGERY

## 2019-12-25 RX ORDER — POLYETHYLENE GLYCOL 3350 17 G/17G
17 POWDER, FOR SOLUTION ORAL 2 TIMES DAILY
Status: DISCONTINUED | OUTPATIENT
Start: 2019-12-25 | End: 2019-12-31 | Stop reason: HOSPADM

## 2019-12-25 RX ORDER — CYCLOBENZAPRINE HCL 10 MG
10 TABLET ORAL 3 TIMES DAILY
Status: DISCONTINUED | OUTPATIENT
Start: 2019-12-25 | End: 2019-12-31 | Stop reason: HOSPADM

## 2019-12-25 RX ORDER — METOPROLOL TARTRATE 50 MG
100 TABLET ORAL 2 TIMES DAILY
Status: DISCONTINUED | OUTPATIENT
Start: 2019-12-25 | End: 2019-12-25

## 2019-12-25 RX ORDER — AMOXICILLIN 250 MG
1 CAPSULE ORAL 2 TIMES DAILY
Status: DISCONTINUED | OUTPATIENT
Start: 2019-12-25 | End: 2019-12-31 | Stop reason: HOSPADM

## 2019-12-25 RX ADMIN — ACETYLCYSTEINE 2 ML: 100 INHALANT RESPIRATORY (INHALATION) at 15:48

## 2019-12-25 RX ADMIN — CARBOXYMETHYLCELLULOSE SODIUM 1 DROP: 5 SOLUTION/ DROPS OPHTHALMIC at 18:17

## 2019-12-25 RX ADMIN — INSULIN ASPART 4 UNITS: 100 INJECTION, SOLUTION INTRAVENOUS; SUBCUTANEOUS at 05:14

## 2019-12-25 RX ADMIN — FAMOTIDINE 20 MG: 40 POWDER, FOR SUSPENSION ORAL at 19:46

## 2019-12-25 RX ADMIN — MULTIVITAMIN 15 ML: LIQUID ORAL at 19:48

## 2019-12-25 RX ADMIN — POTASSIUM PHOSPHATE, MONOBASIC AND POTASSIUM PHOSPHATE, DIBASIC 10 MMOL: 224; 236 INJECTION, SOLUTION INTRAVENOUS at 10:26

## 2019-12-25 RX ADMIN — ALBUTEROL SULFATE 2.5 MG: 2.5 SOLUTION RESPIRATORY (INHALATION) at 07:41

## 2019-12-25 RX ADMIN — METRONIDAZOLE 500 MG: 500 TABLET ORAL at 07:55

## 2019-12-25 RX ADMIN — FOLIC ACID 1 MG: 1 TABLET ORAL at 07:55

## 2019-12-25 RX ADMIN — VANCOMYCIN HYDROCHLORIDE 1250 MG: 10 INJECTION, POWDER, LYOPHILIZED, FOR SOLUTION INTRAVENOUS at 07:54

## 2019-12-25 RX ADMIN — HEPARIN SODIUM 5000 UNITS: 5000 INJECTION, SOLUTION INTRAVENOUS; SUBCUTANEOUS at 19:46

## 2019-12-25 RX ADMIN — CEFEPIME HYDROCHLORIDE 2 G: 2 INJECTION, POWDER, FOR SOLUTION INTRAVENOUS at 16:04

## 2019-12-25 RX ADMIN — CEFEPIME HYDROCHLORIDE 2 G: 2 INJECTION, POWDER, FOR SOLUTION INTRAVENOUS at 06:15

## 2019-12-25 RX ADMIN — ALBUTEROL SULFATE 2.5 MG: 2.5 SOLUTION RESPIRATORY (INHALATION) at 21:09

## 2019-12-25 RX ADMIN — ACETYLCYSTEINE 2 ML: 100 INHALANT RESPIRATORY (INHALATION) at 23:30

## 2019-12-25 RX ADMIN — BUMETANIDE 1 MG: 1 TABLET ORAL at 07:56

## 2019-12-25 RX ADMIN — METOPROLOL TARTRATE 75 MG: 50 TABLET, FILM COATED ORAL at 19:47

## 2019-12-25 RX ADMIN — HEPARIN SODIUM 5000 UNITS: 5000 INJECTION, SOLUTION INTRAVENOUS; SUBCUTANEOUS at 07:55

## 2019-12-25 RX ADMIN — Medication 12.5 MG: at 07:55

## 2019-12-25 RX ADMIN — INSULIN ASPART 2 UNITS: 100 INJECTION, SOLUTION INTRAVENOUS; SUBCUTANEOUS at 16:07

## 2019-12-25 RX ADMIN — INSULIN ASPART 2 UNITS: 100 INJECTION, SOLUTION INTRAVENOUS; SUBCUTANEOUS at 00:37

## 2019-12-25 RX ADMIN — MIRTAZAPINE 30 MG: 15 TABLET, FILM COATED ORAL at 19:48

## 2019-12-25 RX ADMIN — ACETYLCYSTEINE 2 ML: 100 INHALANT RESPIRATORY (INHALATION) at 04:45

## 2019-12-25 RX ADMIN — Medication 7.5 MG: at 07:56

## 2019-12-25 RX ADMIN — BUMETANIDE 1 MG: 1 TABLET ORAL at 19:46

## 2019-12-25 RX ADMIN — Medication 12.5 MG: at 19:48

## 2019-12-25 RX ADMIN — ACETAMINOPHEN 650 MG: 325 TABLET, FILM COATED ORAL at 20:33

## 2019-12-25 RX ADMIN — INSULIN ASPART 4 UNITS: 100 INJECTION, SOLUTION INTRAVENOUS; SUBCUTANEOUS at 19:57

## 2019-12-25 RX ADMIN — METRONIDAZOLE 500 MG: 500 TABLET ORAL at 14:22

## 2019-12-25 RX ADMIN — ACETYLCYSTEINE 2 ML: 100 INHALANT RESPIRATORY (INHALATION) at 11:43

## 2019-12-25 RX ADMIN — POLYETHYLENE GLYCOL (3350) 17 G: 17 POWDER, FOR SOLUTION ORAL at 07:55

## 2019-12-25 RX ADMIN — ALBUTEROL SULFATE 2.5 MG: 2.5 SOLUTION RESPIRATORY (INHALATION) at 04:44

## 2019-12-25 RX ADMIN — ALBUTEROL SULFATE 2.5 MG: 2.5 SOLUTION RESPIRATORY (INHALATION) at 00:59

## 2019-12-25 RX ADMIN — Medication 100 MG: at 07:55

## 2019-12-25 RX ADMIN — INSULIN ASPART 4 UNITS: 100 INJECTION, SOLUTION INTRAVENOUS; SUBCUTANEOUS at 12:03

## 2019-12-25 RX ADMIN — OXYCODONE HYDROCHLORIDE 10 MG: 5 SOLUTION ORAL at 07:55

## 2019-12-25 RX ADMIN — ALBUTEROL SULFATE 2.5 MG: 2.5 SOLUTION RESPIRATORY (INHALATION) at 15:48

## 2019-12-25 RX ADMIN — CYCLOBENZAPRINE HYDROCHLORIDE 10 MG: 10 TABLET, FILM COATED ORAL at 14:22

## 2019-12-25 RX ADMIN — CEFEPIME HYDROCHLORIDE 2 G: 2 INJECTION, POWDER, FOR SOLUTION INTRAVENOUS at 22:30

## 2019-12-25 RX ADMIN — LIDOCAINE 2 PATCH: 560 PATCH PERCUTANEOUS; TOPICAL; TRANSDERMAL at 19:47

## 2019-12-25 RX ADMIN — ALBUTEROL SULFATE 2.5 MG: 2.5 SOLUTION RESPIRATORY (INHALATION) at 23:30

## 2019-12-25 RX ADMIN — VANCOMYCIN HYDROCHLORIDE 1250 MG: 10 INJECTION, POWDER, LYOPHILIZED, FOR SOLUTION INTRAVENOUS at 19:40

## 2019-12-25 RX ADMIN — METOPROLOL TARTRATE 50 MG: 50 TABLET, FILM COATED ORAL at 07:55

## 2019-12-25 RX ADMIN — ACETYLCYSTEINE 2 ML: 100 INHALANT RESPIRATORY (INHALATION) at 00:59

## 2019-12-25 RX ADMIN — ACETYLCYSTEINE 2 ML: 100 INHALANT RESPIRATORY (INHALATION) at 07:42

## 2019-12-25 RX ADMIN — INSULIN ASPART 6 UNITS: 100 INJECTION, SOLUTION INTRAVENOUS; SUBCUTANEOUS at 08:06

## 2019-12-25 RX ADMIN — TRAZODONE HYDROCHLORIDE 100 MG: 50 TABLET ORAL at 22:30

## 2019-12-25 RX ADMIN — ALBUTEROL SULFATE 2.5 MG: 2.5 SOLUTION RESPIRATORY (INHALATION) at 11:43

## 2019-12-25 RX ADMIN — CYCLOBENZAPRINE HYDROCHLORIDE 10 MG: 10 TABLET, FILM COATED ORAL at 19:46

## 2019-12-25 RX ADMIN — CARBOXYMETHYLCELLULOSE SODIUM 1 DROP: 5 SOLUTION/ DROPS OPHTHALMIC at 14:19

## 2019-12-25 RX ADMIN — AMLODIPINE BESYLATE 10 MG: 10 TABLET ORAL at 07:55

## 2019-12-25 RX ADMIN — METRONIDAZOLE 500 MG: 500 TABLET ORAL at 19:47

## 2019-12-25 RX ADMIN — ACETYLCYSTEINE 2 ML: 100 INHALANT RESPIRATORY (INHALATION) at 21:09

## 2019-12-25 RX ADMIN — FAMOTIDINE 20 MG: 40 POWDER, FOR SUSPENSION ORAL at 07:55

## 2019-12-25 ASSESSMENT — ACTIVITIES OF DAILY LIVING (ADL)
ADLS_ACUITY_SCORE: 26

## 2019-12-25 NOTE — PROGRESS NOTES
Up to chair x2 for a total of 5 hours. Becoming more anxious, very concerned about his dry mouth and wanting to drink water. Pressure support for many hours today tolerated ok, RR in the 30's, no trach dome today.

## 2019-12-25 NOTE — PLAN OF CARE
Major Shift Events:  Pt had uneventful night. Neuros remains unchanged. VS stable on 50% FiO2. Denies pain. Degroot with good UOP. No stool.  Plan: continue to support POC. Awaiting rehab placement.  For vital signs and complete assessments, please see documentation flowsheets.

## 2019-12-25 NOTE — PROGRESS NOTES
Neuroscience Intensive Care Progress Note  12/25/2019      24 hour events: Patient tried trach dom for 4 hours that went ok. This morning, he could not tolerate the pressure support.  UA yesterday was dirty. Degroot has been changed but he spiked fever this morning to 100.9. blood culture negative. CRP is rising.  Blood pressure is high with tachycardia overnight.     Current Medications:    acetylcysteine  2 mL Nebulization Q4H     albuterol  2.5 mg Nebulization Q4H     amLODIPine  10 mg Oral or Feeding Tube Daily     bumetanide  1 mg Oral or Feeding Tube BID     ceFEPIme (MAXIPIME) IV  2 g Intravenous Q8H     cyanocobalamin  1,000 mcg Intramuscular Q30 Days     cyclobenzaprine  7.5 mg Oral TID     famotidine  20 mg Per G Tube BID     folic acid  1 mg Oral or Feeding Tube Daily     heparin ANTICOAGULANT  5,000 Units Subcutaneous Q12H     heparin lock flush  5-10 mL Intracatheter Q24H     insulin aspart  2-16 Units Subcutaneous Q4H     lidocaine  2 patch Transdermal Q24h    And     lidocaine   Transdermal Q8H     metoprolol tartrate  50 mg Oral or Feeding Tube BID     metroNIDAZOLE  500 mg Oral or Feeding Tube TID     mirtazapine  30 mg Oral or Feeding Tube QPM     multivitamins w/minerals  15 mL Per Feeding Tube Daily     polyethylene glycol  17 g Oral Daily     QUEtiapine  12.5 mg Oral BID     senna-docusate  1 tablet Oral or Feeding Tube QPM     traZODone  100 mg Oral or Feeding Tube At Bedtime     vancomycin (VANCOCIN) IV  1,250 mg Intravenous Q12H     thiamine  100 mg Oral or Feeding Tube Daily       PRN Medications:  acetaminophen, carboxymethylcellulose PF, IV fluid REPLACEMENT ONLY, glucose **OR** dextrose **OR** glucagon, heparin lock flush, hydrALAZINE, ipratropium - albuterol 0.5 mg/2.5 mg/3 mL, labetalol, lidocaine 4%, lidocaine (buffered or not buffered), magnesium sulfate, magnesium sulfate, melatonin, naloxone, ondansetron **OR** ondansetron, oxyCODONE, potassium chloride, potassium chloride with  "lidocaine, potassium chloride, potassium chloride, potassium chloride, potassium phosphate (KPHOS) in D5W IV, potassium phosphate (KPHOS) in D5W IV, potassium phosphate (KPHOS) in D5W IV, potassium phosphate (KPHOS) in D5W IV, potassium phosphate (KPHOS) in D5W IV, prochlorperazine **OR** prochlorperazine **OR** prochlorperazine, sodium chloride (PF), sodium phosphate    Objective findings:  BP (!) 163/91   Pulse 101   Temp 100.9  F (38.3  C) (Axillary)   Resp 30   Ht 1.727 m (5' 8\")   Wt 89 kg (196 lb 3.4 oz)   SpO2 95%   BMI 29.83 kg/m      Ventilator Settings:  Ventilation Mode: CMV/AC  (Continuous Mandatory Ventilation/ Assist Control)  FiO2 (%): 50 %  Rate Set (breaths/minute): 12 breaths/min  Tidal Volume Set (mL): 450 mL  PEEP (cm H2O): 5 cmH2O  Pressure Support (cm H2O): 7 cmH2O  Oxygen Concentration (%): 50 %  Resp: 30      Intake/Output:      Intake/Output Summary (Last 24 hours) at 12/22/2019 1157  Last data filed at 12/23/2019   Gross per 24 hour   Intake 1460 ml   Output 1460 ml   Net  +405 ml         Physical Examination:   Vitals:  B/P: 166/95, T: 100.9, P: 120,   General:  Laying in bed  HEENT:  NC/AT, cervical collar in place. no icterus, op pink and moist, no ear or nose drainage.   Cardiac:  RRR, no m/r/g  Chest:  Minimal insbiratory wheezes  Abdomen:  Distended with reduced BS  Extremities:  No LE swelling.    Skin:  No rash or lesion.      Neuro Exam:   Mental status: alert, follows all commands  Cranial nerves: PERRL, EOMI, face symmetric  Motor: Some flexion extension at elbow bilaterally more left (3/5) than right (2/5). Some movement left wrist (3/5 ext) and fingers, no thumb abduction, no movement in distal RUE. No movements of the legs  Sensory: reports sensation in arms and upper chest.     Labs/Studies:  CBC RESULTS:   WBC 14.5  Hb 10.6  Plt 248        Last Comprehensive Metabolic Panel:  Sodium   Date Value Ref Range Status   12/25/2019 142 133 - 144 mmol/L Final "     Potassium   Date Value Ref Range Status   12/25/2019 4.2 3.4 - 5.3 mmol/L Final     Chloride   Date Value Ref Range Status   12/25/2019 113 (H) 94 - 109 mmol/L Final     Carbon Dioxide   Date Value Ref Range Status   12/25/2019 23 20 - 32 mmol/L Final     Anion Gap   Date Value Ref Range Status   12/25/2019 5 3 - 14 mmol/L Final     Glucose   Date Value Ref Range Status   12/25/2019 165 (H) 70 - 99 mg/dL Final     Urea Nitrogen   Date Value Ref Range Status   12/25/2019 45 (H) 7 - 30 mg/dL Final     Creatinine   Date Value Ref Range Status   12/25/2019 0.57 (L) 0.66 - 1.25 mg/dL Final     GFR Estimate   Date Value Ref Range Status   12/25/2019 >90 >60 mL/min/[1.73_m2] Final     Comment:     Non  GFR Calc  Starting 12/18/2018, serum creatinine based estimated GFR (eGFR) will be   calculated using the Chronic Kidney Disease Epidemiology Collaboration   (CKD-EPI) equation.       Calcium   Date Value Ref Range Status   12/25/2019 8.9 8.5 - 10.1 mg/dL Final   Mg 2.1  Phosph: 2.5    UA RESULTS:  Recent Labs   Lab Test 12/24/19  1411   COLOR Yellow   APPEARANCE Cloudy   URINEGLC Negative   URINEBILI Negative   URINEKETONE Negative   SG 1.013   UBLD Small*   URINEPH 5.0   PROTEIN 30*   NITRITE Negative   LEUKEST Large*   RBCU 13*   WBCU 51*       Neuro:  #Cervical spinal cord injury  #Discitis/osteomyelitis C3-C4  - [place] Collar at all times, Up with assist  - Neuro checks q4h during the day   - continue PT/OT     #Anxiety/Pain - good control   - Oxycodone 5-10 q4hrs prn   - increase flexeril to 10 mg TID      #Hx of Alcohol dependence  - Thiamine and folate     #Depression  - Continue PTA miratazepine  - continue trazodone 100 mg at bedtime  - Seroquel 12.5 BID      Resp:  #Acute respiratory failure s/p re-intubation 12/6, suspect diaphragmatic weakness related to spine injury  - Mucomyst & chest physio   - hold on Trach dom for now  - pressure support trial TID  - weaning off vent as  tolerated  --metanebs   --chest physio per RT  --percussive therapy  - desaturation when positioned on Rt side, try positional changes first   - S/P trach 12/20      Cardio:  # Hx of hypertension, uncontrolled   - PTA amlodipine 10 mg   - increase PTA metoprolol to 75 mg bid  - PTA Bumex 1 mg bid       Renal:  Hypophosphatemia - resolved  - Electrolyte replacement protocol   - Monitor I/O goal In= outs   - free water flushes 30 q4h  - minimal diuresis (I/O +405)    Neurogenic urinary retention   - failed díaz wean trial 12/12 caused retention   - UA with possible infection  - Urine culture pending  - Díaz has been changed yesterday     Endo:  #DM2  - Sliding scale insulin  - previously on 38 U NPH am/pm with high dose sliding scale insulin      Heme:  #Normocytic anemia, stable  - Hg > 7.0     # Leukocytosis, improving   Likely due to UTI. Blood culture negative so far and UC also negative <24 h  - CBC daily  - follow up beta d glucan  -    - touch base with ID regarding CRP, fever      GI:  #Constipation - resolved but still distended abdomen last BM 12/24  - Senna scheduled bid  - PRN suppository/enema available  - Miralax bid     #Diet   - G-tube in place  - Tube feeds at goal      ID:   #Vertebral phlegmon and Abscess s/p drain placement 12/1  #Osteomyelitis C3-5  #Fever - restarted 12/25  #Leukocytosis, improving   #repeat MRI 12/10 and 12/19 shows progressive epidural abscess.   - Vanc, cefepime, metronidazole regimen per ID (through 1/22)  - Weekly CBC, CMP, CRP while on IV abx,  - Spinal tissue cultures (12/1): MSSA, Strep anginosus, Eikenella, and oral anaerobes   - Per ID:  - will need at least 6wks of above abx from day 1 (12/11), earliest stop date of 1/22/20   - repeat MRI in 2wks (~1/2)  - Will page ID attending at [id number] prior to discharging pt    # Rising CRP and WBCs  # fever  # Possible UTI  - Díaz changed 12/24  - blood culture negative  - follow up B 2 Glucan    Lines:  - Trach, PICC,  PIV x 2, Degroot, PEG     FEN: TF via PEG   PPX:    DVT prophylaxis: SCDs, Heparin subcutaneous restart 11/22    GI: famotidine   Code Status: Full, presumed     [facility]    This patient was seen & discussed with my attending, Dr. Eduardo MD, who agrees with my assessment and plan.       Hermilo Schwartz MD  PGY2  [phone]

## 2019-12-25 NOTE — PROGRESS NOTES
[facility], [place]   Neurosurgery Progress Note:    Assessment: 64 years old man status post C3-C4 artificial disc placement about 2 weeks prior to arrival to the [facility] who presented after a fall with almost complete loss of strength in his upper extremities and paraplegia, found to have cervical stenosis. S/p OR 12/1 - Pharyngotomy closed with assistance from ENT. Arthroplasty implant removed. No replacement implant placed. S/p G-tube 12/5. Extubated 12/5. Re-Intubated 12/6 due to Mucous Plugging and Atelectasis. S/p repeated bronchoscopy. S/p tracheostomy with ENT 12/20.    Plan:  - Nebs, chest physiotherapy  - DVT: SCDs while in bed  - Chase J collar  - per ID: vancomycin, cefepime, flagyl 4-6 wks for vertebral phlegmon, weekly labs; f/u cultures; MRI cervical spine in 2 weeks; consider micafungin, consider flexible laryngoscopy with ENT if worsening infection despite broad spectrum abx  - no plan for another surgery  - GI/nutrition: tube feeds  - trach change per respiratory therapy  - MRI cervical spine before stopping abx in 6 wks  - Endo: endocrine consult; insulin sliding scale  - [facility]: 4A; [facility]  -----------------------------------  Lion Vargas MD  Neurosurgery Resident PGY2    Please contact neurosurgery resident on call with questions.    Dial * * [phone], enter [phone] when prompted.     Subjective: ID following, may add fungal coverage, though nothing now; trach domed during day    Objective:   Temp:  [97.8  F (36.6  C)-98.8  F (37.1  C)] 98.6  F (37  C)  Heart Rate:  [] 105  Resp:  [24-27] 27  BP: (134-167)/(76-95) 138/81  FiO2 (%):  [40 %-50 %] 50 %  SpO2:  [88 %-98 %] 92 %  I/O last 3 completed shifts:  In: 2090 [I.V.:550; NG/GT:440]  Out: 1850 [Urine:1850]    Gen: Appears comfortable, NAD  Wound: Incision, clean, dry, intact  Pulm: tracheostomy, mechanical ventilator  Neuro: Awake, alert, nods yes  or no to questions  Follows commands  Deltoids 4/5, biceps 4/5, triceps 1/5,  0/5, lower extremities 0/5  Triple flexion in lower extremities  Sensation to light touch in arms and upper chest    LABS  Recent Labs   Lab Test 12/24/19  1424 12/24/19  0500 12/23/19  0506   WBC 18.1* 18.3* 8.2   HGB 11.1* 11.4* 10.2*   MCV 97 96 95    276 261       Recent Labs   Lab Test 12/24/19  0500 12/23/19  0506 12/22/19  0430    139 139   POTASSIUM 4.4 3.9 3.7   CHLORIDE 110* 110* 108   CO2 25 25 28   BUN 36* 28 22   CR 0.58* 0.51* 0.52*   ANIONGAP 6 4 2*   FLORINDA 8.9 8.8 8.4*   * 190* 131*     I have reviewed the history above and agree with the resident's assessment and plan.  ORI Figueroa MD

## 2019-12-26 ENCOUNTER — APPOINTMENT (OUTPATIENT)
Dept: GENERAL RADIOLOGY | Facility: CLINIC | Age: 64
DRG: 003 | End: 2019-12-26
Attending: NEUROLOGICAL SURGERY
Payer: COMMERCIAL

## 2019-12-26 ENCOUNTER — APPOINTMENT (OUTPATIENT)
Dept: PHYSICAL THERAPY | Facility: CLINIC | Age: 64
DRG: 003 | End: 2019-12-26
Attending: NEUROLOGICAL SURGERY
Payer: COMMERCIAL

## 2019-12-26 LAB
1,3 BETA GLUCAN SER-MCNC: 129 PG/ML
ALBUMIN SERPL-MCNC: 2.3 G/DL (ref 3.4–5)
ALP SERPL-CCNC: 82 U/L (ref 40–150)
ALT SERPL W P-5'-P-CCNC: 14 U/L (ref 0–70)
ANION GAP SERPL CALCULATED.3IONS-SCNC: 7 MMOL/L (ref 3–14)
AST SERPL W P-5'-P-CCNC: 8 U/L (ref 0–45)
B-D GLUCAN INTERPRETATION (1,3): POSITIVE
BASOPHILS # BLD AUTO: 0 10E9/L (ref 0–0.2)
BASOPHILS NFR BLD AUTO: 0.2 %
BILIRUB DIRECT SERPL-MCNC: 0.1 MG/DL (ref 0–0.2)
BILIRUB SERPL-MCNC: 0.3 MG/DL (ref 0.2–1.3)
BUN SERPL-MCNC: 45 MG/DL (ref 7–30)
CALCIUM SERPL-MCNC: 8.5 MG/DL (ref 8.5–10.1)
CHLORIDE SERPL-SCNC: 117 MMOL/L (ref 94–109)
CO2 SERPL-SCNC: 21 MMOL/L (ref 20–32)
CREAT SERPL-MCNC: 0.58 MG/DL (ref 0.66–1.25)
CRP SERPL-MCNC: 110 MG/L (ref 0–8)
DIFFERENTIAL METHOD BLD: ABNORMAL
EOSINOPHIL # BLD AUTO: 0.1 10E9/L (ref 0–0.7)
EOSINOPHIL NFR BLD AUTO: 0.7 %
ERYTHROCYTE [DISTWIDTH] IN BLOOD BY AUTOMATED COUNT: 15.1 % (ref 10–15)
GFR SERPL CREATININE-BSD FRML MDRD: >90 ML/MIN/{1.73_M2}
GLUCOSE BLDC GLUCOMTR-MCNC: 150 MG/DL (ref 70–99)
GLUCOSE BLDC GLUCOMTR-MCNC: 163 MG/DL (ref 70–99)
GLUCOSE BLDC GLUCOMTR-MCNC: 172 MG/DL (ref 70–99)
GLUCOSE BLDC GLUCOMTR-MCNC: 180 MG/DL (ref 70–99)
GLUCOSE BLDC GLUCOMTR-MCNC: 180 MG/DL (ref 70–99)
GLUCOSE BLDC GLUCOMTR-MCNC: 191 MG/DL (ref 70–99)
GLUCOSE SERPL-MCNC: 184 MG/DL (ref 70–99)
HCT VFR BLD AUTO: 34.6 % (ref 40–53)
HGB BLD-MCNC: 10.3 G/DL (ref 13.3–17.7)
IMM GRANULOCYTES # BLD: 0.1 10E9/L (ref 0–0.4)
IMM GRANULOCYTES NFR BLD: 0.5 %
INTERPRETATION ECG - MUSE: NORMAL
LYMPHOCYTES # BLD AUTO: 1.2 10E9/L (ref 0.8–5.3)
LYMPHOCYTES NFR BLD AUTO: 8.1 %
MAGNESIUM SERPL-MCNC: 2.2 MG/DL (ref 1.6–2.3)
MCH RBC QN AUTO: 29.1 PG (ref 26.5–33)
MCHC RBC AUTO-ENTMCNC: 29.8 G/DL (ref 31.5–36.5)
MCV RBC AUTO: 98 FL (ref 78–100)
MONOCYTES # BLD AUTO: 0.9 10E9/L (ref 0–1.3)
MONOCYTES NFR BLD AUTO: 6.6 %
NEUTROPHILS # BLD AUTO: 12 10E9/L (ref 1.6–8.3)
NEUTROPHILS NFR BLD AUTO: 83.9 %
NRBC # BLD AUTO: 0 10*3/UL
NRBC BLD AUTO-RTO: 0 /100
PHOSPHATE SERPL-MCNC: 2.7 MG/DL (ref 2.5–4.5)
PLATELET # BLD AUTO: 256 10E9/L (ref 150–450)
POTASSIUM SERPL-SCNC: 4.3 MMOL/L (ref 3.4–5.3)
PROT SERPL-MCNC: 6.9 G/DL (ref 6.8–8.8)
RBC # BLD AUTO: 3.54 10E12/L (ref 4.4–5.9)
SODIUM SERPL-SCNC: 144 MMOL/L (ref 133–144)
WBC # BLD AUTO: 14.3 10E9/L (ref 4–11)

## 2019-12-26 PROCEDURE — 85025 COMPLETE CBC W/AUTO DIFF WBC: CPT | Performed by: STUDENT IN AN ORGANIZED HEALTH CARE EDUCATION/TRAINING PROGRAM

## 2019-12-26 PROCEDURE — 25000132 ZZH RX MED GY IP 250 OP 250 PS 637: Performed by: PSYCHIATRY & NEUROLOGY

## 2019-12-26 PROCEDURE — 94640 AIRWAY INHALATION TREATMENT: CPT | Mod: 76

## 2019-12-26 PROCEDURE — 25000132 ZZH RX MED GY IP 250 OP 250 PS 637: Performed by: STUDENT IN AN ORGANIZED HEALTH CARE EDUCATION/TRAINING PROGRAM

## 2019-12-26 PROCEDURE — 25000132 ZZH RX MED GY IP 250 OP 250 PS 637: Performed by: NURSE PRACTITIONER

## 2019-12-26 PROCEDURE — 97530 THERAPEUTIC ACTIVITIES: CPT | Mod: GP

## 2019-12-26 PROCEDURE — 84100 ASSAY OF PHOSPHORUS: CPT | Performed by: STUDENT IN AN ORGANIZED HEALTH CARE EDUCATION/TRAINING PROGRAM

## 2019-12-26 PROCEDURE — 71045 X-RAY EXAM CHEST 1 VIEW: CPT

## 2019-12-26 PROCEDURE — 25000132 ZZH RX MED GY IP 250 OP 250 PS 637: Performed by: NEUROLOGICAL SURGERY

## 2019-12-26 PROCEDURE — 40000275 ZZH STATISTIC RCP TIME EA 10 MIN

## 2019-12-26 PROCEDURE — 25000125 ZZHC RX 250

## 2019-12-26 PROCEDURE — 25800030 ZZH RX IP 258 OP 636: Performed by: NEUROLOGICAL SURGERY

## 2019-12-26 PROCEDURE — 94003 VENT MGMT INPAT SUBQ DAY: CPT

## 2019-12-26 PROCEDURE — 86140 C-REACTIVE PROTEIN: CPT | Performed by: STUDENT IN AN ORGANIZED HEALTH CARE EDUCATION/TRAINING PROGRAM

## 2019-12-26 PROCEDURE — 80048 BASIC METABOLIC PNL TOTAL CA: CPT | Performed by: STUDENT IN AN ORGANIZED HEALTH CARE EDUCATION/TRAINING PROGRAM

## 2019-12-26 PROCEDURE — 25000132 ZZH RX MED GY IP 250 OP 250 PS 637: Performed by: COUNSELOR

## 2019-12-26 PROCEDURE — 80076 HEPATIC FUNCTION PANEL: CPT | Performed by: STUDENT IN AN ORGANIZED HEALTH CARE EDUCATION/TRAINING PROGRAM

## 2019-12-26 PROCEDURE — 25000131 ZZH RX MED GY IP 250 OP 636 PS 637: Performed by: NURSE PRACTITIONER

## 2019-12-26 PROCEDURE — 87040 BLOOD CULTURE FOR BACTERIA: CPT | Performed by: STUDENT IN AN ORGANIZED HEALTH CARE EDUCATION/TRAINING PROGRAM

## 2019-12-26 PROCEDURE — 93005 ELECTROCARDIOGRAM TRACING: CPT

## 2019-12-26 PROCEDURE — 93010 ELECTROCARDIOGRAM REPORT: CPT | Performed by: INTERNAL MEDICINE

## 2019-12-26 PROCEDURE — 36415 COLL VENOUS BLD VENIPUNCTURE: CPT | Performed by: STUDENT IN AN ORGANIZED HEALTH CARE EDUCATION/TRAINING PROGRAM

## 2019-12-26 PROCEDURE — 27210437 ZZH NUTRITION PRODUCT SEMIELEM INTERMED LITER

## 2019-12-26 PROCEDURE — 40000281 ZZH STATISTIC TRANSPORT TIME EA 15 MIN

## 2019-12-26 PROCEDURE — 27211294 ZZ H CIRCUIT, METANEB

## 2019-12-26 PROCEDURE — 25000128 H RX IP 250 OP 636: Performed by: STUDENT IN AN ORGANIZED HEALTH CARE EDUCATION/TRAINING PROGRAM

## 2019-12-26 PROCEDURE — 25000128 H RX IP 250 OP 636: Performed by: NEUROLOGICAL SURGERY

## 2019-12-26 PROCEDURE — 25000128 H RX IP 250 OP 636: Performed by: PSYCHIATRY & NEUROLOGY

## 2019-12-26 PROCEDURE — 20000004 ZZH R&B ICU UMMC

## 2019-12-26 PROCEDURE — 00000146 ZZHCL STATISTIC GLUCOSE BY METER IP

## 2019-12-26 PROCEDURE — 25000128 H RX IP 250 OP 636: Performed by: COUNSELOR

## 2019-12-26 PROCEDURE — 40000961 ZZH STATISTIC INTRAPULMONARY PERCUSSIVE VENT

## 2019-12-26 PROCEDURE — 97110 THERAPEUTIC EXERCISES: CPT | Mod: GP

## 2019-12-26 PROCEDURE — 83735 ASSAY OF MAGNESIUM: CPT | Performed by: STUDENT IN AN ORGANIZED HEALTH CARE EDUCATION/TRAINING PROGRAM

## 2019-12-26 PROCEDURE — 87040 BLOOD CULTURE FOR BACTERIA: CPT | Performed by: NEUROLOGICAL SURGERY

## 2019-12-26 RX ORDER — SALIVA STIMULANT COMB. NO.3
2 SPRAY, NON-AEROSOL (ML) MUCOUS MEMBRANE 4 TIMES DAILY
Status: DISCONTINUED | OUTPATIENT
Start: 2019-12-26 | End: 2019-12-31 | Stop reason: HOSPADM

## 2019-12-26 RX ORDER — MAGNESIUM HYDROXIDE 1200 MG/15ML
LIQUID ORAL
Status: DISCONTINUED
Start: 2019-12-26 | End: 2019-12-26 | Stop reason: HOSPADM

## 2019-12-26 RX ADMIN — ALBUTEROL SULFATE 2.5 MG: 2.5 SOLUTION RESPIRATORY (INHALATION) at 12:01

## 2019-12-26 RX ADMIN — MULTIVITAMIN 15 ML: LIQUID ORAL at 20:06

## 2019-12-26 RX ADMIN — AMLODIPINE BESYLATE 10 MG: 10 TABLET ORAL at 08:05

## 2019-12-26 RX ADMIN — VANCOMYCIN HYDROCHLORIDE 1250 MG: 10 INJECTION, POWDER, LYOPHILIZED, FOR SOLUTION INTRAVENOUS at 10:34

## 2019-12-26 RX ADMIN — FAMOTIDINE 20 MG: 40 POWDER, FOR SUSPENSION ORAL at 08:09

## 2019-12-26 RX ADMIN — INSULIN ASPART 4 UNITS: 100 INJECTION, SOLUTION INTRAVENOUS; SUBCUTANEOUS at 13:52

## 2019-12-26 RX ADMIN — BUMETANIDE 1 MG: 1 TABLET ORAL at 08:05

## 2019-12-26 RX ADMIN — INSULIN ASPART 2 UNITS: 100 INJECTION, SOLUTION INTRAVENOUS; SUBCUTANEOUS at 20:05

## 2019-12-26 RX ADMIN — ACETAMINOPHEN 650 MG: 325 TABLET, FILM COATED ORAL at 15:29

## 2019-12-26 RX ADMIN — Medication 2 SPRAY: at 08:05

## 2019-12-26 RX ADMIN — Medication 100 MG: at 08:05

## 2019-12-26 RX ADMIN — CEFEPIME HYDROCHLORIDE 2 G: 2 INJECTION, POWDER, FOR SOLUTION INTRAVENOUS at 08:06

## 2019-12-26 RX ADMIN — ALBUTEROL SULFATE 2.5 MG: 2.5 SOLUTION RESPIRATORY (INHALATION) at 05:30

## 2019-12-26 RX ADMIN — LIDOCAINE 2 PATCH: 560 PATCH PERCUTANEOUS; TOPICAL; TRANSDERMAL at 20:07

## 2019-12-26 RX ADMIN — ACETYLCYSTEINE 2 ML: 100 INHALANT RESPIRATORY (INHALATION) at 19:52

## 2019-12-26 RX ADMIN — FOLIC ACID 1 MG: 1 TABLET ORAL at 08:05

## 2019-12-26 RX ADMIN — METOPROLOL TARTRATE 75 MG: 50 TABLET, FILM COATED ORAL at 08:05

## 2019-12-26 RX ADMIN — FAMOTIDINE 20 MG: 40 POWDER, FOR SUSPENSION ORAL at 20:09

## 2019-12-26 RX ADMIN — CEFEPIME HYDROCHLORIDE 2 G: 2 INJECTION, POWDER, FOR SOLUTION INTRAVENOUS at 14:04

## 2019-12-26 RX ADMIN — ALBUTEROL SULFATE 2.5 MG: 2.5 SOLUTION RESPIRATORY (INHALATION) at 07:38

## 2019-12-26 RX ADMIN — Medication 2 SPRAY: at 17:05

## 2019-12-26 RX ADMIN — OXYCODONE HYDROCHLORIDE 10 MG: 5 SOLUTION ORAL at 15:29

## 2019-12-26 RX ADMIN — HEPARIN SODIUM 5000 UNITS: 5000 INJECTION, SOLUTION INTRAVENOUS; SUBCUTANEOUS at 20:06

## 2019-12-26 RX ADMIN — INSULIN ASPART 4 UNITS: 100 INJECTION, SOLUTION INTRAVENOUS; SUBCUTANEOUS at 17:06

## 2019-12-26 RX ADMIN — METRONIDAZOLE 500 MG: 500 TABLET ORAL at 14:04

## 2019-12-26 RX ADMIN — Medication 12.5 MG: at 08:08

## 2019-12-26 RX ADMIN — ALBUTEROL SULFATE 2.5 MG: 2.5 SOLUTION RESPIRATORY (INHALATION) at 17:15

## 2019-12-26 RX ADMIN — ALBUTEROL SULFATE 2.5 MG: 2.5 SOLUTION RESPIRATORY (INHALATION) at 19:51

## 2019-12-26 RX ADMIN — HEPARIN SODIUM 5000 UNITS: 5000 INJECTION, SOLUTION INTRAVENOUS; SUBCUTANEOUS at 08:06

## 2019-12-26 RX ADMIN — INSULIN ASPART 2 UNITS: 100 INJECTION, SOLUTION INTRAVENOUS; SUBCUTANEOUS at 00:58

## 2019-12-26 RX ADMIN — OXYCODONE HYDROCHLORIDE 10 MG: 5 SOLUTION ORAL at 08:04

## 2019-12-26 RX ADMIN — Medication 2 SPRAY: at 13:58

## 2019-12-26 RX ADMIN — OXYCODONE HYDROCHLORIDE 10 MG: 5 SOLUTION ORAL at 20:07

## 2019-12-26 RX ADMIN — HYDRALAZINE HYDROCHLORIDE 10 MG: 20 INJECTION INTRAMUSCULAR; INTRAVENOUS at 06:29

## 2019-12-26 RX ADMIN — METRONIDAZOLE 500 MG: 500 TABLET ORAL at 08:05

## 2019-12-26 RX ADMIN — CYCLOBENZAPRINE HYDROCHLORIDE 10 MG: 10 TABLET, FILM COATED ORAL at 08:04

## 2019-12-26 RX ADMIN — SENNOSIDES AND DOCUSATE SODIUM 1 TABLET: 8.6; 5 TABLET ORAL at 08:05

## 2019-12-26 RX ADMIN — VANCOMYCIN HYDROCHLORIDE 1250 MG: 10 INJECTION, POWDER, LYOPHILIZED, FOR SOLUTION INTRAVENOUS at 20:10

## 2019-12-26 RX ADMIN — CYCLOBENZAPRINE HYDROCHLORIDE 10 MG: 10 TABLET, FILM COATED ORAL at 20:07

## 2019-12-26 RX ADMIN — ACETAMINOPHEN 650 MG: 325 TABLET, FILM COATED ORAL at 08:04

## 2019-12-26 RX ADMIN — CEFEPIME HYDROCHLORIDE 2 G: 2 INJECTION, POWDER, FOR SOLUTION INTRAVENOUS at 22:37

## 2019-12-26 RX ADMIN — ACETYLCYSTEINE 2 ML: 100 INHALANT RESPIRATORY (INHALATION) at 12:01

## 2019-12-26 RX ADMIN — Medication 2 SPRAY: at 20:05

## 2019-12-26 RX ADMIN — METRONIDAZOLE 500 MG: 500 TABLET ORAL at 20:08

## 2019-12-26 RX ADMIN — INSULIN ASPART 4 UNITS: 100 INJECTION, SOLUTION INTRAVENOUS; SUBCUTANEOUS at 05:26

## 2019-12-26 RX ADMIN — ACETYLCYSTEINE 2 ML: 100 INHALANT RESPIRATORY (INHALATION) at 05:30

## 2019-12-26 RX ADMIN — TRAZODONE HYDROCHLORIDE 100 MG: 50 TABLET ORAL at 22:37

## 2019-12-26 RX ADMIN — BUMETANIDE 1 MG: 1 TABLET ORAL at 20:08

## 2019-12-26 RX ADMIN — SENNOSIDES AND DOCUSATE SODIUM 1 TABLET: 8.6; 5 TABLET ORAL at 20:07

## 2019-12-26 RX ADMIN — ACETYLCYSTEINE 2 ML: 100 INHALANT RESPIRATORY (INHALATION) at 17:15

## 2019-12-26 RX ADMIN — METOPROLOL TARTRATE 75 MG: 50 TABLET, FILM COATED ORAL at 20:07

## 2019-12-26 RX ADMIN — CYCLOBENZAPRINE HYDROCHLORIDE 10 MG: 10 TABLET, FILM COATED ORAL at 14:04

## 2019-12-26 RX ADMIN — Medication 12.5 MG: at 20:09

## 2019-12-26 RX ADMIN — INSULIN ASPART 4 UNITS: 100 INJECTION, SOLUTION INTRAVENOUS; SUBCUTANEOUS at 08:36

## 2019-12-26 RX ADMIN — MIRTAZAPINE 30 MG: 15 TABLET, FILM COATED ORAL at 20:08

## 2019-12-26 RX ADMIN — ACETYLCYSTEINE 2 ML: 100 INHALANT RESPIRATORY (INHALATION) at 07:38

## 2019-12-26 ASSESSMENT — ACTIVITIES OF DAILY LIVING (ADL)
ADLS_ACUITY_SCORE: 27
ADLS_ACUITY_SCORE: 26
ADLS_ACUITY_SCORE: 26
ADLS_ACUITY_SCORE: 27
ADLS_ACUITY_SCORE: 27
ADLS_ACUITY_SCORE: 26

## 2019-12-26 NOTE — PLAN OF CARE
PT 4A     Discharge Planner PT   Patient plan for discharge: not discussed today  Current status: no active movement in LE noted.  Rolling in bed with max A. Self cares total A. Dependently transferred bed>chair via OH lift. Performed PROM to LE and AROM for UE.   Barriers to return to prior living situation: assistance needed for basic mobility.   Recommendations for discharge: anticipate need for rehab stay/ SCI rehab  Rationale for recommendations: dependence with functional mobility.

## 2019-12-26 NOTE — PROGRESS NOTES
CLINICAL NUTRITION SERVICES - REASSESSMENT NOTE     Nutrition Prescription    RECOMMENDATIONS FOR MDs/PROVIDERS TO ORDER:  Fluids and bowel regimen per team     Malnutrition Status:    Non-severe malnutrition in the context of acute illness    Recommendations already ordered by Registered Dietitian (RD):  - Continue TF as ordered - immune modulating formula appears to remains beneficial given CRP trends.  - Met cart 12/26  - RD to order vitamin D lab    Future/Additional Recommendations:  -Monitor appropriateness to transition to standard TF formula regimen prior to discharge:  Rec: Nutren 1.5 @ 55 mL/hr + 2 pkt protein to provide 2060 kcals (29 kcal/kg/day), 112 g PRO (1.6 g/kg/day), 1003 mL H2O, 232 g CHO and no fiber daily.     -Monitor need for repeat metabolic cart study    - Supplement Vit D as appropriate     EVALUATION OF THE PROGRESS TOWARD GOALS   Diet: NPO  Nutrition Support: Impact Peptide 1.5 at goal 55 ml/hr (1320 ml/day) to provide 1980 kcals (28 kcal/kg/day), 124 g PRO (1.8 g/kg/day), 1016 ml free H2O, 84 g Fat (50% from MCTs), 185 g CHO and no Fiber daily  Free water: 30 ml Q4  Intake: Average TF intakes over the past 7 days: 1057 ml, 1586 kcals (23 kcals/kg), 99 g pro ( 1.4 g/kg pro)      Gtube placed: 12/5/19  Trach: 12/20/19     NEW FINDINGS   Weight: Moderate edema noted, weight fluctuations noted, suspect fluids  Labs: CRP: 24>120>110 (H), Cr: .58 (L)  Meds: folic acid, Remeron, Certavite, thiamine, senna, sliding scale  GI: BM+ noted, pt reported dry mouth and wanting water, however can only have mouth swabs at this time. Reports no abdominal pain but some mild nausea.   Resp:  S/p tracheostomy with ENT 12/20.  Neuro: alert, follows all commands    MALNUTRITION  % Intake: Decreased intake does not meet criteria  % Weight Loss: Unable/difficult to assess with fluid status  Subcutaneous Fat Loss: Upper arm and Lower arm: Mild  Muscle Loss: Upper leg (quadricep, hamstring), Patellar region and  Posterior calf: Mild - pt newly paraplegic, suspect part of normal process - no change  Fluid Accumulation/Edema: Moderate  Malnutrition Diagnosis: Non-severe malnutrition in the context of acute illness    Previous Goals   Total avg nutritional intake to meet a minimum of 28 kcal/kg (80% MREE on 12/16) and 1.5 g PRO/kg daily (per dosing wt 70 kg).  Evaluation: Not met    Previous Nutrition Diagnosis  Inadequate energy intake related to interruptions to EN infusions as evidenced by 7-day average enteral nutrition infusions showing 1080 mL TF = 1620 kcals (23 kcal/kg, or 71% MREE from 12/16).  Evaluation: Declining    CURRENT NUTRITION DIAGNOSIS  Inadequate energy intake related to interruptions to EN infusions as evidenced by 7-day average enteral nutrition infusions showing 1080 mL TF = 1586 kcals (23 kcal/kg, or 71% MREE from 12/16).    INTERVENTIONS  Implementation  Collaboration with other providers    Goals  Total avg nutritional intake to meet a minimum of 28 kcal/kg (80% MREE on 12/16) and 1.5 g PRO/kg daily (per dosing wt 70 kg).    Monitoring/Evaluation  Progress toward goals will be monitored and evaluated per protocol.      Ermelinda Saucedo, RD, MS, LD  SICU: [phone]

## 2019-12-26 NOTE — PROGRESS NOTES
Neuroscience Intensive Care Progress Note  12/26/2019      24 hour events: Pressure support yesterday and this morning with desaturations.   Had fever around midnight 100.3. blood culture negative. CRP is still high 110.  Blood pressure is high with tachycardia this morning. He did not have BMs. The leg spasms are gone    Current Medications:    acetylcysteine  2 mL Nebulization Q4H     albuterol  2.5 mg Nebulization Q4H     amLODIPine  10 mg Oral or Feeding Tube Daily     bumetanide  1 mg Oral or Feeding Tube BID     ceFEPIme (MAXIPIME) IV  2 g Intravenous Q8H     cyanocobalamin  1,000 mcg Intramuscular Q30 Days     cyclobenzaprine  10 mg Oral TID     famotidine  20 mg Per G Tube BID     folic acid  1 mg Oral or Feeding Tube Daily     heparin ANTICOAGULANT  5,000 Units Subcutaneous Q12H     heparin lock flush  5-10 mL Intracatheter Q24H     insulin aspart  2-16 Units Subcutaneous Q4H     lidocaine  2 patch Transdermal Q24h    And     lidocaine   Transdermal Q8H     metoprolol tartrate  75 mg Oral or Feeding Tube BID     metroNIDAZOLE  500 mg Oral or Feeding Tube TID     mirtazapine  30 mg Oral or Feeding Tube QPM     multivitamins w/minerals  15 mL Per Feeding Tube Daily     polyethylene glycol  17 g Oral BID     QUEtiapine  12.5 mg Oral BID     senna-docusate  1 tablet Oral or Feeding Tube BID     traZODone  100 mg Oral or Feeding Tube At Bedtime     vancomycin (VANCOCIN) IV  1,250 mg Intravenous Q12H     thiamine  100 mg Oral or Feeding Tube Daily       PRN Medications:  acetaminophen, carboxymethylcellulose PF, IV fluid REPLACEMENT ONLY, glucose **OR** dextrose **OR** glucagon, heparin lock flush, hydrALAZINE, ipratropium - albuterol 0.5 mg/2.5 mg/3 mL, labetalol, lidocaine 4%, lidocaine (buffered or not buffered), magnesium sulfate, magnesium sulfate, melatonin, naloxone, ondansetron **OR** ondansetron, oxyCODONE, potassium chloride, potassium chloride with lidocaine, potassium chloride, potassium chloride,  "potassium chloride, potassium phosphate (KPHOS) in D5W IV, potassium phosphate (KPHOS) in D5W IV, potassium phosphate (KPHOS) in D5W IV, potassium phosphate (KPHOS) in D5W IV, potassium phosphate (KPHOS) in D5W IV, prochlorperazine **OR** prochlorperazine **OR** prochlorperazine, sodium chloride (PF), sodium phosphate    Objective findings:  BP (!) 164/90   Pulse 101   Temp 99.3  F (37.4  C) (Axillary)   Resp 25   Ht 1.727 m (5' 8\")   Wt 89 kg (196 lb 3.4 oz)   SpO2 94%   BMI 29.83 kg/m      Ventilator Settings:  Ventilation Mode: CMV/AC  (Continuous Mandatory Ventilation/ Assist Control)  FiO2 (%): 50 %  Rate Set (breaths/minute): 12 breaths/min  Tidal Volume Set (mL): 450 mL  PEEP (cm H2O): 5 cmH2O  Pressure Support (cm H2O): 7 cmH2O  Oxygen Concentration (%): 50 %  Resp: 25      Intake/Output:      Intake/Output Summary (Last 24 hours) at 12/22/2019 1157  Last data filed at 12/23/2019   Gross per 24 hour   Intake 1460 ml   Output 1460 ml   Net  +405 ml         Physical Examination:   Vitals:  B/P: 166/95, T: 100.9, P: 120,   General:  Laying in bed, looks tired  HEENT:  NC/AT, cervical collar in place. no icterus, op pink and moist, no ear or nose drainage.   Cardiac:  RRR, no m/r/g  Chest:  Minimal insbiratory wheezes  Abdomen:  Distended with reduced BS  Extremities:  No LE swelling.    Skin:  No rash or lesion.      Neuro Exam:   Mental status: alert, follows all commands   Cranial nerves: PERRL, EOMI, face symmetric  Motor: Some flexion extension at elbow bilaterally more left (3/5) than right (2/5). Some movement left wrist (3/5 ext) and fingers, no thumb abduction, no movement in distal RUE. No movements of the legs  Sensory: reports sensation in arms and upper chest and both thighs    Labs/Studies:  CBC RESULTS:   WBC 14.3  Hb 10.3  Plt 256        Last Comprehensive Metabolic Panel:  Sodium   Date Value Ref Range Status   12/26/2019 144 133 - 144 mmol/L Final     Potassium   Date Value Ref " Range Status   12/26/2019 4.3 3.4 - 5.3 mmol/L Final     Chloride   Date Value Ref Range Status   12/26/2019 117 (H) 94 - 109 mmol/L Final     Carbon Dioxide   Date Value Ref Range Status   12/26/2019 21 20 - 32 mmol/L Final     Anion Gap   Date Value Ref Range Status   12/26/2019 7 3 - 14 mmol/L Final     Glucose   Date Value Ref Range Status   12/26/2019 184 (H) 70 - 99 mg/dL Final     Urea Nitrogen   Date Value Ref Range Status   12/26/2019 45 (H) 7 - 30 mg/dL Final     Creatinine   Date Value Ref Range Status   12/26/2019 0.58 (L) 0.66 - 1.25 mg/dL Final     GFR Estimate   Date Value Ref Range Status   12/26/2019 >90 >60 mL/min/[1.73_m2] Final     Comment:     Non  GFR Calc  Starting 12/18/2018, serum creatinine based estimated GFR (eGFR) will be   calculated using the Chronic Kidney Disease Epidemiology Collaboration   (CKD-EPI) equation.       Calcium   Date Value Ref Range Status   12/26/2019 8.5 8.5 - 10.1 mg/dL Final   Mg 2.2  Phosph: 2.7    B 2 glucan: pending  bl culture negative  Urine culture pending    UA RESULTS:  Recent Labs   Lab Test 12/24/19  1411   COLOR Yellow   APPEARANCE Cloudy   URINEGLC Negative   URINEBILI Negative   URINEKETONE Negative   SG 1.013   UBLD Small*   URINEPH 5.0   PROTEIN 30*   NITRITE Negative   LEUKEST Large*   RBCU 13*   WBCU 51*       Neuro:  #Cervical spinal cord injury  #Discitis/osteomyelitis C3-C4  - [place] Collar at all times, Up with assist  - Neuro checks q4h during the day   - continue PT/OT     #Anxiety/Pain - good control   - Oxycodone 5-10 q4hrs prn   - flexeril to 10 mg TID      #Hx of Alcohol dependence  - Thiamine and folate     #Depression  - Continue PTA miratazepine  - continue trazodone 100 mg at bedtime  - Seroquel 12.5 BID      Resp:  #Acute respiratory failure s/p re-intubation 12/6, suspect diaphragmatic weakness related to spine injury  - Mucomyst & chest physio   - pressure support trial TID  - weaning off vent as  tolerated  --metanebs   --chest physio per RT  --percussive therapy  - desaturation when positioned on Rt side, try positional changes first   - S/P trach 12/20   - sputum culture and chest xray     Cardio:  # Hx of hypertension, tachycardia, uncontrolled   - PTA amlodipine 10 mg   - increase PTA metoprolol to 75 mg bid  - PTA Bumex 1 mg bid  - Address other issue that makes him agitated and keep an eye on the blood pressure for now       Renal:  Hypophosphatemia - resolved  - Electrolyte replacement protocol   - Monitor I/O goal In= outs   - free water flushes 30 q4h  - minimal diuresis (I/O +405)    Neurogenic urinary retention   - failed díaz wean trial 12/12 caused retention   - UA with possible infection  - Urine culture pending  - Díaz has been changed      Endo:  #DM2  - Sliding scale insulin  - previously on 38 U NPH am/pm with high dose sliding scale insulin      Heme:  #Normocytic anemia, stable  - Hg > 7.0     # Leukocytosis, improving   Likely due to UTI. Blood culture negative so far and UC also negative <24 h  - CBC daily  - follow up beta d glucan  -    - ID on board      GI:  #Constipation - still distended abdomen last BM 12/24  - Senna scheduled bid  - PRN suppository/enema   - Miralax bid     #Diet   - G-tube in place  - Tube feeds at goal      ID:   #Vertebral phlegmon and Abscess s/p drain placement 12/1  #Osteomyelitis C3-5  #Fever - restarted 12/25  #Leukocytosis, improving   #repeat MRI 12/10 and 12/19 shows progressive epidural abscess.   - Vanc, cefepime, metronidazole regimen per ID (through 1/22)  - Weekly CBC, CMP, CRP while on IV abx,  - Spinal tissue cultures (12/1): MSSA, Strep anginosus, Eikenella, and oral anaerobes   - Per ID:  - will need at least 6wks of above abx from day 1 (12/11), earliest stop date of 1/22/20   - repeat MRI in 2wks (~1/2)    # elevated CRP and WBCs  # new fever  # Possible UTI    ID does not think the new fever and elevated CRP is due to UTI. They  think possibly spinal abscess.   - Degroot changed 12/24  - blood culture negative  - Repeat blood culture today  - follow up B 2 Glucan  - consider adding micafungin if decompensate  - Touch base with NSG and ENT about getting new cervical MRI     Lines:  - Trach, PICC, PIV x 2, Degroot, PEG     FEN: TF via PEG   PPX:    DVT prophylaxis: SCDs, Heparin subcutaneous restart 11/22    GI: famotidine   Code Status: Full, presumed     [facility]    This patient was seen & discussed with my attending, Dr. Eduardo MD, who agrees with my assessment and plan.       Hermilo Schwartz MD  PGY2  [phone]

## 2019-12-26 NOTE — PROGRESS NOTES
[facility], [place]   Neurosurgery Progress Note:    Assessment: 64 years old gentleman status post C3-C4 artificial disc placement about 2 weeks prior to arriaval the [facility] who presented after a fall with almost complete loss of strength in his upper extremities and paraplegia, found to have cervical stenosis. S/p OR 12/1 - Pharyngotomy closed with assistance from ENT. Arthroplasty implant removed. No replacement implant placed. S/p G-tube 12/5. Extubated 12/5. Re-Intubated 12/6 due to Mucous Plugging and Atelectasis. S/p repeated bronchoscopy. S/p tracheostomy with ENT 12/20. Infectious disease with concerns about adequate source control.    Plan:  - Nebs, chest physiotherapy  - DVT: SCDs while in bed  - [place] J collar  - per ID: vancomycin, cefepime, flagyl 4-6 wks for vertebral phlegmon, weekly labs; f/u clx; MRI cervical spine in 2 weeks; consider fungal converage, consider MRI +/- flexible laryngoscopy with ENT if worsening infection despite broad spectrum abx; appreciate recs  - no plan for another surgery  - GI/nutrition: tube feeds  - trach change per respiratory therapy  - MRI cervical spine before stopping abx in 6 wks  - Endo: endocrine cslt; insulin sliding scale  - [facility]  -----------------------------------  Lion Vargas MD  Neurosurgery Resident PGY2    Please contact neurosurgery resident on call with questions.    Dial * * [phone], enter [phone] when prompted.     Subjective: ID concerned about abscess, may want earlier repeat imaging vs fungal coverage; pressure supported    Objective:   Temp:  [98.6  F (37  C)-101.5  F (38.6  C)] 101.5  F (38.6  C)  Heart Rate:  [] 87  Resp:  [27-32] 32  BP: (127-172)/() 127/77  FiO2 (%):  [50 %] 50 %  SpO2:  [90 %-99 %] 97 %  I/O last 3 completed shifts:  In: 2570 [I.V.:850; NG/GT:400]  Out: 2650 [Urine:2650]    Gen: Appears comfortable, NAD  Wound: Incision,  clean, dry, intact  Pulm: tracheostomy, mechanical ventilator  Neuro: Awake, alert, nods yes or no to questions  Follows commands  Deltoids 4/5, biceps 4-/5, triceps 1/5,  0/5, lower extremities 0/5  Triple flexion in lower extremities  Sensation to light touch in arms and upper chest    LABS  Recent Labs   Lab Test 12/25/19  0330 12/24/19  1424 12/24/19  0500   WBC 14.5* 18.1* 18.3*   HGB 10.6* 11.1* 11.4*   MCV 97 97 96    291 276       Recent Labs   Lab Test 12/25/19  0330 12/24/19  0500 12/23/19  0506    141 139   POTASSIUM 4.2 4.4 3.9   CHLORIDE 113* 110* 110*   CO2 23 25 25   BUN 45* 36* 28   CR 0.57* 0.58* 0.51*   ANIONGAP 5 6 4   FLORINDA 8.9 8.9 8.8   * 180* 190*

## 2019-12-26 NOTE — PROGRESS NOTES
[facility] PROGRESS NOTE     Patient:  Sherwin Angel   YOB: 1955, MRN: [id number]  Date of Visit: 12/25/2019  Date of Admission: 12/1/2019          Assessment and Recommendations:     Recommendations:  - Consider repeat MRI +/- laryngoscopy to assess for any new fluid collections on 12/26 if fevers, WBC, CRP continue to rise. Based on the last MRI it seems unclear that we truly have adequate source control.   - If patient decompensates, recheck EKG. If QT is normal, start fluconazole 800 mg PO daily. If QT remains prolonged, start micafungin 150 mg daily.   - Await pending Beta D glucan  - No change to cefepime, metronidazole, and vancomycin    Assessment:  Sherwin Angel is a 64-year-old man with history of hypertension and diabetes who [facility] and now admitted with new paraplegia after a fall and found to have pharyngotomy and vertebral phlegmon now s/p OR with removal of arthroplasty and placement of drain. He subsequently had G tube placed on 12/5 and ENT preformed tracheostomy on 12/20.    # Traumatic cervical spinal cord injury s/p C3-4 arthroplasty on 11/15, now removed  # Cervical stenosis C3-5  # Osteomyelitis C3-C5  # Vertebral phlegmon vs abscess s/p drain placement on 12/1   Cultures growing MSSA, Strep anginosus, Eikenella, and oral anaerobes. Clinically he has stabilized with broadening of coverage and his inflammatory markers are normalizing (CRP 14 on 12/20), and while his neuro exam is unchanged he has significant deficits that correlate with substantial abnormalities on imaging, suggesting that he would benefit from additional source control.  - Repeat MRI 12/10 and 12/19 shows progressive epidural abscess.   - Slight increase in CRP (14 --> 24) on 12/23, now with increase in WBC from 8 to 18 on 12/24    # Leukocytosis  Possibly related to infectious issues detailed above, however, patient has been on  broad spectrum antibiotics for 2 weeks. CXR from this morning shows improvement in right lung opacities and stable left basilar opacities. Discussed pharyngotomy with ENT as 1 of the 2 locations was unable to be closed, however, given it has been ~3 weeks since procedure unlikely to be a source of infection at this time. He had a díaz catheter in place, so would likely grow something on culture, but it is relatively unlikely to be true pathogen in the absence of other urinary infection signs. He has one bowel movement recorded per day, so unlikely to be C diff.   - Continue to monitor  - Recheck blood cultures  - Add micafungin if decompensates to cover fungus    Discussed with neurocritical are and neurosurgery teams.     Thank you for the consult. ID will continue to follow with you.    Dr. Isabel Capone will be returning to the service on 12/26/19.     Daisy Mcclain MD  Infectious Diseases  [phone] (TextPage)          Interval History and Events:   Fevers to 101 with increasing CRP. Watching [employer] movie, alert, interactive. Very concerned about his dry mouth. Right shoulder is sore with shrugging motion. No other new problems per patient.          HPI:   Adopted from Consult note 12/02:  Sherwin Angel is a 64-year-old man with history of hypertension and diabetes who [facility] and is now readmitted on 12/1 after a fall at his rehab facility that resulted in near paraplegia.     He was taken to the OR by neurosurgery on 12/1, and had the arthroplasty implant removed. He was found to have significant inflammation in the area and 2 pharyngotomy sites, so ENT was consulted intra-operatively and closed one pharyngotomy (the other was unable to be reached) and placed a drain for the phlegmon. He was put on broad-spectrum antibiotics with cefepime, metronidazole, and vancomycin, and ID is consulted for assistance with antibiotic management.     The patient had  a temperature to 100.6 overnight after the surgery, but has been afebrile throughout the day today. He is awake and able to nod yes/no to questions, and follow basic commands.           Physical Examination:   Temp:  [98.6  F (37  C)-101.5  F (38.6  C)] 101.5  F (38.6  C)  Heart Rate:  [] 99  Resp:  [27-32] 32  BP: (135-172)/() 149/85  FiO2 (%):  [50 %] 50 %  SpO2:  [90 %-99 %] 98 %    I/O last 3 completed shifts:  In: 2425 [I.V.:600; NG/GT:450]  Out: 2830 [Urine:2830]    Vitals:    12/12/19 0400 12/17/19 0400 12/18/19 0500 12/21/19 0400   Weight: 87 kg (191 lb 12.8 oz) 84.1 kg (185 lb 8 oz) 85.6 kg (188 lb 11.4 oz) 88.1 kg (194 lb 3.6 oz)    12/24/19 0400   Weight: 89 kg (196 lb 3.4 oz)       Constitutional: Adult male seen sitting in recliner, in NAD. Awake, alert, interactive.  HEENT: NC/AT, EOMI, sclera clear, conjunctiva normal, tracheostomy with no apparent drainage  Respiratory: No increased work of breathing, slightly course and diminished lung sounds.  Cardiovascular: RRR, no murmur noted. B/l UE edema, trace LE edema visible above boots.  GI: Normal bowel sounds, soft, non-distended and non-tender.   Skin: Warm, dry, well-perfused. No bruising, bleeding, rashes, or lesions on limited exam.  Neurologic: A&O. Able to mouth words or indicate yes/no.  Neuropsychiatric: Calm. Affect appropriate to situation.  Extremities: Right shoulder painful with shrugging motion. Warm but not red or hot. No pain with palpation or with passive ROM.          Medications:       acetylcysteine  2 mL Nebulization Q4H     albuterol  2.5 mg Nebulization Q4H     amLODIPine  10 mg Oral or Feeding Tube Daily     bumetanide  1 mg Oral or Feeding Tube BID     ceFEPIme (MAXIPIME) IV  2 g Intravenous Q8H     cyanocobalamin  1,000 mcg Intramuscular Q30 Days     cyclobenzaprine  10 mg Oral TID     famotidine  20 mg Per G Tube BID     folic acid  1 mg Oral or Feeding Tube Daily     heparin ANTICOAGULANT  5,000 Units Subcutaneous  Q12H     heparin lock flush  5-10 mL Intracatheter Q24H     insulin aspart  2-16 Units Subcutaneous Q4H     lidocaine  2 patch Transdermal Q24h    And     lidocaine   Transdermal Q8H     metoprolol tartrate  75 mg Oral or Feeding Tube BID     metroNIDAZOLE  500 mg Oral or Feeding Tube TID     mirtazapine  30 mg Oral or Feeding Tube QPM     multivitamins w/minerals  15 mL Per Feeding Tube Daily     polyethylene glycol  17 g Oral BID     QUEtiapine  12.5 mg Oral BID     senna-docusate  1 tablet Oral or Feeding Tube BID     traZODone  100 mg Oral or Feeding Tube At Bedtime     vancomycin (VANCOCIN) IV  1,250 mg Intravenous Q12H     thiamine  100 mg Oral or Feeding Tube Daily       Antiinfectives:  Anti-infectives (From now, onward)    Start     Dose/Rate Route Frequency Ordered Stop    12/23/19 0000  metroNIDAZOLE (FLAGYL) 500 MG tablet     Note to Pharmacy:  Through 1/22    500 mg Oral or Feeding Tube 3 TIMES DAILY 12/23/19 1252      12/23/19 0000  ceFEPIme (MAXIPIME) 2 G vial     Note to Pharmacy:  Through 1/22    2 g  over 30 Minutes Intravenous EVERY 8 HOURS 12/23/19 1252      12/20/19 0800  vancomycin 1250 mg in 0.9% NaCl 250 mL intermittent infusion 1,250 mg      1,250 mg  over 90 Minutes Intravenous EVERY 12 HOURS 12/19/19 1757      12/16/19 0800  metroNIDAZOLE (FLAGYL) tablet 500 mg      500 mg Oral or Feeding Tube 3 TIMES DAILY 12/16/19 0759      12/11/19 1500  ceFEPIme (MAXIPIME) 2 g vial to attach to  ml bag for ADULTS or 50 ml bag for PEDS      2 g  over 30 Minutes Intravenous EVERY 8 HOURS 12/11/19 1420            Infusions/Drips:    IV fluid REPLACEMENT ONLY 55 mL/hr at 12/18/19 0200            Laboratory Data:     Microbiology:  Culture Micro   Date Value Ref Range Status   12/24/2019 Culture negative < 24 hours, reincubate  Preliminary   12/24/2019 No growth after 1 day  Preliminary   12/24/2019 No growth after 1 day  Preliminary   12/12/2019 (A)  Final    Light growth  Candida albicans /  dubliniensis  Candida albicans and Candida dubliniensis are not routinely speciated  Susceptibility testing not routinely done     12/11/2019 (A)  Final    Moderate growth  Candida albicans / dubliniensis  Candida albicans and Candida dubliniensis are not routinely speciated  Susceptibility testing not routinely done     12/11/2019 No growth  Final   12/11/2019 No growth  Final   12/07/2019 (A)  Final    Light growth  Candida albicans / dubliniensis  Candida albicans and Candida dubliniensis are not routinely speciated  Susceptibility testing not routinely done     12/07/2019 No growth  Final   12/07/2019 No growth  Final   12/03/2019 No growth  Final   12/03/2019 No growth  Final   12/01/2019 No growth  Final   12/01/2019 (A)  Final    Moderate growth  Fusobacterium nucleatum  Susceptibility testing not routinely done     12/01/2019 (A)  Final    Moderate growth  Parvimonas micra  Susceptibility testing not routinely done     12/01/2019 (A)  Final    Light growth  Prevotella species  Susceptibility testing not routinely done     12/01/2019 (A)  Final    Light growth  Strain 2  Prevotella species  Identification obtained by MALDI-TOF mass spectrometry research use only database. Test   characteristics determined and verified by the [facility]   ([facility]) [place], [place].  Susceptibility testing not routinely done     12/01/2019 (A)  Final    Moderate growth  Strain 3  Prevotella species  Identification obtained by MALDI-TOF mass spectrometry research use only database. Test   characteristics determined and verified by the [facility]   ([facility]) [place], [place].  Beta lactamase positive  Susceptibility testing not routinely done     12/01/2019 Culture negative after 22 days  Preliminary   12/01/2019 Single colony  Staphylococcus aureus   (A)  Final   12/01/2019 Light growth  Streptococcus anginosus   (A)  Final   12/01/2019 Light growth  Eikenella corrodens    (A)  Final       Inflammatory Markers    Recent Labs   Lab Test 12/25/19  0330 12/23/19  0506 12/20/19  0337 12/17/19  0351 12/14/19  0444 12/12/19  0210 12/11/19  0317 12/08/19  0348   .0* 24.0* 14.0* 34.0* 48.0* 36.0* 16.0* 56.0*       Metabolic Studies       Recent Labs   Lab Test 12/25/19  0330 12/24/19  0500 12/23/19  0506 12/22/19  0430 12/21/19  0359 12/20/19  0337  12/01/19  1057 12/01/19  0112    141 139 139 140 143   < > 135 133   POTASSIUM 4.2 4.4 3.9 3.7 3.8 3.5   < > 4.6 4.4   CHLORIDE 113* 110* 110* 108 109 111*   < >  --  94   CO2 23 25 25 28 28 29   < >  --  31   ANIONGAP 5 6 4 2* 4 3   < >  --  7   BUN 45* 36* 28 22 15 22   < >  --  27   CR 0.57* 0.58* 0.51* 0.52* 0.48* 0.49*   < >  --  0.75   GFRESTIMATED >90 >90 >90 >90 >90 >90   < >  --  >90   * 180* 190* 131* 105* 95   < > 195* 230*   A1C  --   --   --   --   --   --   --   --  6.5*   FLORINDA 8.9 8.9 8.8 8.4* 8.4* 8.4*   < >  --  8.7   PHOS 2.5 3.1 2.6 2.8 3.7 2.8   < >  --   --    MAG 2.1 2.3 2.0 2.1 2.2 2.0   < >  --   --    LACT  --   --   --   --   --   --   --  1.2  --     < > = values in this interval not displayed.       Hematology Studies      Recent Labs   Lab Test 12/25/19  0330 12/24/19  1424 12/24/19  0500 12/23/19  0506 12/22/19  0430 12/21/19  0359 12/20/19  0337   WBC 14.5* 18.1* 18.3* 8.2 7.4 7.3 7.1   ANEU 12.9*  --  16.5* 6.7 5.1 5.2 4.6   ALYM 0.9  --  1.0 1.0 1.6 1.3 1.6   JAMES 0.6  --  0.6 0.4 0.6 0.6 0.7   AEOS 0.1  --  0.0 0.1 0.1 0.1 0.1   HGB 10.6* 11.1* 11.4* 10.2* 9.0* 8.5* 8.9*   HCT 34.5* 36.0* 36.9* 32.9* 29.1* 27.7* 29.2*    291 276 261 254 246 281       Arterial Blood Gas Testing    Recent Labs   Lab Test 12/14/19  1006 12/11/19  0535 12/05/19  1945 12/02/19  0545 12/02/19  0200   PH 7.46* 7.49* 7.43 7.42 7.43   PCO2 37 40 46* 43 42   PO2 55* 105 87 130* 122*   HCO3 26 30* 30* 28 28   O2PER 80 100 4L 60.0 50.0        Urine Studies     Recent Labs   Lab Test 12/24/19  1411 12/11/19  1141  12/07/19  2310 12/01/19  1824   URINEPH 5.0 5.5 5.5 6.0   NITRITE Negative Negative Negative Negative   LEUKEST Large* Negative Negative Negative   WBCU 51* 2 2 2       Vancomycin Levels     Recent Labs   Lab Test 12/22/19  2035 12/19/19  1600 12/16/19  1718 12/13/19  1525 12/03/19  1548   VANCOMYCIN 18.5 23.4 18.6 19.2 14.3            Imaging:   CXR 12/23   Impression:   1. Stable support devices.  2. Slightly increased right and stable left basilar  atelectasis/consolidation.  3. Stable small pleural effusions.    CXR 12/22  Impression:   1. Interval decrease in small right pleural effusion and associated  bibasilar opacities. New airspace opacity.  2. Interval placement of tracheostomy tube, with tip in the mid  thoracic trachea. Additional support devices appear stable.    MR Cervical Spine wwo contrast 12/19  Impression:   1.  No significant change in anterior epidural phlegmon/abscess  extending from C2 down to C6. Stable/slightly increased spinal cord  edema.  2.  Persistent discitis osteomyelitis findings C3-C5.  3.  Stable/slightly progressed extensive prevertebral phlegmon  extending from C2 through C5. Decreased abscess in the prevertebral  phlegmon likely due to drainage into the hypopharynx through a  fistulous communication.

## 2019-12-26 NOTE — PLAN OF CARE
Major Shift Events:  Pt spiked fever 101.5. prn tylenol given with improvement in temp. Neuros remains unchanged. Other VS stable on 50% FiO2. Denies pain. Degroot with good UOP. No stool.  Plan: continue to support POC. Awaiting rehab placement.  For vital signs and complete assessments, please see documentation flowsheets

## 2019-12-27 ENCOUNTER — APPOINTMENT (OUTPATIENT)
Dept: PHYSICAL THERAPY | Facility: CLINIC | Age: 64
DRG: 003 | End: 2019-12-27
Attending: NEUROLOGICAL SURGERY
Payer: COMMERCIAL

## 2019-12-27 ENCOUNTER — APPOINTMENT (OUTPATIENT)
Dept: MRI IMAGING | Facility: CLINIC | Age: 64
DRG: 003 | End: 2019-12-27
Attending: STUDENT IN AN ORGANIZED HEALTH CARE EDUCATION/TRAINING PROGRAM
Payer: COMMERCIAL

## 2019-12-27 LAB
ANION GAP SERPL CALCULATED.3IONS-SCNC: 6 MMOL/L (ref 3–14)
BASOPHILS # BLD AUTO: 0 10E9/L (ref 0–0.2)
BASOPHILS NFR BLD AUTO: 0.2 %
BUN SERPL-MCNC: 36 MG/DL (ref 7–30)
CALCIUM SERPL-MCNC: 8 MG/DL (ref 8.5–10.1)
CHLORIDE SERPL-SCNC: 114 MMOL/L (ref 94–109)
CO2 SERPL-SCNC: 27 MMOL/L (ref 20–32)
CREAT SERPL-MCNC: 0.5 MG/DL (ref 0.66–1.25)
CRP SERPL-MCNC: 100 MG/L (ref 0–8)
DEPRECATED CALCIDIOL+CALCIFEROL SERPL-MC: 33 UG/L (ref 20–75)
DIFFERENTIAL METHOD BLD: ABNORMAL
EOSINOPHIL # BLD AUTO: 0.1 10E9/L (ref 0–0.7)
EOSINOPHIL NFR BLD AUTO: 0.9 %
ERYTHROCYTE [DISTWIDTH] IN BLOOD BY AUTOMATED COUNT: 14.9 % (ref 10–15)
GFR SERPL CREATININE-BSD FRML MDRD: >90 ML/MIN/{1.73_M2}
GLUCOSE BLDC GLUCOMTR-MCNC: 156 MG/DL (ref 70–99)
GLUCOSE BLDC GLUCOMTR-MCNC: 160 MG/DL (ref 70–99)
GLUCOSE BLDC GLUCOMTR-MCNC: 171 MG/DL (ref 70–99)
GLUCOSE BLDC GLUCOMTR-MCNC: 200 MG/DL (ref 70–99)
GLUCOSE BLDC GLUCOMTR-MCNC: 202 MG/DL (ref 70–99)
GLUCOSE BLDC GLUCOMTR-MCNC: 222 MG/DL (ref 70–99)
GLUCOSE SERPL-MCNC: 165 MG/DL (ref 70–99)
HCT VFR BLD AUTO: 29.8 % (ref 40–53)
HGB BLD-MCNC: 9.1 G/DL (ref 13.3–17.7)
IMM GRANULOCYTES # BLD: 0 10E9/L (ref 0–0.4)
IMM GRANULOCYTES NFR BLD: 0.3 %
LYMPHOCYTES # BLD AUTO: 1.1 10E9/L (ref 0.8–5.3)
LYMPHOCYTES NFR BLD AUTO: 9.7 %
MAGNESIUM SERPL-MCNC: 1.8 MG/DL (ref 1.6–2.3)
MCH RBC QN AUTO: 29.3 PG (ref 26.5–33)
MCHC RBC AUTO-ENTMCNC: 30.5 G/DL (ref 31.5–36.5)
MCV RBC AUTO: 96 FL (ref 78–100)
MONOCYTES # BLD AUTO: 0.9 10E9/L (ref 0–1.3)
MONOCYTES NFR BLD AUTO: 8 %
NEUTROPHILS # BLD AUTO: 9.4 10E9/L (ref 1.6–8.3)
NEUTROPHILS NFR BLD AUTO: 80.9 %
NRBC # BLD AUTO: 0 10*3/UL
NRBC BLD AUTO-RTO: 0 /100
PHOSPHATE SERPL-MCNC: 1.7 MG/DL (ref 2.5–4.5)
PHOSPHATE SERPL-MCNC: 2 MG/DL (ref 2.5–4.5)
PLATELET # BLD AUTO: 227 10E9/L (ref 150–450)
POTASSIUM SERPL-SCNC: 3.4 MMOL/L (ref 3.4–5.3)
RBC # BLD AUTO: 3.11 10E12/L (ref 4.4–5.9)
SODIUM SERPL-SCNC: 147 MMOL/L (ref 133–144)
VANCOMYCIN SERPL-MCNC: 19.5 MG/L
WBC # BLD AUTO: 11.7 10E9/L (ref 4–11)

## 2019-12-27 PROCEDURE — 40000961 ZZH STATISTIC INTRAPULMONARY PERCUSSIVE VENT

## 2019-12-27 PROCEDURE — 82306 VITAMIN D 25 HYDROXY: CPT | Performed by: NEUROLOGICAL SURGERY

## 2019-12-27 PROCEDURE — A9585 GADOBUTROL INJECTION: HCPCS | Performed by: STUDENT IN AN ORGANIZED HEALTH CARE EDUCATION/TRAINING PROGRAM

## 2019-12-27 PROCEDURE — 25000132 ZZH RX MED GY IP 250 OP 250 PS 637: Performed by: STUDENT IN AN ORGANIZED HEALTH CARE EDUCATION/TRAINING PROGRAM

## 2019-12-27 PROCEDURE — 25000128 H RX IP 250 OP 636: Performed by: NEUROLOGICAL SURGERY

## 2019-12-27 PROCEDURE — 25000132 ZZH RX MED GY IP 250 OP 250 PS 637: Performed by: PSYCHIATRY & NEUROLOGY

## 2019-12-27 PROCEDURE — 25800030 ZZH RX IP 258 OP 636: Performed by: NEUROLOGICAL SURGERY

## 2019-12-27 PROCEDURE — 97530 THERAPEUTIC ACTIVITIES: CPT | Mod: GP

## 2019-12-27 PROCEDURE — 72156 MRI NECK SPINE W/O & W/DYE: CPT

## 2019-12-27 PROCEDURE — 25500064 ZZH RX 255 OP 636: Performed by: STUDENT IN AN ORGANIZED HEALTH CARE EDUCATION/TRAINING PROGRAM

## 2019-12-27 PROCEDURE — 84100 ASSAY OF PHOSPHORUS: CPT | Performed by: STUDENT IN AN ORGANIZED HEALTH CARE EDUCATION/TRAINING PROGRAM

## 2019-12-27 PROCEDURE — 00000146 ZZHCL STATISTIC GLUCOSE BY METER IP

## 2019-12-27 PROCEDURE — 20000004 ZZH R&B ICU UMMC

## 2019-12-27 PROCEDURE — 94640 AIRWAY INHALATION TREATMENT: CPT | Mod: 76

## 2019-12-27 PROCEDURE — 36415 COLL VENOUS BLD VENIPUNCTURE: CPT | Performed by: STUDENT IN AN ORGANIZED HEALTH CARE EDUCATION/TRAINING PROGRAM

## 2019-12-27 PROCEDURE — 94640 AIRWAY INHALATION TREATMENT: CPT

## 2019-12-27 PROCEDURE — 80202 ASSAY OF VANCOMYCIN: CPT | Performed by: NEUROLOGICAL SURGERY

## 2019-12-27 PROCEDURE — 25000132 ZZH RX MED GY IP 250 OP 250 PS 637: Performed by: NURSE PRACTITIONER

## 2019-12-27 PROCEDURE — 86140 C-REACTIVE PROTEIN: CPT | Performed by: STUDENT IN AN ORGANIZED HEALTH CARE EDUCATION/TRAINING PROGRAM

## 2019-12-27 PROCEDURE — 25800030 ZZH RX IP 258 OP 636: Performed by: STUDENT IN AN ORGANIZED HEALTH CARE EDUCATION/TRAINING PROGRAM

## 2019-12-27 PROCEDURE — 27210437 ZZH NUTRITION PRODUCT SEMIELEM INTERMED LITER

## 2019-12-27 PROCEDURE — 80048 BASIC METABOLIC PNL TOTAL CA: CPT | Performed by: STUDENT IN AN ORGANIZED HEALTH CARE EDUCATION/TRAINING PROGRAM

## 2019-12-27 PROCEDURE — 25000125 ZZHC RX 250: Performed by: STUDENT IN AN ORGANIZED HEALTH CARE EDUCATION/TRAINING PROGRAM

## 2019-12-27 PROCEDURE — 83735 ASSAY OF MAGNESIUM: CPT | Performed by: STUDENT IN AN ORGANIZED HEALTH CARE EDUCATION/TRAINING PROGRAM

## 2019-12-27 PROCEDURE — 25000132 ZZH RX MED GY IP 250 OP 250 PS 637: Performed by: COUNSELOR

## 2019-12-27 PROCEDURE — 36415 COLL VENOUS BLD VENIPUNCTURE: CPT | Performed by: NEUROLOGICAL SURGERY

## 2019-12-27 PROCEDURE — 94681 O2 UPTK CO2 OUTP % O2 XTRC: CPT

## 2019-12-27 PROCEDURE — G0463 HOSPITAL OUTPT CLINIC VISIT: HCPCS

## 2019-12-27 PROCEDURE — 25000128 H RX IP 250 OP 636: Performed by: COUNSELOR

## 2019-12-27 PROCEDURE — 85025 COMPLETE CBC W/AUTO DIFF WBC: CPT | Performed by: STUDENT IN AN ORGANIZED HEALTH CARE EDUCATION/TRAINING PROGRAM

## 2019-12-27 PROCEDURE — 40000275 ZZH STATISTIC RCP TIME EA 10 MIN

## 2019-12-27 PROCEDURE — 25000125 ZZHC RX 250

## 2019-12-27 PROCEDURE — 94003 VENT MGMT INPAT SUBQ DAY: CPT

## 2019-12-27 PROCEDURE — 25000132 ZZH RX MED GY IP 250 OP 250 PS 637: Performed by: NEUROLOGICAL SURGERY

## 2019-12-27 PROCEDURE — 25000128 H RX IP 250 OP 636: Performed by: STUDENT IN AN ORGANIZED HEALTH CARE EDUCATION/TRAINING PROGRAM

## 2019-12-27 RX ORDER — METOPROLOL TARTRATE 50 MG
100 TABLET ORAL 2 TIMES DAILY
Status: DISCONTINUED | OUTPATIENT
Start: 2019-12-27 | End: 2019-12-31 | Stop reason: HOSPADM

## 2019-12-27 RX ORDER — GADOBUTROL 604.72 MG/ML
10 INJECTION INTRAVENOUS ONCE
Status: COMPLETED | OUTPATIENT
Start: 2019-12-27 | End: 2019-12-27

## 2019-12-27 RX ADMIN — Medication 2 SPRAY: at 16:33

## 2019-12-27 RX ADMIN — Medication 100 MG: at 07:54

## 2019-12-27 RX ADMIN — Medication 2 SPRAY: at 19:51

## 2019-12-27 RX ADMIN — BUMETANIDE 1 MG: 1 TABLET ORAL at 19:49

## 2019-12-27 RX ADMIN — BUMETANIDE 1 MG: 1 TABLET ORAL at 07:54

## 2019-12-27 RX ADMIN — INSULIN ASPART 2 UNITS: 100 INJECTION, SOLUTION INTRAVENOUS; SUBCUTANEOUS at 12:33

## 2019-12-27 RX ADMIN — CARBOXYMETHYLCELLULOSE SODIUM 1 DROP: 5 SOLUTION/ DROPS OPHTHALMIC at 16:33

## 2019-12-27 RX ADMIN — METRONIDAZOLE 500 MG: 500 TABLET ORAL at 20:12

## 2019-12-27 RX ADMIN — VANCOMYCIN HYDROCHLORIDE 1250 MG: 10 INJECTION, POWDER, LYOPHILIZED, FOR SOLUTION INTRAVENOUS at 07:54

## 2019-12-27 RX ADMIN — FAMOTIDINE 20 MG: 40 POWDER, FOR SUSPENSION ORAL at 19:50

## 2019-12-27 RX ADMIN — ALBUTEROL SULFATE 2.5 MG: 2.5 SOLUTION RESPIRATORY (INHALATION) at 07:46

## 2019-12-27 RX ADMIN — ACETAMINOPHEN 650 MG: 325 TABLET, FILM COATED ORAL at 14:04

## 2019-12-27 RX ADMIN — ACETYLCYSTEINE 2 ML: 100 INHALANT RESPIRATORY (INHALATION) at 05:09

## 2019-12-27 RX ADMIN — METRONIDAZOLE 500 MG: 500 TABLET ORAL at 14:04

## 2019-12-27 RX ADMIN — Medication 2 G: at 05:50

## 2019-12-27 RX ADMIN — CEFEPIME HYDROCHLORIDE 2 G: 2 INJECTION, POWDER, FOR SOLUTION INTRAVENOUS at 14:04

## 2019-12-27 RX ADMIN — ALBUTEROL SULFATE 2.5 MG: 2.5 SOLUTION RESPIRATORY (INHALATION) at 12:39

## 2019-12-27 RX ADMIN — METRONIDAZOLE 500 MG: 500 TABLET ORAL at 07:54

## 2019-12-27 RX ADMIN — POTASSIUM CHLORIDE 20 MEQ: 1.5 POWDER, FOR SOLUTION ORAL at 05:50

## 2019-12-27 RX ADMIN — CYCLOBENZAPRINE HYDROCHLORIDE 10 MG: 10 TABLET, FILM COATED ORAL at 19:49

## 2019-12-27 RX ADMIN — ACETYLCYSTEINE 2 ML: 100 INHALANT RESPIRATORY (INHALATION) at 00:30

## 2019-12-27 RX ADMIN — LIDOCAINE 2 PATCH: 560 PATCH PERCUTANEOUS; TOPICAL; TRANSDERMAL at 20:30

## 2019-12-27 RX ADMIN — VANCOMYCIN HYDROCHLORIDE 1250 MG: 500 INJECTION, POWDER, LYOPHILIZED, FOR SOLUTION INTRAVENOUS at 19:46

## 2019-12-27 RX ADMIN — SENNOSIDES AND DOCUSATE SODIUM 1 TABLET: 8.6; 5 TABLET ORAL at 19:49

## 2019-12-27 RX ADMIN — HEPARIN SODIUM 5000 UNITS: 5000 INJECTION, SOLUTION INTRAVENOUS; SUBCUTANEOUS at 19:46

## 2019-12-27 RX ADMIN — METOPROLOL TARTRATE 75 MG: 50 TABLET, FILM COATED ORAL at 07:54

## 2019-12-27 RX ADMIN — POTASSIUM PHOSPHATE, MONOBASIC AND POTASSIUM PHOSPHATE, DIBASIC 20 MMOL: 224; 236 INJECTION, SOLUTION INTRAVENOUS at 07:27

## 2019-12-27 RX ADMIN — OXYCODONE HYDROCHLORIDE 10 MG: 5 SOLUTION ORAL at 05:42

## 2019-12-27 RX ADMIN — CYCLOBENZAPRINE HYDROCHLORIDE 10 MG: 10 TABLET, FILM COATED ORAL at 14:04

## 2019-12-27 RX ADMIN — INSULIN ASPART 8 UNITS: 100 INJECTION, SOLUTION INTRAVENOUS; SUBCUTANEOUS at 16:36

## 2019-12-27 RX ADMIN — METOPROLOL TARTRATE 100 MG: 50 TABLET, FILM COATED ORAL at 19:49

## 2019-12-27 RX ADMIN — INSULIN ASPART 4 UNITS: 100 INJECTION, SOLUTION INTRAVENOUS; SUBCUTANEOUS at 04:10

## 2019-12-27 RX ADMIN — INSULIN ASPART 6 UNITS: 100 INJECTION, SOLUTION INTRAVENOUS; SUBCUTANEOUS at 08:09

## 2019-12-27 RX ADMIN — INSULIN ASPART 4 UNITS: 100 INJECTION, SOLUTION INTRAVENOUS; SUBCUTANEOUS at 19:42

## 2019-12-27 RX ADMIN — CEFEPIME HYDROCHLORIDE 2 G: 2 INJECTION, POWDER, FOR SOLUTION INTRAVENOUS at 05:12

## 2019-12-27 RX ADMIN — FAMOTIDINE 20 MG: 40 POWDER, FOR SUSPENSION ORAL at 07:55

## 2019-12-27 RX ADMIN — ALBUTEROL SULFATE 2.5 MG: 2.5 SOLUTION RESPIRATORY (INHALATION) at 00:30

## 2019-12-27 RX ADMIN — GADOBUTROL 9 ML: 604.72 INJECTION INTRAVENOUS at 11:32

## 2019-12-27 RX ADMIN — FOLIC ACID 1 MG: 1 TABLET ORAL at 07:54

## 2019-12-27 RX ADMIN — TRAZODONE HYDROCHLORIDE 100 MG: 50 TABLET ORAL at 21:57

## 2019-12-27 RX ADMIN — CEFEPIME HYDROCHLORIDE 2 G: 2 INJECTION, POWDER, FOR SOLUTION INTRAVENOUS at 21:57

## 2019-12-27 RX ADMIN — ALBUTEROL SULFATE 2.5 MG: 2.5 SOLUTION RESPIRATORY (INHALATION) at 05:09

## 2019-12-27 RX ADMIN — Medication 12.5 MG: at 07:56

## 2019-12-27 RX ADMIN — AMLODIPINE BESYLATE 10 MG: 10 TABLET ORAL at 07:55

## 2019-12-27 RX ADMIN — INSULIN ASPART 6 UNITS: 100 INJECTION, SOLUTION INTRAVENOUS; SUBCUTANEOUS at 00:06

## 2019-12-27 RX ADMIN — ACETYLCYSTEINE 2 ML: 100 INHALANT RESPIRATORY (INHALATION) at 15:58

## 2019-12-27 RX ADMIN — ACETYLCYSTEINE 2 ML: 100 INHALANT RESPIRATORY (INHALATION) at 20:39

## 2019-12-27 RX ADMIN — CYCLOBENZAPRINE HYDROCHLORIDE 10 MG: 10 TABLET, FILM COATED ORAL at 07:54

## 2019-12-27 RX ADMIN — Medication 2 SPRAY: at 12:31

## 2019-12-27 RX ADMIN — ACETAMINOPHEN 650 MG: 325 TABLET, FILM COATED ORAL at 05:42

## 2019-12-27 RX ADMIN — Medication 12.5 MG: at 19:51

## 2019-12-27 RX ADMIN — ALBUTEROL SULFATE 2.5 MG: 2.5 SOLUTION RESPIRATORY (INHALATION) at 20:39

## 2019-12-27 RX ADMIN — HEPARIN SODIUM 5000 UNITS: 5000 INJECTION, SOLUTION INTRAVENOUS; SUBCUTANEOUS at 07:54

## 2019-12-27 RX ADMIN — MULTIVITAMIN 15 ML: LIQUID ORAL at 19:49

## 2019-12-27 RX ADMIN — Medication 2 SPRAY: at 07:55

## 2019-12-27 RX ADMIN — ACETYLCYSTEINE 2 ML: 100 INHALANT RESPIRATORY (INHALATION) at 07:46

## 2019-12-27 RX ADMIN — OXYCODONE HYDROCHLORIDE 10 MG: 5 SOLUTION ORAL at 01:48

## 2019-12-27 RX ADMIN — ALBUTEROL SULFATE 2.5 MG: 2.5 SOLUTION RESPIRATORY (INHALATION) at 15:57

## 2019-12-27 RX ADMIN — MIRTAZAPINE 30 MG: 15 TABLET, FILM COATED ORAL at 19:48

## 2019-12-27 RX ADMIN — ACETYLCYSTEINE 2 ML: 100 INHALANT RESPIRATORY (INHALATION) at 12:39

## 2019-12-27 ASSESSMENT — ACTIVITIES OF DAILY LIVING (ADL)
ADLS_ACUITY_SCORE: 26
ADLS_ACUITY_SCORE: 27
ADLS_ACUITY_SCORE: 26
ADLS_ACUITY_SCORE: 27
ADLS_ACUITY_SCORE: 27
ADLS_ACUITY_SCORE: 26

## 2019-12-27 ASSESSMENT — MIFFLIN-ST. JEOR: SCORE: 1656.74

## 2019-12-27 NOTE — PHARMACY-VANCOMYCIN DOSING SERVICE
Pharmacy Vancomycin Note  Date of Service 2019  Patient's  1955   64 year old, male    Indication: Osteomyelitis and vertebral phlegmon  Goal Trough Level: 15-20 mg/L  Day of Therapy: 19  Current Vancomycin regimen: 1250 mg IV q12h    Current estimated CrCl = Estimated Creatinine Clearance: 163.6 mL/min (A) (based on SCr of 0.5 mg/dL (L)).    Creatinine for last 3 days  2019:  3:30 AM Creatinine 0.57 mg/dL  2019:  5:00 AM Creatinine 0.58 mg/dL  2019:  3:52 AM Creatinine 0.50 mg/dL    Recent Vancomycin Levels (past 3 days)  2019:  6:35 AM Vancomycin Level 19.5 mg/L    Vancomycin IV Administrations (past 72 hours)                   vancomycin 1250 mg in 0.9% NaCl 250 mL intermittent infusion 1,250 mg (mg) 1,250 mg Given 19 0754     1,250 mg Given 19     1,250 mg Given  103     1,250 mg Given 19 1940     1,250 mg Given  0754     1,250 mg Given 19     1,250 mg Given  0900                Nephrotoxins and other renal medications (From now, onward)    Start     Dose/Rate Route Frequency Ordered Stop    19 2000  bumetanide (BUMEX) tablet 1 mg      1 mg Oral or Feeding Tube 2 TIMES DAILY 19 1039      19 0000  bumetanide (BUMEX) 1 MG tablet      1 mg Oral 2 TIMES DAILY 19 1231      19 0800  vancomycin 1250 mg in 0.9% NaCl 250 mL intermittent infusion 1,250 mg      1,250 mg  over 90 Minutes Intravenous EVERY 12 HOURS 19 1757               Contrast Orders - past 72 hours (72h ago, onward)    None        Interpretation of levels and current regimen:  Trough level is  Therapeutic  Has serum creatinine changed > 50% in last 72 hours: No  Urine output:  good urine output  Renal Function: Stable    Plan:  1. Continue Current Dose  2. Pharmacy will check trough levels as appropriate in 3-5 Days.    3. Serum creatinine levels will be ordered daily for the first week of therapy and at least twice weekly for subsequent  weeks.      Kirk Patel, PharmD, MS

## 2019-12-27 NOTE — PROGRESS NOTES
Care Coordinator - Discharge Planning    Admission Date/Time:  12/1/2019  Attending MD:  Fernando Ashton MD     Data  Chart reviewed, discussed with interdisciplinary team.   Patient was admitted for:      Assessment   Full assessment completed in previous note    Coordination of Care   RNCC [facility] # [phone] to follow up regarding [facility] and LVM.      Plan  Anticipated Discharge Date:  TBD.  Anticipated Discharge Plan:  [facility].  Awaiting a call back from [facility] referral [facility] regarding the approval for [facility].  RNCC called pt sisterCarlota #  [phone] and provided update about the discharge plan.  RNCC will cont to follow plan of care.      Edilson Xavier RN, PHN, BSN  4A and 4E/ ICU  Care Coordinator  Phone: [phone]  Pager: [phone]

## 2019-12-27 NOTE — PROGRESS NOTES
Writer cared for Pt from 1630 to 1930, see previous RN Note for full shift details and plan.     Updates: Pt remains calm, neuro status unchanged. Moderate loose BM x1.      Plan: Follow up on VRE results.     For vital signs and complete assessments, please see documentation flowsheets.

## 2019-12-27 NOTE — PROGRESS NOTES
WOC Consult    S: WOC Consult to evaluate and treat perineal skin breakdown.    B: Per Dr Lion Vargas on 19: 64 years old gentleman status post C3-C4 artificial disc placement about 2 weeks prior to arriaval the [facility] who presented after a fall with almost complete loss of strength in his upper extremities and paraplegia, found to have cervical stenosis. S/p OR  - Pharyngotomy closed with assistance from ENT. Arthroplasty implant removed. No replacement implant placed. S/p G-tube . Extubated . Re-Intubated  due to Mucous Plugging and Atelectasis. S/p repeated bronchoscopy. S/p tracheostomy with ENT . Infectious disease with concerns about adequate source control.    A: Patient turned and buttocks, coccyx,  cleft and perineum assessed. All skin is intact and blanchable. No open areas noted. Patient is incontinent of stool, díaz in place. Nursing following the Incontinence Protocol.     P: Continue with Incontinence Protocol, turning Q2 hours. No pressure injury or incontinence associated breakdown noted on assessment today.    WOC will sign off. Please re-consult with further questions or concerns.    Gaviota Lambert RN, CWOCN

## 2019-12-27 NOTE — PLAN OF CARE
Problem: Adult Inpatient Plan of Care  Goal: Plan of Care Review  12/27/2019 1744 by Miguel A Mcgrath RN  Outcome: No Change    D: PT remains in ICU awaiting rehab placement, now with trach and PEG after a fall resulting in spinal cord injury and paralysis. Hemodynamically stable today with no acute events this shift.     I/A:     Neuro: Alert and oriented, responding to yes/no questions well. Mouthing words to communicate. Gross movement of upper extremities, more on L side. No movement of lower extremities.   Cardiac: Sinus rhythm in 80s. BPS from 120-150s/60-80s. Goal <160 systolic.   Respiratory: Trached, vent at 40% FiO2 CMV settings. Attempted PS but pt feeling short of breath. Continuing nebs. Lungs clear and diminished in bases. Small amounts of creamy secretions.   GI:  Tolerating TF at goal rate. 1 large loose B today, bowel medications held.   : Degroot in place, concern for neurogenic bladder. Good UO   ID: T max 100.1, tylenol given. Begun on cefepime today.       P: COntinue to monitor and notify MD of any acute events or concerns. NO calls from family today.

## 2019-12-27 NOTE — PROGRESS NOTES
[facility] PROGRESS NOTE     Patient:  Sherwin Angel   YOB: 1955, MRN: [id number]  Date of Visit: 12/26/2019  Date of Admission: 12/1/2019          Assessment and Recommendations:     Recommendations:  - Consider repeat MRI +/- laryngoscopy. Based on the last MRI it seems unclear that we truly have adequate source control.   - Await pending Beta D glucan  - No change to cefepime, metronidazole, and vancomycin  - No evidence for fungal infection at this time, but given lines he is at potential risk for fungemia so if he spikes a high fever to >102 would repeat blood cultures and initiate fluconazole 400mg daily (Qtc 452 on 12/26)    Assessment:  Sherwin Angel is a 64-year-old man with history of hypertension and diabetes who [facility] and now [facility] with new functional quadreplegia after a fall and found to have pharyngotomy and vertebral phlegmon now s/p OR with removal of arthroplasty and placement of drain. He subsequently had G tube placed on 12/5 and ENT preformed tracheostomy on 12/20.    # Traumatic cervical spinal cord injury s/p C3-4 arthroplasty on 11/15, now removed  # Cervical stenosis C3-5  # Osteomyelitis C3-C5  # Vertebral phlegmon vs abscess s/p I/D and removal arthroplasty hardware drain placement on 12/1   Cultures growing MSSA, Strep anginosus, Eikenella, and oral anaerobes. Repeat MRI 12/10 and 12/19 shows progressive epidural abscess and inflammatory markers are rising. Concern for ongoing infection with suboptimal source control in his cervical spine    # Leukocytosis  Possibly related to infectious issues detailed above, however, patient has been on broad spectrum antibiotics for 2 weeks. CXR from this morning shows improvement in right lung opacities and stable left basilar opacities. Discussed pharyngotomy with ENT as 1 of the 2 locations was unable to be closed, however, given it has been ~3  weeks since procedure unlikely to be a source of infection at this time. Concern as above for ongoing infection in his C-spine    ID will continue to follow.  Isabel Capone MD   of Medicine, Division of Infectious Diseases  [facility] [phone]            Interval History and Events:   Continues to have bilateral shoulder discomfort with shrugging. No other acute events. Tmax 101.5. Trach in place but no speaking valve and he cannot meaningfully participate in ROS         HPI:   Adopted from Consult note 12/02:  Sherwin Angel is a 64-year-old man with history of hypertension and diabetes who had a recent [facility] and is now readmitted on 12/1 after a fall at his rehab facility that resulted in near paraplegia.     He was taken to the OR by neurosurgery on 12/1, and had the arthroplasty implant removed and an I/D. He was found to have significant inflammation in the area and 2 pharyngotomy sites, so ENT was consulted intra-operatively and closed one pharyngotomy (the other was unable to be reached) and placed a drain for the phlegmon. He was put on broad-spectrum antibiotics with cefepime, metronidazole, and vancomycin, and ID is consulted for assistance with antibiotic management.     The patient had a temperature to 100.6 overnight after the surgery, but has been afebrile throughout the day today. He is awake and able to nod yes/no to questions, and follow basic commands.           Physical Examination:   Temp:  [97.2  F (36.2  C)-100.3  F (37.9  C)] 99.4  F (37.4  C)  Pulse:  [104-107] 104  Heart Rate:  [] 104  Resp:  [21-37] 35  BP: (118-195)/() 128/75  FiO2 (%):  [50 %] 50 %  SpO2:  [91 %-98 %] 97 %    I/O last 3 completed shifts:  In: 2385 [I.V.:805; NG/GT:370]  Out: 2350 [Urine:2350]    Vitals:    12/12/19 0400 12/17/19 0400 12/18/19 0500 12/21/19 0400   Weight: 87 kg (191 lb 12.8 oz) 84.1 kg (185 lb 8 oz) 85.6 kg (188 lb 11.4 oz) 88.1 kg (194 lb 3.6  oz)    12/24/19 0400   Weight: 89 kg (196 lb 3.4 oz)       Constitutional: Adult male in bed, in NAD. Awake, alert, interactive.  HEENT: NC/AT, EOMI, sclera clear, conjunctiva normal, tracheostomy with no apparent drainage  Respiratory: No increased work of breathing, slightly course and diminished lung sounds.  Cardiovascular: RRR, no murmur noted. B/l UE edema, trace LE edema   GI: Normal bowel sounds, soft, non-distended and non-tender.   Skin: Warm, dry, well-perfused. No bruising, bleeding, rashes, or lesions on limited exam.  Neurologic: A&O. Able to mouth words or indicate yes/no. I did not elicit movement in any of his 4 extremities  Neuropsychiatric: Calm. Affect appropriate to situation.  Extremities: Right and left shoulder painful with shrugging motion. Warm but not red or hot. No pain with palpation or with passive ROM.          Medications:       acetylcysteine  2 mL Nebulization Q4H     albuterol  2.5 mg Nebulization Q4H     amLODIPine  10 mg Oral or Feeding Tube Daily     artificial saliva  2 spray Swish & Spit 4x Daily     bumetanide  1 mg Oral or Feeding Tube BID     ceFEPIme (MAXIPIME) IV  2 g Intravenous Q8H     cyanocobalamin  1,000 mcg Intramuscular Q30 Days     cyclobenzaprine  10 mg Oral TID     famotidine  20 mg Per G Tube BID     folic acid  1 mg Oral or Feeding Tube Daily     heparin ANTICOAGULANT  5,000 Units Subcutaneous Q12H     heparin lock flush  5-10 mL Intracatheter Q24H     insulin aspart  2-16 Units Subcutaneous Q4H     lidocaine  2 patch Transdermal Q24h    And     lidocaine   Transdermal Q8H     metoprolol tartrate  75 mg Oral or Feeding Tube BID     metroNIDAZOLE  500 mg Oral or Feeding Tube TID     mirtazapine  30 mg Oral or Feeding Tube QPM     multivitamins w/minerals  15 mL Per Feeding Tube Daily     polyethylene glycol  17 g Oral BID     QUEtiapine  12.5 mg Oral BID     senna-docusate  1 tablet Oral or Feeding Tube BID     traZODone  100 mg Oral or Feeding Tube At Bedtime      vancomycin (VANCOCIN) IV  1,250 mg Intravenous Q12H     thiamine  100 mg Oral or Feeding Tube Daily       Antiinfectives:  Anti-infectives (From now, onward)    Start     Dose/Rate Route Frequency Ordered Stop    12/23/19 0000  metroNIDAZOLE (FLAGYL) 500 MG tablet     Note to Pharmacy:  Through 1/22    500 mg Oral or Feeding Tube 3 TIMES DAILY 12/23/19 1252      12/23/19 0000  ceFEPIme (MAXIPIME) 2 G vial     Note to Pharmacy:  Through 1/22    2 g  over 30 Minutes Intravenous EVERY 8 HOURS 12/23/19 1252      12/20/19 0800  vancomycin 1250 mg in 0.9% NaCl 250 mL intermittent infusion 1,250 mg      1,250 mg  over 90 Minutes Intravenous EVERY 12 HOURS 12/19/19 1757      12/16/19 0800  metroNIDAZOLE (FLAGYL) tablet 500 mg      500 mg Oral or Feeding Tube 3 TIMES DAILY 12/16/19 0759      12/11/19 1500  ceFEPIme (MAXIPIME) 2 g vial to attach to  ml bag for ADULTS or 50 ml bag for PEDS      2 g  over 30 Minutes Intravenous EVERY 8 HOURS 12/11/19 1420            Infusions/Drips:    IV fluid REPLACEMENT ONLY 55 mL/hr at 12/18/19 0200            Laboratory Data:     Microbiology:  Culture Micro   Date Value Ref Range Status   12/26/2019 No growth after 8 hours  Preliminary   12/26/2019 No growth after 8 hours  Preliminary   12/24/2019 (A)  Preliminary    <10,000 colonies/mL  Enterococcus faecium  Probable VRE await confirmation  Susceptibility testing in progress     12/24/2019   Preliminary    Critical Value/Significant Value, preliminary result only, called to and read back by  Cris Self RN 4A 12.26.19 @[address] [place] [address]/[facility]     12/24/2019 No growth after 2 days  Preliminary   12/24/2019 No growth after 2 days  Preliminary   12/12/2019 (A)  Final    Light growth  Candida albicans / dubliniensis  Candida albicans and Candida dubliniensis are not routinely speciated  Susceptibility testing not routinely done     12/11/2019 (A)  Final    Moderate growth  Candida albicans / dubliniensis  Candida albicans and Candida  dubliniensis are not routinely speciated  Susceptibility testing not routinely done     12/11/2019 No growth  Final   12/11/2019 No growth  Final   12/07/2019 (A)  Final    Light growth  Candida albicans / dubliniensis  Candida albicans and Candida dubliniensis are not routinely speciated  Susceptibility testing not routinely done     12/07/2019 No growth  Final   12/07/2019 No growth  Final   12/03/2019 No growth  Final   12/03/2019 No growth  Final   12/01/2019 No growth  Final   12/01/2019 (A)  Final    Moderate growth  Fusobacterium nucleatum  Susceptibility testing not routinely done     12/01/2019 (A)  Final    Moderate growth  Parvimonas micra  Susceptibility testing not routinely done     12/01/2019 (A)  Final    Light growth  Prevotella species  Susceptibility testing not routinely done     12/01/2019 (A)  Final    Light growth  Strain 2  Prevotella species  Identification obtained by MALDI-TOF mass spectrometry research use only database. Test   characteristics determined and verified by the [facility]   ([facility]) [place], [place].  Susceptibility testing not routinely done     12/01/2019 (A)  Final    Moderate growth  Strain 3  Prevotella species  Identification obtained by MALDI-TOF mass spectrometry research use only database. Test   characteristics determined and verified by the [facility]   ([facility]) [place], [place].  Beta lactamase positive  Susceptibility testing not routinely done     12/01/2019 Culture negative after 22 days  Preliminary   12/01/2019 Single colony  Staphylococcus aureus   (A)  Final   12/01/2019 Light growth  Streptococcus anginosus   (A)  Final   12/01/2019 Light growth  Eikenella corrodens   (A)  Final       Inflammatory Markers    Recent Labs   Lab Test 12/26/19  0500 12/25/19  0330 12/23/19  0506 12/20/19  0337 12/17/19  0351 12/14/19  0444 12/12/19  0210 12/11/19  0317   .0* 120.0* 24.0* 14.0* 34.0* 48.0* 36.0*  16.0*       Metabolic Studies       Recent Labs   Lab Test 12/26/19  0500 12/25/19  0330 12/24/19  0500 12/23/19  0506 12/22/19  0430 12/21/19  0359  12/01/19  1057 12/01/19  0112    142 141 139 139 140   < > 135 133   POTASSIUM 4.3 4.2 4.4 3.9 3.7 3.8   < > 4.6 4.4   CHLORIDE 117* 113* 110* 110* 108 109   < >  --  94   CO2 21 23 25 25 28 28   < >  --  31   ANIONGAP 7 5 6 4 2* 4   < >  --  7   BUN 45* 45* 36* 28 22 15   < >  --  27   CR 0.58* 0.57* 0.58* 0.51* 0.52* 0.48*   < >  --  0.75   GFRESTIMATED >90 >90 >90 >90 >90 >90   < >  --  >90   * 165* 180* 190* 131* 105*   < > 195* 230*   A1C  --   --   --   --   --   --   --   --  6.5*   FLORINDA 8.5 8.9 8.9 8.8 8.4* 8.4*   < >  --  8.7   PHOS 2.7 2.5 3.1 2.6 2.8 3.7   < >  --   --    MAG 2.2 2.1 2.3 2.0 2.1 2.2   < >  --   --    LACT  --   --   --   --   --   --   --  1.2  --     < > = values in this interval not displayed.       Hematology Studies      Recent Labs   Lab Test 12/26/19  0500 12/25/19  0330 12/24/19  1424 12/24/19  0500 12/23/19  0506 12/22/19  0430 12/21/19  0359   WBC 14.3* 14.5* 18.1* 18.3* 8.2 7.4 7.3   ANEU 12.0* 12.9*  --  16.5* 6.7 5.1 5.2   ALYM 1.2 0.9  --  1.0 1.0 1.6 1.3   JAMES 0.9 0.6  --  0.6 0.4 0.6 0.6   AEOS 0.1 0.1  --  0.0 0.1 0.1 0.1   HGB 10.3* 10.6* 11.1* 11.4* 10.2* 9.0* 8.5*   HCT 34.6* 34.5* 36.0* 36.9* 32.9* 29.1* 27.7*    248 291 276 261 254 246       Arterial Blood Gas Testing    Recent Labs   Lab Test 12/14/19  1006 12/11/19  0535 12/05/19  1945 12/02/19  0545 12/02/19  0200   PH 7.46* 7.49* 7.43 7.42 7.43   PCO2 37 40 46* 43 42   PO2 55* 105 87 130* 122*   HCO3 26 30* 30* 28 28   O2PER 80 100 4L 60.0 50.0        Urine Studies     Recent Labs   Lab Test 12/24/19  1411 12/11/19  1141 12/07/19  2310 12/01/19  1824   URINEPH 5.0 5.5 5.5 6.0   NITRITE Negative Negative Negative Negative   LEUKEST Large* Negative Negative Negative   WBCU 51* 2 2 2       Vancomycin Levels     Recent Labs   Lab Test 12/22/19 2035  12/19/19  1600 12/16/19  1718 12/13/19  1525 12/03/19  1548   VANCOMYCIN 18.5 23.4 18.6 19.2 14.3            Imaging:   CXR 12/23   Impression:   1. Stable support devices.  2. Slightly increased right and stable left basilar  atelectasis/consolidation.  3. Stable small pleural effusions.    CXR 12/22  Impression:   1. Interval decrease in small right pleural effusion and associated  bibasilar opacities. New airspace opacity.  2. Interval placement of tracheostomy tube, with tip in the mid  thoracic trachea. Additional support devices appear stable.    MR Cervical Spine wwo contrast 12/19  Impression:   1.  No significant change in anterior epidural phlegmon/abscess  extending from C2 down to C6. Stable/slightly increased spinal cord  edema.  2.  Persistent discitis osteomyelitis findings C3-C5.  3.  Stable/slightly progressed extensive prevertebral phlegmon  extending from C2 through C5. Decreased abscess in the prevertebral  phlegmon likely due to drainage into the hypopharynx through a  fistulous communication.

## 2019-12-27 NOTE — PROGRESS NOTES
Neuroscience Intensive Care Progress Note  12/27/2019      24 hour events: Pressure support yesterday went ok but this morning with shortness of breath. Had fever this morning 100.5. blood culture negative. CRP is going down, today is 100.  Blood pressure has been high at 150s with tachycardia 110s. Had 2 large loose liquid bowel movements this morning.     Current Medications:    acetylcysteine  2 mL Nebulization Q4H     albuterol  2.5 mg Nebulization Q4H     amLODIPine  10 mg Oral or Feeding Tube Daily     artificial saliva  2 spray Swish & Spit 4x Daily     bumetanide  1 mg Oral or Feeding Tube BID     ceFEPIme (MAXIPIME) IV  2 g Intravenous Q8H     cyanocobalamin  1,000 mcg Intramuscular Q30 Days     cyclobenzaprine  10 mg Oral TID     famotidine  20 mg Per G Tube BID     folic acid  1 mg Oral or Feeding Tube Daily     heparin ANTICOAGULANT  5,000 Units Subcutaneous Q12H     heparin lock flush  5-10 mL Intracatheter Q24H     insulin aspart  2-16 Units Subcutaneous Q4H     lidocaine  2 patch Transdermal Q24h    And     lidocaine   Transdermal Q8H     metoprolol tartrate  75 mg Oral or Feeding Tube BID     metroNIDAZOLE  500 mg Oral or Feeding Tube TID     mirtazapine  30 mg Oral or Feeding Tube QPM     multivitamins w/minerals  15 mL Per Feeding Tube Daily     polyethylene glycol  17 g Oral BID     QUEtiapine  12.5 mg Oral BID     senna-docusate  1 tablet Oral or Feeding Tube BID     traZODone  100 mg Oral or Feeding Tube At Bedtime     vancomycin (VANCOCIN) IV  1,250 mg Intravenous Q12H     thiamine  100 mg Oral or Feeding Tube Daily       PRN Medications:  acetaminophen, carboxymethylcellulose PF, IV fluid REPLACEMENT ONLY, glucose **OR** dextrose **OR** glucagon, heparin lock flush, hydrALAZINE, ipratropium - albuterol 0.5 mg/2.5 mg/3 mL, labetalol, lidocaine 4%, lidocaine (buffered or not buffered), magnesium sulfate, magnesium sulfate, melatonin, naloxone, ondansetron **OR** ondansetron, oxyCODONE, potassium  "chloride, potassium chloride with lidocaine, potassium chloride, potassium chloride, potassium chloride, potassium phosphate (KPHOS) in D5W IV, potassium phosphate (KPHOS) in D5W IV, potassium phosphate (KPHOS) in D5W IV, potassium phosphate (KPHOS) in D5W IV, potassium phosphate (KPHOS) in D5W IV, potassium phosphate (KPHOS) in D5W IV, potassium phosphate (KPHOS) in D5W IV, potassium phosphate (KPHOS) in D5W IV, potassium phosphate (KPHOS) in D5W IV, prochlorperazine **OR** prochlorperazine **OR** prochlorperazine, sodium chloride (PF), sodium phosphate    Objective findings:  BP (!) 167/81   Pulse 115   Temp 100.5  F (38.1  C) (Axillary)   Resp 23   Ht 1.727 m (5' 8\")   Wt 88.7 kg (195 lb 9.6 oz)   SpO2 95%   BMI 29.74 kg/m      Ventilator Settings:  Ventilation Mode: CMV/AC  (Continuous Mandatory Ventilation/ Assist Control)  FiO2 (%): 50 %  Rate Set (breaths/minute): 12 breaths/min  Tidal Volume Set (mL): 450 mL  PEEP (cm H2O): 5 cmH2O  Pressure Support (cm H2O): 7 cmH2O  Oxygen Concentration (%): 50 %  Resp: 23      Intake/Output:      Intake/Output Summary (Last 24 hours) at 12/22/2019 1157  Last data filed at 12/23/2019   Gross per 24 hour   Intake 2320 ml   Output 2395 ml   Net  -75 ml         Physical Examination:   Vitals:  B/P: 166/95, T: 100.9, P: 120,   General:  Laying in bed, he is more comfortable today  HEENT:  NC/AT, cervical collar in place. no icterus, op pink and moist, no ear or nose drainage.   Cardiac:  RRR but tachycaric  Chest:  Minimal insbiratory wheezes  Abdomen:  Less distended and softer with reduced BS  Extremities:  No LE swelling.    Skin:  No rash or lesion.      Neuro Exam:   Mental status: alert, follows all commands   Cranial nerves: PERRL, EOMI, face symmetric  Motor: Some flexion extension at elbow bilaterally more left (3/5) than right (2/5). Some movement left wrist (3/5 ext) and fingers, no thumb abduction, no movement in distal RUE. No movements of the " legs  Sensory: reports sensation in arms and upper chest and both thighs    Labs/Studies:  CBC RESULTS:   WBC 14.3--> 11.7  Hb 10.3 --> 9.1  Plt 256 --> 227     --> 100    Last Comprehensive Metabolic Panel:  Sodium   Date Value Ref Range Status   12/27/2019 147 (H) 133 - 144 mmol/L Final     Potassium   Date Value Ref Range Status   12/27/2019 3.4 3.4 - 5.3 mmol/L Final     Chloride   Date Value Ref Range Status   12/27/2019 114 (H) 94 - 109 mmol/L Final     Carbon Dioxide   Date Value Ref Range Status   12/27/2019 27 20 - 32 mmol/L Final     Anion Gap   Date Value Ref Range Status   12/27/2019 6 3 - 14 mmol/L Final     Glucose   Date Value Ref Range Status   12/27/2019 165 (H) 70 - 99 mg/dL Final     Urea Nitrogen   Date Value Ref Range Status   12/27/2019 36 (H) 7 - 30 mg/dL Final     Creatinine   Date Value Ref Range Status   12/27/2019 0.50 (L) 0.66 - 1.25 mg/dL Final     GFR Estimate   Date Value Ref Range Status   12/27/2019 >90 >60 mL/min/[1.73_m2] Final     Comment:     Non  GFR Calc  Starting 12/18/2018, serum creatinine based estimated GFR (eGFR) will be   calculated using the Chronic Kidney Disease Epidemiology Collaboration   (CKD-EPI) equation.       Calcium   Date Value Ref Range Status   12/27/2019 8.0 (L) 8.5 - 10.1 mg/dL Final   Mg 1.8  Phosph: 1.7    B 2 glucan: positive 129  bl culture negative (first and second one )  Urine culture growth of VRE resistant to multiple Abx  Sputum culture not collected    UA RESULTS:  Recent Labs   Lab Test 12/24/19  1411   COLOR Yellow   APPEARANCE Cloudy   URINEGLC Negative   URINEBILI Negative   URINEKETONE Negative   SG 1.013   UBLD Small*   URINEPH 5.0   PROTEIN 30*   NITRITE Negative   LEUKEST Large*   RBCU 13*   WBCU 51*       Neuro:  #Cervical spinal cord injury  #Discitis/osteomyelitis C3-C4  - [place] Collar at all times, Up with assist  - Neuro checks q4h during the day   - continue PT/OT  - MRI cervical spines with contrast  today     #Anxiety/Pain - good control   - Oxycodone 5-10 q4hrs prn   - flexeril to 10 mg TID      #Hx of Alcohol dependence  - Thiamine and folate     #Depression  - Continue PTA miratazepine  - continue trazodone 100 mg at bedtime  - Seroquel 12.5 BID      Resp:  #Acute respiratory failure s/p re-intubation 12/6, suspect diaphragmatic weakness related to spine injury  - Mucomyst & chest physio   - pressure support trial TID  - weaning off vent as tolerated  --metanebs   --chest physio per RT  --percussive therapy  - desaturation when positioned on Rt side, try positional changes first   - S/P trach 12/20   - sputum culture needs to be collected  - Chest xray yesterday stable     Cardio:  # Hypertension, tachycardia   - PTA amlodipine 10 mg   - increase PTA metoprolol to 100 mg bid  - PTA Bumex 1 mg bid       Renal:  Hypophosphatemia   Hypernatremia   - Electrolyte replacement protocol   - Monitor I/O goal In= outs   - increase free water flushes to 60 q4h  - minimal diuresis (I/O +405)  - Switch saline to D5w for the IV meds    Neurogenic urinary retention   - failed díaz wean trial 12/12 caused retention   - UA with possible infection  - Urine culture VRE resistant amp, nitrofurantoin, penicillin, vanc, waiting for Linezolid senstivity     Endo:  #DM2  - Sliding scale insulin  - start lantus in the AM  - previously on 38 U NPH am/pm with high dose sliding scale insulin      Heme:  #Normocytic anemia, stable  - Hg > 7.0     # Leukocytosis, improving   Likely due to UTI. Blood culture negative so far and UC positive for VRE. B2 Glucan positive 129  - CBC daily  -   - ID on board     GI:  #Constipation - still distended abdomen last BM 12/27  - Senna scheduled bid  - PRN suppository/enema   - Miralax bid  Night dose tonight     #Diet   - G-tube in place  - Tube feeds at goal      ID:   #Vertebral phlegmon and Abscess s/p drain placement 12/1  #Osteomyelitis C3-5  #Fever - restarted 12/25  #Leukocytosis,  improving   #repeat MRI 12/10 and 12/19 shows progressive epidural abscess.   - Vanc, cefepime, metronidazole regimen per ID (through 1/22)  - Weekly CBC, CMP, CRP while on IV abx,  - Spinal tissue cultures (12/1): MSSA, Strep anginosus, Eikenella, and oral anaerobes   - Per ID:  - will need at least 6wks of above abx from day 1 (12/11), earliest stop date of 1/22/20   - repeat MRI cervical spines today    # elevated CRP and WBCs, resolving  # new fever  # VRE UTI and fungal infection    ID does not think the new fever and elevated CRP is due to UTI. They think possibly spinal abscess.   - blood culture negative  - Repeat blood culture negative  -  B 2 Glucan positive   - Follow up ID recs    Lines:  - Trach, PICC (possible line holiday after we touch base with ID) , PIV x 2, Degroot, PEG     FEN: TF via PEG   PPX:    DVT prophylaxis: SCDs, Heparin subcutaneous restart 11/22    GI: famotidine   Code Status: Full, presumed     [facility]    This patient was seen & discussed with my attending, Dr. Eduardo MD, who agrees with my assessment and plan.       Hermilo Schwartz MD  PGY2  [phone]

## 2019-12-27 NOTE — PLAN OF CARE
PT / 4A -     Discharge Planner PT   Patient plan for discharge: not formally discussed  Current status: Requires Max A x 2 for rolling.  Dependent lift transfer to sitting EOB.  Requires sling support and Mod-Max A to maintain sitting balance.    Barriers to return to prior living situation: level of assist, SCI  Recommendations for discharge: rehab for SCI   Rationale for recommendations: to promote independence        Entered by: Ramandeep Lares 12/27/2019 3:22 PM

## 2019-12-27 NOTE — PLAN OF CARE
Major Shift Events: Extremely anxious this am upon arrival to shift - hypertensive, tachycardic, tachypneic.  With scheduled meds and pain medication patient became more calm throughout shift.  Now normotensive, SR, and respiratory rate in 20s-30s.  PS this afternoon.  Call received from microbiology - patient to be placed on contact precautions for possible positive VRE.  Results are pending and should be available tomorrow.     Plan: Follow up on VRE results.      For vital signs and complete assessments, please see documentation flowsheets.

## 2019-12-27 NOTE — PROGRESS NOTES
[facility], [place]   Neurosurgery Progress Note:    Assessment: 64 years old gentleman status post C3-C4 artificial disc placement about 2 weeks prior to arriaval the [facility] who presented after a fall with almost complete loss of strength in his upper extremities and paraplegia, found to have cervical stenosis. S/p OR 12/1 - Pharyngotomy closed with assistance from ENT. Arthroplasty implant removed. No replacement implant placed. S/p G-tube 12/5. Extubated 12/5. Re-Intubated 12/6 due to Mucous Plugging and Atelectasis. S/p repeated bronchoscopy. S/p tracheostomy with ENT 12/20. Infectious disease with concerns about adequate source control.    Plan:  - Nebs, chest physiotherapy  - DVT: SCDs while in bed  - [place] collar  - per ID: vancomycin, cefepime, flagyl 4-6 wks for vertebral phlegmon, weekly labs; f/u clx; MRI cervical spine 12/27; appreciate recs; if fever > 102, will repeat blood cultures, start fluconazole  - no plan for another surgery  - GI/nutrition: tube feeds  - trach change per respiratory therapy  - MRI cervical spine before stopping abx in 6 wks  - Endo: endocrine cslt; insulin sliding scale  - [facility] 4A[facility]  -----------------------------------  Lion Vargas MD  Neurosurgery Resident PGY2    Please contact neurosurgery resident on call with questions.    Dial * * [phone], enter [phone] when prompted.     Subjective: no acute events    Objective:   Temp:  [97.2  F (36.2  C)-100.5  F (38.1  C)] 100.5  F (38.1  C)  Pulse:  [] 115  Heart Rate:  [] 114  Resp:  [16-37] 23  BP: (117-195)/(67-96) 167/81  FiO2 (%):  [50 %] 50 %  SpO2:  [91 %-99 %] 95 %  I/O last 3 completed shifts:  In: 2570 [I.V.:865; NG/GT:550]  Out: 3600 [Urine:3600]    Gen: Appears comfortable, NAD  Wound: Incision, clean, dry, intact  Pulm: tracheostomy, mechanical ventilator  Neuro: Awake, alert, nods yes or no to questions  Follows  commands  Deltoids 4/5, biceps 4-/5, triceps 1/5,  0/5, lower extremities 0/5  Triple flexion in lower extremities  Sensation to light touch in arms and upper chest    LABS  Recent Labs   Lab Test 12/27/19  0352 12/26/19  0500 12/25/19  0330   WBC 11.7* 14.3* 14.5*   HGB 9.1* 10.3* 10.6*   MCV 96 98 97    256 248       Recent Labs   Lab Test 12/27/19  0352 12/26/19  0500 12/25/19  0330   * 144 142   POTASSIUM 3.4 4.3 4.2   CHLORIDE 114* 117* 113*   CO2 27 21 23   BUN 36* 45* 45*   CR 0.50* 0.58* 0.57*   ANIONGAP 6 7 5   FLORINDA 8.0* 8.5 8.9   * 184* 165*

## 2019-12-27 NOTE — PLAN OF CARE
Major Shift Events:  Patient vitally stable throughout shift. Patient complains of dry mouth this shift. Vent settings overnight /rate 12/PEEP 5/FiO2 50%. 2 large loose liquid bowel movements. Hold am bowel meds.   Plan: PRN oxycodone is available when needed for pain. VRE results. Waiting for placement.   For vital signs and complete assessments, please see documentation flowsheets.

## 2019-12-27 NOTE — PROGRESS NOTES
Neuroscience Intensive Care Progress Note  12/28/2019      24 hour events: Pressure support yesterday went ok for 5 hours. No fever overnight. Blood culture negative. CRP is going up, today is 120.  Blood pressure has been [id number] , HR around 70. Had 2 large loose liquid bowel movements this morning but his abdomen is distended. He stated has chest discomfort      Current Medications:    acetylcysteine  2 mL Nebulization Q4H     albuterol  2.5 mg Nebulization Q4H     amLODIPine  10 mg Oral or Feeding Tube Daily     artificial saliva  2 spray Swish & Spit 4x Daily     bumetanide  1 mg Oral or Feeding Tube BID     ceFEPIme (MAXIPIME) IV  2 g Intravenous Q8H     citalopram  40 mg Oral Daily     cyanocobalamin  1,000 mcg Intramuscular Q30 Days     cyclobenzaprine  10 mg Oral TID     famotidine  20 mg Per G Tube BID     folic acid  1 mg Oral or Feeding Tube Daily     heparin ANTICOAGULANT  5,000 Units Subcutaneous Q12H     heparin lock flush  5-10 mL Intracatheter Q24H     insulin aspart  2-16 Units Subcutaneous Q4H     lidocaine  2 patch Transdermal Q24h    And     lidocaine   Transdermal Q8H     metoprolol tartrate  100 mg Oral or Feeding Tube BID     metroNIDAZOLE  500 mg Oral or Feeding Tube TID     miconazole   Topical BID     mirtazapine  30 mg Oral or Feeding Tube QPM     multivitamins w/minerals  15 mL Per Feeding Tube Daily     polyethylene glycol  17 g Oral BID     QUEtiapine  12.5 mg Oral BID     senna-docusate  1 tablet Oral or Feeding Tube BID     traZODone  100 mg Oral or Feeding Tube At Bedtime     vancomycin (VANCOCIN) IV  1,250 mg Intravenous Q12H     thiamine  100 mg Oral or Feeding Tube Daily       PRN Medications:  acetaminophen, carboxymethylcellulose PF, IV fluid REPLACEMENT ONLY, glucose **OR** dextrose **OR** glucagon, heparin lock flush, hydrALAZINE, hypromellose-dextran, ipratropium - albuterol 0.5 mg/2.5 mg/3 mL, labetalol, lidocaine 4%, lidocaine (buffered or not buffered), magnesium  "sulfate, magnesium sulfate, melatonin, naloxone, ondansetron **OR** ondansetron, oxyCODONE, potassium chloride, potassium chloride with lidocaine, potassium chloride, potassium chloride, potassium chloride, potassium phosphate (KPHOS) in D5W IV, potassium phosphate (KPHOS) in D5W IV, potassium phosphate (KPHOS) in D5W IV, potassium phosphate (KPHOS) in D5W IV, potassium phosphate (KPHOS) in D5W IV, prochlorperazine **OR** prochlorperazine **OR** prochlorperazine, sodium chloride (PF), sodium phosphate    Objective findings:  BP (!) 161/95   Pulse 90   Temp 98.7  F (37.1  C) (Axillary)   Resp 22   Ht 1.727 m (5' 8\")   Wt 88.4 kg (194 lb 14.2 oz)   SpO2 96%   BMI 29.63 kg/m      Ventilator Settings:  Ventilation Mode: CMV/AC  (Continuous Mandatory Ventilation/ Assist Control)  FiO2 (%): 40 %  Rate Set (breaths/minute): 12 breaths/min  Tidal Volume Set (mL): 450 mL  PEEP (cm H2O): 5 cmH2O  Oxygen Concentration (%): 40 %  Resp: 22      Intake/Output:      Intake/Output Summary (Last 24 hours) at 12/28/2019  Last data filed at 12/23/2019   Gross per 24 hour   Intake 2495 ml   Output 3955 ml   Net  -1460 ml         Physical Examination:   Vitals:  B/P: 166/95, T: 100.9, P: 120,   General:  Laying in bed, he is more comfortable today  HEENT:  NC/AT, cervical collar in place. no icterus, op pink and moist, no ear or nose drainage.   Cardiac:  RRR but tachycaric  Chest:  Minimal insbiratory wheezes  Abdomen:  Less distended and softer with reduced BS  Extremities:  No LE swelling.    Skin:  No rash or lesion.      Neuro Exam:   Mental status: alert, follows all commands   Cranial nerves: PERRL, EOMI, face symmetric  Motor: Some flexion extension at elbow bilaterally more left (3/5) than right (2/5). Some movement left wrist (3/5 ext) and fingers, no thumb abduction, no movement in distal RUE. No movements of the legs  Sensory: reports sensation in arms and upper chest and both thighs    Labs/Studies:  CBC RESULTS: "   WBC 14.3--> 11.7--> 12.3  Hb 10.3 --> 9.1 --> 8.4  Plt 256 --> 227-->216     --> 100 --> 120    Last Comprehensive Metabolic Panel:  Sodium   Date Value Ref Range Status   12/28/2019 141 133 - 144 mmol/L Final     Potassium   Date Value Ref Range Status   12/28/2019 3.6 3.4 - 5.3 mmol/L Final     Chloride   Date Value Ref Range Status   12/28/2019 109 94 - 109 mmol/L Final     Carbon Dioxide   Date Value Ref Range Status   12/28/2019 30 20 - 32 mmol/L Final     Anion Gap   Date Value Ref Range Status   12/28/2019 2 (L) 3 - 14 mmol/L Final     Glucose   Date Value Ref Range Status   12/28/2019 244 (H) 70 - 99 mg/dL Final     Urea Nitrogen   Date Value Ref Range Status   12/28/2019 34 (H) 7 - 30 mg/dL Final     Creatinine   Date Value Ref Range Status   12/28/2019 0.50 (L) 0.66 - 1.25 mg/dL Final     GFR Estimate   Date Value Ref Range Status   12/28/2019 >90 >60 mL/min/[1.73_m2] Final     Comment:     Non  GFR Calc  Starting 12/18/2018, serum creatinine based estimated GFR (eGFR) will be   calculated using the Chronic Kidney Disease Epidemiology Collaboration   (CKD-EPI) equation.       Calcium   Date Value Ref Range Status   12/28/2019 7.9 (L) 8.5 - 10.1 mg/dL Final   Mg 1.8  Phosph: 1.7    B 2 glucan: positive 129  bl culture negative (first and second one )  Urine culture growth of VRE and candida glabrata  Sputum culture not collected    UA RESULTS:  Recent Labs   Lab Test 12/24/19  1411   COLOR Yellow   APPEARANCE Cloudy   URINEGLC Negative   URINEBILI Negative   URINEKETONE Negative   SG 1.013   UBLD Small*   URINEPH 5.0   PROTEIN 30*   NITRITE Negative   LEUKEST Large*   RBCU 13*   WBCU 51*     MRI cervical spines 12/27  Impression:   1. Persistent but minimally decreased thickness of epidural  phlegmon/abscess along the anterior thecal sac and prevertebral soft  tissue since 12/10/2019. Unchanged severe canal stenosis.  2. Slightly increased erosion with loss of cortical definition of  the  C3-4 and C4-5 endplates which is resulting in minimally increased  kyphotic curvature at C3-4 since prior.  3. Unchanged abnormal myelopathic signal extending from C3 to C6.      Neuro:  #Cervical spinal cord injury  #Discitis/osteomyelitis C3-C4  # Vertebral phlegmon and Abscess, minimally reduced in thickness  - continue wearing the [place] Collar at all times per neuro surgery, Up with assist  - Neuro checks q4h during the day   - continue PT/OT     #Anxiety/Pain - good control   - Oxycodone 5-10 q4hrs prn   - flexeril to 10 mg TID      #Hx of Alcohol dependence  - Thiamine and folate     #Depression  - Continue PTA miratazepine  - continue trazodone 100 mg at bedtime  - Seroquel 12.5 BID      Resp:  #Acute respiratory failure s/p re-intubation 12/6, suspect diaphragmatic weakness related to spine injury  - Mucomyst & chest physio   - pressure support trial TID  - weaning off vent as tolerated  --metanebs   --chest physio per RT  --percussive therapy  - desaturation when positioned on Rt side, try positional changes first   - S/P trach 12/20   - sputum culture needs to be collected  - repeat Chest xray: stable      Cardio:  # Hypertension, tachycardia   # Dysautonomia??  - PTA amlodipine 10 mg   - continue metoprolol to 100 mg bid  - PTA Bumex 1 mg bid  -  Troponin, EKG    Renal:  Hypophosphatemia   Hypernatremia   - Electrolyte replacement protocol   - Monitor I/O goal In= outs   - increase free water flushes to 60 q4h  - minimal diuresis (I/O -1460)      Neurogenic urinary retention   - failed díaz wean trial 12/12 caused retention   - UA with VRE and Candida Glabrata complex infection  - Urine culture VRE resistant amp, nitrofurantoin, penicillin, vanc, waiting for Linezolid sensitivity  - ID did not recommend adding antifungal . If he spiked fever > 102 start fluconazole 400 mg daily     Endo:  #DM2   - High sliding scale insulin  - start lantus 10 units      Heme:  #Normocytic anemia, stable  - Hg > 7.0      # Leukocytosis, improving   Likely due to UTI. Blood culture negative so far and UC positive for VRE and candida glabrata. B2 Glucan positive 129  - CBC daily  -   - ID on board   - Follow susceptibility for VRE and candida glabrata    GI:  #Constipation - still distended abdomen last BM 12/27  - Senna scheduled bid  - PRN suppository/enema   - Miralax bid  - KUB     #Diet   - G-tube in place  - Tube feeds at goal      ID:   #Vertebral phlegmon and Abscess s/p drain placement 12/1  #Osteomyelitis C3-5  #Fever - restarted 12/25  #Leukocytosis, improving   #repeat MRI 12/10 and 12/19 shows progressive epidural abscess.   # Repeat MRI 12/27 showed minimally decrease abscess thickness and increase the vertebral erosion   - Vanc, cefepime, metronidazole regimen per ID (through 1/22)  - Weekly CBC, CMP, CRP while on IV abx,  - Spinal tissue cultures (12/1): MSSA, Strep anginosus, Eikenella, and oral anaerobes   - Per ID:  - will need at least 6wks of above abx from day 1 (12/11), earliest stop date of 1/22/20    # elevated CRP and WBCs, resolving  # new fever  # VRE and candida glabrata UTI  ID does not think the new fever and elevated CRP is due to UTI. They think possibly spinal abscess. Repeat cervical spines MRI did not show increase of the abscess size but showed more vertebral erosions  - blood culture negative  - Repeat blood culture negative  -  B 2 Glucan positive   - if spiked fever > 102 start Fluconazole 400 mg daily    Lines:  - Trach, PICC , PIV x 2, Degroot, PEG     FEN: TF via PEG   PPX:    DVT prophylaxis: SCDs, Heparin subcutaneous restart 11/22    GI: famotidine   Code Status: Full, presumed     [facility]    This patient was seen & discussed with my attending, Dr. Eduardo MD, who agrees with my assessment and plan.       Hermilo Schwartz MD  PGY2  [phone]

## 2019-12-28 ENCOUNTER — APPOINTMENT (OUTPATIENT)
Dept: GENERAL RADIOLOGY | Facility: CLINIC | Age: 64
DRG: 003 | End: 2019-12-28
Attending: NEUROLOGICAL SURGERY
Payer: COMMERCIAL

## 2019-12-28 LAB
ABO + RH BLD: NORMAL
ABO + RH BLD: NORMAL
ANION GAP SERPL CALCULATED.3IONS-SCNC: 2 MMOL/L (ref 3–14)
BASOPHILS # BLD AUTO: 0 10E9/L (ref 0–0.2)
BASOPHILS NFR BLD AUTO: 0.1 %
BLD GP AB SCN SERPL QL: NORMAL
BLOOD BANK CMNT PATIENT-IMP: NORMAL
BUN SERPL-MCNC: 34 MG/DL (ref 7–30)
CALCIUM SERPL-MCNC: 7.9 MG/DL (ref 8.5–10.1)
CHLORIDE SERPL-SCNC: 109 MMOL/L (ref 94–109)
CO2 SERPL-SCNC: 30 MMOL/L (ref 20–32)
CREAT SERPL-MCNC: 0.5 MG/DL (ref 0.66–1.25)
CRP SERPL-MCNC: 120 MG/L (ref 0–8)
DIFFERENTIAL METHOD BLD: ABNORMAL
EOSINOPHIL # BLD AUTO: 0.2 10E9/L (ref 0–0.7)
EOSINOPHIL NFR BLD AUTO: 1.5 %
ERYTHROCYTE [DISTWIDTH] IN BLOOD BY AUTOMATED COUNT: 14.8 % (ref 10–15)
GFR SERPL CREATININE-BSD FRML MDRD: >90 ML/MIN/{1.73_M2}
GLUCOSE BLDC GLUCOMTR-MCNC: 176 MG/DL (ref 70–99)
GLUCOSE BLDC GLUCOMTR-MCNC: 177 MG/DL (ref 70–99)
GLUCOSE BLDC GLUCOMTR-MCNC: 180 MG/DL (ref 70–99)
GLUCOSE BLDC GLUCOMTR-MCNC: 189 MG/DL (ref 70–99)
GLUCOSE BLDC GLUCOMTR-MCNC: 192 MG/DL (ref 70–99)
GLUCOSE BLDC GLUCOMTR-MCNC: 234 MG/DL (ref 70–99)
GLUCOSE SERPL-MCNC: 244 MG/DL (ref 70–99)
HCT VFR BLD AUTO: 26.8 % (ref 40–53)
HGB BLD-MCNC: 8.4 G/DL (ref 13.3–17.7)
IMM GRANULOCYTES # BLD: 0.1 10E9/L (ref 0–0.4)
IMM GRANULOCYTES NFR BLD: 0.7 %
INTERPRETATION ECG - MUSE: NORMAL
LYMPHOCYTES # BLD AUTO: 1.5 10E9/L (ref 0.8–5.3)
LYMPHOCYTES NFR BLD AUTO: 11.9 %
MAGNESIUM SERPL-MCNC: 2 MG/DL (ref 1.6–2.3)
MCH RBC QN AUTO: 30 PG (ref 26.5–33)
MCHC RBC AUTO-ENTMCNC: 31.3 G/DL (ref 31.5–36.5)
MCV RBC AUTO: 96 FL (ref 78–100)
MONOCYTES # BLD AUTO: 0.9 10E9/L (ref 0–1.3)
MONOCYTES NFR BLD AUTO: 7.5 %
NEUTROPHILS # BLD AUTO: 9.6 10E9/L (ref 1.6–8.3)
NEUTROPHILS NFR BLD AUTO: 78.3 %
NRBC # BLD AUTO: 0 10*3/UL
NRBC BLD AUTO-RTO: 0 /100
PHOSPHATE SERPL-MCNC: 2.7 MG/DL (ref 2.5–4.5)
PLATELET # BLD AUTO: 216 10E9/L (ref 150–450)
POTASSIUM SERPL-SCNC: 3.6 MMOL/L (ref 3.4–5.3)
RADIOLOGIST FLAGS: ABNORMAL
RBC # BLD AUTO: 2.8 10E12/L (ref 4.4–5.9)
SODIUM SERPL-SCNC: 141 MMOL/L (ref 133–144)
SPECIMEN EXP DATE BLD: NORMAL
TROPONIN I SERPL-MCNC: <0.015 UG/L (ref 0–0.04)
WBC # BLD AUTO: 12.3 10E9/L (ref 4–11)

## 2019-12-28 PROCEDURE — 25000132 ZZH RX MED GY IP 250 OP 250 PS 637: Performed by: NEUROLOGICAL SURGERY

## 2019-12-28 PROCEDURE — 25000132 ZZH RX MED GY IP 250 OP 250 PS 637: Performed by: PSYCHIATRY & NEUROLOGY

## 2019-12-28 PROCEDURE — 25000132 ZZH RX MED GY IP 250 OP 250 PS 637: Performed by: STUDENT IN AN ORGANIZED HEALTH CARE EDUCATION/TRAINING PROGRAM

## 2019-12-28 PROCEDURE — 27210437 ZZH NUTRITION PRODUCT SEMIELEM INTERMED LITER

## 2019-12-28 PROCEDURE — 25000132 ZZH RX MED GY IP 250 OP 250 PS 637: Performed by: NURSE PRACTITIONER

## 2019-12-28 PROCEDURE — 74018 RADEX ABDOMEN 1 VIEW: CPT

## 2019-12-28 PROCEDURE — 25000128 H RX IP 250 OP 636: Performed by: STUDENT IN AN ORGANIZED HEALTH CARE EDUCATION/TRAINING PROGRAM

## 2019-12-28 PROCEDURE — 86900 BLOOD TYPING SEROLOGIC ABO: CPT | Performed by: STUDENT IN AN ORGANIZED HEALTH CARE EDUCATION/TRAINING PROGRAM

## 2019-12-28 PROCEDURE — 83735 ASSAY OF MAGNESIUM: CPT | Performed by: STUDENT IN AN ORGANIZED HEALTH CARE EDUCATION/TRAINING PROGRAM

## 2019-12-28 PROCEDURE — 71045 X-RAY EXAM CHEST 1 VIEW: CPT

## 2019-12-28 PROCEDURE — 93005 ELECTROCARDIOGRAM TRACING: CPT

## 2019-12-28 PROCEDURE — 94640 AIRWAY INHALATION TREATMENT: CPT

## 2019-12-28 PROCEDURE — 80048 BASIC METABOLIC PNL TOTAL CA: CPT | Performed by: STUDENT IN AN ORGANIZED HEALTH CARE EDUCATION/TRAINING PROGRAM

## 2019-12-28 PROCEDURE — 40000961 ZZH STATISTIC INTRAPULMONARY PERCUSSIVE VENT

## 2019-12-28 PROCEDURE — 93010 ELECTROCARDIOGRAM REPORT: CPT | Performed by: INTERNAL MEDICINE

## 2019-12-28 PROCEDURE — 40000275 ZZH STATISTIC RCP TIME EA 10 MIN

## 2019-12-28 PROCEDURE — 25800030 ZZH RX IP 258 OP 636: Performed by: STUDENT IN AN ORGANIZED HEALTH CARE EDUCATION/TRAINING PROGRAM

## 2019-12-28 PROCEDURE — 86901 BLOOD TYPING SEROLOGIC RH(D): CPT | Performed by: STUDENT IN AN ORGANIZED HEALTH CARE EDUCATION/TRAINING PROGRAM

## 2019-12-28 PROCEDURE — 85025 COMPLETE CBC W/AUTO DIFF WBC: CPT | Performed by: STUDENT IN AN ORGANIZED HEALTH CARE EDUCATION/TRAINING PROGRAM

## 2019-12-28 PROCEDURE — 86140 C-REACTIVE PROTEIN: CPT | Performed by: STUDENT IN AN ORGANIZED HEALTH CARE EDUCATION/TRAINING PROGRAM

## 2019-12-28 PROCEDURE — 84100 ASSAY OF PHOSPHORUS: CPT | Performed by: STUDENT IN AN ORGANIZED HEALTH CARE EDUCATION/TRAINING PROGRAM

## 2019-12-28 PROCEDURE — 25000131 ZZH RX MED GY IP 250 OP 636 PS 637: Performed by: PSYCHIATRY & NEUROLOGY

## 2019-12-28 PROCEDURE — 20000004 ZZH R&B ICU UMMC

## 2019-12-28 PROCEDURE — 94003 VENT MGMT INPAT SUBQ DAY: CPT

## 2019-12-28 PROCEDURE — 84484 ASSAY OF TROPONIN QUANT: CPT | Performed by: STUDENT IN AN ORGANIZED HEALTH CARE EDUCATION/TRAINING PROGRAM

## 2019-12-28 PROCEDURE — 00000146 ZZHCL STATISTIC GLUCOSE BY METER IP

## 2019-12-28 PROCEDURE — 25000128 H RX IP 250 OP 636: Performed by: NEUROLOGICAL SURGERY

## 2019-12-28 PROCEDURE — 25800030 ZZH RX IP 258 OP 636: Performed by: NEUROLOGICAL SURGERY

## 2019-12-28 PROCEDURE — 94640 AIRWAY INHALATION TREATMENT: CPT | Mod: 76

## 2019-12-28 PROCEDURE — 25000125 ZZHC RX 250: Performed by: STUDENT IN AN ORGANIZED HEALTH CARE EDUCATION/TRAINING PROGRAM

## 2019-12-28 PROCEDURE — 25000132 ZZH RX MED GY IP 250 OP 250 PS 637: Performed by: COUNSELOR

## 2019-12-28 PROCEDURE — 25000125 ZZHC RX 250

## 2019-12-28 PROCEDURE — 86850 RBC ANTIBODY SCREEN: CPT | Performed by: STUDENT IN AN ORGANIZED HEALTH CARE EDUCATION/TRAINING PROGRAM

## 2019-12-28 RX ORDER — SIMETHICONE 80 MG
80 TABLET,CHEWABLE ORAL 2 TIMES DAILY
Status: DISCONTINUED | OUTPATIENT
Start: 2019-12-28 | End: 2019-12-31 | Stop reason: HOSPADM

## 2019-12-28 RX ORDER — MICONAZOLE NITRATE 20 MG/G
CREAM TOPICAL 2 TIMES DAILY
Status: DISCONTINUED | OUTPATIENT
Start: 2019-12-28 | End: 2019-12-31 | Stop reason: HOSPADM

## 2019-12-28 RX ORDER — CITALOPRAM HYDROBROMIDE 40 MG/1
40 TABLET ORAL DAILY
Status: DISCONTINUED | OUTPATIENT
Start: 2019-12-28 | End: 2019-12-28

## 2019-12-28 RX ADMIN — FAMOTIDINE 20 MG: 40 POWDER, FOR SUSPENSION ORAL at 08:23

## 2019-12-28 RX ADMIN — ALBUTEROL SULFATE 2.5 MG: 2.5 SOLUTION RESPIRATORY (INHALATION) at 08:15

## 2019-12-28 RX ADMIN — SENNOSIDES AND DOCUSATE SODIUM 1 TABLET: 8.6; 5 TABLET ORAL at 21:08

## 2019-12-28 RX ADMIN — INSULIN ASPART 8 UNITS: 100 INJECTION, SOLUTION INTRAVENOUS; SUBCUTANEOUS at 03:57

## 2019-12-28 RX ADMIN — MULTIVITAMIN 15 ML: LIQUID ORAL at 21:08

## 2019-12-28 RX ADMIN — METRONIDAZOLE 500 MG: 500 TABLET ORAL at 21:07

## 2019-12-28 RX ADMIN — BUMETANIDE 1 MG: 1 TABLET ORAL at 08:22

## 2019-12-28 RX ADMIN — Medication 2 SPRAY: at 11:33

## 2019-12-28 RX ADMIN — POLYETHYLENE GLYCOL 3350 17 G: 17 POWDER, FOR SOLUTION ORAL at 21:01

## 2019-12-28 RX ADMIN — SIMETHICONE CHEW TAB 80 MG 80 MG: 80 TABLET ORAL at 21:20

## 2019-12-28 RX ADMIN — ALBUTEROL SULFATE 2.5 MG: 2.5 SOLUTION RESPIRATORY (INHALATION) at 21:24

## 2019-12-28 RX ADMIN — ACETYLCYSTEINE 2 ML: 100 INHALANT RESPIRATORY (INHALATION) at 11:59

## 2019-12-28 RX ADMIN — FAMOTIDINE 20 MG: 40 POWDER, FOR SUSPENSION ORAL at 21:01

## 2019-12-28 RX ADMIN — TRAZODONE HYDROCHLORIDE 100 MG: 50 TABLET ORAL at 22:10

## 2019-12-28 RX ADMIN — INSULIN ASPART 4 UNITS: 100 INJECTION, SOLUTION INTRAVENOUS; SUBCUTANEOUS at 12:27

## 2019-12-28 RX ADMIN — AMLODIPINE BESYLATE 10 MG: 10 TABLET ORAL at 08:22

## 2019-12-28 RX ADMIN — SIMETHICONE CHEW TAB 80 MG 80 MG: 80 TABLET ORAL at 13:32

## 2019-12-28 RX ADMIN — VANCOMYCIN HYDROCHLORIDE 1250 MG: 500 INJECTION, POWDER, LYOPHILIZED, FOR SOLUTION INTRAVENOUS at 20:16

## 2019-12-28 RX ADMIN — CYCLOBENZAPRINE HYDROCHLORIDE 10 MG: 10 TABLET, FILM COATED ORAL at 08:22

## 2019-12-28 RX ADMIN — ALBUTEROL SULFATE 2.5 MG: 2.5 SOLUTION RESPIRATORY (INHALATION) at 00:47

## 2019-12-28 RX ADMIN — SODIUM CHLORIDE, PRESERVATIVE FREE 10 ML: 5 INJECTION INTRAVENOUS at 11:32

## 2019-12-28 RX ADMIN — Medication 12.5 MG: at 21:19

## 2019-12-28 RX ADMIN — ACETYLCYSTEINE 2 ML: 100 INHALANT RESPIRATORY (INHALATION) at 16:33

## 2019-12-28 RX ADMIN — METRONIDAZOLE 500 MG: 500 TABLET ORAL at 08:22

## 2019-12-28 RX ADMIN — Medication 2 SPRAY: at 21:01

## 2019-12-28 RX ADMIN — OXYCODONE HYDROCHLORIDE 10 MG: 5 SOLUTION ORAL at 18:01

## 2019-12-28 RX ADMIN — ALBUTEROL SULFATE 2.5 MG: 2.5 SOLUTION RESPIRATORY (INHALATION) at 11:59

## 2019-12-28 RX ADMIN — ACETYLCYSTEINE 2 ML: 100 INHALANT RESPIRATORY (INHALATION) at 21:24

## 2019-12-28 RX ADMIN — VANCOMYCIN HYDROCHLORIDE 1250 MG: 500 INJECTION, POWDER, LYOPHILIZED, FOR SOLUTION INTRAVENOUS at 09:03

## 2019-12-28 RX ADMIN — POTASSIUM CHLORIDE 20 MEQ: 29.8 INJECTION, SOLUTION INTRAVENOUS at 04:59

## 2019-12-28 RX ADMIN — HEPARIN SODIUM 5000 UNITS: 5000 INJECTION, SOLUTION INTRAVENOUS; SUBCUTANEOUS at 08:22

## 2019-12-28 RX ADMIN — CYCLOBENZAPRINE HYDROCHLORIDE 10 MG: 10 TABLET, FILM COATED ORAL at 21:07

## 2019-12-28 RX ADMIN — HEPARIN SODIUM 5000 UNITS: 5000 INJECTION, SOLUTION INTRAVENOUS; SUBCUTANEOUS at 20:43

## 2019-12-28 RX ADMIN — POTASSIUM PHOSPHATE, MONOBASIC AND POTASSIUM PHOSPHATE, DIBASIC 15 MMOL: 224; 236 INJECTION, SOLUTION INTRAVENOUS at 00:37

## 2019-12-28 RX ADMIN — FOLIC ACID 1 MG: 1 TABLET ORAL at 08:22

## 2019-12-28 RX ADMIN — INSULIN ASPART 4 UNITS: 100 INJECTION, SOLUTION INTRAVENOUS; SUBCUTANEOUS at 00:42

## 2019-12-28 RX ADMIN — Medication 2 SPRAY: at 15:21

## 2019-12-28 RX ADMIN — Medication 100 MG: at 08:22

## 2019-12-28 RX ADMIN — METRONIDAZOLE 500 MG: 500 TABLET ORAL at 13:32

## 2019-12-28 RX ADMIN — INSULIN GLARGINE 10 UNITS: 100 INJECTION, SOLUTION SUBCUTANEOUS at 10:12

## 2019-12-28 RX ADMIN — METOPROLOL TARTRATE 100 MG: 50 TABLET, FILM COATED ORAL at 08:22

## 2019-12-28 RX ADMIN — CEFEPIME HYDROCHLORIDE 2 G: 2 INJECTION, POWDER, FOR SOLUTION INTRAVENOUS at 22:10

## 2019-12-28 RX ADMIN — INSULIN ASPART 4 UNITS: 100 INJECTION, SOLUTION INTRAVENOUS; SUBCUTANEOUS at 08:42

## 2019-12-28 RX ADMIN — ACETYLCYSTEINE 2 ML: 100 INHALANT RESPIRATORY (INHALATION) at 04:43

## 2019-12-28 RX ADMIN — CYCLOBENZAPRINE HYDROCHLORIDE 10 MG: 10 TABLET, FILM COATED ORAL at 13:32

## 2019-12-28 RX ADMIN — INSULIN ASPART 4 UNITS: 100 INJECTION, SOLUTION INTRAVENOUS; SUBCUTANEOUS at 15:19

## 2019-12-28 RX ADMIN — MIRTAZAPINE 30 MG: 15 TABLET, FILM COATED ORAL at 21:07

## 2019-12-28 RX ADMIN — MICONAZOLE NITRATE: 20 CREAM TOPICAL at 08:24

## 2019-12-28 RX ADMIN — CEFEPIME HYDROCHLORIDE 2 G: 2 INJECTION, POWDER, FOR SOLUTION INTRAVENOUS at 06:21

## 2019-12-28 RX ADMIN — Medication 2 SPRAY: at 08:23

## 2019-12-28 RX ADMIN — ACETYLCYSTEINE 2 ML: 100 INHALANT RESPIRATORY (INHALATION) at 00:48

## 2019-12-28 RX ADMIN — METOPROLOL TARTRATE 100 MG: 50 TABLET, FILM COATED ORAL at 21:07

## 2019-12-28 RX ADMIN — OXYCODONE HYDROCHLORIDE 10 MG: 5 SOLUTION ORAL at 10:19

## 2019-12-28 RX ADMIN — LIDOCAINE 2 PATCH: 560 PATCH PERCUTANEOUS; TOPICAL; TRANSDERMAL at 21:08

## 2019-12-28 RX ADMIN — MICONAZOLE NITRATE: 20 CREAM TOPICAL at 20:55

## 2019-12-28 RX ADMIN — POLYETHYLENE GLYCOL 3350 17 G: 17 POWDER, FOR SOLUTION ORAL at 11:33

## 2019-12-28 RX ADMIN — INSULIN ASPART 4 UNITS: 100 INJECTION, SOLUTION INTRAVENOUS; SUBCUTANEOUS at 20:20

## 2019-12-28 RX ADMIN — ALBUTEROL SULFATE 2.5 MG: 2.5 SOLUTION RESPIRATORY (INHALATION) at 04:43

## 2019-12-28 RX ADMIN — ALBUTEROL SULFATE 2.5 MG: 2.5 SOLUTION RESPIRATORY (INHALATION) at 16:33

## 2019-12-28 RX ADMIN — ACETYLCYSTEINE 2 ML: 100 INHALANT RESPIRATORY (INHALATION) at 08:15

## 2019-12-28 RX ADMIN — Medication 12.5 MG: at 08:25

## 2019-12-28 RX ADMIN — ACETAMINOPHEN 650 MG: 325 TABLET, FILM COATED ORAL at 22:10

## 2019-12-28 RX ADMIN — CEFEPIME HYDROCHLORIDE 2 G: 2 INJECTION, POWDER, FOR SOLUTION INTRAVENOUS at 13:32

## 2019-12-28 RX ADMIN — BUMETANIDE 1 MG: 1 TABLET ORAL at 21:08

## 2019-12-28 ASSESSMENT — ACTIVITIES OF DAILY LIVING (ADL)
ADLS_ACUITY_SCORE: 26
ADLS_ACUITY_SCORE: 27
ADLS_ACUITY_SCORE: 26

## 2019-12-28 ASSESSMENT — MIFFLIN-ST. JEOR: SCORE: 1653.5

## 2019-12-28 NOTE — PLAN OF CARE
D: Sherwin is able to mouth words and with force able to make words audible. He smacks his lips indicating he needs oral swabs, these are needed frequently.     Neuro: Alert and oriented via choice questions. Pupils 3/brisk/tracks. UE moved slightly, grossly, some numbness present. Unable to move or feel in LE . All with normal pulses.   VS: Afebrile,  HR 65-90, -160/65-89 maps , RR 20-26.  CV: SR, no ectopy.   Pulm: tolerated PS from 2182-4121, placed on CMV/40%/12/450/5. Odorous, tan ooze around trach site, suctioning thick creamy tan secretions. Optifoam and inner cannula changed at 0430.   GI: TF at goal of 55ml/hour with free water 60 ml every 4 hours. Smear x1, large x1, small x 1, all brown liquid.   : Degroot with good output after bumex. Degroot in for neurogenic bladder.   Lines/Gtts:  R Triple lumen Picc.   Skin:   Neck incisions clean and dry. Trach site with tan ooze,purulent. R lower outer leg is scabbed/intact. Gtube site intact. Requested miconzole cream for red scaly around nicolle area .  Drains: Degroot.   Labs: 20 Meq potassium given for K+3.6.   P: continue total cares. Pulmonary hygiene, vent weaning.  Monitor skin and insensate areas.

## 2019-12-28 NOTE — PROGRESS NOTES
[facility] PROGRESS NOTE     Patient:  Sherwin Angel   YOB: 1955, MRN: [id number]  Date of Visit: 12/27/2019  Date of Admission: 12/1/2019          Assessment and Recommendations:     Recommendations:  - No change to cefepime, metronidazole, and vancomycin  - His MRI with persistent phlegmon and endplate destruction suggests he is failing medical therapy. Phlegmon has decreased slightly (1mm) but I remain pessimistic that he will respond to medical therapy alone.  - No evidence for fungal infection at this time - suspect urinary growth and growth from respiratory specimens reflect colonization in the setting of broad spectrum abx. Given lines he is at potential risk for fungemia so if he spikes a high fever to >102 would repeat blood cultures and initiate fluconazole 400mg daily (Qtc 452 on 12/26)    Assessment:  Sherwin Angel is a 64-year-old man with history of hypertension and diabetes who [facility] and now [facility] with new functional quadreplegia (has very minimal movement in L arm) after a fall and found to have pharyngotomy and vertebral phlegmon now s/p OR with removal of arthroplasty and placement of drain. He subsequently had G tube placed on 12/5 and ENT preformed tracheostomy on 12/20. Serial imaging reflects ongoing phelgmon.    # Traumatic cervical spinal cord injury s/p C3-4 arthroplasty on 11/15, now removed (see below)  # Cervical stenosis C3-5  # Osteomyelitis C3-C5  # Vertebral phlegmon vs abscess s/p I/D and removal arthroplasty hardware on 12/1   Cultures growing MSSA, Strep anginosus, Eikenella, and oral anaerobes. Repeat MRI 12/10, 12/19 and 12/27 shoes relatively unchanged phlegmon. Concern for ongoing infection with suboptimal source control in his cervical spine    # Leukocytosis  DDx includes worsening spinal infection, fungal infection. While urine shows growth of Candida, Candida is an unusual  cause of UTI in the absence of urinary anatomy abnormality. Candida is not a pathogen in the lungs. It would be surprising that he would develop a new bacterial infection while on broad spectrum therapy, though developed of infx due to a resistant organism is a possibility - however, no clinical evidence of infection outside of his C-spine.    ID will continue to follow. ID will follow. Dr. Linn Delgado will cover this weekend if there are questions, and Dr. Katy Wilhelm will assume care next week.    Isabel Capone MD   of Medicine, Division of Infectious Diseases  [facility] [phone]            Interval History and Events:   Continues to have bilateral shoulder discomfort with shrugging. No acute events. Went for MRI today.     Cannot participate in ROS due to clinical status.         HPI:   Adopted from Consult note 12/02:  Sherwin Angel is a 64-year-old man with history of hypertension and diabetes who [facility] and is now readmitted on 12/1 after a fall at his rehab facility that resulted in near paraplegia.     He was taken to the OR by neurosurgery on 12/1, and had the arthroplasty implant removed and an I/D. He was found to have significant inflammation in the area and 2 pharyngotomy sites, so ENT was consulted intra-operatively and closed one pharyngotomy (the other was unable to be reached) and placed a drain for the phlegmon. He was put on broad-spectrum antibiotics with cefepime, metronidazole, and vancomycin, and ID is consulted for assistance with antibiotic management.     The patient had a temperature to 100.6 overnight after the surgery, but has been afebrile throughout the day today. He is awake and able to nod yes/no to questions, and follow basic commands.           Physical Examination:   Temp:  [98.8  F (37.1  C)-100.5  F (38.1  C)] 98.8  F (37.1  C)  Pulse:  [] 89  Heart Rate:  [] 71  Resp:  [16-32] 25  BP: (119-167)/(7-86)  146/83  FiO2 (%):  [40 %-60 %] 40 %  SpO2:  [94 %-99 %] 97 %    I/O last 3 completed shifts:  In: 2360 [I.V.:510; NG/GT:640]  Out: 3800 [Urine:3800]    Vitals:    12/17/19 0400 12/18/19 0500 12/21/19 0400 12/24/19 0400   Weight: 84.1 kg (185 lb 8 oz) 85.6 kg (188 lb 11.4 oz) 88.1 kg (194 lb 3.6 oz) 89 kg (196 lb 3.4 oz)    12/27/19 0600   Weight: 88.7 kg (195 lb 9.6 oz)       Constitutional: Adult male in bed, in NAD. Awake, alert, interactive.  HEENT: NC/AT, EOMI, sclera clear, conjunctiva normal, tracheostomy in place  Respiratory: No increased work of breathing, slightly course   Cardiovascular: RRR, no murmur noted. B/l UE edema, trace LE edema   GI: Normal bowel sounds, soft, non-distended and non-tender.   Skin: Warm, dry, well-perfused. No bruising, bleeding, rashes, or lesions on limited exam.  Neurologic: A&O. Able to mouth words or indicate yes/no. I did not elicit movement in any of his 4 extremities  Neuropsychiatric: Calm. Affect appropriate to situation.  Extremities: Right and left shoulder painful with shrugging motion. Warm but not red or hot. No pain with palpation or with passive ROM.          Medications:       acetylcysteine  2 mL Nebulization Q4H     albuterol  2.5 mg Nebulization Q4H     amLODIPine  10 mg Oral or Feeding Tube Daily     artificial saliva  2 spray Swish & Spit 4x Daily     bumetanide  1 mg Oral or Feeding Tube BID     ceFEPIme (MAXIPIME) IV  2 g Intravenous Q8H     cyanocobalamin  1,000 mcg Intramuscular Q30 Days     cyclobenzaprine  10 mg Oral TID     famotidine  20 mg Per G Tube BID     folic acid  1 mg Oral or Feeding Tube Daily     heparin ANTICOAGULANT  5,000 Units Subcutaneous Q12H     heparin lock flush  5-10 mL Intracatheter Q24H     insulin aspart  2-16 Units Subcutaneous Q4H     lidocaine  2 patch Transdermal Q24h    And     lidocaine   Transdermal Q8H     metoprolol tartrate  100 mg Oral or Feeding Tube BID     metroNIDAZOLE  500 mg Oral or Feeding Tube TID      mirtazapine  30 mg Oral or Feeding Tube QPM     multivitamins w/minerals  15 mL Per Feeding Tube Daily     polyethylene glycol  17 g Oral BID     QUEtiapine  12.5 mg Oral BID     senna-docusate  1 tablet Oral or Feeding Tube BID     traZODone  100 mg Oral or Feeding Tube At Bedtime     vancomycin (VANCOCIN) IV  1,250 mg Intravenous Q12H     thiamine  100 mg Oral or Feeding Tube Daily       Antiinfectives:  Anti-infectives (From now, onward)    Start     Dose/Rate Route Frequency Ordered Stop    12/27/19 2000  vancomycin (VANCOCIN) 1,250 mg in D5W 250 mL intermittent infusion      1,250 mg  over 90 Minutes Intravenous EVERY 12 HOURS 12/27/19 1206      12/27/19 1400  ceFEPIme (MAXIPIME) 2 g in D5W 100 mL intermittent infusion      2 g  200 mL/hr over 30 Minutes Intravenous EVERY 8 HOURS 12/27/19 1209      12/23/19 0000  metroNIDAZOLE (FLAGYL) 500 MG tablet     Note to Pharmacy:  Through 1/22    500 mg Oral or Feeding Tube 3 TIMES DAILY 12/23/19 1252      12/23/19 0000  ceFEPIme (MAXIPIME) 2 G vial     Note to Pharmacy:  Through 1/22    2 g  over 30 Minutes Intravenous EVERY 8 HOURS 12/23/19 1252      12/16/19 0800  metroNIDAZOLE (FLAGYL) tablet 500 mg      500 mg Oral or Feeding Tube 3 TIMES DAILY 12/16/19 0759            Infusions/Drips:    IV fluid REPLACEMENT ONLY 55 mL/hr at 12/18/19 0200            Laboratory Data:     Microbiology:  Culture Micro   Date Value Ref Range Status   12/26/2019 No growth after 1 day  Preliminary   12/26/2019 No growth after 1 day  Preliminary   12/24/2019 (A)  Preliminary    <10,000 colonies/mL  Enterococcus faecium (VRE)  Linezolid susceptibilities in progress  12.27 12/24/2019 (A)  Preliminary    50,000 to 100,000 colonies/mL  Candida glabrata complex  Susceptibility testing not routinely done     12/24/2019   Preliminary    Critical Value/Significant Value, preliminary result only, called to and read back by  Cris Self RN 4A 12.26.19 @[address] [place] [address]/[facility]     12/24/2019 (A)   Preliminary    Susceptibility testing requested by  DR. CHAVEZ FOR  Catarina glabrata complex  12/27/19 1912. MM     12/24/2019 No growth after 3 days  Preliminary   12/24/2019 No growth after 3 days  Preliminary   12/12/2019 (A)  Final    Light growth  Candida albicans / dubliniensis  Candida albicans and Candida dubliniensis are not routinely speciated  Susceptibility testing not routinely done     12/11/2019 (A)  Final    Moderate growth  Candida albicans / dubliniensis  Candida albicans and Candida dubliniensis are not routinely speciated  Susceptibility testing not routinely done     12/11/2019 No growth  Final   12/11/2019 No growth  Final   12/07/2019 (A)  Final    Light growth  Candida albicans / dubliniensis  Candida albicans and Candida dubliniensis are not routinely speciated  Susceptibility testing not routinely done     12/07/2019 No growth  Final   12/07/2019 No growth  Final   12/03/2019 No growth  Final   12/03/2019 No growth  Final   12/01/2019 No growth  Final   12/01/2019 (A)  Final    Moderate growth  Fusobacterium nucleatum  Susceptibility testing not routinely done     12/01/2019 (A)  Final    Moderate growth  Parvimonas micra  Susceptibility testing not routinely done     12/01/2019 (A)  Final    Light growth  Prevotella species  Susceptibility testing not routinely done     12/01/2019 (A)  Final    Light growth  Strain 2  Prevotella species  Identification obtained by MALDI-TOF mass spectrometry research use only database. Test   characteristics determined and verified by the [facility]   ([facility]) [place], [place].  Susceptibility testing not routinely done     12/01/2019 (A)  Final    Moderate growth  Strain 3  Prevotella species  Identification obtained by MALDI-TOF mass spectrometry research use only database. Test   characteristics determined and verified by the [facility]   ([facility]) [place], [place].  Beta lactamase  positive  Susceptibility testing not routinely done     12/01/2019 Culture negative after 22 days  Preliminary   12/01/2019 Single colony  Staphylococcus aureus   (A)  Final   12/01/2019 Light growth  Streptococcus anginosus   (A)  Final   12/01/2019 Light growth  Eikenella corrodens   (A)  Final       Inflammatory Markers    Recent Labs   Lab Test 12/27/19  0352 12/26/19  0500 12/25/19  0330 12/23/19  0506 12/20/19  0337 12/17/19  0351 12/14/19  0444 12/12/19  0210   .0* 110.0* 120.0* 24.0* 14.0* 34.0* 48.0* 36.0*       Metabolic Studies       Recent Labs   Lab Test 12/27/19  1839 12/27/19  0352 12/26/19  0500 12/25/19  0330 12/24/19  0500 12/23/19  0506 12/22/19  0430  12/01/19  1057 12/01/19  0112   NA  --  147* 144 142 141 139 139   < > 135 133   POTASSIUM  --  3.4 4.3 4.2 4.4 3.9 3.7   < > 4.6 4.4   CHLORIDE  --  114* 117* 113* 110* 110* 108   < >  --  94   CO2  --  27 21 23 25 25 28   < >  --  31   ANIONGAP  --  6 7 5 6 4 2*   < >  --  7   BUN  --  36* 45* 45* 36* 28 22   < >  --  27   CR  --  0.50* 0.58* 0.57* 0.58* 0.51* 0.52*   < >  --  0.75   GFRESTIMATED  --  >90 >90 >90 >90 >90 >90   < >  --  >90   GLC  --  165* 184* 165* 180* 190* 131*   < > 195* 230*   A1C  --   --   --   --   --   --   --   --   --  6.5*   FLORINDA  --  8.0* 8.5 8.9 8.9 8.8 8.4*   < >  --  8.7   PHOS 2.0* 1.7* 2.7 2.5 3.1 2.6 2.8   < >  --   --    MAG  --  1.8 2.2 2.1 2.3 2.0 2.1   < >  --   --    LACT  --   --   --   --   --   --   --   --  1.2  --     < > = values in this interval not displayed.       Hematology Studies      Recent Labs   Lab Test 12/27/19  0352 12/26/19  0500 12/25/19  0330 12/24/19  1424 12/24/19  0500 12/23/19  0506 12/22/19  0430   WBC 11.7* 14.3* 14.5* 18.1* 18.3* 8.2 7.4   ANEU 9.4* 12.0* 12.9*  --  16.5* 6.7 5.1   ALYM 1.1 1.2 0.9  --  1.0 1.0 1.6   JAMES 0.9 0.9 0.6  --  0.6 0.4 0.6   AEOS 0.1 0.1 0.1  --  0.0 0.1 0.1   HGB 9.1* 10.3* 10.6* 11.1* 11.4* 10.2* 9.0*   HCT 29.8* 34.6* 34.5* 36.0* 36.9* 32.9*  29.1*    256 248 291 276 261 254       Arterial Blood Gas Testing    Recent Labs   Lab Test 12/14/19  1006 12/11/19  0535 12/05/19  1945 12/02/19  0545 12/02/19  0200   PH 7.46* 7.49* 7.43 7.42 7.43   PCO2 37 40 46* 43 42   PO2 55* 105 87 130* 122*   HCO3 26 30* 30* 28 28   O2PER 80 100 4L 60.0 50.0        Urine Studies     Recent Labs   Lab Test 12/24/19  1411 12/11/19  1141 12/07/19  2310 12/01/19  1824   URINEPH 5.0 5.5 5.5 6.0   NITRITE Negative Negative Negative Negative   LEUKEST Large* Negative Negative Negative   WBCU 51* 2 2 2       Vancomycin Levels     Recent Labs   Lab Test 12/27/19  0635 12/22/19  2035 12/19/19  1600 12/16/19  1718 12/13/19  1525 12/03/19  1548   VANCOMYCIN 19.5 18.5 23.4 18.6 19.2 14.3            Imaging:   CXR 12/23   Impression:   1. Stable support devices.  2. Slightly increased right and stable left basilar  atelectasis/consolidation.  3. Stable small pleural effusions.    CXR 12/22  Impression:   1. Interval decrease in small right pleural effusion and associated  bibasilar opacities. New airspace opacity.  2. Interval placement of tracheostomy tube, with tip in the mid  thoracic trachea. Additional support devices appear stable.    MR Cervical Spine wwo contrast 12/19  Impression:   1.  No significant change in anterior epidural phlegmon/abscess  extending from C2 down to C6. Stable/slightly increased spinal cord  edema.  2.  Persistent discitis osteomyelitis findings C3-C5.  3.  Stable/slightly progressed extensive prevertebral phlegmon  extending from C2 through C5. Decreased abscess in the prevertebral  phlegmon likely due to drainage into the hypopharynx through a  fistulous communication.

## 2019-12-28 NOTE — PROGRESS NOTES
[facility], [place]   Neurosurgery Progress Note:    Assessment: 64 years old gentleman status post C3-C4 artificial disc placement about 2 weeks prior to arriaval the [facility] who presented after a fall with almost complete loss of strength in his upper extremities and paraplegia, found to have cervical stenosis. S/p OR 12/1 - Pharyngotomy closed with assistance from ENT. Arthroplasty implant removed. No replacement implant placed. S/p G-tube 12/5. Extubated 12/5. Re-Intubated 12/6 due to Mucous Plugging and Atelectasis. S/p repeated bronchoscopy. S/p tracheostomy with ENT 12/20. Infectious disease with concerns about adequate source control. MRI cervical spine 12/27 with stable epidural abscess/phlegmon.    Plan:  - Nebs, chest physiotherapy  - DVT: SCDs while in bed  - Leon J collar  - per ID: vancomycin, cefepime, flagyl 4-6 wks for vertebral phlegmon, weekly labs; f/u clx; appreciate recs; if fever > 102, will repeat blood cultures, start fluconazole  - no plan for another surgery  - GI/nutrition: tube feeds  - trach change per respiratory therapy  - MRI cervical spine before stopping abx in 6 wks  - Endo: endocrine cslt; insulin sliding scale  - Neuropsych consulted, appreciate recs  - [facility]  -----------------------------------    Andressa Diaz MD  Neurosurgery Resident, PGY-1    Please contact neurosurgery resident on call with questions.    Dial * * [phone], enter [phone] when prompted.     Subjective: no acute events    Objective:   Temp:  [98.2  F (36.8  C)-99.2  F (37.3  C)] 98.2  F (36.8  C)  Pulse:  [51-90] 55  Heart Rate:  [54-89] 54  Resp:  [17-25] 17  BP: (113-161)/() 121/68  FiO2 (%):  [40 %] 40 %  SpO2:  [95 %-100 %] 100 %  I/O last 3 completed shifts:  In: 3300 [I.V.:1250; NG/GT:730]  Out: 2180 [Urine:2180]    Gen: Appears comfortable, NAD  Wound: Incision, clean, dry, intact  Pulm: tracheostomy,  mechanical ventilator  Neuro: Awake, alert, nods yes or no to questions  Follows commands  Deltoids 4/5, biceps 4-/5, triceps 1/5,  0/5, lower extremities 0/5  Triple flexion in lower extremities  Sensation to light touch in arms and upper chest    LABS  Recent Labs   Lab Test 12/28/19  0354 12/27/19  0352 12/26/19  0500   WBC 12.3* 11.7* 14.3*   HGB 8.4* 9.1* 10.3*   MCV 96 96 98    227 256       Recent Labs   Lab Test 12/28/19  0354 12/27/19  0352 12/26/19  0500    147* 144   POTASSIUM 3.6 3.4 4.3   CHLORIDE 109 114* 117*   CO2 30 27 21   BUN 34* 36* 45*   CR 0.50* 0.50* 0.58*   ANIONGAP 2* 6 7   FLORINDA 7.9* 8.0* 8.5   * 165* 184*     I have reviewed the history. I have seen and examined the patient myself and agree with the assessment and plan above.  ORI Figueroa MD

## 2019-12-28 NOTE — PROGRESS NOTES
[facility], [place], [place]   Neurosurgery Progress Note:    Assessment: 64 years old man status post C3-C4 artificial disc placement about 2 weeks prior to arriaval the [facility] who presented after a fall with almost complete loss of strength in his upper extremities and paraplegia, found to have cervical stenosis. S/p OR 12/1 - Pharyngotomy closed with assistance from ENT. Arthroplasty implant removed. No replacement implant placed. S/p G-tube 12/5. Extubated 12/5. Re-Intubated 12/6 due to Mucous Plugging and Atelectasis. S/p repeated bronchoscopy. S/p tracheostomy with ENT 12/20. Infectious disease with concerns about adequate source control. MRI cervical spine 12/27 with stable epidural abscess/phlegmon.    Plan:  - Nebs, chest physiotherapy  - DVT: SCDs while in bed  - [place] J collar  - per ID: vancomycin, cefepime, flagyl 4-6 wks for vertebral phlegmon, weekly labs; f/u clx; appreciate recs; if fever > 102, will repeat blood cultures, start fluconazole  - no plan for another surgery  - GI/nutrition: tube feeds  - trach change per respiratory therapy  - MRI cervical spine before stopping abx in 6 wks  - Endo: endocrine cslt; insulin sliding scale  - Discuss with patient possibly starting serotonin specific reuptake inhibitor if patient seems depressed  - [facility]: [facility]  -----------------------------------  Lion Vargas MD  Neurosurgery Resident PGY2    Please contact neurosurgery resident on call with questions.    Dial * * [phone], enter [phone] when prompted.     Subjective: no acute events, MRI cervical spine stable    Objective:   Temp:  [98.8  F (37.1  C)-100.5  F (38.1  C)] 98.8  F (37.1  C)  Pulse:  [] 86  Heart Rate:  [] 85  Resp:  [16-32] 24  BP: (115-167)/(7-89) 138/89  FiO2 (%):  [40 %-60 %] 40 %  SpO2:  [94 %-100 %] 100 %  I/O last 3 completed shifts:  In: 2485 [I.V.:580; NG/GT:640]  Out: 3880 [Urine:3880]    Gen:  Appears comfortable, NAD  Wound: Incision, clean, dry, intact  Pulm: tracheostomy, mechanical ventilator  Neuro: Awake, alert, nods yes or no to questions  Follows commands  Deltoids 4/5, biceps 4-/5, triceps 1/5,  0/5, lower extremities 0/5  Triple flexion in lower extremities  Sensation to light touch in arms and upper chest    LABS  Recent Labs   Lab Test 12/27/19  0352 12/26/19  0500 12/25/19  0330   WBC 11.7* 14.3* 14.5*   HGB 9.1* 10.3* 10.6*   MCV 96 98 97    256 248       Recent Labs   Lab Test 12/27/19  0352 12/26/19  0500 12/25/19  0330   * 144 142   POTASSIUM 3.4 4.3 4.2   CHLORIDE 114* 117* 113*   CO2 27 21 23   BUN 36* 45* 45*   CR 0.50* 0.58* 0.57*   ANIONGAP 6 7 5   FLORINDA 8.0* 8.5 8.9   * 184* 165*     I have reviewed the history. I have seen and examined the patient myself and agree with the assessment and plan above.  ORI Figueroa MD

## 2019-12-28 NOTE — PLAN OF CARE
D: Yuriy returned to 4A at 2100, His family visited with him and have gone home.     Neuro: Alert and oriented, hard of hearing bilat. Pupils 3/tracks. RIVERA all with normal pulses/sensation, L foot is cool.   VS: Afeb, HR ( up to 156 while standing). RR 11-26. -160/, maps .   CV: SR/ST no ectopy.   Pulm: LS clear/coarse/diminished. Weak cough, IS used to 1250. CT with bright red ouput at 710 ml in container. 2 R ERVIN bloody output. On 2 L NC, ETCO2  38, IPI 10.   GI: BS +. Clear liquids tolerated well.   : Díaz with adequate output. To be dc'd in am.   Lines/Gtts:  R AC SL. L hand SL. L upper infusing LR 75 ml /hour. On Q ropivicaine at 14 ml/hour.   Skin:   Liquid dressings intact. R chest bandage with scant sanguinous drainage. R calf scabs intact.   Drains: Chest tube, ERVIN x2 , díaz .   Labs: Just being drawn.   P: Pulmonary toilet frequently. Monitor for signs of ropivicaine toxicity. Assist activity as needed.Plan for transfer to floor.

## 2019-12-28 NOTE — PLAN OF CARE
Major Shift Events: Neuro status unchanged. BLE plegia. BUE w/ numbness and tingling, LUE able to lift off bed and make small movements, RUE able to make small movements. Sensation absent from nipples down. HR sinus rhythm. BP stable. Lungs coarse and diminished, fair cough, small amount of thick yellow sputum w/ suctioning. Pressure supported x2, tolerating. ABD grossly distended and taut, incontinent of liquid stool, simethicone added for gas discomfort. Urethral cath in place w/ adequate output; purulent/creamy secretions noted around cath - [facility] MD notified, cath to be exchanged out for new. PEG w/ feeds at goal, residual 12ml. Up to chair w/ mechanical lift, tolerated for 3.5hrs. Pain adequately controlled w/ PRN's.     Plan: Pressure support oncve more before bedtime. Encourage pulm toilet. Continue to monitor closely.    For vital signs and complete assessments, please see documentation flowsheets.

## 2019-12-29 LAB
ANION GAP SERPL CALCULATED.3IONS-SCNC: 4 MMOL/L (ref 3–14)
BASOPHILS # BLD AUTO: 0 10E9/L (ref 0–0.2)
BASOPHILS NFR BLD AUTO: 0.2 %
BUN SERPL-MCNC: 34 MG/DL (ref 7–30)
CALCIUM SERPL-MCNC: 8.3 MG/DL (ref 8.5–10.1)
CHLORIDE SERPL-SCNC: 107 MMOL/L (ref 94–109)
CO2 SERPL-SCNC: 29 MMOL/L (ref 20–32)
CREAT SERPL-MCNC: 0.46 MG/DL (ref 0.66–1.25)
CRP SERPL-MCNC: 110 MG/L (ref 0–8)
DIFFERENTIAL METHOD BLD: ABNORMAL
EOSINOPHIL # BLD AUTO: 0.3 10E9/L (ref 0–0.7)
EOSINOPHIL NFR BLD AUTO: 2.7 %
ERYTHROCYTE [DISTWIDTH] IN BLOOD BY AUTOMATED COUNT: 14.8 % (ref 10–15)
GFR SERPL CREATININE-BSD FRML MDRD: >90 ML/MIN/{1.73_M2}
GLUCOSE BLDC GLUCOMTR-MCNC: 148 MG/DL (ref 70–99)
GLUCOSE BLDC GLUCOMTR-MCNC: 161 MG/DL (ref 70–99)
GLUCOSE BLDC GLUCOMTR-MCNC: 165 MG/DL (ref 70–99)
GLUCOSE BLDC GLUCOMTR-MCNC: 168 MG/DL (ref 70–99)
GLUCOSE BLDC GLUCOMTR-MCNC: 170 MG/DL (ref 70–99)
GLUCOSE BLDC GLUCOMTR-MCNC: 171 MG/DL (ref 70–99)
GLUCOSE BLDC GLUCOMTR-MCNC: 275 MG/DL (ref 70–99)
GLUCOSE SERPL-MCNC: 179 MG/DL (ref 70–99)
HCT VFR BLD AUTO: 27.5 % (ref 40–53)
HGB BLD-MCNC: 8.6 G/DL (ref 13.3–17.7)
IMM GRANULOCYTES # BLD: 0.1 10E9/L (ref 0–0.4)
IMM GRANULOCYTES NFR BLD: 1.3 %
LYMPHOCYTES # BLD AUTO: 1.6 10E9/L (ref 0.8–5.3)
LYMPHOCYTES NFR BLD AUTO: 17.3 %
MAGNESIUM SERPL-MCNC: 1.9 MG/DL (ref 1.6–2.3)
MCH RBC QN AUTO: 29.3 PG (ref 26.5–33)
MCHC RBC AUTO-ENTMCNC: 31.3 G/DL (ref 31.5–36.5)
MCV RBC AUTO: 94 FL (ref 78–100)
MONOCYTES # BLD AUTO: 0.8 10E9/L (ref 0–1.3)
MONOCYTES NFR BLD AUTO: 8.9 %
NEUTROPHILS # BLD AUTO: 6.5 10E9/L (ref 1.6–8.3)
NEUTROPHILS NFR BLD AUTO: 69.6 %
NRBC # BLD AUTO: 0 10*3/UL
NRBC BLD AUTO-RTO: 0 /100
PHOSPHATE SERPL-MCNC: 2.8 MG/DL (ref 2.5–4.5)
PLATELET # BLD AUTO: 226 10E9/L (ref 150–450)
POTASSIUM SERPL-SCNC: 3.6 MMOL/L (ref 3.4–5.3)
RBC # BLD AUTO: 2.94 10E12/L (ref 4.4–5.9)
SODIUM SERPL-SCNC: 140 MMOL/L (ref 133–144)
VANCOMYCIN SERPL-MCNC: 19.1 MG/L
WBC # BLD AUTO: 9.3 10E9/L (ref 4–11)

## 2019-12-29 PROCEDURE — 27210437 ZZH NUTRITION PRODUCT SEMIELEM INTERMED LITER

## 2019-12-29 PROCEDURE — 25000128 H RX IP 250 OP 636: Performed by: NEUROLOGICAL SURGERY

## 2019-12-29 PROCEDURE — 25000132 ZZH RX MED GY IP 250 OP 250 PS 637: Performed by: PSYCHIATRY & NEUROLOGY

## 2019-12-29 PROCEDURE — 25000132 ZZH RX MED GY IP 250 OP 250 PS 637: Performed by: COUNSELOR

## 2019-12-29 PROCEDURE — 25000132 ZZH RX MED GY IP 250 OP 250 PS 637: Performed by: STUDENT IN AN ORGANIZED HEALTH CARE EDUCATION/TRAINING PROGRAM

## 2019-12-29 PROCEDURE — 25000128 H RX IP 250 OP 636: Performed by: STUDENT IN AN ORGANIZED HEALTH CARE EDUCATION/TRAINING PROGRAM

## 2019-12-29 PROCEDURE — 85025 COMPLETE CBC W/AUTO DIFF WBC: CPT | Performed by: STUDENT IN AN ORGANIZED HEALTH CARE EDUCATION/TRAINING PROGRAM

## 2019-12-29 PROCEDURE — 20000004 ZZH R&B ICU UMMC

## 2019-12-29 PROCEDURE — 83735 ASSAY OF MAGNESIUM: CPT | Performed by: STUDENT IN AN ORGANIZED HEALTH CARE EDUCATION/TRAINING PROGRAM

## 2019-12-29 PROCEDURE — 00000146 ZZHCL STATISTIC GLUCOSE BY METER IP

## 2019-12-29 PROCEDURE — 86140 C-REACTIVE PROTEIN: CPT | Performed by: STUDENT IN AN ORGANIZED HEALTH CARE EDUCATION/TRAINING PROGRAM

## 2019-12-29 PROCEDURE — 84100 ASSAY OF PHOSPHORUS: CPT | Performed by: STUDENT IN AN ORGANIZED HEALTH CARE EDUCATION/TRAINING PROGRAM

## 2019-12-29 PROCEDURE — 40000275 ZZH STATISTIC RCP TIME EA 10 MIN

## 2019-12-29 PROCEDURE — 80048 BASIC METABOLIC PNL TOTAL CA: CPT | Performed by: STUDENT IN AN ORGANIZED HEALTH CARE EDUCATION/TRAINING PROGRAM

## 2019-12-29 PROCEDURE — 25000125 ZZHC RX 250: Performed by: STUDENT IN AN ORGANIZED HEALTH CARE EDUCATION/TRAINING PROGRAM

## 2019-12-29 PROCEDURE — 25000132 ZZH RX MED GY IP 250 OP 250 PS 637: Performed by: NURSE PRACTITIONER

## 2019-12-29 PROCEDURE — 94640 AIRWAY INHALATION TREATMENT: CPT | Mod: 76

## 2019-12-29 PROCEDURE — 25000132 ZZH RX MED GY IP 250 OP 250 PS 637: Performed by: NEUROLOGICAL SURGERY

## 2019-12-29 PROCEDURE — 80202 ASSAY OF VANCOMYCIN: CPT | Performed by: NEUROLOGICAL SURGERY

## 2019-12-29 PROCEDURE — 40000961 ZZH STATISTIC INTRAPULMONARY PERCUSSIVE VENT

## 2019-12-29 PROCEDURE — 25800030 ZZH RX IP 258 OP 636: Performed by: NEUROLOGICAL SURGERY

## 2019-12-29 PROCEDURE — 94003 VENT MGMT INPAT SUBQ DAY: CPT

## 2019-12-29 PROCEDURE — 25000125 ZZHC RX 250

## 2019-12-29 RX ORDER — LACTOBACILLUS RHAMNOSUS GG 10B CELL
1 CAPSULE ORAL DAILY
Status: DISCONTINUED | OUTPATIENT
Start: 2019-12-29 | End: 2019-12-31 | Stop reason: HOSPADM

## 2019-12-29 RX ADMIN — INSULIN ASPART 2 UNITS: 100 INJECTION, SOLUTION INTRAVENOUS; SUBCUTANEOUS at 20:34

## 2019-12-29 RX ADMIN — FAMOTIDINE 20 MG: 40 POWDER, FOR SUSPENSION ORAL at 08:09

## 2019-12-29 RX ADMIN — Medication 100 MG: at 08:06

## 2019-12-29 RX ADMIN — ALBUTEROL SULFATE 2.5 MG: 2.5 SOLUTION RESPIRATORY (INHALATION) at 04:58

## 2019-12-29 RX ADMIN — INSULIN ASPART 2 UNITS: 100 INJECTION, SOLUTION INTRAVENOUS; SUBCUTANEOUS at 17:20

## 2019-12-29 RX ADMIN — FOLIC ACID 1 MG: 1 TABLET ORAL at 08:07

## 2019-12-29 RX ADMIN — METOPROLOL TARTRATE 100 MG: 50 TABLET, FILM COATED ORAL at 08:06

## 2019-12-29 RX ADMIN — Medication 12.5 MG: at 08:09

## 2019-12-29 RX ADMIN — INSULIN ASPART 10 UNITS: 100 INJECTION, SOLUTION INTRAVENOUS; SUBCUTANEOUS at 08:18

## 2019-12-29 RX ADMIN — ACETAMINOPHEN 650 MG: 325 TABLET, FILM COATED ORAL at 19:45

## 2019-12-29 RX ADMIN — POLYETHYLENE GLYCOL 3350 17 G: 17 POWDER, FOR SOLUTION ORAL at 19:42

## 2019-12-29 RX ADMIN — CEFEPIME HYDROCHLORIDE 2 G: 2 INJECTION, POWDER, FOR SOLUTION INTRAVENOUS at 07:09

## 2019-12-29 RX ADMIN — ACETYLCYSTEINE 2 ML: 100 INHALANT RESPIRATORY (INHALATION) at 07:46

## 2019-12-29 RX ADMIN — HEPARIN SODIUM 5000 UNITS: 5000 INJECTION, SOLUTION INTRAVENOUS; SUBCUTANEOUS at 20:40

## 2019-12-29 RX ADMIN — MICONAZOLE NITRATE: 20 CREAM TOPICAL at 19:48

## 2019-12-29 RX ADMIN — Medication 12.5 MG: at 19:47

## 2019-12-29 RX ADMIN — Medication 5 MG: at 22:36

## 2019-12-29 RX ADMIN — MULTIVITAMIN 15 ML: LIQUID ORAL at 19:45

## 2019-12-29 RX ADMIN — OXYCODONE HYDROCHLORIDE 5 MG: 5 SOLUTION ORAL at 12:13

## 2019-12-29 RX ADMIN — ALBUTEROL SULFATE 2.5 MG: 2.5 SOLUTION RESPIRATORY (INHALATION) at 07:45

## 2019-12-29 RX ADMIN — LIDOCAINE 2 PATCH: 560 PATCH PERCUTANEOUS; TOPICAL; TRANSDERMAL at 20:28

## 2019-12-29 RX ADMIN — MIRTAZAPINE 30 MG: 15 TABLET, FILM COATED ORAL at 19:46

## 2019-12-29 RX ADMIN — CARBOXYMETHYLCELLULOSE SODIUM 1 DROP: 5 SOLUTION/ DROPS OPHTHALMIC at 17:58

## 2019-12-29 RX ADMIN — ALBUTEROL SULFATE 2.5 MG: 2.5 SOLUTION RESPIRATORY (INHALATION) at 15:35

## 2019-12-29 RX ADMIN — METRONIDAZOLE 500 MG: 500 TABLET ORAL at 19:46

## 2019-12-29 RX ADMIN — INSULIN ASPART 4 UNITS: 100 INJECTION, SOLUTION INTRAVENOUS; SUBCUTANEOUS at 23:26

## 2019-12-29 RX ADMIN — ALBUTEROL SULFATE 2.5 MG: 2.5 SOLUTION RESPIRATORY (INHALATION) at 00:36

## 2019-12-29 RX ADMIN — METOPROLOL TARTRATE 100 MG: 50 TABLET, FILM COATED ORAL at 19:45

## 2019-12-29 RX ADMIN — TRAZODONE HYDROCHLORIDE 100 MG: 50 TABLET ORAL at 22:36

## 2019-12-29 RX ADMIN — SENNOSIDES AND DOCUSATE SODIUM 1 TABLET: 8.6; 5 TABLET ORAL at 19:45

## 2019-12-29 RX ADMIN — SODIUM CHLORIDE, PRESERVATIVE FREE 10 ML: 5 INJECTION INTRAVENOUS at 12:11

## 2019-12-29 RX ADMIN — CYCLOBENZAPRINE HYDROCHLORIDE 10 MG: 10 TABLET, FILM COATED ORAL at 19:45

## 2019-12-29 RX ADMIN — INSULIN ASPART 2 UNITS: 100 INJECTION, SOLUTION INTRAVENOUS; SUBCUTANEOUS at 00:26

## 2019-12-29 RX ADMIN — CEFEPIME HYDROCHLORIDE 2 G: 2 INJECTION, POWDER, FOR SOLUTION INTRAVENOUS at 17:21

## 2019-12-29 RX ADMIN — FAMOTIDINE 20 MG: 40 POWDER, FOR SUSPENSION ORAL at 19:47

## 2019-12-29 RX ADMIN — SIMETHICONE CHEW TAB 80 MG 80 MG: 80 TABLET ORAL at 19:45

## 2019-12-29 RX ADMIN — Medication 2 SPRAY: at 08:15

## 2019-12-29 RX ADMIN — INSULIN ASPART 4 UNITS: 100 INJECTION, SOLUTION INTRAVENOUS; SUBCUTANEOUS at 11:59

## 2019-12-29 RX ADMIN — ACETYLCYSTEINE 2 ML: 100 INHALANT RESPIRATORY (INHALATION) at 00:36

## 2019-12-29 RX ADMIN — INSULIN ASPART 2 UNITS: 100 INJECTION, SOLUTION INTRAVENOUS; SUBCUTANEOUS at 04:28

## 2019-12-29 RX ADMIN — BUMETANIDE 1 MG: 1 TABLET ORAL at 19:45

## 2019-12-29 RX ADMIN — ACETYLCYSTEINE 2 ML: 100 INHALANT RESPIRATORY (INHALATION) at 11:00

## 2019-12-29 RX ADMIN — ALBUTEROL SULFATE 2.5 MG: 2.5 SOLUTION RESPIRATORY (INHALATION) at 19:54

## 2019-12-29 RX ADMIN — VANCOMYCIN HYDROCHLORIDE 1250 MG: 500 INJECTION, POWDER, LYOPHILIZED, FOR SOLUTION INTRAVENOUS at 19:39

## 2019-12-29 RX ADMIN — SIMETHICONE CHEW TAB 80 MG 80 MG: 80 TABLET ORAL at 08:08

## 2019-12-29 RX ADMIN — Medication 2 SPRAY: at 17:23

## 2019-12-29 RX ADMIN — ACETYLCYSTEINE 2 ML: 100 INHALANT RESPIRATORY (INHALATION) at 19:54

## 2019-12-29 RX ADMIN — Medication 1 CAPSULE: at 09:58

## 2019-12-29 RX ADMIN — METRONIDAZOLE 500 MG: 500 TABLET ORAL at 08:07

## 2019-12-29 RX ADMIN — CARBOXYMETHYLCELLULOSE SODIUM 1 DROP: 5 SOLUTION/ DROPS OPHTHALMIC at 10:33

## 2019-12-29 RX ADMIN — Medication 2 SPRAY: at 19:38

## 2019-12-29 RX ADMIN — ACETYLCYSTEINE 2 ML: 100 INHALANT RESPIRATORY (INHALATION) at 15:35

## 2019-12-29 RX ADMIN — CYCLOBENZAPRINE HYDROCHLORIDE 10 MG: 10 TABLET, FILM COATED ORAL at 14:38

## 2019-12-29 RX ADMIN — METRONIDAZOLE 500 MG: 500 TABLET ORAL at 14:38

## 2019-12-29 RX ADMIN — CYCLOBENZAPRINE HYDROCHLORIDE 10 MG: 10 TABLET, FILM COATED ORAL at 08:07

## 2019-12-29 RX ADMIN — HEPARIN SODIUM 5000 UNITS: 5000 INJECTION, SOLUTION INTRAVENOUS; SUBCUTANEOUS at 08:08

## 2019-12-29 RX ADMIN — Medication 2 G: at 08:14

## 2019-12-29 RX ADMIN — MICONAZOLE NITRATE: 20 CREAM TOPICAL at 08:16

## 2019-12-29 RX ADMIN — VANCOMYCIN HYDROCHLORIDE 1250 MG: 500 INJECTION, POWDER, LYOPHILIZED, FOR SOLUTION INTRAVENOUS at 08:21

## 2019-12-29 RX ADMIN — BUMETANIDE 1 MG: 1 TABLET ORAL at 08:06

## 2019-12-29 RX ADMIN — ALBUTEROL SULFATE 2.5 MG: 2.5 SOLUTION RESPIRATORY (INHALATION) at 11:00

## 2019-12-29 RX ADMIN — AMLODIPINE BESYLATE 10 MG: 10 TABLET ORAL at 08:07

## 2019-12-29 RX ADMIN — INSULIN GLARGINE 10 UNITS: 100 INJECTION, SOLUTION SUBCUTANEOUS at 08:19

## 2019-12-29 RX ADMIN — POTASSIUM CHLORIDE 20 MEQ: 29.8 INJECTION, SOLUTION INTRAVENOUS at 05:52

## 2019-12-29 RX ADMIN — CEFEPIME HYDROCHLORIDE 2 G: 2 INJECTION, POWDER, FOR SOLUTION INTRAVENOUS at 23:30

## 2019-12-29 RX ADMIN — ACETYLCYSTEINE 2 ML: 100 INHALANT RESPIRATORY (INHALATION) at 04:58

## 2019-12-29 ASSESSMENT — MIFFLIN-ST. JEOR
SCORE: 1640.5
SCORE: 1659.5

## 2019-12-29 ASSESSMENT — ACTIVITIES OF DAILY LIVING (ADL)
ADLS_ACUITY_SCORE: 27
ADLS_ACUITY_SCORE: 26
ADLS_ACUITY_SCORE: 27
ADLS_ACUITY_SCORE: 27

## 2019-12-29 NOTE — PHARMACY-VANCOMYCIN DOSING SERVICE
Pharmacy Vancomycin Note  Date of Service 2019  Patient's  1955   64 year old, male    Indication: Osteomyelitis and vertebral phlegmon  Goal Trough Level: 15-20 mg/L  Day of Therapy: 21  Current Vancomycin regimen:  1250 mg IV q12h    Current estimated CrCl = Estimated Creatinine Clearance: 176.5 mL/min (A) (based on SCr of 0.46 mg/dL (L)).    Creatinine for last 3 days  2019:  3:52 AM Creatinine 0.50 mg/dL  2019:  3:54 AM Creatinine 0.50 mg/dL  2019:  4:26 AM Creatinine 0.46 mg/dL    Recent Vancomycin Levels (past 3 days)  2019:  6:35 AM Vancomycin Level 19.5 mg/L  2019:  7:15 AM Vancomycin Level 19.1 mg/L    Vancomycin IV Administrations (past 72 hours)                   vancomycin (VANCOCIN) 1,250 mg in D5W 250 mL intermittent infusion (mg) 1,250 mg New Bag 19 0821     1,250 mg New Bag 19     1,250 mg New Bag  0903     1,250 mg New Bag 19 194    vancomycin 1250 mg in 0.9% NaCl 250 mL intermittent infusion 1,250 mg (mg) 1,250 mg Given 19 0754     1,250 mg Given 19                Nephrotoxins and other renal medications (From now, onward)    Start     Dose/Rate Route Frequency Ordered Stop    19  vancomycin (VANCOCIN) 1,250 mg in D5W 250 mL intermittent infusion      1,250 mg  over 90 Minutes Intravenous EVERY 12 HOURS 19 1206      19 2000  bumetanide (BUMEX) tablet 1 mg      1 mg Oral or Feeding Tube 2 TIMES DAILY 19 1039      19 0000  bumetanide (BUMEX) 1 MG tablet      1 mg Oral 2 TIMES DAILY 19 1231               Contrast Orders - past 72 hours (72h ago, onward)    Start     Dose/Rate Route Frequency Ordered Stop    19 1115  gadobutrol (GADAVIST) injection 10 mL      10 mL Intravenous ONCE 19 1112 19 1132          Interpretation of levels and current regimen:  Trough level is  Therapeutic at 19.1    Has serum creatinine changed > 50% in last 72 hours:  No    Urine output:  good urine output    Renal Function: Stable    Plan:  1.  Continue Current Dose 1250 mg q12h  2.  Pharmacy will check trough levels as appropriate in 3-5 Days.    3. Serum creatinine levels will be ordered daily for the first week of therapy and at least twice weekly for subsequent weeks.      Mckenzie Toney RPH        .

## 2019-12-29 NOTE — PLAN OF CARE
D: Sherwin mouth words that he needs water, swabs, he wants the bed turned so he can better see the t.v.. He has frustration when not understood.     Neuro: Alert and oriented x 4, pupils 3/brisk/tracks. Moves L UE  to reposition, R slightly. LE no movement or sensation. All extremities warm/pale/normal pulses.   VS: Tmax 99.2 , HR 54-93, -143/61-82, maps , RR 16-24.   CV: SR rare PVC.   Pulm: PS til 2100, rested on CMV/40%/12/450/5. Suctioning up  small amounts thick yellow secretions via # 6 shiley .   GI: TF at 55 ml/hour via PEG, free water 60 every 4 hours. Liquid stool, medium x 2, attempted rectal pouch, found off at 0430.   : díaz changed out at 0500. Adequate amounts clear yellow urine. No discharge noted with change.   Lines/Gtts:  R  triple lumen PICC with TKO  med line. Nuno draws poorly.   Skin:   Neck incisions healing, open to air. Trach site reddened, tan thick drainage around it , dressing changed 2100 along with inner cannula. Scabs intact. Miconazole cream to nicolle/groin red sites. Red taint cleansed and covered with protective cream.   Drains: Díaz.   Labs: potassium replaced.   P: Pulmonary toilet, vent wean. Comprehensive care, monitoring of insensate areas. Maintain cervical collar and safety. Continue to attempt to relieve oral dryness, biotin spray, swabs. Maintain communication non-verbal, keep soft call light reach of slight head movement.

## 2019-12-29 NOTE — PROGRESS NOTES
Neuroscience Intensive Care Progress Note  12/29/2019      24 hour events:   PST yesterday for 5-7 hours. Afebrile, WBC stable. 1 Stool overnight. Some gas.           Current Medications:    acetylcysteine  2 mL Nebulization Q4H     albuterol  2.5 mg Nebulization Q4H     amLODIPine  10 mg Oral or Feeding Tube Daily     artificial saliva  2 spray Swish & Spit 4x Daily     bumetanide  1 mg Oral or Feeding Tube BID     ceFEPIme (MAXIPIME) IV  2 g Intravenous Q8H     cyanocobalamin  1,000 mcg Intramuscular Q30 Days     cyclobenzaprine  10 mg Oral TID     famotidine  20 mg Per G Tube BID     folic acid  1 mg Oral or Feeding Tube Daily     heparin ANTICOAGULANT  5,000 Units Subcutaneous Q12H     heparin lock flush  5-10 mL Intracatheter Q24H     insulin aspart  2-16 Units Subcutaneous Q4H     insulin glargine  10 Units Subcutaneous QAM AC     lidocaine  2 patch Transdermal Q24h    And     lidocaine   Transdermal Q8H     metoprolol tartrate  100 mg Oral or Feeding Tube BID     metroNIDAZOLE  500 mg Oral or Feeding Tube TID     miconazole   Topical BID     mirtazapine  30 mg Oral or Feeding Tube QPM     multivitamins w/minerals  15 mL Per Feeding Tube Daily     polyethylene glycol  17 g Oral BID     QUEtiapine  12.5 mg Oral BID     senna-docusate  1 tablet Oral or Feeding Tube BID     simethicone  80 mg Oral BID     traZODone  100 mg Oral or Feeding Tube At Bedtime     vancomycin (VANCOCIN) IV  1,250 mg Intravenous Q12H     thiamine  100 mg Oral or Feeding Tube Daily       PRN Medications:  acetaminophen, carboxymethylcellulose PF, IV fluid REPLACEMENT ONLY, glucose **OR** dextrose **OR** glucagon, heparin lock flush, hydrALAZINE, hypromellose-dextran, ipratropium - albuterol 0.5 mg/2.5 mg/3 mL, labetalol, lidocaine 4%, lidocaine (buffered or not buffered), magnesium sulfate, magnesium sulfate, melatonin, naloxone, ondansetron **OR** ondansetron, oxyCODONE, potassium chloride, potassium chloride with lidocaine, potassium  "chloride, potassium chloride, potassium chloride, potassium phosphate (KPHOS) in D5W IV, potassium phosphate (KPHOS) in D5W IV, potassium phosphate (KPHOS) in D5W IV, potassium phosphate (KPHOS) in D5W IV, potassium phosphate (KPHOS) in D5W IV, prochlorperazine **OR** prochlorperazine **OR** prochlorperazine, sodium chloride (PF), sodium phosphate    Objective findings:  /72   Pulse 95   Temp 98.9  F (37.2  C) (Axillary)   Resp 21   Ht 1.727 m (5' 8\")   Wt 88.4 kg (194 lb 14.2 oz)   SpO2 95%   BMI 29.63 kg/m      Ventilator Settings:  Ventilation Mode: CMV/AC  (Continuous Mandatory Ventilation/ Assist Control)  FiO2 (%): 40 %  Rate Set (breaths/minute): 12 breaths/min  Tidal Volume Set (mL): 450 mL  PEEP (cm H2O): 5 cmH2O  Pressure Support (cm H2O): 7 cmH2O  Oxygen Concentration (%): 40 %  Resp: 21      Intake/Output:      Intake/Output Summary (Last 24 hours) at 12/28/2019  Last data filed at 12/23/2019   Gross per 24 hour   Intake 2495 ml   Output 3955 ml   Net  -1460 ml         Physical Examination:   Vitals:  B/P: 166/95, T: 100.9, P: 120,   General:  Laying in bed, he is more comfortable today  HEENT:  NC/AT, cervical collar in place. no icterus, op pink and moist, no ear or nose drainage.   Cardiac:  RRR but tachycaric  Chest:  Minimal insbiratory wheezes  Abdomen:  Less distended and softer with reduced BS  Extremities:  No LE swelling.    Skin:  No rash or lesion.      Neuro Exam:   Mental status: alert, follows all commands   Cranial nerves: PERRL, EOMI, face symmetric  Motor: Some flexion extension at elbow bilaterally more left (3/5) than right (2/5). Some movement left wrist (3/5 ext) and fingers, no thumb abduction, no movement in distal RUE. No movements of the legs  Sensory: reports sensation in arms and upper chest and both thighs    Labs/Studies:  CBC RESULTS:   WBC 14.3--> 11.7--> 12.3  Hb 10.3 --> 9.1 --> 8.4  Plt 256 --> 227-->216     --> 100 --> 120    Last Comprehensive " Metabolic Panel:  Sodium   Date Value Ref Range Status   12/29/2019 140 133 - 144 mmol/L Final     Potassium   Date Value Ref Range Status   12/29/2019 3.6 3.4 - 5.3 mmol/L Final     Chloride   Date Value Ref Range Status   12/29/2019 107 94 - 109 mmol/L Final     Carbon Dioxide   Date Value Ref Range Status   12/29/2019 29 20 - 32 mmol/L Final     Anion Gap   Date Value Ref Range Status   12/29/2019 4 3 - 14 mmol/L Final     Glucose   Date Value Ref Range Status   12/29/2019 179 (H) 70 - 99 mg/dL Final     Urea Nitrogen   Date Value Ref Range Status   12/29/2019 34 (H) 7 - 30 mg/dL Final     Creatinine   Date Value Ref Range Status   12/29/2019 0.46 (L) 0.66 - 1.25 mg/dL Final     GFR Estimate   Date Value Ref Range Status   12/29/2019 >90 >60 mL/min/[1.73_m2] Final     Comment:     Non  GFR Calc  Starting 12/18/2018, serum creatinine based estimated GFR (eGFR) will be   calculated using the Chronic Kidney Disease Epidemiology Collaboration   (CKD-EPI) equation.       Calcium   Date Value Ref Range Status   12/29/2019 8.3 (L) 8.5 - 10.1 mg/dL Final   Mg 1.8  Phosph: 1.7    B 2 glucan: positive 129  bl culture negative (first and second one )  Urine culture growth of VRE and candida glabrata  Sputum culture not collected    UA RESULTS:  Recent Labs   Lab Test 12/24/19  1411   COLOR Yellow   APPEARANCE Cloudy   URINEGLC Negative   URINEBILI Negative   URINEKETONE Negative   SG 1.013   UBLD Small*   URINEPH 5.0   PROTEIN 30*   NITRITE Negative   LEUKEST Large*   RBCU 13*   WBCU 51*     MRI cervical spines 12/27  Impression:   1. Persistent but minimally decreased thickness of epidural  phlegmon/abscess along the anterior thecal sac and prevertebral soft  tissue since 12/10/2019. Unchanged severe canal stenosis.  2. Slightly increased erosion with loss of cortical definition of the  C3-4 and C4-5 endplates which is resulting in minimally increased  kyphotic curvature at C3-4 since prior.  3. Unchanged  abnormal myelopathic signal extending from C3 to C6.      Neuro:  #Cervical spinal cord injury  #Discitis/osteomyelitis C3-C4  # Vertebral phlegmon and Abscess, minimally reduced in thickness  - continue wearing the [place] Collar at all times per neuro surgery, Up with assist  - Neuro checks q4h during the day   - continue PT/OT     #Anxiety/Pain - good control   - Oxycodone 5-10 q4hrs prn   - flexeril to 10 mg TID      #Hx of Alcohol dependence  - Thiamine and folate     #Depression  - Continue PTA miratazepine  - continue trazodone 100 mg at bedtime  - Seroquel 12.5 BID      Resp:  #Acute respiratory failure s/p re-intubation 12/6, suspect diaphragmatic weakness related to spine injury  - Mucomyst & chest physio   - pressure support trial TID  - weaning off vent as tolerated  --metanebs   --chest physio per RT  --percussive therapy  - desaturation when positioned on Rt side, try positional changes first   - S/P trach 12/20   - sputum culture needs to be collected  - repeat Chest xray: stable      Cardio:  # Hypertension, tachycardia   # Dysautonomia??  - PTA amlodipine 10 mg   - continue metoprolol to 100 mg bid  - PTA Bumex 1 mg bid  -  Troponin, EKG    Renal:  Hypophosphatemia   Hypernatremia   - Electrolyte replacement protocol   - Monitor I/O goal In= outs   - increase free water flushes to 60 q4h  - minimal diuresis (I/O -1460)      Neurogenic urinary retention   - failed díaz wean trial 12/12 caused retention   - UA with VRE and Candida Glabrata complex infection  - Urine culture VRE resistant amp, nitrofurantoin, penicillin, vanc, waiting for Linezolid sensitivity  - ID did not recommend adding antifungal . If he spiked fever > 102 start fluconazole 400 mg daily     Endo:  #DM2   - High sliding scale insulin  - start lantus 20 units tomorrow      Heme:  #Normocytic anemia, stable  - Hg > 7.0     # Leukocytosis, improving   Likely due to UTI. Blood culture negative so far and UC positive for VRE and candida  glabrata. B2 Glucan positive 129  - CBC daily  -   - ID on board   - Follow susceptibility for VRE and candida glabrata    GI:  #Constipation - still distended abdomen last BM 12/27  - Senna scheduled bid  - PRN suppository/enema   - Miralax bid  - KUB: some dilated loops of bowel, some air? Unclear if from PEG, he does not have acute abdomen on exam  [] Added simethacone, and probiotics for gas        #Diet   - G-tube in place  - Tube feeds at goal      ID:   #Vertebral phlegmon and Abscess s/p drain placement 12/1  #Osteomyelitis C3-5  #Fever - restarted 12/25  #Leukocytosis, improving   #repeat MRI 12/10 and 12/19 shows progressive epidural abscess.   # Repeat MRI 12/27 showed minimally decrease abscess thickness and increase the vertebral erosion   - Vanc, cefepime, metronidazole regimen per ID (through 1/22)  - Weekly CBC, CMP, CRP while on IV abx,  - Spinal tissue cultures (12/1): MSSA, Strep anginosus, Eikenella, and oral anaerobes   - Per ID:  - will need at least 6wks of above abx from day 1 (12/11), earliest stop date of 1/22/20    # elevated CRP and WBCs, resolving  # new fever  # VRE and candida glabrata UTI  ID does not think the new fever and elevated CRP is due to UTI. They think possibly spinal abscess. Repeat cervical spines MRI did not show increase of the abscess size but showed more vertebral erosions  - blood culture negative  - Repeat blood culture negative  -  B 2 Glucan positive   - if spiked fever > 102 start Fluconazole 400 mg daily  []Trend CRP q3h days     Lines:  - Trach, PICC , PIV x 2, Deg[place], PEG     FEN: TF via PEG   PPX:    DVT prophylaxis: SCDs, Heparin subcutaneous restart 11/22    GI: famotidine   Code Status: Full, presumed     [facility]    This patient was seen & discussed with my attending, Dr. Eduardo MD, who agrees with my assessment and plan.

## 2019-12-30 ENCOUNTER — APPOINTMENT (OUTPATIENT)
Dept: PHYSICAL THERAPY | Facility: CLINIC | Age: 64
DRG: 003 | End: 2019-12-30
Attending: NEUROLOGICAL SURGERY
Payer: COMMERCIAL

## 2019-12-30 ENCOUNTER — APPOINTMENT (OUTPATIENT)
Dept: GENERAL RADIOLOGY | Facility: CLINIC | Age: 64
DRG: 003 | End: 2019-12-30
Attending: STUDENT IN AN ORGANIZED HEALTH CARE EDUCATION/TRAINING PROGRAM
Payer: COMMERCIAL

## 2019-12-30 LAB
ANION GAP SERPL CALCULATED.3IONS-SCNC: 4 MMOL/L (ref 3–14)
BACTERIA SPEC CULT: ABNORMAL
BACTERIA SPEC CULT: NO GROWTH
BACTERIA SPEC CULT: NO GROWTH
BASOPHILS # BLD AUTO: 0 10E9/L (ref 0–0.2)
BASOPHILS NFR BLD AUTO: 0.3 %
BUN SERPL-MCNC: 37 MG/DL (ref 7–30)
CALCIUM SERPL-MCNC: 8.4 MG/DL (ref 8.5–10.1)
CHLORIDE SERPL-SCNC: 104 MMOL/L (ref 94–109)
CO2 SERPL-SCNC: 30 MMOL/L (ref 20–32)
CREAT SERPL-MCNC: 0.45 MG/DL (ref 0.66–1.25)
DIFFERENTIAL METHOD BLD: ABNORMAL
EOSINOPHIL # BLD AUTO: 0.2 10E9/L (ref 0–0.7)
EOSINOPHIL NFR BLD AUTO: 2.8 %
ERYTHROCYTE [DISTWIDTH] IN BLOOD BY AUTOMATED COUNT: 14.7 % (ref 10–15)
FUNGUS SPEC CULT: NORMAL
GFR SERPL CREATININE-BSD FRML MDRD: >90 ML/MIN/{1.73_M2}
GLUCOSE BLDC GLUCOMTR-MCNC: 150 MG/DL (ref 70–99)
GLUCOSE BLDC GLUCOMTR-MCNC: 170 MG/DL (ref 70–99)
GLUCOSE BLDC GLUCOMTR-MCNC: 201 MG/DL (ref 70–99)
GLUCOSE BLDC GLUCOMTR-MCNC: 205 MG/DL (ref 70–99)
GLUCOSE BLDC GLUCOMTR-MCNC: 213 MG/DL (ref 70–99)
GLUCOSE SERPL-MCNC: 195 MG/DL (ref 70–99)
HCT VFR BLD AUTO: 26.6 % (ref 40–53)
HGB BLD-MCNC: 8.2 G/DL (ref 13.3–17.7)
IMM GRANULOCYTES # BLD: 0.3 10E9/L (ref 0–0.4)
IMM GRANULOCYTES NFR BLD: 3.2 %
LYMPHOCYTES # BLD AUTO: 1.3 10E9/L (ref 0.8–5.3)
LYMPHOCYTES NFR BLD AUTO: 15.3 %
Lab: ABNORMAL
Lab: NORMAL
Lab: NORMAL
MAGNESIUM SERPL-MCNC: 1.9 MG/DL (ref 1.6–2.3)
MCH RBC QN AUTO: 29.2 PG (ref 26.5–33)
MCHC RBC AUTO-ENTMCNC: 30.8 G/DL (ref 31.5–36.5)
MCV RBC AUTO: 95 FL (ref 78–100)
MONOCYTES # BLD AUTO: 0.7 10E9/L (ref 0–1.3)
MONOCYTES NFR BLD AUTO: 7.7 %
NEUTROPHILS # BLD AUTO: 6.1 10E9/L (ref 1.6–8.3)
NEUTROPHILS NFR BLD AUTO: 70.7 %
NRBC # BLD AUTO: 0 10*3/UL
NRBC BLD AUTO-RTO: 0 /100
PHOSPHATE SERPL-MCNC: 2.5 MG/DL (ref 2.5–4.5)
PLATELET # BLD AUTO: 267 10E9/L (ref 150–450)
POTASSIUM SERPL-SCNC: 3.4 MMOL/L (ref 3.4–5.3)
RBC # BLD AUTO: 2.81 10E12/L (ref 4.4–5.9)
SODIUM SERPL-SCNC: 138 MMOL/L (ref 133–144)
SPECIMEN SOURCE: ABNORMAL
SPECIMEN SOURCE: NORMAL
WBC # BLD AUTO: 8.7 10E9/L (ref 4–11)

## 2019-12-30 PROCEDURE — 25000132 ZZH RX MED GY IP 250 OP 250 PS 637: Performed by: STUDENT IN AN ORGANIZED HEALTH CARE EDUCATION/TRAINING PROGRAM

## 2019-12-30 PROCEDURE — 25000128 H RX IP 250 OP 636: Performed by: STUDENT IN AN ORGANIZED HEALTH CARE EDUCATION/TRAINING PROGRAM

## 2019-12-30 PROCEDURE — 25000132 ZZH RX MED GY IP 250 OP 250 PS 637: Performed by: PSYCHIATRY & NEUROLOGY

## 2019-12-30 PROCEDURE — 80048 BASIC METABOLIC PNL TOTAL CA: CPT | Performed by: STUDENT IN AN ORGANIZED HEALTH CARE EDUCATION/TRAINING PROGRAM

## 2019-12-30 PROCEDURE — 25000132 ZZH RX MED GY IP 250 OP 250 PS 637: Performed by: COUNSELOR

## 2019-12-30 PROCEDURE — 40000961 ZZH STATISTIC INTRAPULMONARY PERCUSSIVE VENT

## 2019-12-30 PROCEDURE — 25000125 ZZHC RX 250: Performed by: STUDENT IN AN ORGANIZED HEALTH CARE EDUCATION/TRAINING PROGRAM

## 2019-12-30 PROCEDURE — 40000275 ZZH STATISTIC RCP TIME EA 10 MIN

## 2019-12-30 PROCEDURE — 94003 VENT MGMT INPAT SUBQ DAY: CPT

## 2019-12-30 PROCEDURE — 84100 ASSAY OF PHOSPHORUS: CPT | Performed by: STUDENT IN AN ORGANIZED HEALTH CARE EDUCATION/TRAINING PROGRAM

## 2019-12-30 PROCEDURE — 25000132 ZZH RX MED GY IP 250 OP 250 PS 637: Performed by: NEUROLOGICAL SURGERY

## 2019-12-30 PROCEDURE — 97110 THERAPEUTIC EXERCISES: CPT | Mod: GP | Performed by: REHABILITATION PRACTITIONER

## 2019-12-30 PROCEDURE — 97530 THERAPEUTIC ACTIVITIES: CPT | Mod: GP | Performed by: REHABILITATION PRACTITIONER

## 2019-12-30 PROCEDURE — 25800030 ZZH RX IP 258 OP 636: Performed by: NEUROLOGICAL SURGERY

## 2019-12-30 PROCEDURE — 85025 COMPLETE CBC W/AUTO DIFF WBC: CPT | Performed by: STUDENT IN AN ORGANIZED HEALTH CARE EDUCATION/TRAINING PROGRAM

## 2019-12-30 PROCEDURE — 25000125 ZZHC RX 250

## 2019-12-30 PROCEDURE — 97112 NEUROMUSCULAR REEDUCATION: CPT | Mod: GP | Performed by: REHABILITATION PRACTITIONER

## 2019-12-30 PROCEDURE — 27210429 ZZH NUTRITION PRODUCT INTERMEDIATE LITER

## 2019-12-30 PROCEDURE — 20000004 ZZH R&B ICU UMMC

## 2019-12-30 PROCEDURE — 25000132 ZZH RX MED GY IP 250 OP 250 PS 637: Performed by: NURSE PRACTITIONER

## 2019-12-30 PROCEDURE — 72040 X-RAY EXAM NECK SPINE 2-3 VW: CPT

## 2019-12-30 PROCEDURE — 25000128 H RX IP 250 OP 636: Performed by: NEUROLOGICAL SURGERY

## 2019-12-30 PROCEDURE — 94640 AIRWAY INHALATION TREATMENT: CPT | Mod: 76

## 2019-12-30 PROCEDURE — 94640 AIRWAY INHALATION TREATMENT: CPT

## 2019-12-30 PROCEDURE — 83735 ASSAY OF MAGNESIUM: CPT | Performed by: STUDENT IN AN ORGANIZED HEALTH CARE EDUCATION/TRAINING PROGRAM

## 2019-12-30 PROCEDURE — 00000146 ZZHCL STATISTIC GLUCOSE BY METER IP

## 2019-12-30 PROCEDURE — 99221 1ST HOSP IP/OBS SF/LOW 40: CPT | Performed by: PSYCHIATRY & NEUROLOGY

## 2019-12-30 PROCEDURE — 25800030 ZZH RX IP 258 OP 636: Performed by: STUDENT IN AN ORGANIZED HEALTH CARE EDUCATION/TRAINING PROGRAM

## 2019-12-30 RX ORDER — BUSPIRONE HYDROCHLORIDE 10 MG/1
10 TABLET ORAL 2 TIMES DAILY
Status: DISCONTINUED | OUTPATIENT
Start: 2019-12-30 | End: 2019-12-31

## 2019-12-30 RX ORDER — CARBOXYMETHYLCELLULOSE SODIUM 5 MG/ML
1 SOLUTION/ DROPS OPHTHALMIC 3 TIMES DAILY
Status: DISCONTINUED | OUTPATIENT
Start: 2019-12-30 | End: 2019-12-31 | Stop reason: HOSPADM

## 2019-12-30 RX ORDER — ESCITALOPRAM OXALATE 10 MG/1
10 TABLET ORAL DAILY
Status: DISCONTINUED | OUTPATIENT
Start: 2019-12-30 | End: 2019-12-31 | Stop reason: HOSPADM

## 2019-12-30 RX ORDER — AMINO AC/PROTEIN HYDR/WHEY PRO 10G-100/30
1 LIQUID (ML) ORAL 3 TIMES DAILY
Status: DISCONTINUED | OUTPATIENT
Start: 2019-12-30 | End: 2019-12-31 | Stop reason: HOSPADM

## 2019-12-30 RX ADMIN — METRONIDAZOLE 500 MG: 500 TABLET ORAL at 08:44

## 2019-12-30 RX ADMIN — FAMOTIDINE 20 MG: 40 POWDER, FOR SUSPENSION ORAL at 08:46

## 2019-12-30 RX ADMIN — HEPARIN SODIUM 5000 UNITS: 5000 INJECTION, SOLUTION INTRAVENOUS; SUBCUTANEOUS at 08:44

## 2019-12-30 RX ADMIN — MICONAZOLE NITRATE: 20 CREAM TOPICAL at 08:45

## 2019-12-30 RX ADMIN — CARBOXYMETHYLCELLULOSE SODIUM 1 DROP: 5 SOLUTION/ DROPS OPHTHALMIC at 20:46

## 2019-12-30 RX ADMIN — OXYCODONE HYDROCHLORIDE 10 MG: 5 SOLUTION ORAL at 15:35

## 2019-12-30 RX ADMIN — ESCITALOPRAM OXALATE 10 MG: 10 TABLET ORAL at 13:38

## 2019-12-30 RX ADMIN — METRONIDAZOLE 500 MG: 500 TABLET ORAL at 20:45

## 2019-12-30 RX ADMIN — INSULIN ASPART 6 UNITS: 100 INJECTION, SOLUTION INTRAVENOUS; SUBCUTANEOUS at 12:10

## 2019-12-30 RX ADMIN — Medication 1 PACKET: at 21:20

## 2019-12-30 RX ADMIN — CYCLOBENZAPRINE HYDROCHLORIDE 10 MG: 10 TABLET, FILM COATED ORAL at 20:45

## 2019-12-30 RX ADMIN — Medication 2 SPRAY: at 12:07

## 2019-12-30 RX ADMIN — POTASSIUM PHOSPHATE, MONOBASIC AND POTASSIUM PHOSPHATE, DIBASIC 10 MMOL: 224; 236 INJECTION, SOLUTION INTRAVENOUS at 05:49

## 2019-12-30 RX ADMIN — Medication 12.5 MG: at 08:46

## 2019-12-30 RX ADMIN — CYCLOBENZAPRINE HYDROCHLORIDE 10 MG: 10 TABLET, FILM COATED ORAL at 08:43

## 2019-12-30 RX ADMIN — ALBUTEROL SULFATE 2.5 MG: 2.5 SOLUTION RESPIRATORY (INHALATION) at 04:48

## 2019-12-30 RX ADMIN — TRAZODONE HYDROCHLORIDE 100 MG: 50 TABLET ORAL at 22:06

## 2019-12-30 RX ADMIN — MULTIVITAMIN 15 ML: LIQUID ORAL at 20:46

## 2019-12-30 RX ADMIN — FAMOTIDINE 20 MG: 40 POWDER, FOR SUSPENSION ORAL at 20:46

## 2019-12-30 RX ADMIN — METOPROLOL TARTRATE 100 MG: 50 TABLET, FILM COATED ORAL at 20:59

## 2019-12-30 RX ADMIN — Medication 100 MG: at 08:43

## 2019-12-30 RX ADMIN — SENNOSIDES AND DOCUSATE SODIUM 1 TABLET: 8.6; 5 TABLET ORAL at 20:45

## 2019-12-30 RX ADMIN — Medication 2 SPRAY: at 20:48

## 2019-12-30 RX ADMIN — ACETAMINOPHEN 650 MG: 325 TABLET, FILM COATED ORAL at 20:45

## 2019-12-30 RX ADMIN — FOLIC ACID 1 MG: 1 TABLET ORAL at 08:44

## 2019-12-30 RX ADMIN — CYCLOBENZAPRINE HYDROCHLORIDE 10 MG: 10 TABLET, FILM COATED ORAL at 13:38

## 2019-12-30 RX ADMIN — MICONAZOLE NITRATE: 20 CREAM TOPICAL at 21:11

## 2019-12-30 RX ADMIN — CARBOXYMETHYLCELLULOSE SODIUM 1 DROP: 5 SOLUTION/ DROPS OPHTHALMIC at 13:38

## 2019-12-30 RX ADMIN — METOPROLOL TARTRATE 100 MG: 50 TABLET, FILM COATED ORAL at 08:43

## 2019-12-30 RX ADMIN — Medication 2 SPRAY: at 08:45

## 2019-12-30 RX ADMIN — Medication 2 G: at 05:52

## 2019-12-30 RX ADMIN — AMLODIPINE BESYLATE 10 MG: 10 TABLET ORAL at 08:43

## 2019-12-30 RX ADMIN — CEFEPIME HYDROCHLORIDE 2 G: 2 INJECTION, POWDER, FOR SOLUTION INTRAVENOUS at 10:06

## 2019-12-30 RX ADMIN — ALBUTEROL SULFATE 2.5 MG: 2.5 SOLUTION RESPIRATORY (INHALATION) at 12:50

## 2019-12-30 RX ADMIN — ACETYLCYSTEINE 2 ML: 100 INHALANT RESPIRATORY (INHALATION) at 12:51

## 2019-12-30 RX ADMIN — Medication 1 PACKET: at 10:11

## 2019-12-30 RX ADMIN — SIMETHICONE CHEW TAB 80 MG 80 MG: 80 TABLET ORAL at 20:45

## 2019-12-30 RX ADMIN — Medication 2 SPRAY: at 15:38

## 2019-12-30 RX ADMIN — INSULIN ASPART 4 UNITS: 100 INJECTION, SOLUTION INTRAVENOUS; SUBCUTANEOUS at 03:36

## 2019-12-30 RX ADMIN — ACETAMINOPHEN 650 MG: 325 TABLET, FILM COATED ORAL at 13:38

## 2019-12-30 RX ADMIN — ACETYLCYSTEINE 2 ML: 100 INHALANT RESPIRATORY (INHALATION) at 04:48

## 2019-12-30 RX ADMIN — POLYETHYLENE GLYCOL 3350 17 G: 17 POWDER, FOR SOLUTION ORAL at 20:59

## 2019-12-30 RX ADMIN — BUMETANIDE 1 MG: 1 TABLET ORAL at 20:45

## 2019-12-30 RX ADMIN — ALBUTEROL SULFATE 2.5 MG: 2.5 SOLUTION RESPIRATORY (INHALATION) at 16:48

## 2019-12-30 RX ADMIN — Medication 1 PACKET: at 13:39

## 2019-12-30 RX ADMIN — INSULIN ASPART 6 UNITS: 100 INJECTION, SOLUTION INTRAVENOUS; SUBCUTANEOUS at 20:58

## 2019-12-30 RX ADMIN — VANCOMYCIN HYDROCHLORIDE 1250 MG: 500 INJECTION, POWDER, LYOPHILIZED, FOR SOLUTION INTRAVENOUS at 20:40

## 2019-12-30 RX ADMIN — HEPARIN SODIUM 5000 UNITS: 5000 INJECTION, SOLUTION INTRAVENOUS; SUBCUTANEOUS at 20:59

## 2019-12-30 RX ADMIN — POLYETHYLENE GLYCOL 3350 17 G: 17 POWDER, FOR SOLUTION ORAL at 08:43

## 2019-12-30 RX ADMIN — BUMETANIDE 1 MG: 1 TABLET ORAL at 08:43

## 2019-12-30 RX ADMIN — ACETYLCYSTEINE 2 ML: 100 INHALANT RESPIRATORY (INHALATION) at 16:48

## 2019-12-30 RX ADMIN — CEFEPIME HYDROCHLORIDE 2 G: 2 INJECTION, POWDER, FOR SOLUTION INTRAVENOUS at 15:52

## 2019-12-30 RX ADMIN — ACETYLCYSTEINE 2 ML: 100 INHALANT RESPIRATORY (INHALATION) at 20:18

## 2019-12-30 RX ADMIN — BUSPIRONE HYDROCHLORIDE 10 MG: 10 TABLET ORAL at 20:47

## 2019-12-30 RX ADMIN — SIMETHICONE CHEW TAB 80 MG 80 MG: 80 TABLET ORAL at 08:44

## 2019-12-30 RX ADMIN — Medication 1 CAPSULE: at 08:44

## 2019-12-30 RX ADMIN — SENNOSIDES AND DOCUSATE SODIUM 1 TABLET: 8.6; 5 TABLET ORAL at 08:43

## 2019-12-30 RX ADMIN — BUSPIRONE HYDROCHLORIDE 10 MG: 10 TABLET ORAL at 12:07

## 2019-12-30 RX ADMIN — Medication 5 MG: at 22:07

## 2019-12-30 RX ADMIN — POTASSIUM CHLORIDE 20 MEQ: 29.8 INJECTION, SOLUTION INTRAVENOUS at 05:01

## 2019-12-30 RX ADMIN — ALBUTEROL SULFATE 2.5 MG: 2.5 SOLUTION RESPIRATORY (INHALATION) at 08:50

## 2019-12-30 RX ADMIN — INSULIN ASPART 2 UNITS: 100 INJECTION, SOLUTION INTRAVENOUS; SUBCUTANEOUS at 15:38

## 2019-12-30 RX ADMIN — VANCOMYCIN HYDROCHLORIDE 1250 MG: 500 INJECTION, POWDER, LYOPHILIZED, FOR SOLUTION INTRAVENOUS at 10:06

## 2019-12-30 RX ADMIN — OXYCODONE HYDROCHLORIDE 10 MG: 5 SOLUTION ORAL at 10:16

## 2019-12-30 RX ADMIN — ALBUTEROL SULFATE 2.5 MG: 2.5 SOLUTION RESPIRATORY (INHALATION) at 20:18

## 2019-12-30 RX ADMIN — ACETYLCYSTEINE 2 ML: 100 INHALANT RESPIRATORY (INHALATION) at 08:50

## 2019-12-30 RX ADMIN — METRONIDAZOLE 500 MG: 500 TABLET ORAL at 13:38

## 2019-12-30 RX ADMIN — LIDOCAINE 2 PATCH: 560 PATCH PERCUTANEOUS; TOPICAL; TRANSDERMAL at 21:09

## 2019-12-30 RX ADMIN — INSULIN ASPART 6 UNITS: 100 INJECTION, SOLUTION INTRAVENOUS; SUBCUTANEOUS at 08:57

## 2019-12-30 ASSESSMENT — ACTIVITIES OF DAILY LIVING (ADL)
ADLS_ACUITY_SCORE: 26

## 2019-12-30 ASSESSMENT — MIFFLIN-ST. JEOR: SCORE: 1658.5

## 2019-12-30 NOTE — PROGRESS NOTES
[facility], [place]   Neurosurgery Progress Note:    Assessment: 64 years old man status post C3-C4 artificial disc placement about 2 weeks prior to arriaval the [facility] who presented after a fall with almost complete loss of strength in his upper extremities and paraplegia, found to have cervical stenosis. S/p OR 12/1 - Pharyngotomy closed with assistance from ENT. Arthroplasty implant removed. No replacement implant placed. S/p G-tube 12/5. Extubated 12/5. Re-Intubated 12/6 due to Mucous Plugging and Atelectasis. S/p repeated bronchoscopy. S/p tracheostomy with ENT 12/20. Infectious disease with concerns about adequate source control. MRI cervical spine 12/27 with stable epidural abscess/phlegmon.    Plan:  - Nebs, chest physiotherapy  - DVT: SCDs while in bed  - Stokes J collar  - per ID: vancomycin, cefepime, flagyl 4-6 wks for vertebral phlegmon, weekly labs; f/u clx; appreciate recs; if fever > 102, will repeat blood cultures, start fluconazole  - no plan for another surgery  - GI/nutrition: tube feeds  - trach change per respiratory therapy  - MRI cervical spine before stopping abx in 6 wks  - Endo: endocrine cslt; insulin sliding scale  - Neuropsych consulted, appreciate recs  - [facility]  -----------------------------------    Lion Vargas MD  Neurosurgery Resident PGY2    Please contact neurosurgery resident on call with questions.    Dial * * [phone], enter [phone] when prompted.       Subjective: no acute events, pressure supported    Objective:   Temp:  [98.7  F (37.1  C)-99.6  F (37.6  C)] 99  F (37.2  C)  Pulse:  [56-95] 56  Heart Rate:  [56-93] 56  Resp:  [14-33] 30  BP: (101-159)/(61-85) 120/74  FiO2 (%):  [40 %] 40 %  SpO2:  [94 %-100 %] 96 %  I/O last 3 completed shifts:  In: 3240 [I.V.:960; NG/GT:960]  Out: 2355 [Urine:2355]    Gen: Appears comfortable, NAD  Wound: Incision, clean, dry, intact  Pulm: tracheostomy,  mechanical ventilator  Neuro: Awake, alert, nods yes or no to questions  Follows commands  Deltoids 4/5, biceps 4-/5, triceps 1/5,  0/5, lower extremities 0/5  Triple flexion in lower extremities  Sensation to light touch in arms and upper chest    LABS  Recent Labs   Lab Test 12/29/19 0426 12/28/19  0354 12/27/19  0352   WBC 9.3 12.3* 11.7*   HGB 8.6* 8.4* 9.1*   MCV 94 96 96    216 227       Recent Labs   Lab Test 12/29/19 0426 12/28/19  0354 12/27/19  0352    141 147*   POTASSIUM 3.6 3.6 3.4   CHLORIDE 107 109 114*   CO2 29 30 27   BUN 34* 34* 36*   CR 0.46* 0.50* 0.50*   ANIONGAP 4 2* 6   FLORINDA 8.3* 7.9* 8.0*   * 244* 165*     I have reviewed the history above and agree with the resident's assessment and plan.  ORI Figueroa MD

## 2019-12-30 NOTE — PLAN OF CARE
Pressure supported for 3 hours then trach domed for 1.5 hours this am.  Rested for a few hours on CMV and now pressure supporting again this evening (has done 3 hours).  Pt had 2 large watery BMs.  Sat in chair for 2 hours. [place] J collar pads changed and dirty pads washed.      P: Continue current cares.  Notify NSG/NCC with concerns.

## 2019-12-30 NOTE — CONSULTS
Consult Date:  12/30/2019      IDENTIFICATION:  Mr. Sherwin Angel is a 64-year-old white male who unfortunately went in for neck surgery and then had a fall at a rehab center and is now paraplegic with very poor motor function in his upper extremities.  He also has a history of major depressive disorder and I am asked to evaluate his depression by Sandra Aleman.      HISTORY OF PRESENT ILLNESS:  Mr. Angel does have a history of major depressive disorder.  He has been treated with other antidepressants in the past.  He does not remember ever being on Celexa or Lexapro.  He does remember being on Prozac.  His current antidepressant regimen includes Remeron 30, trazodone 100 and BuSpar 10 b.i.d.      In discussing this case with the treatment team, they are concerned that his big issue is being flat, not particularly spontaneous.  They do not see anxiety as being the major issue.  In interviewing the patient, he reports he is sleeping well.  He has a feeding tube in and does not feel he can comment on his appetite.  He tells me that he is more bored than anything else and the treatment team notes that he has had no visitors.  He does have a past history of alcohol use disorder and he is .  He is from [place].      Mr. Angel is currently trached and ventilated, so he communicates by mouthing words, which he does very well.  He is actually quite capable of communicating.      I discussed various strategies with the patient.  One was to increase his Remeron, which is only at 30.  The other would be to discontinue the Remeron and try Lexapro.  Because Remeron is somewhat sedating, I made the decision to discontinue Remeron and start Lexapro.  The patient was fine with this.  I suggested that sometime in the future Wellbutrin augmentation could be tried and he suggested that he has been on Wellbutrin in the past and that had been a good drug for him.  So when he is more medically stable, that  certainly could be a strategy.  For now I am going to discontinue Remeron and start Lexapro 10 mg p.o. q.a.m.  This should be fine with his current QTc.      PAST MEDICAL HISTORY:  Includes hypertension and diabetes.  He had a previous significant neck surgery with C3-C4 arthroplasty 2 weeks prior to admission.  This was complicated by dysphagia.      FAMILY HISTORY:  The patient does not think he has other family members with major depressive disorder.      SOCIAL HISTORY:  The patient is from [place].  He is .  The chart suggests he has a history of alcohol use disorder.  There is a social work note from Milagro Rousseau on 12/20 which suggests that someone has been using his checkbook and the [facility] and [facility] has put a hold on his account.      PHYSICAL REVIEW OF SYSTEMS:  The patient denies headache or problems with vision or hearing.  He has had difficulty swallowing.  He has a feeding tube in place.  He has very little feeling below his nipples, though he does feel me touching both of his arms.  He can move both arms, but does not have any groups space.  He has no movement of the lower extremities.      ALLERGIES:  THE PATIENT HAS NO KNOWN ALLERGIES.      MENTAL STATUS EXAMINATION:  On my interview, the patient was a pleasant, cooperative white male in a hard collar in the Intensive Care Unit.  His mood was dysphoric.  His affect was restricted.  His speech was difficult because he is trached and ventilated; however, he could mouth words and it appeared that his thought processes were logical and linear.  His content of thought was without overt psychosis or suicidal ideation.  Recent and remote memory, concentration, fund of knowledge and use of language appear to be at baseline, although he apparently cannot remember much of the fall that led to this hospitalization.  His insight and judgment appear to be intact.  Muscle strength and tone are markedly decreased.   Recent vitals include a temperature of 98, heart rate of 60, respiration rate of 19 with 98% oxygen saturation and a blood pressure 123/67.      ASSESSMENT:  Recurrent major depressive disorder.      RECOMMENDATION:  Discontinue Remeron and start Lexapro 10 mg p.o. q.a.m.  In 2 or 3 weeks, one could consider Wellbutrin augmentation.         LORRAINE VINES MD             D: 2019   T: 2019   MT: MENDEZ      Name:     IVONE HUITRON   MRN:      [id number]        Account:       [id number]   :      1955           Consult Date:  2019      Document: [id number]

## 2019-12-30 NOTE — PLAN OF CARE
Neuro: Sherwin is alert and oriented,mouths words demonstrating no aphasia. Pupils 3 brisk/tracks. Tongue midline. Weak movement UE L>R. Absent motion, numbness LE. All warm, normal pulses.   VS: T max 99.6, HR 59-71, -150/61-80, maps , RR 12-30.  CV: SR, 4 beat tach run 22:48.   Pulm: CMV 40%/12/450/5 via #6 shiley. LS clear diminished . At 0400 insp/exp wheezes, clear with suctioning.   GI: TF at goal via PEG 55ml/hour, free water 60 every 4 hours tolerated well. Liquid stool  x2 , flatus.   : díaz with good output urine.   Lines/Gtts:  R TL PICC with red port TKO for meds. Purple saline locked, grey heparin locked.   Skin:   Neck incision healed. Trach site with erythema, ooze around trach tan, site improved, cleansed NS, optifoam in place, cannula changed 2030. Groin rash improving.Taint wound pink, moist.   Drains: Díaz.   Labs: K+ 3.4 replaced 20 meq, Magnesium 1.9 replacement pending, Kphos 10mmol ordered for phos 2.5.   P: Continue pulmonary toilet, vent weaning. Continue non verbal communication attempts, probable passy miur valve soon. Monitor insensate areas/protect. With flatus passing, decrease bowel program.

## 2019-12-30 NOTE — PROGRESS NOTES
Care Coordinator - Discharge Planning    Admission Date/Time:  12/1/2019  Attending MD:  Fernando Ashton MD     Data  Chart reviewed, discussed with interdisciplinary team.      Assessment   Full assessment completed in previous note    Pt remain in ICU vented via trach and waiting [facility].    Coordination of Care  RNCC called [facility] referral specialist[facility]i # [phone] to follow up regarding the [facility] and LVM.      Plan  Anticipated Discharge Date:  TBD.    Anticipated Discharge Plan:  [facility].  Awaiting a call back from [facility] referral specialist regarding the approval for [facility] to [facility].  RNCC will cont to follow plan of care.    Addendum:  Received a call back from Angie [facility] referral specialist.  Angie requested update info faxed to her, fax # [phone].  RNCC faxed the request info.

## 2019-12-30 NOTE — PROGRESS NOTES
[facility] PROGRESS NOTE     Patient:  Sherwin Angel   YOB: 1955, MRN: [id number]  Date of Visit: 12/30/2019  Date of Admission: 12/1/2019          Assessment and Recommendations:     Recommendations:  - No change to cefepime, metronidazole, and vancomycin  - His MRI on 12/27 with persistent phlegmon and endplate destruction suggests he is failing medical therapy. Phlegmon has decreased slightly (1mm) but I remain pessimistic that he will respond to medical therapy alone.  - No evidence for fungal infection at this time - suspect urinary growth and growth from respiratory specimens reflect colonization in the setting of broad spectrum abx. Given lines he is at potential risk for fungemia so if he spikes a high fever to >102 would repeat blood cultures and initiate fluconazole 400mg daily (Qtc 452 on 12/26)  - Agree with MRI on January 2. Would consider re-assessing with NSGY whether region would be amenable to surgical intervention.   -Recommend discontinue díaz and use straight intermittent cath for candida in urine as well as VRE, which could be colonization.   -Repeat UA/UC 48 hours after díaz removal.     Assessment:  Sherwin Angel is a 64-year-old man with history of hypertension and diabetes who [facility] and now admitted with new functional quadreplegia (has very minimal movement in L arm) after a fall and found to have pharyngotomy and vertebral phlegmon now s/p OR with removal of arthroplasty and placement of drain. He subsequently had G tube placed on 12/5 and ENT preformed tracheostomy on 12/20. Serial imaging reflects ongoing phelgmon.    # Traumatic cervical spinal cord injury s/p C3-4 arthroplasty on 11/15, now removed (see below)  # Cervical stenosis C3-5  # Osteomyelitis C3-C5  # Vertebral phlegmon vs abscess s/p I/D and removal arthroplasty hardware on 12/1   Cultures growing MSSA, Strep anginosus,  Eikenella, and oral anaerobes. Repeat MRI 12/10, 12/19 and 12/27 shows relatively unchanged phlegmon. Concern for ongoing infection with suboptimal source control in his cervical spine.    # Leukocytosis, improved  DDx includes worsening spinal infection, fungal infection. Urine growing VRE and candida. While urine shows growth of Candida, Candida is an unusual cause of UTI in the absence of urinary anatomy abnormality. Candida is not a pathogen in the lungs. As for VRE, it is not covered by current antibiotics, and it is hard to know whether, if patient had intact sensation, would have dysuria/symptoms. Will watch for now, per recommendations above. Leukocytosis improved. Asides from urine cultures per above, there remains no clinical evidence of infection outside of his C-spine.     ID will continue to follow.     Per instructions in daily progress note, page sent out to NSGY resident to notify there are new ID recommendations.     This patient seen and plan discussed with the attending physician, Dr. Wilhelm.     Ladan Yip, PGY-3  Internal Medicine/Pediatrics  [phone].             Interval History and Events:   Patient reports by nodding or shaking head as well as facial expressions that the pain in his neck has not changed. He does not have any pain asides from his neck.     Cannot participate in ROS due to clinical status.         HPI:   Adopted from Consult note 12/02:  Sherwin Angel is a 64-year-old man with history of hypertension and diabetes who [facility] and is now readmitted on 12/1 after a fall at his rehab facility that resulted in near paraplegia.     He was taken to the OR by neurosurgery on 12/1, and had the arthroplasty implant removed and an I/D. He was found to have significant inflammation in the area and 2 pharyngotomy sites, so ENT was consulted intra-operatively and closed one pharyngotomy (the other was unable to be reached) and placed a  drain for the phlegmon. He was put on broad-spectrum antibiotics with cefepime, metronidazole, and vancomycin, and ID is consulted for assistance with antibiotic management.     The patient had a temperature to 100.6 overnight after the surgery, but has been afebrile throughout the day today. He is awake and able to nod yes/no to questions, and follow basic commands.           Physical Examination:   Temp:  [98  F (36.7  C)-99.6  F (37.6  C)] 98  F (36.7  C)  Pulse:  [56-78] 66  Heart Rate:  [56-79] 65  Resp:  [12-33] 21  BP: ()/(60-86) 94/64  FiO2 (%):  [40 %] 40 %  SpO2:  [94 %-100 %] 95 %    I/O last 3 completed shifts:  In: 3375 [I.V.:1110; NG/GT:945]  Out: 2940 [Urine:2940]    Vitals:    12/24/19 0400 12/27/19 0600 12/28/19 0100 12/29/19 0400   Weight: 89 kg (196 lb 3.4 oz) 88.7 kg (195 lb 9.6 oz) 88.4 kg (194 lb 14.2 oz) 87.1 kg (192 lb 0.3 oz)    12/29/19 2200   Weight: 89 kg (196 lb 3.4 oz)       Constitutional: Adult male in bed, in NAD. Awake, alert, interactive.  HEENT: NC/AT, EOMI, sclera clear, conjunctiva normal, tracheostomy in place  Respiratory: No increased work of breathing, slightly course   Cardiovascular: RRR, no murmur noted. B/l UE edema, trace LE edema   GI: Normal bowel sounds, soft, non-distended and non-tender. GT site c/d/i.   Skin: Warm, dry, well-perfused. No bruising, bleeding, rashes, or lesions on limited exam. CVC site c/d/i.   Neurologic: A&O. Able to mouth words or indicate yes/no. I did not elicit movement in any of his 4 extremities  Neuropsychiatric: Calm. Affect appropriate to situation.  Extremities: From 12/27: Right and left shoulder painful with shrugging motion. Warm but not red or hot. No pain with palpation or with passive ROM.          Medications:       acetylcysteine  2 mL Nebulization Q4H     albuterol  2.5 mg Nebulization Q4H     amLODIPine  10 mg Oral or Feeding Tube Daily     artificial saliva  2 spray Swish & Spit 4x Daily     bumetanide  1 mg Oral or  Feeding Tube BID     busPIRone  10 mg Oral or Feeding Tube BID     carboxymethylcellulose PF  1 drop Both Eyes TID     ceFEPIme (MAXIPIME) IV  2 g Intravenous Q8H     cyanocobalamin  1,000 mcg Intramuscular Q30 Days     cyclobenzaprine  10 mg Oral TID     escitalopram  10 mg Oral Daily     famotidine  20 mg Per G Tube BID     folic acid  1 mg Oral or Feeding Tube Daily     heparin ANTICOAGULANT  5,000 Units Subcutaneous Q12H     heparin lock flush  5-10 mL Intracatheter Q24H     insulin aspart  2-16 Units Subcutaneous Q4H     insulin glargine  20 Units Subcutaneous QAM AC     lactobacillus rhamnosus (GG)  1 capsule Oral Daily     lidocaine  2 patch Transdermal Q24h    And     lidocaine   Transdermal Q8H     metoprolol tartrate  100 mg Oral or Feeding Tube BID     metroNIDAZOLE  500 mg Oral or Feeding Tube TID     miconazole   Topical BID     multivitamins w/minerals  15 mL Per Feeding Tube Daily     polyethylene glycol  17 g Oral BID     protein modular  1 packet Per Feeding Tube TID     senna-docusate  1 tablet Oral or Feeding Tube BID     simethicone  80 mg Oral BID     traZODone  100 mg Oral or Feeding Tube At Bedtime     vancomycin (VANCOCIN) IV  1,250 mg Intravenous Q12H     thiamine  100 mg Oral or Feeding Tube Daily       Antiinfectives:  Anti-infectives (From now, onward)    Start     Dose/Rate Route Frequency Ordered Stop    12/27/19 2000  vancomycin (VANCOCIN) 1,250 mg in D5W 250 mL intermittent infusion      1,250 mg  over 90 Minutes Intravenous EVERY 12 HOURS 12/27/19 1206      12/27/19 1400  ceFEPIme (MAXIPIME) 2 g in D5W 100 mL intermittent infusion      2 g  200 mL/hr over 30 Minutes Intravenous EVERY 8 HOURS 12/27/19 1209      12/23/19 0000  metroNIDAZOLE (FLAGYL) 500 MG tablet     Note to Pharmacy:  Through 1/22    500 mg Oral or Feeding Tube 3 TIMES DAILY 12/23/19 1252      12/23/19 0000  ceFEPIme (MAXIPIME) 2 G vial     Note to Pharmacy:  Through 1/22    2 g  over 30 Minutes Intravenous EVERY 8  HOURS 12/23/19 1252      12/16/19 0800  metroNIDAZOLE (FLAGYL) tablet 500 mg      500 mg Oral or Feeding Tube 3 TIMES DAILY 12/16/19 0759            Infusions/Drips:    IV fluid REPLACEMENT ONLY 55 mL/hr at 12/18/19 0200            Laboratory Data:     Microbiology:  Culture Micro   Date Value Ref Range Status   12/26/2019 No growth after 4 days  Preliminary   12/26/2019 No growth after 4 days  Preliminary   12/24/2019 <10,000 colonies/mL  Enterococcus faecium (VRE)   (A)  Final   12/24/2019 (A)  Final    50,000 to 100,000 colonies/mL  Candida glabrata complex  Susceptibility testing not routinely done     12/24/2019   Final    Critical Value/Significant Value, preliminary result only, called to and read back by  Cris Self RN 4A 12.26.19 @[address] [place] [address]/[facility]     12/24/2019 (A)  Final    Susceptibility testing requested by  DR. CHAVEZ FOR  Catarina glabrata complex  12/27/19 1912. MM     12/24/2019 No growth  Final   12/24/2019 No growth  Final   12/12/2019 (A)  Final    Light growth  Candida albicans / dubliniensis  Candida albicans and Candida dubliniensis are not routinely speciated  Susceptibility testing not routinely done     12/11/2019 (A)  Final    Moderate growth  Candida albicans / dubliniensis  Candida albicans and Candida dubliniensis are not routinely speciated  Susceptibility testing not routinely done     12/11/2019 No growth  Final   12/11/2019 No growth  Final   12/07/2019 (A)  Final    Light growth  Candida albicans / dubliniensis  Candida albicans and Candida dubliniensis are not routinely speciated  Susceptibility testing not routinely done     12/07/2019 No growth  Final   12/07/2019 No growth  Final   12/03/2019 No growth  Final   12/03/2019 No growth  Final   12/01/2019 No growth  Final   12/01/2019 (A)  Final    Moderate growth  Fusobacterium nucleatum  Susceptibility testing not routinely done     12/01/2019 (A)  Final    Moderate growth  Parvimonas micra  Susceptibility testing not routinely  done     12/01/2019 (A)  Final    Light growth  Prevotella species  Susceptibility testing not routinely done     12/01/2019 (A)  Final    Light growth  Strain 2  Prevotella species  Identification obtained by MALDI-TOF mass spectrometry research use only database. Test   characteristics determined and verified by the [facility]   ([facility]) [place], [place].  Susceptibility testing not routinely done     12/01/2019 (A)  Final    Moderate growth  Strain 3  Prevotella species  Identification obtained by MALDI-TOF mass spectrometry research use only database. Test   characteristics determined and verified by the [facility]   ([facility]) [place], [place].  Beta lactamase positive  Susceptibility testing not routinely done     12/01/2019 Culture negative after 29 days  Final   12/01/2019 Single colony  Staphylococcus aureus   (A)  Final   12/01/2019 Light growth  Streptococcus anginosus   (A)  Final   12/01/2019 Light growth  Eikenella corrodens   (A)  Final       Inflammatory Markers    Recent Labs   Lab Test 12/29/19  0426 12/28/19  1010 12/27/19  0352 12/26/19  0500 12/25/19  0330 12/23/19  0506 12/20/19  0337 12/17/19  0351   .0* 120.0* 100.0* 110.0* 120.0* 24.0* 14.0* 34.0*       Metabolic Studies       Recent Labs   Lab Test 12/30/19  0333 12/29/19  0426 12/28/19  0354  12/27/19  0352 12/26/19  0500 12/25/19  0330  12/01/19  1057 12/01/19  0112    140 141  --  147* 144 142   < > 135 133   POTASSIUM 3.4 3.6 3.6  --  3.4 4.3 4.2   < > 4.6 4.4   CHLORIDE 104 107 109  --  114* 117* 113*   < >  --  94   CO2 30 29 30  --  27 21 23   < >  --  31   ANIONGAP 4 4 2*  --  6 7 5   < >  --  7   BUN 37* 34* 34*  --  36* 45* 45*   < >  --  27   CR 0.45* 0.46* 0.50*  --  0.50* 0.58* 0.57*   < >  --  0.75   GFRESTIMATED >90 >90 >90  --  >90 >90 >90   < >  --  >90   * 179* 244*  --  165* 184* 165*   < > 195* 230*   A1C  --   --   --   --   --   --   --   --   --   6.5*   FLORINDA 8.4* 8.3* 7.9*  --  8.0* 8.5 8.9   < >  --  8.7   PHOS 2.5 2.8 2.7   < > 1.7* 2.7 2.5   < >  --   --    MAG 1.9 1.9 2.0  --  1.8 2.2 2.1   < >  --   --    LACT  --   --   --   --   --   --   --   --  1.2  --     < > = values in this interval not displayed.       Hematology Studies      Recent Labs   Lab Test 12/30/19  0333 12/29/19  0426 12/28/19  0354 12/27/19  0352 12/26/19  0500 12/25/19  0330   WBC 8.7 9.3 12.3* 11.7* 14.3* 14.5*   ANEU 6.1 6.5 9.6* 9.4* 12.0* 12.9*   ALYM 1.3 1.6 1.5 1.1 1.2 0.9   JAMES 0.7 0.8 0.9 0.9 0.9 0.6   AEOS 0.2 0.3 0.2 0.1 0.1 0.1   HGB 8.2* 8.6* 8.4* 9.1* 10.3* 10.6*   HCT 26.6* 27.5* 26.8* 29.8* 34.6* 34.5*    226 216 227 256 248       Arterial Blood Gas Testing    Recent Labs   Lab Test 12/14/19  1006 12/11/19  0535 12/05/19  1945 12/02/19  0545 12/02/19  0200   PH 7.46* 7.49* 7.43 7.42 7.43   PCO2 37 40 46* 43 42   PO2 55* 105 87 130* 122*   HCO3 26 30* 30* 28 28   O2PER 80 100 4L 60.0 50.0        Urine Studies     Recent Labs   Lab Test 12/24/19  1411 12/11/19  1141 12/07/19  2310 12/01/19  1824   URINEPH 5.0 5.5 5.5 6.0   NITRITE Negative Negative Negative Negative   LEUKEST Large* Negative Negative Negative   WBCU 51* 2 2 2       Vancomycin Levels     Recent Labs   Lab Test 12/29/19  0715 12/27/19  0635 12/22/19  2035 12/19/19  1600 12/16/19  1718 12/13/19  1525   VANCOMYCIN 19.1 19.5 18.5 23.4 18.6 19.2            Imaging:   CXR 12/23   Impression:   1. Stable support devices.  2. Slightly increased right and stable left basilar  atelectasis/consolidation.  3. Stable small pleural effusions.    CXR 12/22  Impression:   1. Interval decrease in small right pleural effusion and associated  bibasilar opacities. New airspace opacity.  2. Interval placement of tracheostomy tube, with tip in the mid  thoracic trachea. Additional support devices appear stable.    MR Cervical Spine wwo contrast 12/19  Impression:   1.  No significant change in anterior epidural  phlegmon/abscess  extending from C2 down to C6. Stable/slightly increased spinal cord  edema.  2.  Persistent discitis osteomyelitis findings C3-C5.  3.  Stable/slightly progressed extensive prevertebral phlegmon  extending from C2 through C5. Decreased abscess in the prevertebral  phlegmon likely due to drainage into the hypopharynx through a  fistulous communication.

## 2019-12-30 NOTE — PROGRESS NOTES
Neuroscience Intensive Care Progress Note  12/30/2019      24 hour events:   No overnight events. 2 loose stools overnight. Remains afebrile and vitally stable. Blood sugars remain in the high 100s.    Current Medications:    acetylcysteine  2 mL Nebulization Q4H     albuterol  2.5 mg Nebulization Q4H     amLODIPine  10 mg Oral or Feeding Tube Daily     artificial saliva  2 spray Swish & Spit 4x Daily     bumetanide  1 mg Oral or Feeding Tube BID     ceFEPIme (MAXIPIME) IV  2 g Intravenous Q8H     cyanocobalamin  1,000 mcg Intramuscular Q30 Days     cyclobenzaprine  10 mg Oral TID     famotidine  20 mg Per G Tube BID     folic acid  1 mg Oral or Feeding Tube Daily     heparin ANTICOAGULANT  5,000 Units Subcutaneous Q12H     heparin lock flush  5-10 mL Intracatheter Q24H     insulin aspart  2-16 Units Subcutaneous Q4H     insulin glargine  20 Units Subcutaneous QAM AC     lactobacillus rhamnosus (GG)  1 capsule Oral Daily     lidocaine  2 patch Transdermal Q24h    And     lidocaine   Transdermal Q8H     metoprolol tartrate  100 mg Oral or Feeding Tube BID     metroNIDAZOLE  500 mg Oral or Feeding Tube TID     miconazole   Topical BID     mirtazapine  30 mg Oral or Feeding Tube QPM     multivitamins w/minerals  15 mL Per Feeding Tube Daily     polyethylene glycol  17 g Oral BID     QUEtiapine  12.5 mg Oral BID     senna-docusate  1 tablet Oral or Feeding Tube BID     simethicone  80 mg Oral BID     traZODone  100 mg Oral or Feeding Tube At Bedtime     vancomycin (VANCOCIN) IV  1,250 mg Intravenous Q12H     thiamine  100 mg Oral or Feeding Tube Daily       PRN Medications:  acetaminophen, carboxymethylcellulose PF, IV fluid REPLACEMENT ONLY, glucose **OR** dextrose **OR** glucagon, heparin lock flush, hydrALAZINE, hypromellose-dextran, ipratropium - albuterol 0.5 mg/2.5 mg/3 mL, labetalol, lidocaine 4%, lidocaine (buffered or not buffered), magnesium sulfate, magnesium sulfate, melatonin, naloxone, ondansetron **OR**  "ondansetron, oxyCODONE, potassium chloride, potassium chloride with lidocaine, potassium chloride, potassium chloride, potassium chloride, potassium phosphate (KPHOS) in D5W IV, potassium phosphate (KPHOS) in D5W IV, potassium phosphate (KPHOS) in D5W IV, potassium phosphate (KPHOS) in D5W IV, potassium phosphate (KPHOS) in D5W IV, prochlorperazine **OR** prochlorperazine **OR** prochlorperazine, sodium chloride (PF), sodium phosphate    Objective findings:  /63   Pulse 78   Temp 98.6  F (37  C) (Axillary)   Resp 24   Ht 1.727 m (5' 8\")   Wt 89 kg (196 lb 3.4 oz)   SpO2 96%   BMI 29.83 kg/m      Ventilator Settings:  Ventilation Mode: CMV/AC  (Continuous Mandatory Ventilation/ Assist Control)  FiO2 (%): 40 %  Rate Set (breaths/minute): 12 breaths/min  Tidal Volume Set (mL): 450 mL  PEEP (cm H2O): 5 cmH2O  Pressure Support (cm H2O): 7 cmH2O  Oxygen Concentration (%): 40 %  Resp: 24    Intake/Output 12/29:  In 3.24 L  Out 2.65 L  Net + 0.62 L  Since admission +3.694    Physical Examination:   Vitals:  /86 (BP Location: Left arm)   Pulse 76   Temp 98.9  F (37.2  C) (Oral)   Resp 22   Ht 1.727 m (5' 8\")   Wt 89 kg (196 lb 3.4 oz)   SpO2 99%   BMI 29.83 kg/m     General:  Sitting in chair  HEENT:  NC/AT, cervical collar in place. no icterus, op pink and moist, no ear or nose drainage.   Cardiac:  Regular rate  Chest:  On [facility] ventilator via trach  Abdomen:  Less distended and softer with reduced BS  Extremities:  No LE swelling.    Skin:  No rash or lesion.    Neuro:   Mental Status:  fully alert, attentive, follows commands  Cranial Nerves: PERRL, EOMI with normal smooth pursuit, facial sensation intact and symmetric, facial movements symmetric, hearing intact to conversation  Motor: no abnormal movements, moves bilateral upper extremities at elbows antigravity with overall greater movement in LUE, no movement in fingers bilaterally, no movement in BLE with noxious except some slight movement " in R toe with noxious stimulus  Sensory: Decreased to light touch in bilateral upper extremities throughout and below chest. Decreased sensation in bilateral lower extremities. No withdrawal to noxious in LLE. No significant withdrawal to noxious in RLE except R toe with slight movement.     Labs:  Recent Labs   Lab Test 12/30/19  0333   WBC 8.7   RBC 2.81*   HGB 8.2*   HCT 26.6*   MCV 95   MCH 29.2   MCHC 30.8*   RDW 14.7        Recent Labs   Lab Test 12/30/19  0333 12/29/19  0426    140   POTASSIUM 3.4 3.6   CHLORIDE 104 107   CO2 30 29   ANIONGAP 4 4   * 179*   BUN 37* 34*   CR 0.45* 0.46*   FLORINDA 8.4* 8.3*     Imaging:  XR Cervical spine pending    Assessment and Plan:  Neuro:  #Cervical spinal cord injury  #Discitis/osteomyelitis C3-C4  # Vertebral phlegmon and Abscess, minimally reduced in thickness  - continue wearing the [place] Collar at all times per neuro surgery, Up with assist  - Neuro checks q4h during the day   - continue PT/OT  - XR cervical spine today     #Anxiety/Pain   - good control   - Oxycodone 5-10 q4hrs prn   - flexeril 10 mg TID      #Hx of Alcohol dependence  - Thiamine and folate     #Depression  - start buspar 10 mg BID  - Continue PTA mirtazepine   - continue trazodone 100 mg at bedtime, will increase if noted to have sleep difficulty  - Stop seroquel 12.5 BID   - Health psych consult     Resp:  #Acute respiratory failure s/p re-intubation 12/6, suspect diaphragmatic weakness related to spine injury  #S/P trach 12/20   - Mucomyst   - albuterol nebs  - Chest physio   - Pressure support trial TID  - Weaning off vent as tolerated  - sputum culture needs to be collected  - repeat Chest xray on 12/28 stable   - repeat CXR tomorrow     Cardio:  # Hypertension  - PTA amlodipine 10 mg   - continue metoprolol to 100 mg bid  - PTA Bumex 1 mg bid    Renal:  #Hypophosphatemia, resolved  #Hypernatremia, resolved  - Electrolyte replacement protocol   - Monitor I/O, goal In= outs   -  free water flushes to 60 q4h    #Neurogenic urinary retention   - failed díaz wean trial 12/12 caused retention   - treat UTI as below      Endo:  #DM2   - Continued elevated BG's, will monitor as lantus was increased yesterday  - High sliding scale insulin  - lantus 20 units increased 12/29     Heme:  #Normocytic anemia, stable  - Hg > 7.0     # Leukocytosis, improving   Likely due to UTI. Blood culture negative so far and UC positive for VRE and candida glabrata. B2 Glucan positive 129  - CBC daily  -   - ID on board   - Follow susceptibility for VRE and candida glabrata    GI:  #Constipation   - continued distended abdomen last BM 12/29  - Senna scheduled bid  - PRN suppository/enema   - Miralax bid  - KUB: some dilated loops of bowel, some air? Unclear if from PEG, he does not have acute abdomen on exam  - simethacone, and probiotics for gas      #Diet   - G-tube in place  - Tube feeds at goal      ID:   #Vertebral phlegmon and Abscess s/p drain placement 12/1  #Osteomyelitis C3-5  #Fever - restarted 12/25  #Leukocytosis, improving   #repeat MRI 12/10 and 12/19 shows progressive epidural abscess.   # Repeat MRI 12/27 showed minimally decrease abscess thickness and increase the vertebral erosion   - Vanc, cefepime, metronidazole regimen per ID (through 1/22)  - Weekly CBC, CMP, CRP while on IV abx,  - Spinal tissue cultures (12/1): MSSA, Strep anginosus, Eikenella, and oral anaerobes   - Per ID, will need at least 6wks of above abx from day 1 (12/11), earliest stop date of 1/22/20    #VRE and candida glabrata UTI  ID does not think the new fever and elevated CRP is due to UTI. They think possibly spinal abscess. Repeat cervical spines MRI did not show increase of the abscess size but showed more vertebral erosions  - blood culture negative  - Repeat blood culture negative  -  B 2 Glucan positive   - if spikes fever > 102, start Fluconazole 400 mg daily per ID  - Trend CRP q3h days     Lines:  - Trach,  PICC , PIV x 2, [place], PEG     FEN: TF via PEG   PPX:    DVT prophylaxis: SCDs, Heparin subcutaneous restart 11/22    GI: famotidine   Code Status: Full, presumed     [facility]    This patient was seen & discussed with my attending, Dr. Juvenal MD, who agrees with my assessment and plan.     Randi Edmonds MD  Neurology PGY2  [phone]

## 2019-12-30 NOTE — PROGRESS NOTES
CLINICAL NUTRITION SERVICES - BRIEF NOTE   (See RD note on 12/26 for full assessment)     Reason for RD note: Following up on metabolic cart study.    New Findings/Chart Review:  Nutrition support: Impact Peptide 1.5 at goal 55 ml/hr (1320 ml/day) to provide 1980 kcals (28 kcal/kg/day), 124 g PRO (1.8 g/kg/day), 1016 ml free H2O, 84 g Fat (50% from MCTs), 185 g CHO and no Fiber daily    Enteral Access: PEG    Oral Diet/Intake: NPO.     Renal: BUN 37 (H), Cr 0.45 (L), Phos 2.5 (WNL, intermittently L during admit, on replacement)    Respiratory: Tracheostomy on mechanical ventilation FiO2 40%.    GI: Last BM on 12/29 - up to 5 BMs yesterday. Distended abdomen, but soft/nontender per RN chart notes. No C diff checked this admit.    Labs:   -CRP inflammation remains elevated/uptrended despite immunomodulating formula (24 on 12/23-->110 on 12/29). Recommend switch to standard formula.   -Vitamin D status WNL on 1227     Meds: Reviewed, notable for: Cyanocobalamin 1,000 mcg IM injection q30d, folvite 1 mg daily, custom dose sliding scale insulin, lantus qAM, Culturell, Certavite, thiamine 100 mg, senna-docusate, simethicone    Metabolic Cart Study: Obtained metabolic cart study 12/27 @ 1800 with the following results: MREE = 2421 kcals/day (equiv to 35 kcal/kg/day) with RQ = 0.80.  Pt received 1210 ml of TF in 24 hours preceding the study providing 1815 kcals (75 % MREE).  RQ is within physiologic range; RQ is somewhat logical given provisions (hypocaloric) received prior to study.  Would aim energy intakes minimally at 80% of this MREE, or 1937 kcal/d (equiv to 28 kcal/kg/day). Will adjust EN regimen d/t no longer benefit of immunomodulating enteral formula, pt waiting for placement, and to increase kcal provision slightly.    Interventions:  1. Modified enteral regimen:  -->Start new TF formula at goal rate: Nutren 1.5 @ 55 mL/hr + 3 pkts ProSource daily to provide 2100 kcals (87% MREE from 12/27, or 30 kcal/kg/day),  123 g PRO (1.8 g/kg/day), 1003 mL H2O, 232 g CHO and no fiber daily.    2. Collaboration with other providers: Discussed above modification to EN regimen with primary team so they could adjust insulin if needed (new regimen provides ~2 g CHO/hr more, which is pretty negligible).    Future/Additional Recommendations:  -Monitor tolerance to new EN regimen  -Monitor need for repeat metabolic cart study pending LOS  -Monitor need for NS fiber packet if loose stooling continues and C diff is negative. Would rec 2 pkt NS fiber TID (18 g soluble fiber/day).    Nutrition will continue to follow per protocol.    Hannah Lopez RD, LD  Pager: [phone]

## 2019-12-30 NOTE — PLAN OF CARE
Discharge Planner PT   Patient plan for discharge: did not discuss this session  Current status: Pt performed bed mobility with Max A x 2. Pt tx bed->chair with Dep A and Liko Lift sling. Pt sat EOB x 8 minutes with Mod A and Liko Lift sling. PT performed PROM BUE and BLE x 10 reps all joints and planes.   Barriers to return to prior living situation: medical status,   Recommendations for discharge: spinal cord injury program  Rationale for recommendations: Per chart review pt [facility], pt demonstrates skilled need for PT and OT services to progress mobility and ROM.        Entered by: Ramandeep Kauffman 12/30/2019 4:54 PM

## 2019-12-31 ENCOUNTER — APPOINTMENT (OUTPATIENT)
Dept: GENERAL RADIOLOGY | Facility: CLINIC | Age: 64
DRG: 003 | End: 2019-12-31
Attending: PSYCHIATRY & NEUROLOGY
Payer: COMMERCIAL

## 2019-12-31 ENCOUNTER — APPOINTMENT (OUTPATIENT)
Dept: PHYSICAL THERAPY | Facility: CLINIC | Age: 64
DRG: 003 | End: 2019-12-31
Attending: NEUROLOGICAL SURGERY
Payer: COMMERCIAL

## 2019-12-31 VITALS
BODY MASS INDEX: 29.7 KG/M2 | WEIGHT: 195.99 LBS | DIASTOLIC BLOOD PRESSURE: 67 MMHG | HEIGHT: 68 IN | TEMPERATURE: 98.2 F | OXYGEN SATURATION: 95 % | HEART RATE: 70 BPM | SYSTOLIC BLOOD PRESSURE: 135 MMHG | RESPIRATION RATE: 22 BRPM

## 2019-12-31 LAB
ABO + RH BLD: NORMAL
ABO + RH BLD: NORMAL
ANION GAP SERPL CALCULATED.3IONS-SCNC: 4 MMOL/L (ref 3–14)
BLD GP AB SCN SERPL QL: NORMAL
BLOOD BANK CMNT PATIENT-IMP: NORMAL
BUN SERPL-MCNC: 36 MG/DL (ref 7–30)
CALCIUM SERPL-MCNC: 8 MG/DL (ref 8.5–10.1)
CHLORIDE SERPL-SCNC: 102 MMOL/L (ref 94–109)
CO2 SERPL-SCNC: 29 MMOL/L (ref 20–32)
CREAT SERPL-MCNC: 0.5 MG/DL (ref 0.66–1.25)
CRP SERPL-MCNC: 58 MG/L (ref 0–8)
ERYTHROCYTE [DISTWIDTH] IN BLOOD BY AUTOMATED COUNT: 14.6 % (ref 10–15)
GFR SERPL CREATININE-BSD FRML MDRD: >90 ML/MIN/{1.73_M2}
GLUCOSE BLDC GLUCOMTR-MCNC: 137 MG/DL (ref 70–99)
GLUCOSE BLDC GLUCOMTR-MCNC: 150 MG/DL (ref 70–99)
GLUCOSE BLDC GLUCOMTR-MCNC: 160 MG/DL (ref 70–99)
GLUCOSE BLDC GLUCOMTR-MCNC: 173 MG/DL (ref 70–99)
GLUCOSE BLDC GLUCOMTR-MCNC: 227 MG/DL (ref 70–99)
GLUCOSE SERPL-MCNC: 159 MG/DL (ref 70–99)
HBA1C MFR BLD: 6.1 % (ref 0–5.6)
HCT VFR BLD AUTO: 25.2 % (ref 40–53)
HGB BLD-MCNC: 7.8 G/DL (ref 13.3–17.7)
MAGNESIUM SERPL-MCNC: 1.8 MG/DL (ref 1.6–2.3)
MCH RBC QN AUTO: 29.4 PG (ref 26.5–33)
MCHC RBC AUTO-ENTMCNC: 31 G/DL (ref 31.5–36.5)
MCV RBC AUTO: 95 FL (ref 78–100)
PHOSPHATE SERPL-MCNC: 3.5 MG/DL (ref 2.5–4.5)
PLATELET # BLD AUTO: 263 10E9/L (ref 150–450)
POTASSIUM SERPL-SCNC: 3.7 MMOL/L (ref 3.4–5.3)
RBC # BLD AUTO: 2.65 10E12/L (ref 4.4–5.9)
SODIUM SERPL-SCNC: 134 MMOL/L (ref 133–144)
SPECIMEN EXP DATE BLD: NORMAL
WBC # BLD AUTO: 7 10E9/L (ref 4–11)

## 2019-12-31 PROCEDURE — 83036 HEMOGLOBIN GLYCOSYLATED A1C: CPT | Performed by: NEUROLOGICAL SURGERY

## 2019-12-31 PROCEDURE — 97110 THERAPEUTIC EXERCISES: CPT | Mod: GP | Performed by: REHABILITATION PRACTITIONER

## 2019-12-31 PROCEDURE — 86140 C-REACTIVE PROTEIN: CPT | Performed by: NEUROLOGICAL SURGERY

## 2019-12-31 PROCEDURE — 25800030 ZZH RX IP 258 OP 636: Performed by: NEUROLOGICAL SURGERY

## 2019-12-31 PROCEDURE — 27210429 ZZH NUTRITION PRODUCT INTERMEDIATE LITER

## 2019-12-31 PROCEDURE — 86850 RBC ANTIBODY SCREEN: CPT | Performed by: STUDENT IN AN ORGANIZED HEALTH CARE EDUCATION/TRAINING PROGRAM

## 2019-12-31 PROCEDURE — 25000132 ZZH RX MED GY IP 250 OP 250 PS 637: Performed by: COUNSELOR

## 2019-12-31 PROCEDURE — 94640 AIRWAY INHALATION TREATMENT: CPT

## 2019-12-31 PROCEDURE — 25000132 ZZH RX MED GY IP 250 OP 250 PS 637: Performed by: NURSE PRACTITIONER

## 2019-12-31 PROCEDURE — 97530 THERAPEUTIC ACTIVITIES: CPT | Mod: GP | Performed by: REHABILITATION PRACTITIONER

## 2019-12-31 PROCEDURE — 80048 BASIC METABOLIC PNL TOTAL CA: CPT | Performed by: STUDENT IN AN ORGANIZED HEALTH CARE EDUCATION/TRAINING PROGRAM

## 2019-12-31 PROCEDURE — 25000128 H RX IP 250 OP 636: Performed by: NEUROLOGICAL SURGERY

## 2019-12-31 PROCEDURE — 25000125 ZZHC RX 250: Performed by: STUDENT IN AN ORGANIZED HEALTH CARE EDUCATION/TRAINING PROGRAM

## 2019-12-31 PROCEDURE — 94003 VENT MGMT INPAT SUBQ DAY: CPT

## 2019-12-31 PROCEDURE — 00000146 ZZHCL STATISTIC GLUCOSE BY METER IP

## 2019-12-31 PROCEDURE — L1499 SPINAL ORTHOSIS NOS: HCPCS

## 2019-12-31 PROCEDURE — 25000132 ZZH RX MED GY IP 250 OP 250 PS 637: Performed by: NEUROLOGICAL SURGERY

## 2019-12-31 PROCEDURE — 86900 BLOOD TYPING SEROLOGIC ABO: CPT | Performed by: STUDENT IN AN ORGANIZED HEALTH CARE EDUCATION/TRAINING PROGRAM

## 2019-12-31 PROCEDURE — 25000128 H RX IP 250 OP 636: Performed by: STUDENT IN AN ORGANIZED HEALTH CARE EDUCATION/TRAINING PROGRAM

## 2019-12-31 PROCEDURE — 25000125 ZZHC RX 250

## 2019-12-31 PROCEDURE — 71045 X-RAY EXAM CHEST 1 VIEW: CPT

## 2019-12-31 PROCEDURE — 84100 ASSAY OF PHOSPHORUS: CPT | Performed by: NEUROLOGICAL SURGERY

## 2019-12-31 PROCEDURE — 25000132 ZZH RX MED GY IP 250 OP 250 PS 637: Performed by: PSYCHIATRY & NEUROLOGY

## 2019-12-31 PROCEDURE — 25000132 ZZH RX MED GY IP 250 OP 250 PS 637: Performed by: STUDENT IN AN ORGANIZED HEALTH CARE EDUCATION/TRAINING PROGRAM

## 2019-12-31 PROCEDURE — 83735 ASSAY OF MAGNESIUM: CPT | Performed by: NEUROLOGICAL SURGERY

## 2019-12-31 PROCEDURE — 40000275 ZZH STATISTIC RCP TIME EA 10 MIN

## 2019-12-31 PROCEDURE — 86901 BLOOD TYPING SEROLOGIC RH(D): CPT | Performed by: STUDENT IN AN ORGANIZED HEALTH CARE EDUCATION/TRAINING PROGRAM

## 2019-12-31 PROCEDURE — 81003 URINALYSIS AUTO W/O SCOPE: CPT | Performed by: NURSE PRACTITIONER

## 2019-12-31 PROCEDURE — 80048 BASIC METABOLIC PNL TOTAL CA: CPT | Performed by: NEUROLOGICAL SURGERY

## 2019-12-31 PROCEDURE — 85027 COMPLETE CBC AUTOMATED: CPT | Performed by: NEUROLOGICAL SURGERY

## 2019-12-31 PROCEDURE — 85025 COMPLETE CBC W/AUTO DIFF WBC: CPT | Performed by: STUDENT IN AN ORGANIZED HEALTH CARE EDUCATION/TRAINING PROGRAM

## 2019-12-31 PROCEDURE — 25800025 ZZH RX 258: Performed by: STUDENT IN AN ORGANIZED HEALTH CARE EDUCATION/TRAINING PROGRAM

## 2019-12-31 PROCEDURE — 97112 NEUROMUSCULAR REEDUCATION: CPT | Mod: GP | Performed by: REHABILITATION PRACTITIONER

## 2019-12-31 PROCEDURE — 94668 MNPJ CHEST WALL SBSQ: CPT

## 2019-12-31 PROCEDURE — 94640 AIRWAY INHALATION TREATMENT: CPT | Mod: 76

## 2019-12-31 PROCEDURE — 25000128 H RX IP 250 OP 636: Performed by: PSYCHIATRY & NEUROLOGY

## 2019-12-31 RX ORDER — SIMETHICONE 80 MG
80 TABLET,CHEWABLE ORAL 2 TIMES DAILY
COMMUNITY
Start: 2019-12-31

## 2019-12-31 RX ORDER — POLYETHYLENE GLYCOL 3350 17 G/17G
17 POWDER, FOR SOLUTION ORAL 2 TIMES DAILY
COMMUNITY
Start: 2019-12-31

## 2019-12-31 RX ORDER — AMINO AC/PROTEIN HYDR/WHEY PRO 10G-100/30
1 LIQUID (ML) ORAL 3 TIMES DAILY
COMMUNITY
Start: 2019-12-31

## 2019-12-31 RX ORDER — AMOXICILLIN 250 MG
1 CAPSULE ORAL 2 TIMES DAILY
COMMUNITY
Start: 2019-12-31

## 2019-12-31 RX ORDER — MICONAZOLE NITRATE 20 MG/G
CREAM TOPICAL 2 TIMES DAILY
COMMUNITY
Start: 2019-12-31

## 2019-12-31 RX ORDER — CYCLOBENZAPRINE HCL 10 MG
10 TABLET ORAL 3 TIMES DAILY
COMMUNITY
Start: 2019-12-31

## 2019-12-31 RX ORDER — SALIVA STIMULANT COMB. NO.3
2 SPRAY, NON-AEROSOL (ML) MUCOUS MEMBRANE 4 TIMES DAILY
COMMUNITY
Start: 2019-12-31

## 2019-12-31 RX ORDER — FUROSEMIDE 10 MG/ML
20 INJECTION INTRAMUSCULAR; INTRAVENOUS ONCE
Status: COMPLETED | OUTPATIENT
Start: 2019-12-31 | End: 2019-12-31

## 2019-12-31 RX ORDER — ESCITALOPRAM OXALATE 10 MG/1
10 TABLET ORAL DAILY
COMMUNITY
Start: 2020-01-01

## 2019-12-31 RX ORDER — LACTOBACILLUS RHAMNOSUS GG 10B CELL
1 CAPSULE ORAL DAILY
COMMUNITY
Start: 2020-01-01

## 2019-12-31 RX ORDER — HEPARIN SODIUM 5000 [USP'U]/.5ML
5000 INJECTION, SOLUTION INTRAVENOUS; SUBCUTANEOUS EVERY 12 HOURS
COMMUNITY
Start: 2019-12-31

## 2019-12-31 RX ADMIN — INSULIN ASPART 6 UNITS: 100 INJECTION, SOLUTION INTRAVENOUS; SUBCUTANEOUS at 08:33

## 2019-12-31 RX ADMIN — MICONAZOLE NITRATE: 20 CREAM TOPICAL at 08:29

## 2019-12-31 RX ADMIN — SENNOSIDES AND DOCUSATE SODIUM 1 TABLET: 8.6; 5 TABLET ORAL at 12:03

## 2019-12-31 RX ADMIN — CYCLOBENZAPRINE HYDROCHLORIDE 10 MG: 10 TABLET, FILM COATED ORAL at 14:19

## 2019-12-31 RX ADMIN — Medication 1 CAPSULE: at 08:26

## 2019-12-31 RX ADMIN — ACETYLCYSTEINE 2 ML: 100 INHALANT RESPIRATORY (INHALATION) at 07:49

## 2019-12-31 RX ADMIN — ACETYLCYSTEINE 2 ML: 100 INHALANT RESPIRATORY (INHALATION) at 12:14

## 2019-12-31 RX ADMIN — AMLODIPINE BESYLATE 10 MG: 10 TABLET ORAL at 08:27

## 2019-12-31 RX ADMIN — FAMOTIDINE 20 MG: 40 POWDER, FOR SUSPENSION ORAL at 08:27

## 2019-12-31 RX ADMIN — Medication 100 MG: at 08:27

## 2019-12-31 RX ADMIN — ALBUTEROL SULFATE 2.5 MG: 2.5 SOLUTION RESPIRATORY (INHALATION) at 04:12

## 2019-12-31 RX ADMIN — VANCOMYCIN HYDROCHLORIDE 1250 MG: 500 INJECTION, POWDER, LYOPHILIZED, FOR SOLUTION INTRAVENOUS at 09:07

## 2019-12-31 RX ADMIN — CYCLOBENZAPRINE HYDROCHLORIDE 10 MG: 10 TABLET, FILM COATED ORAL at 08:26

## 2019-12-31 RX ADMIN — INSULIN ASPART 2 UNITS: 100 INJECTION, SOLUTION INTRAVENOUS; SUBCUTANEOUS at 11:58

## 2019-12-31 RX ADMIN — ALBUTEROL SULFATE 2.5 MG: 2.5 SOLUTION RESPIRATORY (INHALATION) at 00:00

## 2019-12-31 RX ADMIN — INSULIN ASPART 4 UNITS: 100 INJECTION, SOLUTION INTRAVENOUS; SUBCUTANEOUS at 15:35

## 2019-12-31 RX ADMIN — CARBOXYMETHYLCELLULOSE SODIUM 1 DROP: 5 SOLUTION/ DROPS OPHTHALMIC at 08:25

## 2019-12-31 RX ADMIN — FUROSEMIDE 20 MG: 10 INJECTION, SOLUTION INTRAVENOUS at 11:53

## 2019-12-31 RX ADMIN — FOLIC ACID 1 MG: 1 TABLET ORAL at 08:26

## 2019-12-31 RX ADMIN — Medication 2 SPRAY: at 09:01

## 2019-12-31 RX ADMIN — SIMETHICONE CHEW TAB 80 MG 80 MG: 80 TABLET ORAL at 08:27

## 2019-12-31 RX ADMIN — ACETYLCYSTEINE 2 ML: 100 INHALANT RESPIRATORY (INHALATION) at 04:12

## 2019-12-31 RX ADMIN — ESCITALOPRAM OXALATE 10 MG: 10 TABLET ORAL at 08:26

## 2019-12-31 RX ADMIN — INSULIN ASPART 2 UNITS: 100 INJECTION, SOLUTION INTRAVENOUS; SUBCUTANEOUS at 03:56

## 2019-12-31 RX ADMIN — ALBUTEROL SULFATE 2.5 MG: 2.5 SOLUTION RESPIRATORY (INHALATION) at 07:49

## 2019-12-31 RX ADMIN — Medication 1 PACKET: at 08:44

## 2019-12-31 RX ADMIN — ALBUTEROL SULFATE 2.5 MG: 2.5 SOLUTION RESPIRATORY (INHALATION) at 15:28

## 2019-12-31 RX ADMIN — HEPARIN SODIUM 5000 UNITS: 5000 INJECTION, SOLUTION INTRAVENOUS; SUBCUTANEOUS at 08:26

## 2019-12-31 RX ADMIN — OXYCODONE HYDROCHLORIDE 10 MG: 5 SOLUTION ORAL at 00:28

## 2019-12-31 RX ADMIN — Medication 1 PACKET: at 14:20

## 2019-12-31 RX ADMIN — METRONIDAZOLE 500 MG: 500 TABLET ORAL at 08:27

## 2019-12-31 RX ADMIN — CARBOXYMETHYLCELLULOSE SODIUM 1 DROP: 5 SOLUTION/ DROPS OPHTHALMIC at 14:19

## 2019-12-31 RX ADMIN — ACETYLCYSTEINE 2 ML: 100 INHALANT RESPIRATORY (INHALATION) at 15:28

## 2019-12-31 RX ADMIN — DEXTROSE MONOHYDRATE: 100 INJECTION, SOLUTION INTRAVENOUS at 06:10

## 2019-12-31 RX ADMIN — BUSPIRONE HYDROCHLORIDE 10 MG: 10 TABLET ORAL at 08:28

## 2019-12-31 RX ADMIN — METOPROLOL TARTRATE 100 MG: 50 TABLET, FILM COATED ORAL at 08:26

## 2019-12-31 RX ADMIN — ACETYLCYSTEINE 2 ML: 100 INHALANT RESPIRATORY (INHALATION) at 00:00

## 2019-12-31 RX ADMIN — ALBUTEROL SULFATE 2.5 MG: 2.5 SOLUTION RESPIRATORY (INHALATION) at 12:14

## 2019-12-31 RX ADMIN — CEFEPIME HYDROCHLORIDE 2 G: 2 INJECTION, POWDER, FOR SOLUTION INTRAVENOUS at 00:14

## 2019-12-31 RX ADMIN — CEFEPIME HYDROCHLORIDE 2 G: 2 INJECTION, POWDER, FOR SOLUTION INTRAVENOUS at 08:19

## 2019-12-31 RX ADMIN — POTASSIUM CHLORIDE 20 MEQ: 1.5 POWDER, FOR SOLUTION ORAL at 09:01

## 2019-12-31 RX ADMIN — METRONIDAZOLE 500 MG: 500 TABLET ORAL at 14:19

## 2019-12-31 RX ADMIN — CEFEPIME HYDROCHLORIDE 2 G: 2 INJECTION, POWDER, FOR SOLUTION INTRAVENOUS at 15:35

## 2019-12-31 RX ADMIN — Medication 2 G: at 09:01

## 2019-12-31 RX ADMIN — Medication 2 SPRAY: at 11:59

## 2019-12-31 RX ADMIN — BUMETANIDE 1 MG: 1 TABLET ORAL at 08:26

## 2019-12-31 RX ADMIN — ACETAMINOPHEN 650 MG: 325 TABLET, FILM COATED ORAL at 08:27

## 2019-12-31 ASSESSMENT — ACTIVITIES OF DAILY LIVING (ADL)
ADLS_ACUITY_SCORE: 29
ADLS_ACUITY_SCORE: 26
ADLS_ACUITY_SCORE: 29

## 2019-12-31 NOTE — PROGRESS NOTES
Neuroscience Intensive Care Progress Note  2019    Sherwin Keny Angel is a 64 year old year old male with hx HTN, DM and cervical stenosis s/p recent anterior C3-4 arthroplasty (2 weeks prior) who was admitted on 2019 with bilateral lower extremity paraplegia and near loss of upper extremity movement, after a fall at rehab.S/p  arthroplasty implant removal on . Course complicated by 2 pharyngotomies, + cervical tissue cultures requiring abx and poor respiratory status 2/2 diaphragmatic weakness requiring trach. Additionally, S/P gtube placement  due to anticipated prolonged period of NPO. [facility]    24 hour events:  - Overnight, pt was bradycardic to high 40s shortly after receiving scheduled metoprolol. In the high 50's this morning.   - No other events.  - Overall vitally stable this morning  - BG's remain in mid 100s-200 range, requiring 10-12 units of sliding scale insulin    24 Hour Vital Signs Summary:  Temperatures:  Current - Temp: 98.3  F (36.8  C); Max - Temp  Av.3  F (36.8  C)  Min: 97.7  F (36.5  C)  Max: 98.9  F (37.2  C)  Respiration range: Resp  Av.1  Min: 13  Max: 30  Pulse range: Pulse  Av.2  Min: 48  Max: 76  Blood pressure range: Systolic (24hrs), Av , Min:94 , Max:140   ; Diastolic (24hrs), Av, Min:60, Max:86  Pulse oximetry range: SpO2  Av.1 %  Min: 91 %  Max: 100 %    Ventilator Settings  Ventilation Mode: CMV/AC  (Continuous Mandatory Ventilation/ Assist Control)  FiO2 (%): 40 %  Rate Set (breaths/minute): 12 breaths/min  Tidal Volume Set (mL): 450 mL  PEEP (cm H2O): 5 cmH2O  Pressure Support (cm H2O): 7 cmH2O  Oxygen Concentration (%): 40 %  Resp: 20      Intake/Output   19   00:00 - 23:59 19   0000 - present   Input 3.45 L 0.76 L   Output 2.69 L 0.86 L   Net + 0.76 L -0.99 L   Since Admit +3.42 L +3.32 L     BP - Mean:  [] 90    Current Medications:    acetylcysteine  2 mL Nebulization Q4H     albuterol   2.5 mg Nebulization Q4H     amLODIPine  10 mg Oral or Feeding Tube Daily     artificial saliva  2 spray Swish & Spit 4x Daily     bumetanide  1 mg Oral or Feeding Tube BID     busPIRone  10 mg Oral or Feeding Tube BID     carboxymethylcellulose PF  1 drop Both Eyes TID     ceFEPIme (MAXIPIME) IV  2 g Intravenous Q8H     cyanocobalamin  1,000 mcg Intramuscular Q30 Days     cyclobenzaprine  10 mg Oral TID     escitalopram  10 mg Oral Daily     famotidine  20 mg Per G Tube BID     folic acid  1 mg Oral or Feeding Tube Daily     heparin ANTICOAGULANT  5,000 Units Subcutaneous Q12H     heparin lock flush  5-10 mL Intracatheter Q24H     insulin aspart  2-16 Units Subcutaneous Q4H     insulin glargine  20 Units Subcutaneous QAM AC     lactobacillus rhamnosus (GG)  1 capsule Oral Daily     lidocaine  2 patch Transdermal Q24h    And     lidocaine   Transdermal Q8H     metoprolol tartrate  100 mg Oral or Feeding Tube BID     metroNIDAZOLE  500 mg Oral or Feeding Tube TID     miconazole   Topical BID     multivitamins w/minerals  15 mL Per Feeding Tube Daily     polyethylene glycol  17 g Oral BID     protein modular  1 packet Per Feeding Tube TID     senna-docusate  1 tablet Oral or Feeding Tube BID     simethicone  80 mg Oral BID     traZODone  100 mg Oral or Feeding Tube At Bedtime     vancomycin (VANCOCIN) IV  1,250 mg Intravenous Q12H     thiamine  100 mg Oral or Feeding Tube Daily       PRN Medications:  acetaminophen, IV fluid REPLACEMENT ONLY, glucose **OR** dextrose **OR** glucagon, heparin lock flush, hydrALAZINE, hypromellose-dextran, ipratropium - albuterol 0.5 mg/2.5 mg/3 mL, labetalol, lidocaine 4%, lidocaine (buffered or not buffered), magnesium sulfate, magnesium sulfate, melatonin, naloxone, ondansetron **OR** ondansetron, oxyCODONE, potassium chloride, potassium chloride with lidocaine, potassium chloride, potassium chloride, potassium chloride, potassium phosphate (KPHOS) in D5W IV, potassium phosphate  "(KPHOS) in D5W IV, potassium phosphate (KPHOS) in D5W IV, potassium phosphate (KPHOS) in D5W IV, potassium phosphate (KPHOS) in D5W IV, prochlorperazine **OR** prochlorperazine **OR** prochlorperazine, sodium chloride (PF), sodium phosphate    Infusions:    IV fluid REPLACEMENT ONLY 55 mL/hr at 12/31/19 0700       No Known Allergies    Physical Examination:   Vitals: /69   Pulse 66   Temp 98.3  F (36.8  C) (Oral)   Resp 20   Ht 1.727 m (5' 8\")   Wt 88.9 kg (195 lb 15.8 oz)   SpO2 91%   BMI 29.80 kg/m    General:  Sitting in chair  HEENT:  NC/AT, cervical collar in place. no icterus, op pink and moist, no ear or nose drainage.   Cardiac:  Regular rate  Chest:  On mech ventilator via trach  Abdomen:  Less distended and softer with reduced BS  Extremities:  No LE swelling.    Skin:  No rash or lesion.    Neuro:   Mental Status:  fully alert, attentive, follows commands  Cranial Nerves: PERRL, EOMI with normal smooth pursuit, facial sensation intact and symmetric, facial movements symmetric, hearing intact to conversation  Motor: no abnormal movements, moves bilateral upper extremities at elbows antigravity with overall greater movement in LUE, no movement in fingers bilaterally, no movement in BLE with noxious except some slight movement in R toe with noxious stimulus  Sensory: Decreased to light touch in bilateral upper extremities throughout and below chest (below T2). Decreased sensation in bilateral lower extremities. No withdrawal to noxious in LLE. No significant withdrawal to noxious in RLE except R toe with slight movement.       Labs:  Recent Labs   Lab Test 12/31/19  0355 12/30/19  0333 12/29/19  0426    138 140   POTASSIUM 3.7 3.4 3.6   CHLORIDE 102 104 107   CO2 29 30 29   ANIONGAP 4 4 4   * 195* 179*   BUN 36* 37* 34*   CR 0.50* 0.45* 0.46*   FLORINDA 8.0* 8.4* 8.3*   WBC 7.0 8.7 9.3   RBC 2.65* 2.81* 2.94*   HGB 7.8* 8.2* 8.6*    267 226       Cultures:  Blood cultures x 2 " (12/26): NGTD    Imaging:     XR Cervical spine (12/30):   AP and crosstable lateral radiographs of the cervical spine. Extensive osseous irregularity involving the C3-C5 vertebral bodies characterized by osseous destruction, collapse and fragmentation. There is straightening of the cervical lordosis. There is thickening of the prevertebral soft tissues. Tracheostomy and central venous catheter are present.    CXR (12/31): stable      Assessment/Plan:  Sherwin Angel is a 64 year old year old male with hx HTN, DM and cervical stenosis s/p recent anterior C3-4 arthroplasty (2 weeks prior) who was admitted on 12/1/2019 with bilateral lower extremity paraplegia and near loss of upper extremity movement, after a fall at rehab.  Taken to the OR for arthroplasty implant removal on 12/1. Found to have significant inflammation and 2 pharyngotomy sites. ENT closed 1 (unable to reach other) and drain placed for the phlegmon.    Neuro:  #Cervical spinal cord injury  #Discitis/osteomyelitis C3-C4  # Vertebral phlegmon and Abscess, minimally reduced in thickness  - continue wearing the [place] Collar at all times per neuro surgery, Up with assist  - Neuro checks q4h during the day   - continue PT/OT     #Anxiety/Pain   - good control   - Oxycodone 5-10 q4hrs prn   - flexeril 10 mg TID      #Hx of Alcohol dependence  - Thiamine and folate     #Depression  - stop buspar 10 mg BID on 12/31  - Stop PTA mirtazepine per psych  - Continue trazodone 100 mg at bedtime  - Stop seroquel 12.5 BID 12/30  - Psych consulted  - Start Lexapro 10 mg daily   - In 2 or 3 weeks, one could consider Wellbutrin augmentation per psych     Resp:  #Acute respiratory failure s/p re-intubation 12/6, suspect diaphragmatic weakness related to spine injury  #S/P trach 12/20   - Mucomyst   - albuterol nebs  - Chest physio   - Pressure support trial TID  - Weaning off vent as tolerated  - repeat CXR today stable  - Lasix 20 mg IV once to see if able to  trach dome after     Cardio:  # Hypertension  - PTA amlodipine 10 mg   - Continue metoprolol to 100 mg bid  - PTA Bumex 1 mg bid  - Lasix 20 mg IV once to see if able to trach dome after    #Bradycardia, resolved  - HR of 40s overnight on 12/31  - Now stable     Renal:  #Hypophosphatemia, resolved  #Hypernatremia, resolved  - Electrolyte replacement protocol   - Monitor I/O, goal In= outs   - free water flushes 60 q4h, decrease to 30 q4h given improvement in Na     #Neurogenic urinary retention   - failed ídaz wean trial 12/12 caused retention   - treat UTI as below      Endo:  #DM2   - Hgb A1C 6.1 (12/31)  - Continued elevated BG's despite increasing lantus, however pt was given D10 overnight as TFs not given, will monitor now that TF resumed  - lantus 20 units increased 12/29, will continue for now  - High sliding scale insulin     Heme:  #Normocytic anemia, stable  - Hg > 7.0      # Leukocytosis, resolved  Likely due to UTI. Blood culture negative and UC positive for VRE and candida glabrata. B2 Glucan positive 129  - treat UTI as below in ID      GI:  #Constipation, resolved  - Senna scheduled bid  - PRN suppository/enema   - Miralax bid  - KUB with some dilated loops of bowel, some air? Unclear if from PEG, he does not have acute abdomen on exam  - simethacone, and probiotics for gas      #Diet   - G-tube in place  - Tube feeds at goal      ID:   #Vertebral phlegmon and Abscess s/p drain placement 12/1  #Osteomyelitis C3-5  #Fever - restarted 12/25  #Leukocytosis, improving   #repeat MRI 12/10 and 12/19 shows progressive epidural abscess.   # Repeat MRI 12/27 showed minimally decrease abscess thickness and increase the vertebral erosion   - Vanc, cefepime, metronidazole regimen per ID (through 1/22)  - Weekly CBC, CMP, CRP while on IV abx,  - Spinal tissue cultures (12/1): MSSA, Strep anginosus, Eikenella, and oral anaerobes   - Per ID, will need at least 6wks of above abx from day 1 (12/11), earliest stop  date of 1/22/20     #VRE and candida glabrata UTI  ID does not think the new fever and elevated CRP is due to UTI. They think possibly spinal abscess. Repeat cervical spines MRI did not show increase of the abscess size but showed more vertebral erosions  - blood culture negative  - Repeat blood culture negative  -  B 2 Glucan positive   - if spikes fever > 102, start Fluconazole 400 mg daily per ID  - Trend CRP q3h days   - díaz removed     Lines:  - Trach, PICC , PIV x 2, PEG     FEN: TF via PEG   PPX:    DVT prophylaxis: SCDs, Heparin subcutaneous    GI: famotidine   Code Status: Full, presumed     [facility]      The patient was seen and discussed with the attending, Dr. Sanford.    Randi Edmonds MD  Neurology PGY2  [phone]

## 2019-12-31 NOTE — PROGRESS NOTES
"12/31/19, 11:15 AM, [facility], [place] UN[address] [facility] 4206/4206-01, male, Height: 5' 8\", Weight: 195 lbs 15.82 oz, Ordered by: Dr. Klaus Reyes MD, Dx: Quadriplegia (H); Paraplegia (H), MRN #: [id number].    Subjective:  Patient was seen today at 4206/4206-01 with hospital staff present for the evaluation/measurement/delivery of a Coleman J Occian Back (ACB-100; Lot #: [id number]).  Patient appears pleasant.   Health History & Progression: Patient was fit initially with a Coleman J super short on 12/1/19. Patient now requires an Occian back for the Coleman J usage for comfort.  Patient is currently limited by: cervical spinal concerns requiring immobilization of the cervical spine.    Objective:  ROM & MMT were unable to be completed at this time due to bed restrictions and limitations of trunk movement.  Patient did not weight bear upon fitting the brace.  Further plan and doctor assessments will be made post-delivery of the brace.    Assessment:  The Coleman J Occian memory foam back is utilized for (but not restricted to) patients with high risk of occipital breakdown, pre-existing skin injuries/lacerations, and need for post-op or cervical collar comfort.  Patient s ailment recovery is expected to be managed well with the utilization of the brace.  Upon fitting the brace the patient vocalized satisfaction with the fit and feel of the orthosis. The trimlines, height and circumferences of the Occian back brace presented satisfactory.  Patient's nurse signed the delivery ticket and was given the [place] receipt information sheet.    Goal: Provide support for a neutral cervical posture for relief of pain, preventing unwanted cervical and upper trunk movement.    Plan:  Patient's nurse was given the verbal and written instructions on how to don/doff/care for the brace. Patient will utilize the orthosis for the duration of the treatment or unless otherwise specified by the patient's provider. " Will follow-up PRN.    Electronically signed by Lukas Cabral CPO, LPO, MSPO.

## 2019-12-31 NOTE — PROGRESS NOTES
Care Coordinator - Discharge Planning    Admission Date/Time:  12/1/2019  Attending MD:  Fernando Ashton MD     Data  Chart reviewed, discussed with interdisciplinary team.   Patient was admitted for:   1. Quadriplegia (H)    2. Acute respiratory failure with hypoxia (H)    3. Acute respiratory failure with hypoxia (H)    4. Pain    5. Benign essential hypertension    6. Depression, unspecified depression type    7. Tracheostomy care (H)    8. Dry eyes    9. Alcohol abuse    10. Constipation, unspecified constipation type    11. Gastritis without bleeding, unspecified chronicity, unspecified gastritis type    12. Paraplegia (H)         Assessment   Full assessment completed in previous note    Coordination of Care   Angie, [facility] reported [facility].  Pt need to be floor status or need to  [facility] and assessed for 24 hrs prior being transfer to spinal cord injury program. Angie stated that is their policy.      [facility] and Neurosurgery as consult.  Angie stated transport have been arranged for today, 12/31 4:00 pm  time.  Accepting provider is Dr. Espinal, pager # [phone], RN contact number [phone].  RNCC provided update and  time to Neurosurgery team, bedside RN, Charge nurse and pt.  RNCC called pt Carlota spann # [phone] and provided update.  Pt and pt sister agreed with the plan.      Plan  Anticipated Discharge Date:  Today, 12/31/17 at 4:00 pm.  Anticipated Discharge Plan:   [facility] to [facility].      Edilson Xavier, RN, PHN, BSN  4A and 4E/ ICU  Care Coordinator  Phone: [phone]  Pager: [phone]

## 2019-12-31 NOTE — PROGRESS NOTES
[facility] PROGRESS NOTE     Patient:  Sherwin Angel   YOB: 1955, MRN: [id number]  Date of Visit: 12/31/2019  Date of Admission: 12/1/2019          Assessment and Recommendations:     Recommendations:  - No change to cefepime, metronidazole, and vancomycin  - His MRI on 12/27 with persistent phlegmon and endplate destruction suggests he is failing medical therapy. Phlegmon has decreased slightly (1mm) but I remain pessimistic that he will respond to medical therapy alone.  - No evidence for fungal infection at this time - suspect urinary growth and growth from respiratory specimens reflect colonization in the setting of broad spectrum abx. Given lines he is at potential risk for fungemia so if he spikes a high fever to >101 would repeat blood cultures and urine cutures and initiate fluconazole 400mg daily (Qtc 452 on 12/26)  - Would consider repeat MRI next week (week of Jan 6.) If not improving on interval imaging, would consider re-evaluating for surgical candidacy with NSGY.   -Recommend discontinue díaz and use straight intermittent cath for candida in urine as well as VRE, which could be colonization.   -Repeat UA  48 hours after díaz removal. If no pyuria, would not treat or further workup for asymptomatic bacteriuria/fungal presence.   -Swab white patch on tongue for Candida culture  -Would recommend discussing repeat scope with ENT to evaluate pharyngotomy site, if this can be safely done, to screen for thrush      Assessment:  Sherwin Angel is a 64-year-old man with history of hypertension and diabetes who [facility] and now [facility] with new functional quadreplegia (has very minimal movement in L arm) after a fall and found to have pharyngotomy and vertebral phlegmon now s/p OR with removal of arthroplasty and placement of drain. He subsequently had G tube placed on 12/5 and ENT preformed tracheostomy on 12/20.  Serial imaging reflects ongoing phelgmon.    # Traumatic cervical spinal cord injury s/p C3-4 arthroplasty on 11/15, now removed (see below)  # Cervical stenosis C3-5  # Osteomyelitis C3-C5  # Vertebral phlegmon vs abscess s/p I/D and removal arthroplasty hardware on 12/1   Cultures growing MSSA, Strep anginosus, Eikenella, and oral anaerobes. Repeat MRI 12/10, 12/19 and 12/27 shows relatively unchanged phlegmon. Concern for ongoing infection with suboptimal source control in his cervical spine.    # Leukocytosis, improved  DDx includes worsening spinal infection, fungal infection. Urine growing VRE and candida. While urine shows growth of Candida, Candida is an unusual cause of UTI in the absence of urinary anatomy abnormality. Candida is not a pathogen in the lungs. As for VRE, it is not covered by current antibiotics, and it is hard to know whether, if patient had intact sensation, would have dysuria/symptoms. Will watch for now, per recommendations above. Leukocytosis improved. Asides from urine cultures per above, there remains no clinical evidence of infection outside of his C-spine.     This patient seen and plan discussed with the attending physician, Dr. Wilhelm.     Ladan Yip, PGY-3  Internal Medicine/Pediatrics  [phone].     ID Staff Assessment and Plan:   Patient was seen and examined by me with the resident MD. The note above reflects our joint assessment and plan, which I discussed with the resident in detail.     - I was notified this afternoon that the atient is being transferred to [facility] today for ongoing medical cares. Please ask the [facility] ID service physicians to consult on the patient after transfer since the definitive treatment plan for the cervical osteomyelitis and the paravertebral phlegmon has not been determined yet and the final antibiotic treatment course is not defined prior to transfer. Follow-up imaging is needed of the cervical spine to see if the cervical spine infection and  associated phlegmon has resolved, prior to stopping antibiotics. Anticipated earliest stop date is 1/22/20 which would be the end of a 6 week course. The patient may need a more prolonged course of therapy though.      Katy Wilhelm MD  ID Staff Physician  Pager [phone]              Interval History and Events:   Patient reports by nodding or shaking head as well as facial expressions that the pain in his neck has not changed. He does not have any pain asides from his neck. No SOB or difficulty breathing. Per excellent nursing staff, díaz out. No increased secretions. Small area of skin breakdown without erythema at GT site, associated with sutures. Small dressing placed on this site to pad GT apparatus where it contacts skin.     Cannot participate in ROS due to clinical status.         HPI:   Adopted from Consult note 12/02:  Sherwin Angel is a 64-year-old man with history of hypertension and diabetes who [facility] and is now readmitted on 12/1 after a fall at his rehab facility that resulted in near paraplegia.     He was taken to the OR by neurosurgery on 12/1, and had the arthroplasty implant removed and an I/D. He was found to have significant inflammation in the area and 2 pharyngotomy sites, so ENT was consulted intra-operatively and closed one pharyngotomy (the other was unable to be reached) and placed a drain for the phlegmon. He was put on broad-spectrum antibiotics with cefepime, metronidazole, and vancomycin, and ID is consulted for assistance with antibiotic management.     The patient had a temperature to 100.6 overnight after the surgery, but has been afebrile throughout the day today. He is awake and able to nod yes/no to questions, and follow basic commands.           Physical Examination:   Temp:  [97.7  F (36.5  C)-98.4  F (36.9  C)] 98.4  F (36.9  C)  Pulse:  [48-66] 65  Heart Rate:  [49-71] 64  Resp:  [13-30] 21  BP: ()/() 128/72  FiO2 (%):   [40 %] 40 %  SpO2:  [91 %-100 %] 95 %    I/O last 3 completed shifts:  In: 3305 [I.V.:905; NG/GT:1080]  Out: 2500 [Urine:2500]    Vitals:    12/27/19 0600 12/28/19 0100 12/29/19 0400 12/29/19 2200   Weight: 88.7 kg (195 lb 9.6 oz) 88.4 kg (194 lb 14.2 oz) 87.1 kg (192 lb 0.3 oz) 89 kg (196 lb 3.4 oz)    12/30/19 2130   Weight: 88.9 kg (195 lb 15.8 oz)       Constitutional: Adult male in chair, in NAD. Awake, alert, interactive.  HEENT: NC/AT, EOMI, sclera clear, conjunctiva normal, tracheostomy in place  Respiratory: No increased work of breathing, slightly coarse   Cardiovascular: RRR, no murmur noted. B/l UE edema, trace LE edema   GI: Normal bowel sounds, soft, non-distended and non-tender. GT site with small region of skin breakdown associated with sutures, no erythema.   Skin: Warm, dry, well-perfused. No bruising, bleeding, rashes, or lesions on limited exam. CVC site c/d/i.   Neurologic: A&O. Able to mouth words or indicate yes/no. I did not elicit movement in any of his 4 extremities  Neuropsychiatric: Calm. Affect appropriate to situation.  Extremities: From 12/27: Right and left shoulder painful with shrugging motion. Warm but not red or hot. No pain with palpation or with passive ROM.          Medications:       acetylcysteine  2 mL Nebulization Q4H     albuterol  2.5 mg Nebulization Q4H     amLODIPine  10 mg Oral or Feeding Tube Daily     artificial saliva  2 spray Swish & Spit 4x Daily     bumetanide  1 mg Oral or Feeding Tube BID     busPIRone  10 mg Oral or Feeding Tube BID     carboxymethylcellulose PF  1 drop Both Eyes TID     ceFEPIme (MAXIPIME) IV  2 g Intravenous Q8H     cyanocobalamin  1,000 mcg Intramuscular Q30 Days     cyclobenzaprine  10 mg Oral TID     escitalopram  10 mg Oral Daily     famotidine  20 mg Per G Tube BID     folic acid  1 mg Oral or Feeding Tube Daily     heparin ANTICOAGULANT  5,000 Units Subcutaneous Q12H     heparin lock flush  5-10 mL Intracatheter Q24H     insulin aspart   2-16 Units Subcutaneous Q4H     insulin glargine  20 Units Subcutaneous QAM AC     lactobacillus rhamnosus (GG)  1 capsule Oral Daily     lidocaine  2 patch Transdermal Q24h    And     lidocaine   Transdermal Q8H     metoprolol tartrate  100 mg Oral or Feeding Tube BID     metroNIDAZOLE  500 mg Oral or Feeding Tube TID     miconazole   Topical BID     multivitamins w/minerals  15 mL Per Feeding Tube Daily     polyethylene glycol  17 g Oral BID     protein modular  1 packet Per Feeding Tube TID     senna-docusate  1 tablet Oral or Feeding Tube BID     simethicone  80 mg Oral BID     traZODone  100 mg Oral or Feeding Tube At Bedtime     vancomycin (VANCOCIN) IV  1,250 mg Intravenous Q12H     thiamine  100 mg Oral or Feeding Tube Daily       Antiinfectives:  Anti-infectives (From now, onward)    Start     Dose/Rate Route Frequency Ordered Stop    12/27/19 2000  vancomycin (VANCOCIN) 1,250 mg in D5W 250 mL intermittent infusion      1,250 mg  over 90 Minutes Intravenous EVERY 12 HOURS 12/27/19 1206      12/27/19 1400  ceFEPIme (MAXIPIME) 2 g in D5W 100 mL intermittent infusion      2 g  200 mL/hr over 30 Minutes Intravenous EVERY 8 HOURS 12/27/19 1209      12/23/19 0000  metroNIDAZOLE (FLAGYL) 500 MG tablet     Note to Pharmacy:  Through 1/22    500 mg Oral or Feeding Tube 3 TIMES DAILY 12/23/19 1252      12/23/19 0000  ceFEPIme (MAXIPIME) 2 G vial     Note to Pharmacy:  Through 1/22    2 g  over 30 Minutes Intravenous EVERY 8 HOURS 12/23/19 1252      12/16/19 0800  metroNIDAZOLE (FLAGYL) tablet 500 mg      500 mg Oral or Feeding Tube 3 TIMES DAILY 12/16/19 0759            Infusions/Drips:    IV fluid REPLACEMENT ONLY 55 mL/hr at 12/31/19 0700            Laboratory Data:     Microbiology:  Culture Micro   Date Value Ref Range Status   12/26/2019 No growth after 5 days  Preliminary   12/26/2019 No growth after 5 days  Preliminary   12/24/2019 <10,000 colonies/mL  Enterococcus faecium (VRE)   (A)  Final   12/24/2019 (A)   Final    50,000 to 100,000 colonies/mL  Candida glabrata complex  Susceptibility testing not routinely done     12/24/2019   Final    Critical Value/Significant Value, preliminary result only, called to and read back by  Cris Self RN 4A 12.26.19 @132[facility]     12/24/2019 (A)  Final    Susceptibility testing requested by  DR. CHAVEZ FOR  Catarina glabrata complex  12/27/19 1912. MM     12/24/2019 No growth  Final   12/24/2019 No growth  Final   12/12/2019 (A)  Final    Light growth  Candida albicans / dubliniensis  Candida albicans and Candida dubliniensis are not routinely speciated  Susceptibility testing not routinely done     12/11/2019 (A)  Final    Moderate growth  Candida albicans / dubliniensis  Candida albicans and Candida dubliniensis are not routinely speciated  Susceptibility testing not routinely done     12/11/2019 No growth  Final   12/11/2019 No growth  Final   12/07/2019 (A)  Final    Light growth  Candida albicans / dubliniensis  Candida albicans and Candida dubliniensis are not routinely speciated  Susceptibility testing not routinely done     12/07/2019 No growth  Final   12/07/2019 No growth  Final   12/03/2019 No growth  Final   12/03/2019 No growth  Final   12/01/2019 No growth  Final   12/01/2019 (A)  Final    Moderate growth  Fusobacterium nucleatum  Susceptibility testing not routinely done     12/01/2019 (A)  Final    Moderate growth  Parvimonas micra  Susceptibility testing not routinely done     12/01/2019 (A)  Final    Light growth  Prevotella species  Susceptibility testing not routinely done     12/01/2019 (A)  Final    Light growth  Strain 2  Prevotella species  Identification obtained by MALDI-TOF mass spectrometry research use only database. Test   characteristics determined and verified by the [facility]   ([facility]) [place], [place].  Susceptibility testing not routinely done     12/01/2019 (A)  Final    Moderate growth  Strain 3  Prevotella  species  Identification obtained by MALDI-TOF mass spectrometry research use only database. Test   characteristics determined and verified by the Infect[facility] Diseases Diagnostic Laboratory   ([facility]) [place], [place].  Beta lactamase positive  Susceptibility testing not routinely done     12/01/2019 Culture negative after 29 days  Final   12/01/2019 Single colony  Staphylococcus aureus   (A)  Final   12/01/2019 Light growth  Streptococcus anginosus   (A)  Final   12/01/2019 Light growth  Eikenella corrodens   (A)  Final       Inflammatory Markers    Recent Labs   Lab Test 12/31/19  0355 12/29/19  0426 12/28/19  1010 12/27/19  0352 12/26/19  0500 12/25/19  0330 12/23/19  0506 12/20/19  0337   CRP 58.0* 110.0* 120.0* 100.0* 110.0* 120.0* 24.0* 14.0*       Metabolic Studies       Recent Labs   Lab Test 12/31/19  0355 12/30/19  0333 12/29/19  0426 12/28/19  0354  12/27/19  0352 12/26/19  0500  12/01/19  1057    138 140 141  --  147* 144   < > 135   POTASSIUM 3.7 3.4 3.6 3.6  --  3.4 4.3   < > 4.6   CHLORIDE 102 104 107 109  --  114* 117*   < >  --    CO2 29 30 29 30  --  27 21   < >  --    ANIONGAP 4 4 4 2*  --  6 7   < >  --    BUN 36* 37* 34* 34*  --  36* 45*   < >  --    CR 0.50* 0.45* 0.46* 0.50*  --  0.50* 0.58*   < >  --    GFRESTIMATED >90 >90 >90 >90  --  >90 >90   < >  --    * 195* 179* 244*  --  165* 184*   < > 195*   A1C 6.1*  --   --   --   --   --   --   --   --    FLORINDA 8.0* 8.4* 8.3* 7.9*  --  8.0* 8.5   < >  --    PHOS 3.5 2.5 2.8 2.7   < > 1.7* 2.7   < >  --    MAG 1.8 1.9 1.9 2.0  --  1.8 2.2   < >  --    LACT  --   --   --   --   --   --   --   --  1.2    < > = values in this interval not displayed.       Hematology Studies      Recent Labs   Lab Test 12/31/19  0355 12/30/19  0333 12/29/19  0426 12/28/19  0354 12/27/19  0352 12/26/19  0500 12/25/19  0330   WBC 7.0 8.7 9.3 12.3* 11.7* 14.3* 14.5*   ANEU  --  6.1 6.5 9.6* 9.4* 12.0* 12.9*   ALYM  --  1.3 1.6 1.5 1.1 1.2 0.9   JAMES  --  0.7 0.8  0.9 0.9 0.9 0.6   AEOS  --  0.2 0.3 0.2 0.1 0.1 0.1   HGB 7.8* 8.2* 8.6* 8.4* 9.1* 10.3* 10.6*   HCT 25.2* 26.6* 27.5* 26.8* 29.8* 34.6* 34.5*    267 226 216 227 256 248       Arterial Blood Gas Testing    Recent Labs   Lab Test 12/14/19  1006 12/11/19  0535 12/05/19  1945 12/02/19  0545 12/02/19  0200   PH 7.46* 7.49* 7.43 7.42 7.43   PCO2 37 40 46* 43 42   PO2 55* 105 87 130* 122*   HCO3 26 30* 30* 28 28   O2PER 80 100 4L 60.0 50.0        Urine Studies     Recent Labs   Lab Test 12/24/19  1411 12/11/19  1141 12/07/19  2310 12/01/19  1824   URINEPH 5.0 5.5 5.5 6.0   NITRITE Negative Negative Negative Negative   LEUKEST Large* Negative Negative Negative   WBCU 51* 2 2 2       Vancomycin Levels     Recent Labs   Lab Test 12/29/19  0715 12/27/19  0635 12/22/19  2035 12/19/19  1600 12/16/19  1718 12/13/19  1525   VANCOMYCIN 19.1 19.5 18.5 23.4 18.6 19.2            Imaging:   CXR 12/23   Impression:   1. Stable support devices.  2. Slightly increased right and stable left basilar  atelectasis/consolidation.  3. Stable small pleural effusions.    CXR 12/22  Impression:   1. Interval decrease in small right pleural effusion and associated  bibasilar opacities. New airspace opacity.  2. Interval placement of tracheostomy tube, with tip in the mid  thoracic trachea. Additional support devices appear stable.    MR Cervical Spine wwo contrast 12/19  Impression:   1.  No significant change in anterior epidural phlegmon/abscess  extending from C2 down to C6. Stable/slightly increased spinal cord  edema.  2.  Persistent discitis osteomyelitis findings C3-C5.  3.  Stable/slightly progressed extensive prevertebral phlegmon  extending from C2 through C5. Decreased abscess in the prevertebral  phlegmon likely due to drainage into the hypopharynx through a  fistulous communication.

## 2019-12-31 NOTE — PLAN OF CARE
Physical Therapy Discharge Summary    Reason for therapy discharge:    Discharged to [facility]     Progress towards therapy goal(s). See goals on Care Plan in Epic electronic health record for goal details.  Goals not met.  Barriers to achieving goals:   discharge from facility.    Therapy recommendation(s):    Continued therapy is recommended.  Rationale/Recommendations:  Pt demonstrates skilled need for additional PT, OT and SLP services. Additional skilled PT to address functional mobility, balance and progress mobility and maximize IND.

## 2019-12-31 NOTE — PLAN OF CARE
Major Shift Events:  Up to the chair x2, pressure support x2.  Oxy given for headache/shoulder pain with good results.    Plan:  Continue plan of care as ordered.  Cervical xrays ordered.    For vital signs and complete assessments, please see documentation flowsheets.

## 2019-12-31 NOTE — PLAN OF CARE
Major Shift Events:  A/Ox4, neuro deficits unchanged. Hemodynamically stable, afebrile. Unable to void on own requiring straight caths (continue intol UA in a couple days per ID). TF 55ml/hr, no BM this shift. Worked with therapy, up to chair x2.   Plan: [facility], [facility] for 1600. Valuables obtained from security, glasses/phone and suitcase with clothing sent with patient.  For vital signs and complete assessments, please see documentation flowsheets.

## 2019-12-31 NOTE — PLAN OF CARE
Discharge Planner PT   Patient plan for discharge: [facility]  Current status: Pt performed bed mob with Max A x 2. Pt tx supine->sit EOB with Dep A. Pt performed seated balance activity. PT performed PROM->AAROM BUE and BLE all joints and planes x 10 reps, noted trace muscle activation with bicep curls BUE, pronation/supination.   Barriers to return to prior living situation: medical status, spinal precautions, decreased strength  Recommendations for discharge: [facility] program at [facility]  Rationale for recommendations: Pt would benefit from additional skilled PT to address functional mobility, transfers to maximize IND.        Entered by: Ramandeep Kauffman 12/31/2019 5:05 PM

## 2019-12-31 NOTE — PROGRESS NOTES
[facility], [place]   Neurosurgery Progress Note:    Assessment: 64 years old man status post C3-C4 artificial disc placement about 2 weeks prior to arriaval the [facility] who presented after a fall with almost complete loss of strength in his upper extremities and paraplegia, found to have cervical stenosis. S/p OR 12/1 - Pharyngotomy closed with assistance from ENT. Arthroplasty implant removed. No replacement implant placed. S/p G-tube 12/5. Extubated 12/5. Re-Intubated 12/6 due to Mucous Plugging and Atelectasis. S/p repeated bronchoscopy. S/p tracheostomy with ENT 12/20. Infectious disease with concerns about adequate source control. MRI cervical spine 12/27 with stable epidural abscess/phlegmon.    Plan:  - Nebs, chest physiotherapy  - DVT: SCDs while in bed  - Bond J collar - for 3 months; upright Xrays every 1 month, CT cervical spine in 3 months  - per ID: vancomycin, cefepime, flagyl 4-6 wks for vertebral phlegmon, weekly labs; f/u clx; appreciate recs; if fever > 102, will repeat blood cultures, start fluconazole; discontinue díaz, trial of void with intermittent cath; MRI cervical spine 1/2; follow-up urine cultures  - no plan for another surgery  - GI/nutrition: tube feeds  - trach change per respiratory therapy  - MRI cervical spine before stopping abx in 6 wks  - Upright cervical Xrays completed: expected extensive osseous irregularity  - Endo: endocrine cslt; insulin sliding scale  - Neuropsych consulted, appreciate recs: stopped remeron, started lexapro  - Disposition: 4A; [facility]  -----------------------------------    Lion Vargas MD  Neurosurgery Resident PGY2    Please contact neurosurgery resident on call with questions.    Dial * * [phone], enter [phone] when prompted.       Subjective: discontinued remeron. Initiated lexapro. Upright cervical Xrays completed    Objective:   Temp:  [97.7  F (36.5  C)-98.9  F (37.2  C)] 97.7  F  (36.5  C)  Pulse:  [48-78] 48  Heart Rate:  [49-79] 53  Resp:  [12-30] 21  BP: ()/(60-86) 111/64  FiO2 (%):  [40 %] 40 %  SpO2:  [94 %-100 %] 95 %  I/O last 3 completed shifts:  In: 3455 [I.V.:1095; NG/GT:1040]  Out: 2840 [Urine:2840]    Gen: Appears comfortable, NAD  Wound: Incision, clean, dry, intact  Pulm: tracheostomy, mechanical ventilator  Neuro: Awake, alert, nods yes or no to questions  Follows commands  Deltoids 4/5, biceps 4-/5, triceps 1/5,  0/5, lower extremities 0/5  Triple flexion in lower extremities  Sensation to light touch in arms and upper chest    LABS  Recent Labs   Lab Test 12/30/19  0333 12/29/19  0426 12/28/19  0354   WBC 8.7 9.3 12.3*   HGB 8.2* 8.6* 8.4*   MCV 95 94 96    226 216       Recent Labs   Lab Test 12/30/19  0333 12/29/19  0426 12/28/19  0354    140 141   POTASSIUM 3.4 3.6 3.6   CHLORIDE 104 107 109   CO2 30 29 30   BUN 37* 34* 34*   CR 0.45* 0.46* 0.50*   ANIONGAP 4 4 2*   FLORINDA 8.4* 8.3* 7.9*   * 179* 244*

## 2019-12-31 NOTE — PLAN OF CARE
Neuro: Alert and oriented, pupils 3/brisk/tracks. Decreased sensation to arms/hands, both warm/normal pulses/ able to move both L>R . LE sensation absent/ no movement/warm/normal pulses. All extremities cool at start of shift after being up in the chair.   VS: Afeb, HR 48-66, -123, maps , RR 13-22.   CV: SR, SB.   Pulm: CMV 40%/12/450/5, via #6 shiley. LS clear/diminished, suctioning small amounts thick pale yellow sputum. Tan drng around trach site, site erythema, drsg/cannula changed 0430.   GI: TF at goal 55ml/hour via PEG, 60 ml free water every 4 hours. No stool thus far, smear brown only.  : Degroot with good amounts cloudy yellow urine after bumex.   Lines/Gtts:  R TL PICC all saline locked.   Skin:   Neck incision clean and dry, erythema. Trach site with ooze, erythema. G tube site with sutures intact. Taint has pink,moist sore, open to air.   Drains: Degroot.   Labs: Results pending.   P: Pulmonary toilet, vent wean continued. Reassure Sherwin that he will be able to speak when advanced to trach dome and tolerating passy benny valve. Monitor skin/insensate areas. Maintain C-collar and alignment.

## 2020-01-01 LAB
BACTERIA SPEC CULT: NO GROWTH
BACTERIA SPEC CULT: NO GROWTH
Lab: NORMAL
Lab: NORMAL
SPECIMEN SOURCE: NORMAL
SPECIMEN SOURCE: NORMAL

## 2020-01-02 ENCOUNTER — TELEPHONE (OUTPATIENT)
Dept: OTOLARYNGOLOGY | Facility: CLINIC | Age: 65
End: 2020-01-02

## 2020-01-02 NOTE — TELEPHONE ENCOUNTER
[facility] Call Center    Phone Message    May a detailed message be left on voicemail: yes    Reason for Call: Other: Per hospital discharge instructions, patient needs to be seen within 1 month. Please call the patient to schedule.     Action Taken: Other: ENT

## 2020-01-03 NOTE — TELEPHONE ENCOUNTER
Called patient and left a VM.    Informed him that he is scheduled for a hospital follow up appointment on 1/31 with Dr. Nuno. Provided appointment instructions and the ENT call center number to call back with any questions.

## 2020-01-03 NOTE — TELEPHONE ENCOUNTER
"Per notes 12/31/2019 He ultimately had a tracheostomy placed with ENT 12/20.      Discharged 12/31/2019, requesting 1 month follow up.    Will forward to Clinic ENT Pool to contact patient to schedule.      Per Dr. Brian: \"This is a patient I saw on call and staffed a trach - ok to book with another provider who sees trach patients.\"  "

## 2020-01-06 NOTE — TELEPHONE ENCOUNTER
FUTURE VISIT INFORMATION      FUTURE VISIT INFORMATION:    Date: 1/31/20    Time: 10:20 AM    [place] [facility] [place]ERRAL INFORMATION:    Referring provider:  Dr. Brian    Referring providers clinic:  [facility]    Reason for visit/diagnosis: Hospital Tracheostomy FU    RECORDS REQUESTED FROM:       Clinic name Comments Records Status Imaging Status   [facility] 12/1/19 - IP with Dr. Ashton  12/23/19 - ENT Referral in Progress Note from Dr. Lukas De La Torre    [facility] - Surgery 12/20/19 - OP Note for TRACHEOSTOMY with Dr. Brian  12/1/19 - OP Note for DIRECT LARYNGOSCOPY and TRANSCERVICAL AND TRANSORAL REPAIR OF PHARYNGOTOMY with Dr. Mercado and Dr. Ashton [facility] - Procedures 9/26/18 - EGD  2/21/17 - EGD Care Everywhere    [facility] 9/23/18 - ED OV with DANNY Live  2/16/17 - IP with Dr. Banks and Dr. Styles Care Everywhere

## 2020-01-09 ENCOUNTER — DOCUMENTATION ONLY (OUTPATIENT)
Dept: CARE COORDINATION | Facility: CLINIC | Age: 65
End: 2020-01-09

## 2020-01-27 ENCOUNTER — PRE VISIT (OUTPATIENT)
Dept: OTOLARYNGOLOGY | Facility: CLINIC | Age: 65
End: 2020-01-27

## 2020-01-27 NOTE — TELEPHONE ENCOUNTER
Previsit Call Details  Reason for previsit call: MyChart Activation  Number of days until appointment: 4  Call attempt number: 1  Call outcome: Unsuccessful - Patient Unavailable  Notes: mailbox is full unable to leave a message.

## 2020-01-31 ENCOUNTER — PRE VISIT (OUTPATIENT)
Dept: OTOLARYNGOLOGY | Facility: CLINIC | Age: 65
End: 2020-01-31

## 2021-03-03 NOTE — PLAN OF CARE
Major Shift Events:  Extubated at 1445, 4LNC.  G tube placed.  Dex at 0.2, SBP goal of 180 with 10mg Labetelol as needed.    Plan: Monitor respiratory status overnight, likely transfer to floor in AM.  For vital signs and complete assessments, please see documentation flowsheets.    Physical Therapy   Daily Treatment     Patient Name: Imani Stephen  Age:  63 y.o., Sex:  female  Medical Record #: [id number]  Today's Date: 3/2/2021     Precautions  Precautions: Fall Risk, Swallow Precautions ( See Comments)  Comments: Mildly thickened liquids, Minced & Moist textures; Expressive Aphasia; mild impulsivity    Subjective    Pt received supine in bed. Pt agreeable to tx.     Objective       03/02/21 1231   Precautions   Precautions Fall Risk;Swallow Precautions ( See Comments)   Comments Mildly thickened liquids, Minced & Moist textures; Expressive Aphasia; mild impulsivity   Vitals   O2 (LPM) 0   O2 Delivery Device None - Room Air   Cognition    Speech/ Communication Expressive Aphasia   Ability To Follow Commands 2 Step   Safety Awareness Impulsive   Comments Pt denies visual deficits and field cuts upon subjective.   Gait Functional Level of Assist    Gait Level Of Assist Stand by Assist   Assistive Device   (gait belt)   Distance (Feet) 1400  (outdoors/indoors including ramps and grass terrain)   # of Times Distance was Traveled 1  (and 1 short rep from gym back to bed, about 110 feet)   Deviation Bradykinetic;Decreased Base Of Support  (no LOB; requires cueing for path finding)   Wheelchair Functional Level of Assist   Wheelchair Assist   (n/a due to ambulation status)   Distance Wheelchair (Feet or Distance)   (n/a due to ambulation status)   Wheelchair Description   (n/a due to ambulation status)   Transfer Functional Level of Assist   Bed, Chair, Wheelchair Transfer Stand by Assist   Bed Chair Wheelchair Transfer Description Increased time;Set-up of equipment;Supervision for safety;Verbal cueing   Sitting Lower Body Exercises   Sitting Lower Body Exercises Yes  (2.5 lbs BLE)   Long Arc Quad 2 sets of 10   Marching 2 sets of 10   Bed Mobility    Supine to Sit Supervised   Sit to Supine Supervised   Sit to Stand Stand by Assist   Scooting Supervised   Rolling Independent   Neuro-Muscular  Treatments   Comments Retroambulation for 1 rep of 50 feet with significantly increased bradykinesia and decreased B step length, CGA at gait belt, moderate VC for task attention.  Balloon soccer dribbling activity for 250 feet with SBA and occasional CGA, gait belt, setup A, and minimal VCs for task attention.  2 sets of 8 cone touches for each LE L<>R using SLS and Min A to CGA at gait belt, cueing decreased after new task learning provided.  1 set of 14 cone touches using side & color VCs (eg, Left Green) with 2-step command at about 90% accuracy, gait belt with CGA.  1 set of croquet with moderate cues for problem solving and new task learning, CGA at gait belt, x30 feet using 2-lb ball.  x25 minutes   Interdisciplinary Plan of Care Collaboration   IDT Collaboration with  Other (See Comments)   Patient Position at End of Therapy In Bed;Call Light within Reach;Tray Table within Reach;Phone within Reach   Collaboration Comments SPT assistance first half and rec therapy student A for second half.   PT Total Time Spent   PT Individual Total Time Spent (Mins) 60   PT Charge Group   PT Gait Training 2   PT Neuromuscular Re-Education / Balance 2     Discussed treatment goals related to interventions.     Assessment    Pt demonstrated improved endurance and balance with uneven surfaces. Patient requires moderate verbal cues with problem solving during new task learning, but the A level decreases with activities that are provided more structure.  Standing balance is impaired but continues to improve with SLS during static balance training.  Limited verbal expression continues throughout session.    Strengths: Pleasant and cooperative, Willingly participates in therapeutic activities  Barriers: Aphasia expressive, Decreased endurance, Difficulty following instructions, Fatigue, Hypotension, Impaired activity tolerance, Impaired balance, Impulsive    Plan    Implicate components of DGI to challenge balance with ambulation.  Check vision. Improve LE strength and balance. Progress endurance and activity tolerance.     Physical Therapy Problems     Problem: Mobility     Dates: Start: 02/26/21       Goal: STG-Within one week, patient will propel wheelchair household distances     Dates: Start: 02/26/21       Description: 1) Individualized goal: of 150' on flat ground [place] and minimal verbal cues for steering.   2) Interventions:  PT Group Therapy, PT Gait Training, PT Self Care/Home Eval, PT Therapeutic Exercises, PT Neuro Re-Ed/Balance, PT Aquatic Therapy, PT Therapeutic Activity, and PT Manual Therapy            Goal: STG-Within one week, patient will ambulate household distance     Dates: Start: 02/26/21       Description: 1) Individualized goal: of 150' CGA, no AD on flat ground to improve ambulation in the home.  2) Interventions:  PT Group Therapy, PT Gait Training, PT Self Care/Home Eval, PT Therapeutic Exercises, PT Neuro Re-Ed/Balance, PT Aquatic Therapy, PT Therapeutic Activity, and PT Manual Therapy          Goal: STG-Within one week, patient will ascend and descend four to six stairs     Dates: Start: 02/26/21       Description: 1) Individualized goal: CGA with 1HR, no AD.  2) Interventions:  PT Group Therapy, PT Gait Training, PT Self Care/Home Eval, PT Therapeutic Exercises, PT Neuro Re-Ed/Balance, PT Aquatic Therapy, PT Therapeutic Activity, and PT Manual Therapy                Problem: Mobility Transfers     Dates: Start: 02/26/21       Goal: STG-Within one week, patient will perform bed mobility     Dates: Start: 02/26/21       Description: 1) Individualized goal: independently to improve independence in home.   2) Interventions:  PT Group Therapy, PT Gait Training, PT Self Care/Home Eval, PT Therapeutic Exercises, PT Neuro Re-Ed/Balance, PT Aquatic Therapy, PT Therapeutic Activity, and PT Manual Therapy                Problem: PT-Long Term Goals     Dates: Start: 02/26/21       Goal: LTG-By discharge, patient will propel  wheelchair     Dates: Start: 02/26/21       Description: 1) Individualized goal: of 150' on flat ground supervised and minimal verbal cues for steering.   2) Interventions:  PT Group Therapy, PT Gait Training, PT Self Care/Home Eval, PT Therapeutic Exercises, PT Neuro Re-Ed/Balance, PT Aquatic Therapy, PT Therapeutic Activity, and PT Manual Therapy          Goal: LTG-By discharge, patient will ambulate     Dates: Start: 02/26/21       Description: 1) Individualized goal: of 500' CGA on flat ground to improve ambulation in the community.   2) Interventions:  PT Group Therapy, PT Gait Training, PT Self Care/Home Eval, PT Therapeutic Exercises, PT Neuro Re-Ed/Balance, PT Aquatic Therapy, PT Therapeutic Activity, and PT Manual Therapy          Goal: LTG-By discharge, patient will transfer one surface to another     Dates: Start: 02/26/21       Description: 1) Individualized goal: Supervised to decrease caregiver burden.   2) Interventions:  PT Group Therapy, PT Gait Training, PT Self Care/Home Eval, PT Therapeutic Exercises, PT Neuro Re-Ed/Balance, PT Aquatic Therapy, PT Therapeutic Activity, and PT Manual Therapy          Goal: LTG-By discharge, patient will ambulate up/down flight of stairs     Dates: Start: 02/26/21       Description: 1) Individualized goal: CGA using 1HR to improve pt's mobility in the community.   2) Interventions:  PT Group Therapy, PT Gait Training, PT Self Care/Home Eval, PT Therapeutic Exercises, PT Neuro Re-Ed/Balance, PT Aquatic Therapy, PT Therapeutic Activity, and PT Manual Therapy          Goal: LTG-By discharge, patient will transfer in/out of a car     Dates: Start: 02/26/21       Description: 1) Individualized goal: Supervised with Min verbal cuing to decrease caregiver burden.  2) Interventions:  PT Group Therapy, PT Gait Training, PT Self Care/Home Eval, PT Therapeutic Exercises, PT Neuro Re-Ed/Balance, PT Aquatic Therapy, PT Therapeutic Activity, and PT Manual Therapy

## 2021-12-30 NOTE — PROGRESS NOTES
[facility], [place]   Neurosurgery Progress Note:    Assessment: 64 years old gentleman status post C3-C4 artificial disc placement about 2 weeks prior to arriaval the [facility] who presented after a fall with almost complete loss of strength in his upper extremities and paraplegia, found to have cervical stenosis. S/p OR 12/1 - Pharyngotomy closed with assistance from ENT. Arthroplasty implant removed. No replacement implant placed. S/p G-tube 12/5. Extubated 12/5. Re-Intubated 12/6 due to Mucous Plugging and Atelectasis. S/p repeated bronchoscopy. S/p tracheostomy with ENT 12/20.    Plan:  - Nebs, chest physiotherapy  - DVT: SCDs while in bed  - [place] J collar  - ID: vancomycin, cefepime, flagyl 4-6 wks for vertebral phlegmon, weekly labs; f/u clx; MRI cervical spine 2 weeks per ID  - no plan for another surgery  - GI/nutrition: tube feeds  - trach change per respiratory therapy  - MRI cervical spine before stopping abx in 6 wks  - Endo: endocrine cslt; insulin sliding scale  - Disposition: 4A[facility]  -----------------------------------  Lion Vargas MD  Neurosurgery Resident PGY2    Please contact neurosurgery resident on call with questions.    Dial * * [phone], enter [phone] when prompted.     Subjective: awaiting dispo    Objective:   Temp:  [98.4  F (36.9  C)-99.5  F (37.5  C)] 98.6  F (37  C)  Heart Rate:  [] 75  Resp:  [16-29] 26  BP: (118-183)/(73-99) 134/76  FiO2 (%):  [50 %-70 %] 50 %  SpO2:  [91 %-100 %] 91 %  I/O last 3 completed shifts:  In: 2860 [I.V.:755; NG/GT:840]  Out: 2175 [Urine:2175]    Gen: Appears comfortable, NAD  Wound: Incision, clean, dry, intact  Pulm: tracheostomy, mechanical ventilator  Neuro: Awake, alert, nods yes or no to questions  Follows commands  Deltoids 4/5, biceps 4/5, triceps 1/5,  0/5, lower extremities 0/5  Triple flexion in lower extremities  Sensation to light touch in arms and upper  Refilled per protocol.         chest    LABS  Recent Labs   Lab Test 12/23/19  0506 12/22/19  0430 12/21/19  0359   WBC 8.2 7.4 7.3   HGB 10.2* 9.0* 8.5*   MCV 95 96 95    254 246       Recent Labs   Lab Test 12/23/19  0506 12/22/19  0430 12/21/19  0359    139 140   POTASSIUM 3.9 3.7 3.8   CHLORIDE 110* 108 109   CO2 25 28 28   BUN 28 22 15   CR 0.51* 0.52* 0.48*   ANIONGAP 4 2* 4   FLORINDA 8.8 8.4* 8.4*   * 131* 105*

## 2022-07-19 NOTE — PROGRESS NOTES
Lab Facility: 0 Neuro: Exam per baseline. Denies pain. Makes needs known despite ETT.   CV: VSS. Afebrile.   Pulm: Tolerated CPAP @40% 7/5 for 75 minutes. Deep suctioning with small outputs. Lung sounds remain coarse.   GI: Tolerates tube feeds. No BM this shift. Held laxatives for multiple BM last night.No hypoglycemia this shift. Free water flushes decreased to 30cc q 4 hr. Plan to hold feeds heparin and Humulin -N at midnight for trach tomorrow  : Deg[place] in place, fluid balance net negative 1 liter today.   Skin: intact, Washed under c-collar and changed to clean set of pads at 1600 today.  Plan: NPO at midnight for trach tomorrow.   Billing Type: [employer] Bill For Surgical Tray: no Expected Date Of Service: 07/19/2022 Performing Laboratory: [phone]

## 2023-08-22 NOTE — PROGRESS NOTES
Neurosurgery Progress Note     Subjective:  meeting trach goals of FiO2 40%, PEEP 5; tube feeds held, subQ heparin held for possible trach     Objective:  Temp: 98.6  F (37  C) Temp src: Axillary BP: 126/67   Heart Rate: 56 Resp: 17 SpO2: 98 % O2 Device: Mechanical Ventilator    Intubated, partially sedated  Awake, alert, nods yes or no to questions  Follows commands  Deltoids 4/5, biceps 4/5, triceps 1/5,  0/5, lower extremities 0/5  Triple flexion in lower extremities     Assessment:  64 years old gentleman status post C3-C4 artificial disc placement about 2 weeks prior to arriaval the [facility] who presented after a fall with almost complete loss of strength in his upper extremities and paraplegia, found to have cervical stenosis. S/p OR 12/1 - Pharyngotomy closed with assistance from ENT. Arthroplasty implant removed. No replacement implant placed. S/p G-tube 12/5. Extubated 12/5. Re-Intubated 12/6 due to Mucous Plugging and Atelectasis. S/p repeated bronchoscopy, last 12/14.     Plan:  Intubated, mechanically ventilated  Nebs, chest physiotherapy; wean to extubate as able, plan for tracheostomy with ENT - goal PEEP 5 and FiO2 40%; bronchoscopy prn per neurocritical care  DVT: SCDs while in bed  [place] J collar  Per ID: vancomycin, cefepime, flagyl 4-6 wks for vertebral phlegmon, weekly labs; no plan for further surgery; consider repeat MRI cervical spine this week  Follow-up cultures  Hold SubQ heparin & tube feeds day before possible trach  MRI before stopping abx in 6 wks  endocrine consulted for diabetes - appreciate recs  Disposition: 4A        Lion Vargas MD  Neurosurgery Resident PGY2    Please contact neurosurgery resident on call with questions.    Dial * * [phone], enter [phone] when prompted.      [Well Developed] : well developed [Well Nourished] : well nourished [No Acute Distress] : no acute distress [Normal Conjunctiva] : normal conjunctiva [Normal Venous Pressure] : normal venous pressure [No Carotid Bruit] : no carotid bruit [Normal S1, S2] : normal S1, S2 [No Murmur] : no murmur [No Rub] : no rub [No Gallop] : no gallop [Clear Lung Fields] : clear lung fields [Good Air Entry] : good air entry [No Respiratory Distress] : no respiratory distress  [Soft] : abdomen soft [Non Tender] : non-tender [No Masses/organomegaly] : no masses/organomegaly [Normal Bowel Sounds] : normal bowel sounds [Normal Gait] : normal gait [No Edema] : no edema [No Cyanosis] : no cyanosis [No Clubbing] : no clubbing [No Varicosities] : no varicosities [No Rash] : no rash [No Skin Lesions] : no skin lesions [Moves all extremities] : moves all extremities [No Focal Deficits] : no focal deficits [Normal Speech] : normal speech [Alert and Oriented] : alert and oriented [Normal memory] : normal memory

## 2025-01-23 NOTE — PROGRESS NOTES
1. Weigh daily after arising from bed and emptying your bladder  2. Keep a diary of your daily weights  3. Notify me if your weight increases by 2 lbs in 24 hours or 5 lbs in one week, as this may be an early indicator of increased fluid.    1. Monitor BP at home 2-3 days a week, twice on those days, at random times, at least 2 hours after medications  2. Keep a diary to include date, time, and BP reading  3. Bring your BP diary and monitor to each office visit  4. Notify us if BP readings are consistently greater than 140/90 or less than 95/50.  5. To help keep BP regulated, aim to get at least 30 minutes of moderate physical activity daily, and follow a low sodium diet regularly by avoiding processed foods or added salt.    Regular moderate physical activity throughout the day with at least 30 minutes of sustained activity may lower BP up to 25%. Avoiding the salt shaker and sodium rich processed foods will lower blood pressure.  Limiting alcohol will significantly lower blood pressure.  Finally, research also confirms that 2-3 minutes of guided deep breathing exercises may lower BP as much as some medications.  Consider downloading a free eliazar to your phone or smart waPatient Education        Low Sodium Diet (2,000 Milligram): Care Instructions  Overview     Limiting sodium can be an important part of managing some health problems.  The most common source of sodium is salt. People get most of the salt in their diet from canned, prepared, and packaged foods. Fast food and restaurant meals also are very high in sodium. Your doctor will probably limit your sodium to less than 2,000 milligrams (mg) a day. This limit counts all the sodium in prepared and packaged foods and any salt you add to your food.  Follow-up care is a key part of your treatment and safety. Be sure to make and go to all appointments, and call your doctor if you are having problems. It's also a good idea to know your test results and keep a list of  Patient was lavage suctioned to remove secretions. Patient tolerated well. Lung sounds are clear after suctioning. SpO2 98%

## (undated) DEVICE — DEVICE ENDO STITCH APPLIER 10MM 173016

## (undated) DEVICE — SYR 10ML LL W/O NDL 302995

## (undated) DEVICE — SU ETHILON 3-0 PS-1 18" 1663H

## (undated) DEVICE — ENDO TROCAR FIRST ENTRY KII FIOS Z-THRD 12X100MM CTF73

## (undated) DEVICE — SPONGE SURGIFOAM 12 1972

## (undated) DEVICE — DRAIN JACKSON PRATT 10FR ROUND SU130-1321

## (undated) DEVICE — INTR PEELAWAY 22FRX13CM G04096 PLVW-22.0-38

## (undated) DEVICE — ESU ENDO SCISSORS 5MM CVD 5DCS

## (undated) DEVICE — SPONGE SURGIFOAM 100 1974

## (undated) DEVICE — SPONGE KITTNER 30-101

## (undated) DEVICE — DECANTER VIAL 2006S

## (undated) DEVICE — Device

## (undated) DEVICE — WIPES FOLEY CARE SURESTEP PROVON DFC100

## (undated) DEVICE — NDL BLUNT 17GA 1.5" 8881202330

## (undated) DEVICE — PREP SKIN SCRUB TRAY 4461A

## (undated) DEVICE — PREP CHLORAPREP 26ML TINTED ORANGE  260815

## (undated) DEVICE — PACK NEURO MINOR UMMC SNE32MNMU4

## (undated) DEVICE — SU VICRYL 3-0 SH 8X18" UND J864D

## (undated) DEVICE — SU SILK 3-0 TIE 12X30" A304H

## (undated) DEVICE — SOL NACL 0.9% IRRIG 1000ML BOTTLE 2F7124

## (undated) DEVICE — DRAPE MICROSCOPE LEICA 54X150" AR8033650

## (undated) DEVICE — SU ETHILON 5-0 PC-3 18" 1865G

## (undated) DEVICE — DRSG PRIMAPORE 03 1/8X6" 66000318

## (undated) DEVICE — ESU GROUND PAD ADULT W/CORD E7507

## (undated) DEVICE — SOL NACL 0.9% 10ML VIAL 0409-4888-02

## (undated) DEVICE — DRAIN JACKSON PRATT 10MM FLAT 4/4 PERF SU130-1311

## (undated) DEVICE — CATH VA INTR 10FRX14CM KIT

## (undated) DEVICE — NDL INSUFFLATION 13GA 120MM C2201

## (undated) DEVICE — SU SILK 2-0 TIE 12X30" A305H

## (undated) DEVICE — PREP POVIDONE IODINE SOLUTION 10% 4OZ

## (undated) DEVICE — LINEN TOWEL PACK X30 5481

## (undated) DEVICE — SOL WATER IRRIG 1000ML BOTTLE 2F7114

## (undated) DEVICE — ENDO TROCAR SLEEVE KII ADV FIXATION 05X100MM CFS02

## (undated) DEVICE — SOL NACL 0.9% INJ 1000ML BAG 2B1324X

## (undated) DEVICE — DEVICE SUTURE PASSER 14GA WECK EFX EFXSP2

## (undated) DEVICE — DRAPE C-ARM W/STRAPS 42X72" 07-CA104

## (undated) DEVICE — PREP POVIDONE IODINE SCRUB 7.5% 4OZ APL82212

## (undated) DEVICE — LINEN TOWEL PACK X5 5464

## (undated) DEVICE — SPONGE COTTONOID 1/2X1/2" 20-04S

## (undated) DEVICE — DRAIN JACKSON PRATT RESERVOIR 100ML SU130-1305

## (undated) DEVICE — SU VICRYL 0 UR-6 27" J603H

## (undated) DEVICE — ENDO TROCAR FIRST ENTRY KII FIOS ADV FIX 05X100MM CFF03

## (undated) DEVICE — APPLICATOR COTTON TIP 6"X2 STERILE LF 6012

## (undated) DEVICE — SU ENDO STITCH SURGIDAC 2-0 ES-9 TAPER 48"  170044

## (undated) DEVICE — DRSG TELFA 3X8" 1238

## (undated) DEVICE — SPONGE COTTONOID 1/2X1 1/2" 20-06S

## (undated) DEVICE — SU SILK 2-0 SH CR 5X18" C0125

## (undated) DEVICE — ESU ELEC BLADE 2.75" COATED/INSULATED E1455

## (undated) DEVICE — NDL COUNTER 20CT 31142493

## (undated) DEVICE — LABEL MEDICATION SYSTEM 3303-P

## (undated) DEVICE — SURGICEL HEMOSTAT 4X8" 1952

## (undated) DEVICE — DRAPE IOBAN INCISE 23X17" 6650EZ

## (undated) DEVICE — SYR 30ML LL W/O NDL 302832

## (undated) DEVICE — LINEN TOWEL PACK X6 WHITE 5487

## (undated) DEVICE — SU VICRYL 2-0 CT-2 27" J333H

## (undated) DEVICE — PACK GOWN 3/PK DISP XL SBA32GPFCB

## (undated) DEVICE — SUCTION MANIFOLD DORNOCH ULTRA CART UL-CL500

## (undated) RX ORDER — BUPIVACAINE HYDROCHLORIDE AND EPINEPHRINE 2.5; 5 MG/ML; UG/ML
INJECTION, SOLUTION EPIDURAL; INFILTRATION; INTRACAUDAL; PERINEURAL
Status: DISPENSED
Start: 2019-12-01

## (undated) RX ORDER — PROPOFOL 10 MG/ML
INJECTION, EMULSION INTRAVENOUS
Status: DISPENSED
Start: 2019-12-05

## (undated) RX ORDER — CEFAZOLIN SODIUM 1 G/3ML
INJECTION, POWDER, FOR SOLUTION INTRAMUSCULAR; INTRAVENOUS
Status: DISPENSED
Start: 2019-12-01

## (undated) RX ORDER — LIDOCAINE HYDROCHLORIDE 10 MG/ML
INJECTION, SOLUTION EPIDURAL; INFILTRATION; INTRACAUDAL; PERINEURAL
Status: DISPENSED
Start: 2019-12-05

## (undated) RX ORDER — PROPOFOL 10 MG/ML
INJECTION, EMULSION INTRAVENOUS
Status: DISPENSED
Start: 2019-12-10

## (undated) RX ORDER — BACITRACIN 50000 [IU]/1
INJECTION, POWDER, FOR SOLUTION INTRAMUSCULAR
Status: DISPENSED
Start: 2019-12-01

## (undated) RX ORDER — LIDOCAINE HYDROCHLORIDE AND EPINEPHRINE 10; 10 MG/ML; UG/ML
INJECTION, SOLUTION INFILTRATION; PERINEURAL
Status: DISPENSED
Start: 2019-12-20

## (undated) RX ORDER — FENTANYL CITRATE 50 UG/ML
INJECTION, SOLUTION INTRAMUSCULAR; INTRAVENOUS
Status: DISPENSED
Start: 2019-12-20

## (undated) RX ORDER — PHENYLEPHRINE HCL IN 0.9% NACL 1 MG/10 ML
SYRINGE (ML) INTRAVENOUS
Status: DISPENSED
Start: 2019-12-01

## (undated) RX ORDER — FENTANYL CITRATE 50 UG/ML
INJECTION, SOLUTION INTRAMUSCULAR; INTRAVENOUS
Status: DISPENSED
Start: 2019-12-05

## (undated) RX ORDER — PROPOFOL 10 MG/ML
INJECTION, EMULSION INTRAVENOUS
Status: DISPENSED
Start: 2019-12-01

## (undated) RX ORDER — LIDOCAINE HYDROCHLORIDE 20 MG/ML
INJECTION, SOLUTION EPIDURAL; INFILTRATION; INTRACAUDAL; PERINEURAL
Status: DISPENSED
Start: 2019-12-01

## (undated) RX ORDER — BUPIVACAINE HYDROCHLORIDE 2.5 MG/ML
INJECTION, SOLUTION EPIDURAL; INFILTRATION; INTRACAUDAL
Status: DISPENSED
Start: 2019-12-05

## (undated) RX ORDER — PROPOFOL 10 MG/ML
INJECTION, EMULSION INTRAVENOUS
Status: DISPENSED
Start: 2019-12-20

## (undated) RX ORDER — LIDOCAINE HYDROCHLORIDE AND EPINEPHRINE 10; 10 MG/ML; UG/ML
INJECTION, SOLUTION INFILTRATION; PERINEURAL
Status: DISPENSED
Start: 2019-12-01

## (undated) RX ORDER — FENTANYL CITRATE 50 UG/ML
INJECTION, SOLUTION INTRAMUSCULAR; INTRAVENOUS
Status: DISPENSED
Start: 2019-12-10

## (undated) RX ORDER — LIDOCAINE HYDROCHLORIDE 10 MG/ML
INJECTION, SOLUTION EPIDURAL; INFILTRATION; INTRACAUDAL; PERINEURAL
Status: DISPENSED
Start: 2019-12-02

## (undated) RX ORDER — PHENYLEPHRINE HCL IN 0.9% NACL 1 MG/10 ML
SYRINGE (ML) INTRAVENOUS
Status: DISPENSED
Start: 2019-12-05

## (undated) RX ORDER — LIDOCAINE HYDROCHLORIDE AND EPINEPHRINE 10; 10 MG/ML; UG/ML
INJECTION, SOLUTION INFILTRATION; PERINEURAL
Status: DISPENSED
Start: 2019-12-10

## (undated) RX ORDER — FENTANYL CITRATE 50 UG/ML
INJECTION, SOLUTION INTRAMUSCULAR; INTRAVENOUS
Status: DISPENSED
Start: 2019-12-01